# Patient Record
Sex: FEMALE | Employment: OTHER | URBAN - METROPOLITAN AREA
[De-identification: names, ages, dates, MRNs, and addresses within clinical notes are randomized per-mention and may not be internally consistent; named-entity substitution may affect disease eponyms.]

---

## 2019-01-14 ENCOUNTER — ANESTHESIA EVENT (OUTPATIENT)
Dept: OPERATING ROOM | Age: 44
DRG: 304 | End: 2019-01-14
Payer: MEDICARE

## 2019-01-14 ENCOUNTER — HOSPITAL ENCOUNTER (OUTPATIENT)
Dept: PREADMISSION TESTING | Age: 44
Discharge: HOME | DRG: 304 | End: 2019-01-18
Payer: MEDICARE

## 2019-01-14 ENCOUNTER — HOSPITAL ENCOUNTER (OUTPATIENT)
Dept: GENERAL RADIOLOGY | Age: 44
Discharge: HOME | DRG: 304 | End: 2019-01-16
Attending: NEUROLOGICAL SURGERY
Payer: MEDICARE

## 2019-01-14 VITALS
RESPIRATION RATE: 16 BRPM | BODY MASS INDEX: 31.66 KG/M2 | DIASTOLIC BLOOD PRESSURE: 83 MMHG | WEIGHT: 197 LBS | HEART RATE: 65 BPM | OXYGEN SATURATION: 98 % | HEIGHT: 66 IN | SYSTOLIC BLOOD PRESSURE: 143 MMHG | TEMPERATURE: 97.4 F

## 2019-01-14 DIAGNOSIS — M51.27 HERNIATION OF INTERVERTEBRAL DISC BETWEEN L5 AND S1: ICD-10-CM

## 2019-01-14 LAB
ABO/RH: NORMAL
ANION GAP SERPL CALCULATED.3IONS-SCNC: 13 MEQ/L (ref 7–13)
ANTIBODY SCREEN: NORMAL
APTT: 28.4 SEC (ref 21.6–35.4)
BACTERIA: ABNORMAL /HPF
BILIRUBIN URINE: NEGATIVE
BLOOD, URINE: NEGATIVE
BUN BLDV-MCNC: 15 MG/DL (ref 6–20)
CALCIUM SERPL-MCNC: 9.5 MG/DL (ref 8.6–10.2)
CHLORIDE BLD-SCNC: 103 MEQ/L (ref 98–107)
CLARITY: ABNORMAL
CO2: 24 MEQ/L (ref 22–29)
COLOR: ABNORMAL
CREAT SERPL-MCNC: 0.81 MG/DL (ref 0.5–0.9)
EKG ATRIAL RATE: 73 BPM
EKG P AXIS: 68 DEGREES
EKG P-R INTERVAL: 190 MS
EKG Q-T INTERVAL: 388 MS
EKG QRS DURATION: 98 MS
EKG QTC CALCULATION (BAZETT): 427 MS
EKG R AXIS: 80 DEGREES
EKG T AXIS: 62 DEGREES
EKG VENTRICULAR RATE: 73 BPM
EPITHELIAL CELLS, UA: ABNORMAL /HPF (ref 0–5)
GFR AFRICAN AMERICAN: >60
GFR NON-AFRICAN AMERICAN: >60
GLUCOSE BLD-MCNC: 79 MG/DL (ref 74–109)
GLUCOSE URINE: NEGATIVE MG/DL
HCT VFR BLD CALC: 48.8 % (ref 37–47)
HEMOGLOBIN: 17.1 G/DL (ref 12–16)
HYALINE CASTS: ABNORMAL /HPF (ref 0–5)
INR BLD: 1
KETONES, URINE: 15 MG/DL
LEUKOCYTE ESTERASE, URINE: ABNORMAL
MCH RBC QN AUTO: 34 PG (ref 27–31.3)
MCHC RBC AUTO-ENTMCNC: 35.1 % (ref 33–37)
MCV RBC AUTO: 96.9 FL (ref 82–100)
NITRITE, URINE: NEGATIVE
PDW BLD-RTO: 13.3 % (ref 11.5–14.5)
PH UA: 5 (ref 5–9)
PLATELET # BLD: 215 K/UL (ref 130–400)
POTASSIUM SERPL-SCNC: 4 MEQ/L (ref 3.5–5.1)
PROTEIN UA: NEGATIVE MG/DL
PROTHROMBIN TIME: 10.6 SEC (ref 9–11.5)
RBC # BLD: 5.03 M/UL (ref 4.2–5.4)
RBC UA: ABNORMAL /HPF (ref 0–2)
SODIUM BLD-SCNC: 140 MEQ/L (ref 132–144)
SPECIFIC GRAVITY UA: 1.03 (ref 1–1.03)
URINE REFLEX TO CULTURE: YES
UROBILINOGEN, URINE: 0.2 E.U./DL
WBC # BLD: 8.2 K/UL (ref 4.8–10.8)
WBC UA: ABNORMAL /HPF (ref 0–5)

## 2019-01-14 PROCEDURE — 81001 URINALYSIS AUTO W/SCOPE: CPT

## 2019-01-14 PROCEDURE — 87077 CULTURE AEROBIC IDENTIFY: CPT

## 2019-01-14 PROCEDURE — 86850 RBC ANTIBODY SCREEN: CPT

## 2019-01-14 PROCEDURE — 87086 URINE CULTURE/COLONY COUNT: CPT

## 2019-01-14 PROCEDURE — 80048 BASIC METABOLIC PNL TOTAL CA: CPT

## 2019-01-14 PROCEDURE — 72082 X-RAY EXAM ENTIRE SPI 2/3 VW: CPT

## 2019-01-14 PROCEDURE — 86900 BLOOD TYPING SEROLOGIC ABO: CPT

## 2019-01-14 PROCEDURE — 93005 ELECTROCARDIOGRAM TRACING: CPT

## 2019-01-14 PROCEDURE — 86901 BLOOD TYPING SEROLOGIC RH(D): CPT

## 2019-01-14 PROCEDURE — 87186 SC STD MICRODIL/AGAR DIL: CPT

## 2019-01-14 PROCEDURE — 85730 THROMBOPLASTIN TIME PARTIAL: CPT

## 2019-01-14 PROCEDURE — 85027 COMPLETE CBC AUTOMATED: CPT

## 2019-01-14 PROCEDURE — 87081 CULTURE SCREEN ONLY: CPT

## 2019-01-14 PROCEDURE — 85610 PROTHROMBIN TIME: CPT

## 2019-01-14 RX ORDER — AMLODIPINE BESYLATE 5 MG/1
5 TABLET ORAL DAILY
COMMUNITY
Start: 2018-12-22

## 2019-01-14 RX ORDER — SODIUM CHLORIDE, SODIUM LACTATE, POTASSIUM CHLORIDE, CALCIUM CHLORIDE 600; 310; 30; 20 MG/100ML; MG/100ML; MG/100ML; MG/100ML
INJECTION, SOLUTION INTRAVENOUS CONTINUOUS
Status: CANCELLED | OUTPATIENT
Start: 2019-01-15

## 2019-01-14 RX ORDER — LIDOCAINE HYDROCHLORIDE 10 MG/ML
1 INJECTION, SOLUTION EPIDURAL; INFILTRATION; INTRACAUDAL; PERINEURAL
Status: CANCELLED | OUTPATIENT
Start: 2019-01-15 | End: 2019-01-15

## 2019-01-14 RX ORDER — GENTAMICIN SULFATE 80 MG/100ML
80 INJECTION, SOLUTION INTRAVENOUS ONCE
Status: CANCELLED | OUTPATIENT
Start: 2019-01-15

## 2019-01-14 RX ORDER — OXYCODONE HYDROCHLORIDE AND ACETAMINOPHEN 5; 325 MG/1; MG/1
1 TABLET ORAL DAILY
Status: ON HOLD | COMMUNITY
End: 2019-01-17 | Stop reason: HOSPADM

## 2019-01-14 RX ORDER — SODIUM CHLORIDE 0.9 % (FLUSH) 0.9 %
10 SYRINGE (ML) INJECTION EVERY 12 HOURS SCHEDULED
Status: CANCELLED | OUTPATIENT
Start: 2019-01-15

## 2019-01-14 RX ORDER — SODIUM CHLORIDE 0.9 % (FLUSH) 0.9 %
10 SYRINGE (ML) INJECTION PRN
Status: CANCELLED | OUTPATIENT
Start: 2019-01-15

## 2019-01-14 RX ORDER — CEFAZOLIN SODIUM 2 G/50ML
2 SOLUTION INTRAVENOUS ONCE
Status: CANCELLED | OUTPATIENT
Start: 2019-01-15

## 2019-01-14 ASSESSMENT — ENCOUNTER SYMPTOMS
BACK PAIN: 1
SHORTNESS OF BREATH: 1
EYE ITCHING: 1
CONSTIPATION: 0
VOMITING: 1
WHEEZING: 1
STRIDOR: 0
NAUSEA: 1
COUGH: 1
DIARRHEA: 0
SORE THROAT: 0

## 2019-01-14 ASSESSMENT — LIFESTYLE VARIABLES: SMOKING_STATUS: 1

## 2019-01-15 ENCOUNTER — APPOINTMENT (OUTPATIENT)
Dept: GENERAL RADIOLOGY | Age: 44
DRG: 304 | End: 2019-01-15
Attending: NEUROLOGICAL SURGERY
Payer: MEDICARE

## 2019-01-15 ENCOUNTER — HOSPITAL ENCOUNTER (INPATIENT)
Age: 44
LOS: 2 days | Discharge: HOME HEALTH | DRG: 304 | End: 2019-01-17
Attending: NEUROLOGICAL SURGERY | Admitting: NEUROLOGICAL SURGERY
Payer: MEDICARE

## 2019-01-15 ENCOUNTER — ANESTHESIA (OUTPATIENT)
Dept: OPERATING ROOM | Age: 44
DRG: 304 | End: 2019-01-15
Payer: MEDICARE

## 2019-01-15 VITALS — SYSTOLIC BLOOD PRESSURE: 114 MMHG | DIASTOLIC BLOOD PRESSURE: 67 MMHG | TEMPERATURE: 97.5 F | OXYGEN SATURATION: 100 %

## 2019-01-15 DIAGNOSIS — M51.36 LUMBAR DEGENERATIVE DISC DISEASE: ICD-10-CM

## 2019-01-15 DIAGNOSIS — Z98.1 STATUS POST LUMBAR SPINAL FUSION: Primary | ICD-10-CM

## 2019-01-15 PROBLEM — M51.369 LUMBAR DEGENERATIVE DISC DISEASE: Status: ACTIVE | Noted: 2019-01-15

## 2019-01-15 LAB
HCG, URINE, POC: NORMAL
Lab: NORMAL
MRSA CULTURE ONLY: NORMAL
NEGATIVE QC PASS/FAIL: NORMAL
POSITIVE QC PASS/FAIL: NORMAL

## 2019-01-15 PROCEDURE — 88304 TISSUE EXAM BY PATHOLOGIST: CPT

## 2019-01-15 PROCEDURE — 6360000002 HC RX W HCPCS: Performed by: NURSE PRACTITIONER

## 2019-01-15 PROCEDURE — 6370000000 HC RX 637 (ALT 250 FOR IP): Performed by: NEUROLOGICAL SURGERY

## 2019-01-15 PROCEDURE — 2500000003 HC RX 250 WO HCPCS: Performed by: NEUROLOGICAL SURGERY

## 2019-01-15 PROCEDURE — 2580000003 HC RX 258: Performed by: NURSE PRACTITIONER

## 2019-01-15 PROCEDURE — 6360000002 HC RX W HCPCS: Performed by: NURSE ANESTHETIST, CERTIFIED REGISTERED

## 2019-01-15 PROCEDURE — 3209999900 FLUORO FOR SURGICAL PROCEDURES

## 2019-01-15 PROCEDURE — 1210000000 HC MED SURG R&B

## 2019-01-15 PROCEDURE — 0SB40ZZ EXCISION OF LUMBOSACRAL DISC, OPEN APPROACH: ICD-10-PCS | Performed by: NEUROLOGICAL SURGERY

## 2019-01-15 PROCEDURE — 0SG30K1 FUSION OF LUMBOSACRAL JOINT WITH NONAUTOLOGOUS TISSUE SUBSTITUTE, POSTERIOR APPROACH, POSTERIOR COLUMN, OPEN APPROACH: ICD-10-PCS | Performed by: NEUROLOGICAL SURGERY

## 2019-01-15 PROCEDURE — 01NB0ZZ RELEASE LUMBAR NERVE, OPEN APPROACH: ICD-10-PCS | Performed by: NEUROLOGICAL SURGERY

## 2019-01-15 PROCEDURE — 6360000002 HC RX W HCPCS: Performed by: NEUROLOGICAL SURGERY

## 2019-01-15 PROCEDURE — 94150 VITAL CAPACITY TEST: CPT

## 2019-01-15 PROCEDURE — 2500000003 HC RX 250 WO HCPCS: Performed by: NURSE ANESTHETIST, CERTIFIED REGISTERED

## 2019-01-15 PROCEDURE — 7100000000 HC PACU RECOVERY - FIRST 15 MIN: Performed by: NEUROLOGICAL SURGERY

## 2019-01-15 PROCEDURE — 2780000010 HC IMPLANT OTHER: Performed by: NEUROLOGICAL SURGERY

## 2019-01-15 PROCEDURE — 2580000003 HC RX 258: Performed by: NEUROLOGICAL SURGERY

## 2019-01-15 PROCEDURE — 3600000004 HC SURGERY LEVEL 4 BASE: Performed by: NEUROLOGICAL SURGERY

## 2019-01-15 PROCEDURE — 6360000002 HC RX W HCPCS: Performed by: STUDENT IN AN ORGANIZED HEALTH CARE EDUCATION/TRAINING PROGRAM

## 2019-01-15 PROCEDURE — 2720000010 HC SURG SUPPLY STERILE: Performed by: NEUROLOGICAL SURGERY

## 2019-01-15 PROCEDURE — 3700000001 HC ADD 15 MINUTES (ANESTHESIA): Performed by: NEUROLOGICAL SURGERY

## 2019-01-15 PROCEDURE — 0SG30AJ FUSION OF LUMBOSACRAL JOINT WITH INTERBODY FUSION DEVICE, POSTERIOR APPROACH, ANTERIOR COLUMN, OPEN APPROACH: ICD-10-PCS | Performed by: NEUROLOGICAL SURGERY

## 2019-01-15 PROCEDURE — C1713 ANCHOR/SCREW BN/BN,TIS/BN: HCPCS | Performed by: NEUROLOGICAL SURGERY

## 2019-01-15 PROCEDURE — 3700000000 HC ANESTHESIA ATTENDED CARE: Performed by: NEUROLOGICAL SURGERY

## 2019-01-15 PROCEDURE — 94664 DEMO&/EVAL PT USE INHALER: CPT

## 2019-01-15 PROCEDURE — 7100000001 HC PACU RECOVERY - ADDTL 15 MIN: Performed by: NEUROLOGICAL SURGERY

## 2019-01-15 PROCEDURE — S0028 INJECTION, FAMOTIDINE, 20 MG: HCPCS | Performed by: NURSE ANESTHETIST, CERTIFIED REGISTERED

## 2019-01-15 PROCEDURE — 94640 AIRWAY INHALATION TREATMENT: CPT

## 2019-01-15 PROCEDURE — 3600000014 HC SURGERY LEVEL 4 ADDTL 15MIN: Performed by: NEUROLOGICAL SURGERY

## 2019-01-15 PROCEDURE — 94762 N-INVAS EAR/PLS OXIMTRY CONT: CPT

## 2019-01-15 PROCEDURE — 2709999900 HC NON-CHARGEABLE SUPPLY: Performed by: NEUROLOGICAL SURGERY

## 2019-01-15 DEVICE — ROD SPNL LORDTC 5.5X45 MM TI RELINE-O: Type: IMPLANTABLE DEVICE | Site: SPINE LUMBAR | Status: FUNCTIONAL

## 2019-01-15 DEVICE — SCREW SPNL DIA5.5MM OPN TULIP LOK RELINE: Type: IMPLANTABLE DEVICE | Site: SPINE LUMBAR | Status: FUNCTIONAL

## 2019-01-15 DEVICE — SCREW SPNL L45MM DIA6.5MM POST THORACOLUMBOSACRAL POLYAX 2S: Type: IMPLANTABLE DEVICE | Site: SPINE LUMBAR | Status: FUNCTIONAL

## 2019-01-15 DEVICE — SCREW SPNL L55MM DIA6.5MM POST THORACOLUMBOSACRAL POLYAX 2S: Type: IMPLANTABLE DEVICE | Site: SPINE LUMBAR | Status: FUNCTIONAL

## 2019-01-15 DEVICE — GRAFT CHIP CANC OSTEOCEL 10CC: Type: IMPLANTABLE DEVICE | Site: SPINE LUMBAR | Status: FUNCTIONAL

## 2019-01-15 DEVICE — GRAFT BNE SUB 5ML MTRX CELLULAR OSTEOCEL +: Type: IMPLANTABLE DEVICE | Site: SPINE LUMBAR | Status: FUNCTIONAL

## 2019-01-15 DEVICE — GRAFT BNE SUB 30CC 1.7-10MM CANC CHIP MORSELIZED FRZ DRY: Type: IMPLANTABLE DEVICE | Site: SPINE LUMBAR | Status: FUNCTIONAL

## 2019-01-15 DEVICE — AGENT HEMOSTATIC SURGIFLOW MATRIX KIT W/THROMBIN: Type: IMPLANTABLE DEVICE | Site: SPINE LUMBAR | Status: FUNCTIONAL

## 2019-01-15 DEVICE — CONNECTOR SPNL L40-50MM CRSS LO PROF ADJ FOR 5.5MM ROD: Type: IMPLANTABLE DEVICE | Site: SPINE LUMBAR | Status: FUNCTIONAL

## 2019-01-15 RX ORDER — TIZANIDINE 4 MG/1
4 TABLET ORAL NIGHTLY
Status: ON HOLD | COMMUNITY
End: 2019-01-17 | Stop reason: HOSPADM

## 2019-01-15 RX ORDER — GLYCOPYRROLATE 1 MG/5 ML
SYRINGE (ML) INTRAVENOUS PRN
Status: DISCONTINUED | OUTPATIENT
Start: 2019-01-15 | End: 2019-01-15 | Stop reason: SDUPTHER

## 2019-01-15 RX ORDER — DEXAMETHASONE SODIUM PHOSPHATE 4 MG/ML
4 INJECTION, SOLUTION INTRA-ARTICULAR; INTRALESIONAL; INTRAMUSCULAR; INTRAVENOUS; SOFT TISSUE EVERY 6 HOURS
Status: DISCONTINUED | OUTPATIENT
Start: 2019-01-15 | End: 2019-01-16

## 2019-01-15 RX ORDER — MEPERIDINE HYDROCHLORIDE 25 MG/ML
12.5 INJECTION INTRAMUSCULAR; INTRAVENOUS; SUBCUTANEOUS EVERY 5 MIN PRN
Status: DISCONTINUED | OUTPATIENT
Start: 2019-01-15 | End: 2019-01-15 | Stop reason: HOSPADM

## 2019-01-15 RX ORDER — DIPHENHYDRAMINE HYDROCHLORIDE 50 MG/ML
12.5 INJECTION INTRAMUSCULAR; INTRAVENOUS
Status: DISCONTINUED | OUTPATIENT
Start: 2019-01-15 | End: 2019-01-15 | Stop reason: HOSPADM

## 2019-01-15 RX ORDER — ACETAMINOPHEN 325 MG/1
650 TABLET ORAL EVERY 4 HOURS PRN
Status: DISCONTINUED | OUTPATIENT
Start: 2019-01-15 | End: 2019-01-17 | Stop reason: HOSPADM

## 2019-01-15 RX ORDER — ALBUTEROL SULFATE 90 UG/1
2 AEROSOL, METERED RESPIRATORY (INHALATION)
Status: DISCONTINUED | OUTPATIENT
Start: 2019-01-15 | End: 2019-01-17 | Stop reason: HOSPADM

## 2019-01-15 RX ORDER — FENTANYL CITRATE 50 UG/ML
50 INJECTION, SOLUTION INTRAMUSCULAR; INTRAVENOUS EVERY 10 MIN PRN
Status: DISCONTINUED | OUTPATIENT
Start: 2019-01-15 | End: 2019-01-15 | Stop reason: HOSPADM

## 2019-01-15 RX ORDER — ROCURONIUM BROMIDE 10 MG/ML
INJECTION, SOLUTION INTRAVENOUS PRN
Status: DISCONTINUED | OUTPATIENT
Start: 2019-01-15 | End: 2019-01-15 | Stop reason: SDUPTHER

## 2019-01-15 RX ORDER — PROPOFOL 10 MG/ML
INJECTION, EMULSION INTRAVENOUS PRN
Status: DISCONTINUED | OUTPATIENT
Start: 2019-01-15 | End: 2019-01-15 | Stop reason: SDUPTHER

## 2019-01-15 RX ORDER — ASCORBIC ACID 500 MG
500 TABLET ORAL DAILY
Status: DISCONTINUED | OUTPATIENT
Start: 2019-01-15 | End: 2019-01-17 | Stop reason: HOSPADM

## 2019-01-15 RX ORDER — CEFAZOLIN SODIUM 2 G/50ML
2 SOLUTION INTRAVENOUS ONCE
Status: COMPLETED | OUTPATIENT
Start: 2019-01-15 | End: 2019-01-15

## 2019-01-15 RX ORDER — MAGNESIUM HYDROXIDE 1200 MG/15ML
LIQUID ORAL CONTINUOUS PRN
Status: COMPLETED | OUTPATIENT
Start: 2019-01-15 | End: 2019-01-15

## 2019-01-15 RX ORDER — ONDANSETRON 2 MG/ML
4 INJECTION INTRAMUSCULAR; INTRAVENOUS EVERY 6 HOURS PRN
Status: DISCONTINUED | OUTPATIENT
Start: 2019-01-15 | End: 2019-01-17 | Stop reason: HOSPADM

## 2019-01-15 RX ORDER — ALBUTEROL SULFATE 90 UG/1
2 AEROSOL, METERED RESPIRATORY (INHALATION) EVERY 6 HOURS PRN
Status: DISCONTINUED | OUTPATIENT
Start: 2019-01-15 | End: 2019-01-15

## 2019-01-15 RX ORDER — GINSENG 100 MG
CAPSULE ORAL PRN
Status: DISCONTINUED | OUTPATIENT
Start: 2019-01-15 | End: 2019-01-15 | Stop reason: HOSPADM

## 2019-01-15 RX ORDER — FENTANYL 25 UG/H
1 PATCH TRANSDERMAL
Status: DISCONTINUED | OUTPATIENT
Start: 2019-01-15 | End: 2019-01-16

## 2019-01-15 RX ORDER — LIDOCAINE HYDROCHLORIDE 10 MG/ML
1 INJECTION, SOLUTION EPIDURAL; INFILTRATION; INTRACAUDAL; PERINEURAL
Status: DISCONTINUED | OUTPATIENT
Start: 2019-01-15 | End: 2019-01-15 | Stop reason: HOSPADM

## 2019-01-15 RX ORDER — MORPHINE SULFATE 4 MG/ML
4 INJECTION, SOLUTION INTRAMUSCULAR; INTRAVENOUS
Status: DISCONTINUED | OUTPATIENT
Start: 2019-01-15 | End: 2019-01-16

## 2019-01-15 RX ORDER — SODIUM CHLORIDE 0.9 % (FLUSH) 0.9 %
10 SYRINGE (ML) INJECTION EVERY 12 HOURS SCHEDULED
Status: DISCONTINUED | OUTPATIENT
Start: 2019-01-15 | End: 2019-01-17 | Stop reason: HOSPADM

## 2019-01-15 RX ORDER — OXYCODONE HYDROCHLORIDE AND ACETAMINOPHEN 5; 325 MG/1; MG/1
1 TABLET ORAL DAILY
Status: DISCONTINUED | OUTPATIENT
Start: 2019-01-15 | End: 2019-01-16

## 2019-01-15 RX ORDER — AMLODIPINE BESYLATE 5 MG/1
5 TABLET ORAL DAILY
Status: DISCONTINUED | OUTPATIENT
Start: 2019-01-16 | End: 2019-01-17 | Stop reason: HOSPADM

## 2019-01-15 RX ORDER — ONDANSETRON 4 MG/1
4 TABLET, FILM COATED ORAL EVERY 8 HOURS PRN
COMMUNITY

## 2019-01-15 RX ORDER — SODIUM CHLORIDE, SODIUM LACTATE, POTASSIUM CHLORIDE, CALCIUM CHLORIDE 600; 310; 30; 20 MG/100ML; MG/100ML; MG/100ML; MG/100ML
INJECTION, SOLUTION INTRAVENOUS CONTINUOUS
Status: DISCONTINUED | OUTPATIENT
Start: 2019-01-15 | End: 2019-01-15

## 2019-01-15 RX ORDER — CEPHALEXIN 500 MG/1
500 CAPSULE ORAL EVERY 8 HOURS SCHEDULED
Status: DISCONTINUED | OUTPATIENT
Start: 2019-01-16 | End: 2019-01-17 | Stop reason: HOSPADM

## 2019-01-15 RX ORDER — FAMOTIDINE 20 MG/1
20 TABLET, FILM COATED ORAL 2 TIMES DAILY
Status: DISCONTINUED | OUTPATIENT
Start: 2019-01-15 | End: 2019-01-17 | Stop reason: HOSPADM

## 2019-01-15 RX ORDER — DEXTROSE, SODIUM CHLORIDE, AND POTASSIUM CHLORIDE 5; .45; .15 G/100ML; G/100ML; G/100ML
INJECTION INTRAVENOUS CONTINUOUS
Status: DISCONTINUED | OUTPATIENT
Start: 2019-01-15 | End: 2019-01-16

## 2019-01-15 RX ORDER — PANTOPRAZOLE SODIUM 40 MG/1
40 TABLET, DELAYED RELEASE ORAL
Status: DISCONTINUED | OUTPATIENT
Start: 2019-01-16 | End: 2019-01-17 | Stop reason: HOSPADM

## 2019-01-15 RX ORDER — MIDAZOLAM HYDROCHLORIDE 1 MG/ML
INJECTION INTRAMUSCULAR; INTRAVENOUS PRN
Status: DISCONTINUED | OUTPATIENT
Start: 2019-01-15 | End: 2019-01-15 | Stop reason: SDUPTHER

## 2019-01-15 RX ORDER — CYCLOBENZAPRINE HCL 10 MG
10 TABLET ORAL 3 TIMES DAILY PRN
Status: DISCONTINUED | OUTPATIENT
Start: 2019-01-15 | End: 2019-01-16

## 2019-01-15 RX ORDER — ONDANSETRON 2 MG/ML
INJECTION INTRAMUSCULAR; INTRAVENOUS PRN
Status: DISCONTINUED | OUTPATIENT
Start: 2019-01-15 | End: 2019-01-15 | Stop reason: SDUPTHER

## 2019-01-15 RX ORDER — DEXAMETHASONE SODIUM PHOSPHATE 10 MG/ML
INJECTION INTRAMUSCULAR; INTRAVENOUS PRN
Status: DISCONTINUED | OUTPATIENT
Start: 2019-01-15 | End: 2019-01-15 | Stop reason: SDUPTHER

## 2019-01-15 RX ORDER — ONDANSETRON 2 MG/ML
4 INJECTION INTRAMUSCULAR; INTRAVENOUS
Status: DISCONTINUED | OUTPATIENT
Start: 2019-01-15 | End: 2019-01-15 | Stop reason: HOSPADM

## 2019-01-15 RX ORDER — SODIUM CHLORIDE 0.9 % (FLUSH) 0.9 %
10 SYRINGE (ML) INJECTION PRN
Status: DISCONTINUED | OUTPATIENT
Start: 2019-01-15 | End: 2019-01-15 | Stop reason: HOSPADM

## 2019-01-15 RX ORDER — SENNA AND DOCUSATE SODIUM 50; 8.6 MG/1; MG/1
2 TABLET, FILM COATED ORAL 2 TIMES DAILY
Status: DISCONTINUED | OUTPATIENT
Start: 2019-01-15 | End: 2019-01-17 | Stop reason: HOSPADM

## 2019-01-15 RX ORDER — FENTANYL CITRATE 50 UG/ML
INJECTION, SOLUTION INTRAMUSCULAR; INTRAVENOUS PRN
Status: DISCONTINUED | OUTPATIENT
Start: 2019-01-15 | End: 2019-01-15 | Stop reason: SDUPTHER

## 2019-01-15 RX ORDER — SUCCINYLCHOLINE/SOD CL,ISO/PF 100 MG/5ML
SYRINGE (ML) INTRAVENOUS PRN
Status: DISCONTINUED | OUTPATIENT
Start: 2019-01-15 | End: 2019-01-15 | Stop reason: SDUPTHER

## 2019-01-15 RX ORDER — GABAPENTIN 400 MG/1
400 CAPSULE ORAL 4 TIMES DAILY
Status: DISCONTINUED | OUTPATIENT
Start: 2019-01-15 | End: 2019-01-17 | Stop reason: HOSPADM

## 2019-01-15 RX ORDER — CEFAZOLIN SODIUM 2 G/50ML
2 SOLUTION INTRAVENOUS EVERY 8 HOURS
Status: COMPLETED | OUTPATIENT
Start: 2019-01-15 | End: 2019-01-16

## 2019-01-15 RX ORDER — MORPHINE SULFATE 2 MG/ML
2 INJECTION, SOLUTION INTRAMUSCULAR; INTRAVENOUS
Status: DISCONTINUED | OUTPATIENT
Start: 2019-01-15 | End: 2019-01-16

## 2019-01-15 RX ORDER — OXYCODONE HYDROCHLORIDE AND ACETAMINOPHEN 5; 325 MG/1; MG/1
1 TABLET ORAL EVERY 4 HOURS PRN
Status: DISCONTINUED | OUTPATIENT
Start: 2019-01-15 | End: 2019-01-16

## 2019-01-15 RX ORDER — OXYCODONE HYDROCHLORIDE AND ACETAMINOPHEN 5; 325 MG/1; MG/1
2 TABLET ORAL EVERY 4 HOURS PRN
Status: DISCONTINUED | OUTPATIENT
Start: 2019-01-15 | End: 2019-01-16

## 2019-01-15 RX ORDER — DIPHENHYDRAMINE HYDROCHLORIDE 50 MG/ML
INJECTION INTRAMUSCULAR; INTRAVENOUS PRN
Status: DISCONTINUED | OUTPATIENT
Start: 2019-01-15 | End: 2019-01-15 | Stop reason: SDUPTHER

## 2019-01-15 RX ORDER — TIZANIDINE 4 MG/1
4 TABLET ORAL NIGHTLY
Status: DISCONTINUED | OUTPATIENT
Start: 2019-01-15 | End: 2019-01-17 | Stop reason: HOSPADM

## 2019-01-15 RX ORDER — ALBUTEROL SULFATE 90 UG/1
2 AEROSOL, METERED RESPIRATORY (INHALATION) EVERY 4 HOURS PRN
Status: DISCONTINUED | OUTPATIENT
Start: 2019-01-15 | End: 2019-01-15

## 2019-01-15 RX ORDER — ONDANSETRON 4 MG/1
4 TABLET, FILM COATED ORAL EVERY 8 HOURS PRN
Status: DISCONTINUED | OUTPATIENT
Start: 2019-01-15 | End: 2019-01-17 | Stop reason: HOSPADM

## 2019-01-15 RX ORDER — ASCORBIC ACID 500 MG
500 TABLET ORAL DAILY
COMMUNITY

## 2019-01-15 RX ORDER — LIDOCAINE HYDROCHLORIDE 20 MG/ML
INJECTION, SOLUTION INFILTRATION; PERINEURAL PRN
Status: DISCONTINUED | OUTPATIENT
Start: 2019-01-15 | End: 2019-01-15 | Stop reason: SDUPTHER

## 2019-01-15 RX ORDER — FERROUS SULFATE 325(65) MG
65 TABLET ORAL
COMMUNITY

## 2019-01-15 RX ORDER — ALBUTEROL SULFATE 90 UG/1
2 AEROSOL, METERED RESPIRATORY (INHALATION) 4 TIMES DAILY
Status: DISCONTINUED | OUTPATIENT
Start: 2019-01-15 | End: 2019-01-17 | Stop reason: HOSPADM

## 2019-01-15 RX ORDER — SODIUM CHLORIDE 0.9 % (FLUSH) 0.9 %
10 SYRINGE (ML) INJECTION PRN
Status: DISCONTINUED | OUTPATIENT
Start: 2019-01-15 | End: 2019-01-17 | Stop reason: HOSPADM

## 2019-01-15 RX ORDER — SODIUM CHLORIDE 0.9 % (FLUSH) 0.9 %
10 SYRINGE (ML) INJECTION EVERY 12 HOURS SCHEDULED
Status: DISCONTINUED | OUTPATIENT
Start: 2019-01-15 | End: 2019-01-15 | Stop reason: HOSPADM

## 2019-01-15 RX ORDER — FERROUS SULFATE 325(65) MG
325 TABLET ORAL
Status: DISCONTINUED | OUTPATIENT
Start: 2019-01-16 | End: 2019-01-17 | Stop reason: HOSPADM

## 2019-01-15 RX ORDER — PROPOFOL 10 MG/ML
INJECTION, EMULSION INTRAVENOUS CONTINUOUS PRN
Status: DISCONTINUED | OUTPATIENT
Start: 2019-01-15 | End: 2019-01-15 | Stop reason: SDUPTHER

## 2019-01-15 RX ORDER — GENTAMICIN SULFATE 80 MG/100ML
80 INJECTION, SOLUTION INTRAVENOUS ONCE
Status: COMPLETED | OUTPATIENT
Start: 2019-01-15 | End: 2019-01-15

## 2019-01-15 RX ORDER — METOCLOPRAMIDE HYDROCHLORIDE 5 MG/ML
10 INJECTION INTRAMUSCULAR; INTRAVENOUS
Status: COMPLETED | OUTPATIENT
Start: 2019-01-15 | End: 2019-01-15

## 2019-01-15 RX ADMIN — FAMOTIDINE 20 MG: 10 INJECTION, SOLUTION INTRAVENOUS at 07:10

## 2019-01-15 RX ADMIN — FAMOTIDINE 20 MG: 20 TABLET, FILM COATED ORAL at 20:27

## 2019-01-15 RX ADMIN — SODIUM CHLORIDE, POTASSIUM CHLORIDE, SODIUM LACTATE AND CALCIUM CHLORIDE 125 ML/HR: 600; 310; 30; 20 INJECTION, SOLUTION INTRAVENOUS at 06:59

## 2019-01-15 RX ADMIN — PROPOFOL 200 MG: 10 INJECTION, EMULSION INTRAVENOUS at 07:22

## 2019-01-15 RX ADMIN — SODIUM CHLORIDE, POTASSIUM CHLORIDE, SODIUM LACTATE AND CALCIUM CHLORIDE: 600; 310; 30; 20 INJECTION, SOLUTION INTRAVENOUS at 08:58

## 2019-01-15 RX ADMIN — MORPHINE SULFATE 2 MG: 2 INJECTION, SOLUTION INTRAMUSCULAR; INTRAVENOUS at 16:50

## 2019-01-15 RX ADMIN — SUGAMMADEX 200 MG: 100 INJECTION, SOLUTION INTRAVENOUS at 10:29

## 2019-01-15 RX ADMIN — CYCLOBENZAPRINE HYDROCHLORIDE 10 MG: 10 TABLET, FILM COATED ORAL at 23:54

## 2019-01-15 RX ADMIN — MEPERIDINE HYDROCHLORIDE 12.5 MG: 25 INJECTION INTRAMUSCULAR; INTRAVENOUS; SUBCUTANEOUS at 11:52

## 2019-01-15 RX ADMIN — SODIUM CHLORIDE, POTASSIUM CHLORIDE, SODIUM LACTATE AND CALCIUM CHLORIDE: 600; 310; 30; 20 INJECTION, SOLUTION INTRAVENOUS at 10:50

## 2019-01-15 RX ADMIN — CEFAZOLIN SODIUM 2 G: 2 SOLUTION INTRAVENOUS at 15:41

## 2019-01-15 RX ADMIN — MEPERIDINE HYDROCHLORIDE 12.5 MG: 25 INJECTION INTRAMUSCULAR; INTRAVENOUS; SUBCUTANEOUS at 11:42

## 2019-01-15 RX ADMIN — FENTANYL CITRATE 100 MCG: 50 INJECTION, SOLUTION INTRAMUSCULAR; INTRAVENOUS at 07:18

## 2019-01-15 RX ADMIN — MIDAZOLAM HYDROCHLORIDE 2 MG: 1 INJECTION, SOLUTION INTRAMUSCULAR; INTRAVENOUS at 07:18

## 2019-01-15 RX ADMIN — GENTAMICIN SULFATE 80 MG: 80 INJECTION, SOLUTION INTRAVENOUS at 07:45

## 2019-01-15 RX ADMIN — POTASSIUM CHLORIDE, DEXTROSE MONOHYDRATE AND SODIUM CHLORIDE: 150; 5; 450 INJECTION, SOLUTION INTRAVENOUS at 23:56

## 2019-01-15 RX ADMIN — CEFAZOLIN SODIUM 2 G: 2 SOLUTION INTRAVENOUS at 07:40

## 2019-01-15 RX ADMIN — DEXAMETHASONE SODIUM PHOSPHATE 4 MG: 4 INJECTION, SOLUTION INTRAMUSCULAR; INTRAVENOUS at 13:26

## 2019-01-15 RX ADMIN — FENTANYL CITRATE 50 MCG: 50 INJECTION, SOLUTION INTRAMUSCULAR; INTRAVENOUS at 11:15

## 2019-01-15 RX ADMIN — DEXAMETHASONE SODIUM PHOSPHATE 10 MG: 10 INJECTION INTRAMUSCULAR; INTRAVENOUS at 07:40

## 2019-01-15 RX ADMIN — PROPOFOL 100 MCG/KG/MIN: 10 INJECTION, EMULSION INTRAVENOUS at 07:40

## 2019-01-15 RX ADMIN — OXYCODONE AND ACETAMINOPHEN 2 TABLET: 5; 325 TABLET ORAL at 15:51

## 2019-01-15 RX ADMIN — Medication 2 PUFF: at 21:28

## 2019-01-15 RX ADMIN — FENTANYL CITRATE 50 MCG: 50 INJECTION, SOLUTION INTRAMUSCULAR; INTRAVENOUS at 11:26

## 2019-01-15 RX ADMIN — HYDROMORPHONE HYDROCHLORIDE 1 MG: 1 INJECTION, SOLUTION INTRAMUSCULAR; INTRAVENOUS; SUBCUTANEOUS at 07:40

## 2019-01-15 RX ADMIN — CYCLOBENZAPRINE HYDROCHLORIDE 10 MG: 10 TABLET, FILM COATED ORAL at 13:21

## 2019-01-15 RX ADMIN — OXYCODONE AND ACETAMINOPHEN 2 TABLET: 5; 325 TABLET ORAL at 20:27

## 2019-01-15 RX ADMIN — LIDOCAINE HYDROCHLORIDE 80 MG: 20 INJECTION, SOLUTION INFILTRATION; PERINEURAL at 07:22

## 2019-01-15 RX ADMIN — Medication 100 MG: at 07:22

## 2019-01-15 RX ADMIN — CEFAZOLIN SODIUM 2 G: 2 SOLUTION INTRAVENOUS at 23:54

## 2019-01-15 RX ADMIN — MORPHINE SULFATE 4 MG: 4 INJECTION, SOLUTION INTRAMUSCULAR; INTRAVENOUS at 13:21

## 2019-01-15 RX ADMIN — OXYCODONE AND ACETAMINOPHEN 1 TABLET: 5; 325 TABLET ORAL at 11:50

## 2019-01-15 RX ADMIN — GABAPENTIN 400 MG: 400 CAPSULE ORAL at 16:50

## 2019-01-15 RX ADMIN — DIPHENHYDRAMINE HYDROCHLORIDE 12.5 MG: 50 INJECTION, SOLUTION INTRAMUSCULAR; INTRAVENOUS at 07:40

## 2019-01-15 RX ADMIN — POTASSIUM CHLORIDE, DEXTROSE MONOHYDRATE AND SODIUM CHLORIDE: 150; 5; 450 INJECTION, SOLUTION INTRAVENOUS at 13:21

## 2019-01-15 RX ADMIN — ROCURONIUM BROMIDE 50 MG: 10 INJECTION INTRAVENOUS at 07:26

## 2019-01-15 RX ADMIN — METOCLOPRAMIDE 10 MG: 5 INJECTION, SOLUTION INTRAMUSCULAR; INTRAVENOUS at 11:17

## 2019-01-15 RX ADMIN — ONDANSETRON 4 MG: 2 INJECTION INTRAMUSCULAR; INTRAVENOUS at 13:21

## 2019-01-15 RX ADMIN — FAMOTIDINE 20 MG: 20 TABLET, FILM COATED ORAL at 13:21

## 2019-01-15 RX ADMIN — SENNOSIDES AND DOCUSATE SODIUM 2 TABLET: 8.6; 5 TABLET ORAL at 20:27

## 2019-01-15 RX ADMIN — DEXAMETHASONE SODIUM PHOSPHATE 4 MG: 4 INJECTION, SOLUTION INTRAMUSCULAR; INTRAVENOUS at 20:27

## 2019-01-15 RX ADMIN — HYDROMORPHONE HYDROCHLORIDE 1 MG: 1 INJECTION, SOLUTION INTRAMUSCULAR; INTRAVENOUS; SUBCUTANEOUS at 10:24

## 2019-01-15 RX ADMIN — GABAPENTIN 400 MG: 400 CAPSULE ORAL at 20:27

## 2019-01-15 RX ADMIN — ONDANSETRON 4 MG: 2 INJECTION INTRAMUSCULAR; INTRAVENOUS at 10:09

## 2019-01-15 RX ADMIN — Medication 0.2 MG: at 10:09

## 2019-01-15 ASSESSMENT — PULMONARY FUNCTION TESTS
PIF_VALUE: 23
PIF_VALUE: 23
PIF_VALUE: 22
PIF_VALUE: 23
PIF_VALUE: 21
PIF_VALUE: 20
PIF_VALUE: 23
PIF_VALUE: 23
PIF_VALUE: 13
PIF_VALUE: 23
PIF_VALUE: 22
PIF_VALUE: 23
PIF_VALUE: 13
PIF_VALUE: 22
PIF_VALUE: 24
PIF_VALUE: 19
PIF_VALUE: 23
PIF_VALUE: 22
PIF_VALUE: 23
PIF_VALUE: 23
PIF_VALUE: 22
PIF_VALUE: 6
PIF_VALUE: 23
PIF_VALUE: 27
PIF_VALUE: 20
PIF_VALUE: 23
PIF_VALUE: 13
PIF_VALUE: 24
PIF_VALUE: 20
PIF_VALUE: 22
PIF_VALUE: 23
PIF_VALUE: 20
PIF_VALUE: 24
PIF_VALUE: 13
PIF_VALUE: 23
PIF_VALUE: 17
PIF_VALUE: 23
PIF_VALUE: 22
PIF_VALUE: 22
PIF_VALUE: 23
PIF_VALUE: 23
PIF_VALUE: 21
PIF_VALUE: 23
PIF_VALUE: 23
PIF_VALUE: 19
PIF_VALUE: 17
PIF_VALUE: 23
PIF_VALUE: 22
PIF_VALUE: 13
PIF_VALUE: 23
PIF_VALUE: 19
PIF_VALUE: 23
PIF_VALUE: 23
PIF_VALUE: 21
PIF_VALUE: 23
PIF_VALUE: 18
PIF_VALUE: 23
PIF_VALUE: 22
PIF_VALUE: 17
PIF_VALUE: 22
PIF_VALUE: 23
PIF_VALUE: 22
PIF_VALUE: 23
PIF_VALUE: 13
PIF_VALUE: 17
PIF_VALUE: 22
PIF_VALUE: 17
PIF_VALUE: 0
PIF_VALUE: 23
PIF_VALUE: 22
PIF_VALUE: 22
PIF_VALUE: 5
PIF_VALUE: 23
PIF_VALUE: 17
PIF_VALUE: 23
PIF_VALUE: 22
PIF_VALUE: 22
PIF_VALUE: 14
PIF_VALUE: 23
PIF_VALUE: 22
PIF_VALUE: 23
PIF_VALUE: 22
PIF_VALUE: 24
PIF_VALUE: 23
PIF_VALUE: 24
PIF_VALUE: 22
PIF_VALUE: 23
PIF_VALUE: 22
PIF_VALUE: 23
PIF_VALUE: 13
PIF_VALUE: 22
PIF_VALUE: 23
PIF_VALUE: 22
PIF_VALUE: 23
PIF_VALUE: 22
PIF_VALUE: 23
PIF_VALUE: 24
PIF_VALUE: 23
PIF_VALUE: 22
PIF_VALUE: 8
PIF_VALUE: 23
PIF_VALUE: 22
PIF_VALUE: 20
PIF_VALUE: 24
PIF_VALUE: 19
PIF_VALUE: 21
PIF_VALUE: 23
PIF_VALUE: 22
PIF_VALUE: 24
PIF_VALUE: 23
PIF_VALUE: 23
PIF_VALUE: 21
PIF_VALUE: 23
PIF_VALUE: 22
PIF_VALUE: 24
PIF_VALUE: 23
PIF_VALUE: 13
PIF_VALUE: 23
PIF_VALUE: 23
PIF_VALUE: 22
PIF_VALUE: 22
PIF_VALUE: 20
PIF_VALUE: 23
PIF_VALUE: 23
PIF_VALUE: 26
PIF_VALUE: 22
PIF_VALUE: 21
PIF_VALUE: 23
PIF_VALUE: 20
PIF_VALUE: 23
PIF_VALUE: 2
PIF_VALUE: 23
PIF_VALUE: 20
PIF_VALUE: 22
PIF_VALUE: 23
PIF_VALUE: 23
PIF_VALUE: 20
PIF_VALUE: 22
PIF_VALUE: 22
PIF_VALUE: 23
PIF_VALUE: 23
PIF_VALUE: 17
PIF_VALUE: 19
PIF_VALUE: 22
PIF_VALUE: 21
PIF_VALUE: 2
PIF_VALUE: 23
PIF_VALUE: 23
PIF_VALUE: 24
PIF_VALUE: 17
PIF_VALUE: 2
PIF_VALUE: 22
PIF_VALUE: 13
PIF_VALUE: 20
PIF_VALUE: 23
PIF_VALUE: 14
PIF_VALUE: 22
PIF_VALUE: 23
PIF_VALUE: 3
PIF_VALUE: 23
PIF_VALUE: 23
PIF_VALUE: 17
PIF_VALUE: 22
PIF_VALUE: 23
PIF_VALUE: 22
PIF_VALUE: 23
PIF_VALUE: 20
PIF_VALUE: 23
PIF_VALUE: 22
PIF_VALUE: 23
PIF_VALUE: 23
PIF_VALUE: 15
PIF_VALUE: 22
PIF_VALUE: 21

## 2019-01-15 ASSESSMENT — PAIN SCALES - GENERAL
PAINLEVEL_OUTOF10: 10
PAINLEVEL_OUTOF10: 9
PAINLEVEL_OUTOF10: 8
PAINLEVEL_OUTOF10: 6
PAINLEVEL_OUTOF10: 8
PAINLEVEL_OUTOF10: 5
PAINLEVEL_OUTOF10: 8
PAINLEVEL_OUTOF10: 8
PAINLEVEL_OUTOF10: 6
PAINLEVEL_OUTOF10: 8
PAINLEVEL_OUTOF10: 0
PAINLEVEL_OUTOF10: 8

## 2019-01-15 ASSESSMENT — PAIN DESCRIPTION - PAIN TYPE
TYPE: ACUTE PAIN
TYPE: CHRONIC PAIN

## 2019-01-15 ASSESSMENT — PAIN DESCRIPTION - LOCATION
LOCATION: BACK;NECK
LOCATION: BACK

## 2019-01-16 ENCOUNTER — APPOINTMENT (OUTPATIENT)
Dept: GENERAL RADIOLOGY | Age: 44
DRG: 304 | End: 2019-01-16
Attending: NEUROLOGICAL SURGERY
Payer: MEDICARE

## 2019-01-16 LAB
ANION GAP SERPL CALCULATED.3IONS-SCNC: 12 MEQ/L (ref 7–13)
BASOPHILS ABSOLUTE: 0 K/UL (ref 0–0.2)
BASOPHILS RELATIVE PERCENT: 0.1 %
BUN BLDV-MCNC: 8 MG/DL (ref 6–20)
CALCIUM SERPL-MCNC: 8.3 MG/DL (ref 8.6–10.2)
CHLORIDE BLD-SCNC: 105 MEQ/L (ref 98–107)
CO2: 22 MEQ/L (ref 22–29)
CREAT SERPL-MCNC: 0.86 MG/DL (ref 0.5–0.9)
EOSINOPHILS ABSOLUTE: 0 K/UL (ref 0–0.7)
EOSINOPHILS RELATIVE PERCENT: 0 %
GFR AFRICAN AMERICAN: >60
GFR NON-AFRICAN AMERICAN: >60
GLUCOSE BLD-MCNC: 154 MG/DL (ref 74–109)
HCT VFR BLD CALC: 38.1 % (ref 37–47)
HEMOGLOBIN: 13.4 G/DL (ref 12–16)
LYMPHOCYTES ABSOLUTE: 0.7 K/UL (ref 1–4.8)
LYMPHOCYTES RELATIVE PERCENT: 3.9 %
MCH RBC QN AUTO: 34 PG (ref 27–31.3)
MCHC RBC AUTO-ENTMCNC: 35.1 % (ref 33–37)
MCV RBC AUTO: 96.6 FL (ref 82–100)
MONOCYTES ABSOLUTE: 0.8 K/UL (ref 0.2–0.8)
MONOCYTES RELATIVE PERCENT: 4.5 %
NEUTROPHILS ABSOLUTE: 15.6 K/UL (ref 1.4–6.5)
NEUTROPHILS RELATIVE PERCENT: 91.5 %
ORGANISM: ABNORMAL
PDW BLD-RTO: 13 % (ref 11.5–14.5)
PLATELET # BLD: 168 K/UL (ref 130–400)
POTASSIUM REFLEX MAGNESIUM: 4.4 MEQ/L (ref 3.5–5.1)
RBC # BLD: 3.94 M/UL (ref 4.2–5.4)
SLIDE REVIEW: ABNORMAL
SODIUM BLD-SCNC: 139 MEQ/L (ref 132–144)
URINE CULTURE, ROUTINE: ABNORMAL
WBC # BLD: 17.1 K/UL (ref 4.8–10.8)

## 2019-01-16 PROCEDURE — 6360000002 HC RX W HCPCS: Performed by: NEUROLOGICAL SURGERY

## 2019-01-16 PROCEDURE — 93010 ELECTROCARDIOGRAM REPORT: CPT | Performed by: INTERNAL MEDICINE

## 2019-01-16 PROCEDURE — G8979 MOBILITY GOAL STATUS: HCPCS

## 2019-01-16 PROCEDURE — G8978 MOBILITY CURRENT STATUS: HCPCS

## 2019-01-16 PROCEDURE — 6370000000 HC RX 637 (ALT 250 FOR IP): Performed by: NURSE PRACTITIONER

## 2019-01-16 PROCEDURE — 1210000000 HC MED SURG R&B

## 2019-01-16 PROCEDURE — 97166 OT EVAL MOD COMPLEX 45 MIN: CPT

## 2019-01-16 PROCEDURE — 94640 AIRWAY INHALATION TREATMENT: CPT

## 2019-01-16 PROCEDURE — 97162 PT EVAL MOD COMPLEX 30 MIN: CPT

## 2019-01-16 PROCEDURE — 80048 BASIC METABOLIC PNL TOTAL CA: CPT

## 2019-01-16 PROCEDURE — G8988 SELF CARE GOAL STATUS: HCPCS

## 2019-01-16 PROCEDURE — 2500000003 HC RX 250 WO HCPCS: Performed by: NEUROLOGICAL SURGERY

## 2019-01-16 PROCEDURE — 72100 X-RAY EXAM L-S SPINE 2/3 VWS: CPT

## 2019-01-16 PROCEDURE — 97535 SELF CARE MNGMENT TRAINING: CPT

## 2019-01-16 PROCEDURE — 85025 COMPLETE CBC W/AUTO DIFF WBC: CPT

## 2019-01-16 PROCEDURE — 6370000000 HC RX 637 (ALT 250 FOR IP): Performed by: NEUROLOGICAL SURGERY

## 2019-01-16 PROCEDURE — 2580000003 HC RX 258: Performed by: NEUROLOGICAL SURGERY

## 2019-01-16 PROCEDURE — G8987 SELF CARE CURRENT STATUS: HCPCS

## 2019-01-16 PROCEDURE — 36415 COLL VENOUS BLD VENIPUNCTURE: CPT

## 2019-01-16 RX ORDER — OXYCODONE HCL 10 MG/1
20 TABLET, FILM COATED, EXTENDED RELEASE ORAL EVERY 12 HOURS SCHEDULED
Status: DISCONTINUED | OUTPATIENT
Start: 2019-01-16 | End: 2019-01-17 | Stop reason: HOSPADM

## 2019-01-16 RX ORDER — OXYCODONE HYDROCHLORIDE 5 MG/1
10 TABLET ORAL EVERY 4 HOURS PRN
Status: DISCONTINUED | OUTPATIENT
Start: 2019-01-16 | End: 2019-01-17 | Stop reason: HOSPADM

## 2019-01-16 RX ORDER — DEXAMETHASONE SODIUM PHOSPHATE 4 MG/ML
2 INJECTION, SOLUTION INTRA-ARTICULAR; INTRALESIONAL; INTRAMUSCULAR; INTRAVENOUS; SOFT TISSUE EVERY 6 HOURS
Status: DISCONTINUED | OUTPATIENT
Start: 2019-01-16 | End: 2019-01-17 | Stop reason: HOSPADM

## 2019-01-16 RX ORDER — OXYCODONE HYDROCHLORIDE 5 MG/1
5 TABLET ORAL EVERY 4 HOURS PRN
Status: DISCONTINUED | OUTPATIENT
Start: 2019-01-16 | End: 2019-01-17 | Stop reason: HOSPADM

## 2019-01-16 RX ORDER — OXYCODONE HCL 10 MG/1
20 TABLET, FILM COATED, EXTENDED RELEASE ORAL ONCE
Status: DISCONTINUED | OUTPATIENT
Start: 2019-01-16 | End: 2019-01-16

## 2019-01-16 RX ADMIN — PANTOPRAZOLE SODIUM 40 MG: 40 TABLET, DELAYED RELEASE ORAL at 06:38

## 2019-01-16 RX ADMIN — GABAPENTIN 400 MG: 400 CAPSULE ORAL at 13:48

## 2019-01-16 RX ADMIN — DEXAMETHASONE SODIUM PHOSPHATE 2 MG: 4 INJECTION, SOLUTION INTRAMUSCULAR; INTRAVENOUS at 20:27

## 2019-01-16 RX ADMIN — DEXAMETHASONE SODIUM PHOSPHATE 4 MG: 4 INJECTION, SOLUTION INTRAMUSCULAR; INTRAVENOUS at 08:12

## 2019-01-16 RX ADMIN — CEFAZOLIN SODIUM 2 G: 2 SOLUTION INTRAVENOUS at 08:04

## 2019-01-16 RX ADMIN — Medication 2 PUFF: at 07:32

## 2019-01-16 RX ADMIN — DEXAMETHASONE SODIUM PHOSPHATE 4 MG: 4 INJECTION, SOLUTION INTRAMUSCULAR; INTRAVENOUS at 03:06

## 2019-01-16 RX ADMIN — Medication 10 ML: at 20:28

## 2019-01-16 RX ADMIN — Medication 2 PUFF: at 16:39

## 2019-01-16 RX ADMIN — CYCLOBENZAPRINE HYDROCHLORIDE 10 MG: 10 TABLET, FILM COATED ORAL at 15:12

## 2019-01-16 RX ADMIN — FERROUS SULFATE TAB 325 MG (65 MG ELEMENTAL FE) 325 MG: 325 (65 FE) TAB at 08:04

## 2019-01-16 RX ADMIN — SENNOSIDES AND DOCUSATE SODIUM 2 TABLET: 8.6; 5 TABLET ORAL at 20:26

## 2019-01-16 RX ADMIN — POTASSIUM CHLORIDE, DEXTROSE MONOHYDRATE AND SODIUM CHLORIDE 100 ML/HR: 150; 5; 450 INJECTION, SOLUTION INTRAVENOUS at 11:23

## 2019-01-16 RX ADMIN — CEPHALEXIN 500 MG: 500 CAPSULE ORAL at 20:27

## 2019-01-16 RX ADMIN — Medication 2 PUFF: at 21:43

## 2019-01-16 RX ADMIN — GABAPENTIN 400 MG: 400 CAPSULE ORAL at 20:26

## 2019-01-16 RX ADMIN — MORPHINE SULFATE 2 MG: 2 INJECTION, SOLUTION INTRAMUSCULAR; INTRAVENOUS at 06:38

## 2019-01-16 RX ADMIN — FAMOTIDINE 20 MG: 20 TABLET, FILM COATED ORAL at 20:27

## 2019-01-16 RX ADMIN — OXYCODONE HYDROCHLORIDE AND ACETAMINOPHEN 500 MG: 500 TABLET ORAL at 08:05

## 2019-01-16 RX ADMIN — MORPHINE SULFATE 4 MG: 4 INJECTION, SOLUTION INTRAMUSCULAR; INTRAVENOUS at 15:11

## 2019-01-16 RX ADMIN — OXYCODONE HYDROCHLORIDE 10 MG: 5 TABLET ORAL at 16:22

## 2019-01-16 RX ADMIN — OXYCODONE HYDROCHLORIDE 10 MG: 5 TABLET ORAL at 08:05

## 2019-01-16 RX ADMIN — MORPHINE SULFATE 4 MG: 4 INJECTION, SOLUTION INTRAMUSCULAR; INTRAVENOUS at 11:23

## 2019-01-16 RX ADMIN — GABAPENTIN 400 MG: 400 CAPSULE ORAL at 08:12

## 2019-01-16 RX ADMIN — SENNOSIDES AND DOCUSATE SODIUM 2 TABLET: 8.6; 5 TABLET ORAL at 08:05

## 2019-01-16 RX ADMIN — OXYCODONE AND ACETAMINOPHEN 2 TABLET: 5; 325 TABLET ORAL at 03:06

## 2019-01-16 RX ADMIN — OXYCODONE HYDROCHLORIDE 10 MG: 5 TABLET ORAL at 20:26

## 2019-01-16 RX ADMIN — GABAPENTIN 400 MG: 400 CAPSULE ORAL at 16:22

## 2019-01-16 RX ADMIN — DEXAMETHASONE SODIUM PHOSPHATE 4 MG: 4 INJECTION, SOLUTION INTRAMUSCULAR; INTRAVENOUS at 13:47

## 2019-01-16 RX ADMIN — CEPHALEXIN 500 MG: 500 CAPSULE ORAL at 06:37

## 2019-01-16 RX ADMIN — CEPHALEXIN 500 MG: 500 CAPSULE ORAL at 13:48

## 2019-01-16 RX ADMIN — OXYCODONE HYDROCHLORIDE 20 MG: 10 TABLET, FILM COATED, EXTENDED RELEASE ORAL at 13:47

## 2019-01-16 ASSESSMENT — PAIN DESCRIPTION - PAIN TYPE
TYPE: ACUTE PAIN
TYPE: ACUTE PAIN;SURGICAL PAIN

## 2019-01-16 ASSESSMENT — PAIN SCALES - GENERAL
PAINLEVEL_OUTOF10: 8
PAINLEVEL_OUTOF10: 8
PAINLEVEL_OUTOF10: 9
PAINLEVEL_OUTOF10: 9
PAINLEVEL_OUTOF10: 8
PAINLEVEL_OUTOF10: 9
PAINLEVEL_OUTOF10: 8
PAINLEVEL_OUTOF10: 8
PAINLEVEL_OUTOF10: 9

## 2019-01-16 ASSESSMENT — PAIN DESCRIPTION - LOCATION
LOCATION: BACK
LOCATION: BACK;HEAD

## 2019-01-16 ASSESSMENT — PAIN DESCRIPTION - ORIENTATION: ORIENTATION: LOWER;MID

## 2019-01-17 VITALS
RESPIRATION RATE: 16 BRPM | TEMPERATURE: 97.9 F | HEIGHT: 66 IN | OXYGEN SATURATION: 100 % | BODY MASS INDEX: 31.66 KG/M2 | WEIGHT: 197 LBS | SYSTOLIC BLOOD PRESSURE: 135 MMHG | DIASTOLIC BLOOD PRESSURE: 71 MMHG | HEART RATE: 63 BPM

## 2019-01-17 PROCEDURE — 97116 GAIT TRAINING THERAPY: CPT

## 2019-01-17 PROCEDURE — 97535 SELF CARE MNGMENT TRAINING: CPT

## 2019-01-17 PROCEDURE — 6360000002 HC RX W HCPCS: Performed by: NEUROLOGICAL SURGERY

## 2019-01-17 PROCEDURE — 2580000003 HC RX 258: Performed by: NEUROLOGICAL SURGERY

## 2019-01-17 PROCEDURE — 6370000000 HC RX 637 (ALT 250 FOR IP): Performed by: NURSE PRACTITIONER

## 2019-01-17 PROCEDURE — 6370000000 HC RX 637 (ALT 250 FOR IP): Performed by: NEUROLOGICAL SURGERY

## 2019-01-17 RX ORDER — OXYCODONE HYDROCHLORIDE 5 MG/1
7.5 TABLET ORAL EVERY 6 HOURS PRN
Qty: 40 TABLET | Refills: 0 | Status: SHIPPED | OUTPATIENT
Start: 2019-01-17 | End: 2019-01-24

## 2019-01-17 RX ORDER — CEPHALEXIN 500 MG/1
500 CAPSULE ORAL EVERY 8 HOURS SCHEDULED
Qty: 21 CAPSULE | Refills: 0 | Status: SHIPPED | OUTPATIENT
Start: 2019-01-17 | End: 2019-01-24

## 2019-01-17 RX ORDER — SENNA AND DOCUSATE SODIUM 50; 8.6 MG/1; MG/1
2 TABLET, FILM COATED ORAL 2 TIMES DAILY
Qty: 120 TABLET | Refills: 0 | Status: SHIPPED | OUTPATIENT
Start: 2019-01-17 | End: 2019-02-16

## 2019-01-17 RX ORDER — OXYCODONE HCL 20 MG/1
20 TABLET, FILM COATED, EXTENDED RELEASE ORAL EVERY 12 HOURS SCHEDULED
Qty: 10 TABLET | Refills: 0 | Status: SHIPPED | OUTPATIENT
Start: 2019-01-17 | End: 2019-01-30 | Stop reason: SDUPTHER

## 2019-01-17 RX ORDER — OXYCODONE HYDROCHLORIDE 5 MG/1
5 TABLET ORAL EVERY 4 HOURS PRN
Qty: 40 TABLET | Refills: 0 | Status: SHIPPED | OUTPATIENT
Start: 2019-01-17 | End: 2019-01-17 | Stop reason: HOSPADM

## 2019-01-17 RX ADMIN — FERROUS SULFATE TAB 325 MG (65 MG ELEMENTAL FE) 325 MG: 325 (65 FE) TAB at 08:32

## 2019-01-17 RX ADMIN — DEXAMETHASONE SODIUM PHOSPHATE 2 MG: 4 INJECTION, SOLUTION INTRAMUSCULAR; INTRAVENOUS at 02:38

## 2019-01-17 RX ADMIN — CEPHALEXIN 500 MG: 500 CAPSULE ORAL at 13:23

## 2019-01-17 RX ADMIN — OXYCODONE HYDROCHLORIDE 20 MG: 10 TABLET, FILM COATED, EXTENDED RELEASE ORAL at 14:36

## 2019-01-17 RX ADMIN — AMLODIPINE BESYLATE 5 MG: 5 TABLET ORAL at 08:32

## 2019-01-17 RX ADMIN — SENNOSIDES AND DOCUSATE SODIUM 2 TABLET: 8.6; 5 TABLET ORAL at 08:32

## 2019-01-17 RX ADMIN — PANTOPRAZOLE SODIUM 40 MG: 40 TABLET, DELAYED RELEASE ORAL at 05:27

## 2019-01-17 RX ADMIN — DEXAMETHASONE SODIUM PHOSPHATE 2 MG: 4 INJECTION, SOLUTION INTRAMUSCULAR; INTRAVENOUS at 08:32

## 2019-01-17 RX ADMIN — MAGESIUM CITRATE 296 ML: 1.75 LIQUID ORAL at 13:24

## 2019-01-17 RX ADMIN — CEPHALEXIN 500 MG: 500 CAPSULE ORAL at 05:27

## 2019-01-17 RX ADMIN — OXYCODONE HYDROCHLORIDE 10 MG: 5 TABLET ORAL at 05:10

## 2019-01-17 RX ADMIN — OXYCODONE HYDROCHLORIDE 20 MG: 10 TABLET, FILM COATED, EXTENDED RELEASE ORAL at 02:38

## 2019-01-17 RX ADMIN — FAMOTIDINE 20 MG: 20 TABLET, FILM COATED ORAL at 08:32

## 2019-01-17 RX ADMIN — OXYCODONE HYDROCHLORIDE AND ACETAMINOPHEN 500 MG: 500 TABLET ORAL at 08:32

## 2019-01-17 RX ADMIN — Medication 10 ML: at 08:32

## 2019-01-17 RX ADMIN — GABAPENTIN 400 MG: 400 CAPSULE ORAL at 08:32

## 2019-01-17 RX ADMIN — GABAPENTIN 400 MG: 400 CAPSULE ORAL at 13:23

## 2019-01-17 RX ADMIN — OXYCODONE HYDROCHLORIDE 10 MG: 5 TABLET ORAL at 09:17

## 2019-01-17 ASSESSMENT — PAIN SCALES - GENERAL
PAINLEVEL_OUTOF10: 6
PAINLEVEL_OUTOF10: 8
PAINLEVEL_OUTOF10: 0
PAINLEVEL_OUTOF10: 7
PAINLEVEL_OUTOF10: 9
PAINLEVEL_OUTOF10: 7
PAINLEVEL_OUTOF10: 9

## 2019-01-17 ASSESSMENT — PAIN DESCRIPTION - LOCATION
LOCATION: BACK;HEAD;NECK
LOCATION: BACK;HEAD

## 2019-01-17 ASSESSMENT — PAIN DESCRIPTION - PAIN TYPE: TYPE: ACUTE PAIN;SURGICAL PAIN

## 2020-07-22 ENCOUNTER — HOSPITAL ENCOUNTER (OUTPATIENT)
Dept: NEUROLOGY | Age: 45
Discharge: HOME | End: 2020-07-22
Payer: MEDICARE

## 2020-07-22 PROCEDURE — 95886 MUSC TEST DONE W/N TEST COMP: CPT

## 2020-07-22 PROCEDURE — 95910 NRV CNDJ TEST 7-8 STUDIES: CPT

## 2020-07-22 NOTE — PROCEDURES
evaluation.         Michael Rivera MD    D: 07/22/2020 14:16:21       T: 07/22/2020 14:23:21     [email or web address]  Job#: [id number]     Doc#: [id number]    CC:

## 2020-11-03 PROBLEM — I10 HYPERTENSION: Status: RESOLVED | Noted: 2020-11-03 | Resolved: 2020-11-03

## 2021-03-04 ENCOUNTER — HOSPITAL ENCOUNTER (OUTPATIENT)
Dept: GENERAL RADIOLOGY | Age: 46
Discharge: HOME | End: 2021-03-06
Payer: MEDICARE

## 2021-03-04 ENCOUNTER — HOSPITAL ENCOUNTER (OUTPATIENT)
Dept: PREADMISSION TESTING | Age: 46
Discharge: HOME | End: 2021-03-08
Payer: MEDICARE

## 2021-03-04 VITALS
BODY MASS INDEX: 34.91 KG/M2 | OXYGEN SATURATION: 97 % | WEIGHT: 217.2 LBS | TEMPERATURE: 97.5 F | DIASTOLIC BLOOD PRESSURE: 94 MMHG | SYSTOLIC BLOOD PRESSURE: 151 MMHG | HEART RATE: 73 BPM | RESPIRATION RATE: 16 BRPM | HEIGHT: 66 IN

## 2021-03-04 DIAGNOSIS — M48.061 SPINAL STENOSIS OF LUMBAR REGION, UNSPECIFIED WHETHER NEUROGENIC CLAUDICATION PRESENT: Primary | ICD-10-CM

## 2021-03-04 DIAGNOSIS — G40.909 SEIZURE DISORDER (HCC): ICD-10-CM

## 2021-03-04 DIAGNOSIS — M48.061 SPINAL STENOSIS OF LUMBAR REGION, UNSPECIFIED WHETHER NEUROGENIC CLAUDICATION PRESENT: ICD-10-CM

## 2021-03-04 LAB
ABO/RH: NORMAL
ANION GAP SERPL CALCULATED.3IONS-SCNC: 9 MEQ/L (ref 9–15)
ANTIBODY SCREEN: NORMAL
BUN BLDV-MCNC: 15 MG/DL (ref 6–20)
CALCIUM SERPL-MCNC: 9.4 MG/DL (ref 8.5–9.9)
CHLORIDE BLD-SCNC: 104 MEQ/L (ref 95–107)
CO2: 28 MEQ/L (ref 20–31)
CREAT SERPL-MCNC: 0.88 MG/DL (ref 0.5–0.9)
EKG ATRIAL RATE: 73 BPM
EKG P AXIS: 51 DEGREES
EKG P-R INTERVAL: 204 MS
EKG Q-T INTERVAL: 396 MS
EKG QRS DURATION: 98 MS
EKG QTC CALCULATION (BAZETT): 436 MS
EKG R AXIS: 61 DEGREES
EKG T AXIS: 61 DEGREES
EKG VENTRICULAR RATE: 73 BPM
GFR AFRICAN AMERICAN: >60
GFR NON-AFRICAN AMERICAN: >60
GLUCOSE BLD-MCNC: 111 MG/DL (ref 70–99)
HCT VFR BLD CALC: 44.5 % (ref 37–47)
HEMOGLOBIN: 15.1 G/DL (ref 12–16)
INR BLD: 0.9
MCH RBC QN AUTO: 32.5 PG (ref 27–31.3)
MCHC RBC AUTO-ENTMCNC: 33.9 % (ref 33–37)
MCV RBC AUTO: 95.9 FL (ref 82–100)
PDW BLD-RTO: 13.5 % (ref 11.5–14.5)
PLATELET # BLD: 218 K/UL (ref 130–400)
POTASSIUM SERPL-SCNC: 3.9 MEQ/L (ref 3.4–4.9)
PROTHROMBIN TIME: 12.5 SEC (ref 12.3–14.9)
RBC # BLD: 4.64 M/UL (ref 4.2–5.4)
SODIUM BLD-SCNC: 141 MEQ/L (ref 135–144)
WBC # BLD: 6.7 K/UL (ref 4.8–10.8)

## 2021-03-04 PROCEDURE — 86850 RBC ANTIBODY SCREEN: CPT

## 2021-03-04 PROCEDURE — 85610 PROTHROMBIN TIME: CPT

## 2021-03-04 PROCEDURE — 86901 BLOOD TYPING SEROLOGIC RH(D): CPT

## 2021-03-04 PROCEDURE — 93010 ELECTROCARDIOGRAM REPORT: CPT | Performed by: INTERNAL MEDICINE

## 2021-03-04 PROCEDURE — 80048 BASIC METABOLIC PNL TOTAL CA: CPT

## 2021-03-04 PROCEDURE — 72082 X-RAY EXAM ENTIRE SPI 2/3 VW: CPT

## 2021-03-04 PROCEDURE — 85027 COMPLETE CBC AUTOMATED: CPT

## 2021-03-04 PROCEDURE — 93005 ELECTROCARDIOGRAM TRACING: CPT | Performed by: NURSE PRACTITIONER

## 2021-03-04 PROCEDURE — 86900 BLOOD TYPING SEROLOGIC ABO: CPT

## 2021-03-04 RX ORDER — CEFAZOLIN SODIUM 2 G/50ML
2000 SOLUTION INTRAVENOUS ONCE
Status: CANCELLED | OUTPATIENT
Start: 2021-03-11

## 2021-03-04 RX ORDER — UBIDECARENONE 75 MG
50 CAPSULE ORAL DAILY
COMMUNITY

## 2021-03-04 RX ORDER — LIDOCAINE HYDROCHLORIDE 10 MG/ML
1 INJECTION, SOLUTION EPIDURAL; INFILTRATION; INTRACAUDAL; PERINEURAL
Status: CANCELLED | OUTPATIENT
Start: 2021-03-11 | End: 2021-03-11

## 2021-03-04 RX ORDER — TIZANIDINE 4 MG/1
4 TABLET ORAL EVERY 8 HOURS PRN
Status: ON HOLD | COMMUNITY
End: 2021-03-12 | Stop reason: HOSPADM

## 2021-03-04 RX ORDER — SODIUM CHLORIDE, SODIUM LACTATE, POTASSIUM CHLORIDE, CALCIUM CHLORIDE 600; 310; 30; 20 MG/100ML; MG/100ML; MG/100ML; MG/100ML
INJECTION, SOLUTION INTRAVENOUS CONTINUOUS
Status: CANCELLED | OUTPATIENT
Start: 2021-03-11

## 2021-03-04 RX ORDER — LEVOTHYROXINE SODIUM 0.05 MG/1
TABLET ORAL DAILY
COMMUNITY
Start: 2021-01-26 | End: 2022-07-11

## 2021-03-04 RX ORDER — GENTAMICIN SULFATE 80 MG/100ML
80 INJECTION, SOLUTION INTRAVENOUS ONCE
Status: CANCELLED | OUTPATIENT
Start: 2021-03-11

## 2021-03-04 RX ORDER — SODIUM CHLORIDE 0.9 % (FLUSH) 0.9 %
10 SYRINGE (ML) INJECTION EVERY 12 HOURS SCHEDULED
Status: CANCELLED | OUTPATIENT
Start: 2021-03-11

## 2021-03-04 RX ORDER — SODIUM CHLORIDE 0.9 % (FLUSH) 0.9 %
10 SYRINGE (ML) INJECTION PRN
Status: CANCELLED | OUTPATIENT
Start: 2021-03-11

## 2021-03-04 RX ORDER — BIOTIN 1 MG
TABLET ORAL 2 TIMES DAILY
COMMUNITY

## 2021-03-04 ASSESSMENT — ENCOUNTER SYMPTOMS
SHORTNESS OF BREATH: 0
TROUBLE SWALLOWING: 0
DIARRHEA: 0
STRIDOR: 0
ALLERGIC/IMMUNOLOGIC NEGATIVE: 1
COUGH: 1
EYES NEGATIVE: 1
NAUSEA: 0
VOMITING: 0
SORE THROAT: 0
ABDOMINAL PAIN: 0
CONSTIPATION: 0
WHEEZING: 0
BACK PAIN: 1
CHEST TIGHTNESS: 0

## 2021-03-04 NOTE — H&P
Nurse Practitioner History and Physical      CHIEF COMPLAINT:  Back pain    HISTORY OF PRESENT ILLNESS:      The patient is a 55 y.o. female with significant past medical history of lumbar stenosis  who presents for PLIF lumbar 4 - 5. Low back pain radiates to left leg to include left foot -- foot feels numb. -- leg feels weak & limps with ambulation. Similar sx of right leg but not as severe. Has been treated by pain management. S/p lumbar disc OR in 2017 & 2018. Has had ED visit at [facility] due to pain. MRI done. Scheduled for OR. Past Medical History:        Diagnosis Date    Arthritis     Asthma     Hypertension     Lumbar stenosis     Migraines     starts with neck pain    Seizures (HCC)     Thyroid disease     Uterine anomaly      Past Surgical History:    Past Surgical History:   Procedure Laterality Date    ANTERIOR CRUCIATE LIGAMENT REPAIR Left     APPENDECTOMY      CHOLECYSTECTOMY      LUMBAR FUSION N/A 1/15/2019    L4- L5 DECOMPRESSION, L5-S1 POSTERIOR LUMBAR INTERBODY FUSION performed by Randall Guidry MD at . [address]  5/9/13    duramorph 1.5 mg epideral  6mg celestone    TUBAL LIGATION Bilateral          Medications Prior to Admission:    Current Outpatient Medications   Medication Sig Dispense Refill    oxyCODONE-acetaminophen (PERCOCET) 5-325 MG per tablet Take 1 tablet by mouth 2 times daily for 20 days. 40 tablet 0    gabapentin (NEURONTIN) 600 MG tablet Take 1 tablet by mouth 3 times daily for 30 days. 90 tablet 0    gabapentin (NEURONTIN) 300 MG capsule Take 1 capsule by mouth 3 times daily for 30 days. 90 capsule 3    gabapentin (NEURONTIN) 300 MG capsule Take 1 capsule by mouth 4 times daily for 30 days. . 120 capsule 0    Misc.  Devices (RAISED TOILET SEAT/LOCK & ARMS) MISC 1 Piece by Does not apply route as needed (PRN) 2 each 0    ferrous sulfate 325 (65 Fe) MG tablet Take 65 mg by mouth daily (with breakfast)      vitamin C (ASCORBIC ACID) 500 MG tablet Take 500 mg by mouth daily      ondansetron (ZOFRAN) 4 MG tablet Take 4 mg by mouth every 8 hours as needed for Nausea or Vomiting      VENTOLIN  (90 Base) MCG/ACT inhaler 2 puffs 4 times daily       amLODIPine (NORVASC) 5 MG tablet       gabapentin (NEURONTIN) 400 MG capsule Take 2 capsules by mouth 4 times daily (Patient taking differently: Take 300 mg by mouth three times daily. Ewa Lares ) 120 capsule 0    omeprazole (PRILOSEC) 20 MG capsule Take 20 mg by mouth daily        No current facility-administered medications for this encounter.         Allergies:  Tylenol [acetaminophen], Aspirin, Codeine, Cortizone, Fentanyl, and Prochlorperazine edisylate    Social History:   Social History     Socioeconomic History    Marital status:      Spouse name: Not on file    Number of children: Not on file    Years of education: Not on file    Highest education level: Not on file   Occupational History    Not on file   Social Needs    Financial resource strain: Not on file    Food insecurity     Worry: Not on file     Inability: Not on file    Transportation needs     Medical: Not on file     Non-medical: Not on file   Tobacco Use    Smoking status: Current Every Day Smoker     Packs/day: 1.00     Years: 28.00     Pack years: 28.00     Types: Cigarettes    Smokeless tobacco: Never Used   Substance and Sexual Activity    Alcohol use: No     Alcohol/week: 0.0 standard drinks    Drug use: No    Sexual activity: Not on file   Lifestyle    Physical activity     Days per week: Not on file     Minutes per session: Not on file    Stress: Not on file   Relationships    Social connections     Talks on phone: Not on file     Gets together: Not on file     Attends Buddhism service: Not on file     Active member of club or organization: Not on file     Attends meetings of clubs or organizations: Not on file     Relationship status: Not on file    Intimate partner violence     Fear of current or ex partner: Not on file     Emotionally abused: Not on file     Physically abused: Not on file     Forced sexual activity: Not on file   Other Topics Concern    Not on file   Social History Narrative    Not on file       Family History:       Problem Relation Age of Onset    Cancer Mother         NHL    Heart Failure Maternal Grandmother        Review of Systems   Constitutional: Negative. Negative for chills and fever. HENT: Positive for congestion, postnasal drip and tinnitus. Negative for sore throat and trouble swallowing. Harshness of voice. Full upper & lower dentures. Eyes: Negative. Negative for visual disturbance. Respiratory: Positive for cough (smokers cough). Negative for chest tightness, shortness of breath, wheezing and stridor. Cardiovascular: Negative for chest pain and palpitations. Gastrointestinal: Negative for abdominal pain, constipation, diarrhea, nausea and vomiting. Endocrine:        Hypothyroid. Genitourinary: Negative. Negative for dysuria and frequency. Musculoskeletal: Positive for back pain (radiates to both legs.) and neck pain. Negative for myalgias. Skin: Negative. Allergic/Immunologic: Negative. Neurological: Positive for seizures (last seizure 2/2021) and headaches. Hematological: Negative. Psychiatric/Behavioral: Negative. Vitals: There were no vitals taken for this visit. Physical Exam  Constitutional:       Appearance: She is well-developed. HENT:      Head: Normocephalic and atraumatic. Right Ear: External ear normal. There is impacted cerumen. Left Ear: External ear normal. There is impacted cerumen. Nose: No congestion. Mouth/Throat:      Mouth: Mucous membranes are moist.      Comments: Edentulous upper & lower. Hoarseness of voice. Eyes:      General: No scleral icterus. Extraocular Movements: Extraocular movements intact.       Conjunctiva/sclera: Conjunctivae normal.      Pupils: Pupils are equal, round, and

## 2021-03-04 NOTE — PROGRESS NOTES
covid test to be done 3/4/2021 & will self quarantine until OR 3/11/2021. Deena-Hex wash instructions given -- to be done Tuesday 3/9/2021 & Wednesday 3/10/2021.

## 2021-03-05 LAB
SARS-COV-2: NOT DETECTED
SOURCE: NORMAL

## 2021-03-10 NOTE — H&P
Henny Navaaronfox  (1975)     2-       Subjective:      Chidi Lerner is 55 y.o. female who complains today of:         Chief Complaint   Patient presents with    Back Pain      She is here for follow up after MRI and x-rays. Feels the same compared to last visit. Previously been to [facility]. Denies injury or trauma. She complains of lower back pain with radiation left leg. Subjective left leg weakness is reported. History of PLIF 1-15-19 and lumbar diskectomy 11-27-17. Previous physical therapy. Incision site is clean and dry. She complains of lower back pain and bilateral lateral and anterior leg to foot pain, worse on the right. She sees pain management, Dr. Lewis Burnett in [facility]. Failed physical therapy and pain management injections. Chronic incontinence is reported since two years ago.       Current smoker.     Narcotics: Percocet 5-325 mg bid, Zanaflex, Neurontin 600 mg tid       Work status: Disability since 2004      Back Pain  Associated symptoms include headaches.  Pertinent negatives include no fever.         Allergies:  Tylenol [acetaminophen], Aspirin, Codeine, Cortizone, Fentanyl, and Prochlorperazine edisylate     Past Medical History        Past Medical History:   Diagnosis Date    Arthritis      Asthma      Hypertension      Lumbar stenosis      Migraines       starts with neck pain    Seizures (HCC)      Thyroid disease      Uterine anomaly           Past Surgical History         Past Surgical History:   Procedure Laterality Date    ANTERIOR CRUCIATE LIGAMENT REPAIR Left      APPENDECTOMY        CHOLECYSTECTOMY        LUMBAR FUSION N/A 1/15/2019     L4- L5 DECOMPRESSION, L5-S1 POSTERIOR LUMBAR INTERBODY FUSION performed by Hermes Dial MD at . [address]   5/9/13     duramorph 1.5 mg epideral  6mg celestone    TUBAL LIGATION Bilateral           Family History         Family History   Problem Relation Age of Onset    Cancer Mother       mouth 4 times daily for 30 days. . 120 capsule 0    Misc. Devices (RAISED TOILET SEAT/LOCK & ARMS) MISC 1 Piece by Does not apply route as needed (PRN) 2 each 0    ferrous sulfate 325 (65 Fe) MG tablet Take 65 mg by mouth daily (with breakfast)        vitamin C (ASCORBIC ACID) 500 MG tablet Take 500 mg by mouth daily        ondansetron (ZOFRAN) 4 MG tablet Take 4 mg by mouth every 8 hours as needed for Nausea or Vomiting        VENTOLIN  (90 Base) MCG/ACT inhaler 2 puffs 4 times daily         amLODIPine (NORVASC) 5 MG tablet          gabapentin (NEURONTIN) 400 MG capsule Take 2 capsules by mouth 4 times daily (Patient taking differently: Take 300 mg by mouth three times daily. Lawrance Potter ) 120 capsule 0    omeprazole (PRILOSEC) 20 MG capsule Take 20 mg by mouth daily           No current facility-administered medications on file prior to visit.                   Review of Systems   Constitutional: Negative for fever. HENT: Positive for hearing loss. Respiratory: Negative for shortness of breath. Gastrointestinal: Positive for constipation and nausea. Negative for diarrhea. Genitourinary: Negative for difficulty urinating. Musculoskeletal: Positive for back pain and neck pain. Skin: Negative for rash. Neurological: Positive for headaches. Hematological: Bruises/bleeds easily. Psychiatric/Behavioral: Positive for sleep disturbance.            Objective:      Vitals:  Temp 97.1 °F (36.2 °C)   Ht 5' 6\" (1.676 m)   Wt 200 lb (90.7 kg)   BMI 32.28 kg/m²          Physical Exam  Musculoskeletal:      Right shoulder: She exhibits decreased range of motion and tenderness (Back spasms noted. ). Lumbar back: She exhibits decreased range of motion and tenderness. Neurological:      Mental Status: She is alert and oriented to person, place, and time. Sensory: Sensory deficit (Hypalgesia of left thigh and lower leg.) present. Gait: Gait abnormal (Gait favors the back with back flexion. ).

## 2021-03-11 ENCOUNTER — ANESTHESIA (OUTPATIENT)
Dept: OPERATING ROOM | Age: 46
DRG: 304 | End: 2021-03-11
Payer: MEDICARE

## 2021-03-11 ENCOUNTER — HOSPITAL ENCOUNTER (INPATIENT)
Age: 46
LOS: 2 days | Discharge: HOME | DRG: 304 | End: 2021-03-13
Attending: NEUROLOGICAL SURGERY | Admitting: NEUROLOGICAL SURGERY
Payer: MEDICARE

## 2021-03-11 ENCOUNTER — HOSPITAL ENCOUNTER (OUTPATIENT)
Dept: GENERAL RADIOLOGY | Age: 46
Setting detail: OUTPATIENT SURGERY
Discharge: HOME | DRG: 304 | End: 2021-03-13
Attending: NEUROLOGICAL SURGERY
Payer: MEDICARE

## 2021-03-11 ENCOUNTER — ANESTHESIA EVENT (OUTPATIENT)
Dept: OPERATING ROOM | Age: 46
DRG: 304 | End: 2021-03-11
Payer: MEDICARE

## 2021-03-11 VITALS — TEMPERATURE: 96.6 F | SYSTOLIC BLOOD PRESSURE: 131 MMHG | OXYGEN SATURATION: 100 % | DIASTOLIC BLOOD PRESSURE: 86 MMHG

## 2021-03-11 DIAGNOSIS — R52 PAIN: ICD-10-CM

## 2021-03-11 DIAGNOSIS — G89.18 POST-OP PAIN: Primary | ICD-10-CM

## 2021-03-11 PROBLEM — M48.062 LUMBAR STENOSIS WITH NEUROGENIC CLAUDICATION: Status: ACTIVE | Noted: 2021-03-11

## 2021-03-11 LAB
ANION GAP SERPL CALCULATED.3IONS-SCNC: 8 MEQ/L (ref 9–15)
BUN BLDV-MCNC: 8 MG/DL (ref 6–20)
CALCIUM SERPL-MCNC: 9 MG/DL (ref 8.5–9.9)
CHLORIDE BLD-SCNC: 104 MEQ/L (ref 95–107)
CO2: 27 MEQ/L (ref 20–31)
CREAT SERPL-MCNC: 0.8 MG/DL (ref 0.5–0.9)
GFR AFRICAN AMERICAN: >60
GFR NON-AFRICAN AMERICAN: >60
GLUCOSE BLD-MCNC: 163 MG/DL (ref 70–99)
HCG, URINE, POC: NEGATIVE
HCT VFR BLD CALC: 46.3 % (ref 37–47)
HEMOGLOBIN: 15.6 G/DL (ref 12–16)
Lab: NORMAL
MCH RBC QN AUTO: 32.2 PG (ref 27–31.3)
MCHC RBC AUTO-ENTMCNC: 33.8 % (ref 33–37)
MCV RBC AUTO: 95.5 FL (ref 82–100)
NEGATIVE QC PASS/FAIL: NORMAL
PDW BLD-RTO: 13.2 % (ref 11.5–14.5)
PLATELET # BLD: 247 K/UL (ref 130–400)
POSITIVE QC PASS/FAIL: NORMAL
POTASSIUM REFLEX MAGNESIUM: 4.5 MEQ/L (ref 3.4–4.9)
RBC # BLD: 4.85 M/UL (ref 4.2–5.4)
SODIUM BLD-SCNC: 139 MEQ/L (ref 135–144)
WBC # BLD: 9.7 K/UL (ref 4.8–10.8)

## 2021-03-11 PROCEDURE — 0SP00JZ REMOVAL OF SYNTHETIC SUBSTITUTE FROM LUMBAR VERTEBRAL JOINT, OPEN APPROACH: ICD-10-PCS | Performed by: NEUROLOGICAL SURGERY

## 2021-03-11 PROCEDURE — 2580000003 HC RX 258: Performed by: NURSE ANESTHETIST, CERTIFIED REGISTERED

## 2021-03-11 PROCEDURE — 0SG00AJ FUSION OF LUMBAR VERTEBRAL JOINT WITH INTERBODY FUSION DEVICE, POSTERIOR APPROACH, ANTERIOR COLUMN, OPEN APPROACH: ICD-10-PCS | Performed by: NEUROLOGICAL SURGERY

## 2021-03-11 PROCEDURE — 6360000002 HC RX W HCPCS: Performed by: NURSE PRACTITIONER

## 2021-03-11 PROCEDURE — 6370000000 HC RX 637 (ALT 250 FOR IP): Performed by: NEUROLOGICAL SURGERY

## 2021-03-11 PROCEDURE — 88311 DECALCIFY TISSUE: CPT

## 2021-03-11 PROCEDURE — 94761 N-INVAS EAR/PLS OXIMETRY MLT: CPT

## 2021-03-11 PROCEDURE — 6360000002 HC RX W HCPCS: Performed by: NURSE ANESTHETIST, CERTIFIED REGISTERED

## 2021-03-11 PROCEDURE — 7100000000 HC PACU RECOVERY - FIRST 15 MIN: Performed by: NEUROLOGICAL SURGERY

## 2021-03-11 PROCEDURE — 6360000002 HC RX W HCPCS: Performed by: ANESTHESIOLOGY

## 2021-03-11 PROCEDURE — 0SG3071 FUSION OF LUMBOSACRAL JOINT WITH AUTOLOGOUS TISSUE SUBSTITUTE, POSTERIOR APPROACH, POSTERIOR COLUMN, OPEN APPROACH: ICD-10-PCS | Performed by: NEUROLOGICAL SURGERY

## 2021-03-11 PROCEDURE — 85027 COMPLETE CBC AUTOMATED: CPT

## 2021-03-11 PROCEDURE — 2500000003 HC RX 250 WO HCPCS: Performed by: NEUROLOGICAL SURGERY

## 2021-03-11 PROCEDURE — 3700000001 HC ADD 15 MINUTES (ANESTHESIA): Performed by: NEUROLOGICAL SURGERY

## 2021-03-11 PROCEDURE — 3600000004 HC SURGERY LEVEL 4 BASE: Performed by: NEUROLOGICAL SURGERY

## 2021-03-11 PROCEDURE — 2500000003 HC RX 250 WO HCPCS: Performed by: NURSE ANESTHETIST, CERTIFIED REGISTERED

## 2021-03-11 PROCEDURE — 88304 TISSUE EXAM BY PATHOLOGIST: CPT

## 2021-03-11 PROCEDURE — 94664 DEMO&/EVAL PT USE INHALER: CPT

## 2021-03-11 PROCEDURE — 2709999900 HC NON-CHARGEABLE SUPPLY: Performed by: NEUROLOGICAL SURGERY

## 2021-03-11 PROCEDURE — 94640 AIRWAY INHALATION TREATMENT: CPT

## 2021-03-11 PROCEDURE — 3209999900 FLUORO FOR SURGICAL PROCEDURES

## 2021-03-11 PROCEDURE — 0SB20ZZ EXCISION OF LUMBAR VERTEBRAL DISC, OPEN APPROACH: ICD-10-PCS | Performed by: NEUROLOGICAL SURGERY

## 2021-03-11 PROCEDURE — 2720000010 HC SURG SUPPLY STERILE: Performed by: NEUROLOGICAL SURGERY

## 2021-03-11 PROCEDURE — 4A11X4G MONITORING OF PERIPHERAL NERVOUS ELECTRICAL ACTIVITY, INTRAOPERATIVE, EXTERNAL APPROACH: ICD-10-PCS | Performed by: NEUROLOGICAL SURGERY

## 2021-03-11 PROCEDURE — 6360000002 HC RX W HCPCS: Performed by: NEUROLOGICAL SURGERY

## 2021-03-11 PROCEDURE — 7100000001 HC PACU RECOVERY - ADDTL 15 MIN: Performed by: NEUROLOGICAL SURGERY

## 2021-03-11 PROCEDURE — 2580000003 HC RX 258: Performed by: NEUROLOGICAL SURGERY

## 2021-03-11 PROCEDURE — 2580000003 HC RX 258: Performed by: NURSE PRACTITIONER

## 2021-03-11 PROCEDURE — 94150 VITAL CAPACITY TEST: CPT

## 2021-03-11 PROCEDURE — 3700000000 HC ANESTHESIA ATTENDED CARE: Performed by: NEUROLOGICAL SURGERY

## 2021-03-11 PROCEDURE — C1713 ANCHOR/SCREW BN/BN,TIS/BN: HCPCS | Performed by: NEUROLOGICAL SURGERY

## 2021-03-11 PROCEDURE — 80048 BASIC METABOLIC PNL TOTAL CA: CPT

## 2021-03-11 PROCEDURE — 2780000010 HC IMPLANT OTHER: Performed by: NEUROLOGICAL SURGERY

## 2021-03-11 PROCEDURE — 6370000000 HC RX 637 (ALT 250 FOR IP): Performed by: ANESTHESIOLOGY

## 2021-03-11 PROCEDURE — 3600000014 HC SURGERY LEVEL 4 ADDTL 15MIN: Performed by: NEUROLOGICAL SURGERY

## 2021-03-11 DEVICE — ROD SPNL L60MM DIA5.5MM POST THORACOLUMBOSACRAL TI LORDTC: Type: IMPLANTABLE DEVICE | Site: SPINE LUMBAR | Status: FUNCTIONAL

## 2021-03-11 DEVICE — IMPLANTABLE DEVICE: Type: IMPLANTABLE DEVICE | Site: SPINE LUMBAR | Status: FUNCTIONAL

## 2021-03-11 DEVICE — SCREW SPNL L55MM DIA6.5MM POST THORACOLUMBOSACRAL POLYAX 2S: Type: IMPLANTABLE DEVICE | Site: SPINE LUMBAR | Status: FUNCTIONAL

## 2021-03-11 DEVICE — GRAFT BNE SUB 30CC 1.7-10MM CANC CHIP MORSELIZED FRZ DRY: Type: IMPLANTABLE DEVICE | Site: SPINE LUMBAR | Status: FUNCTIONAL

## 2021-03-11 DEVICE — CONNECTOR SPNL CRSS LO PROF ADJ RELINE-O 5.5 X 45-65 MM: Type: IMPLANTABLE DEVICE | Site: SPINE LUMBAR | Status: FUNCTIONAL

## 2021-03-11 DEVICE — SCREW SPNL DIA5.5MM OPN TULIP LOK RELINE: Type: IMPLANTABLE DEVICE | Site: SPINE LUMBAR | Status: FUNCTIONAL

## 2021-03-11 DEVICE — GRAFT BNE SUB 5ML MTRX CELLULAR OSTEOCEL +: Type: IMPLANTABLE DEVICE | Site: SPINE LUMBAR | Status: FUNCTIONAL

## 2021-03-11 RX ORDER — WOUND DRESSING ADHESIVE - LIQUID
LIQUID MISCELLANEOUS PRN
Status: DISCONTINUED | OUTPATIENT
Start: 2021-03-11 | End: 2021-03-11 | Stop reason: ALTCHOICE

## 2021-03-11 RX ORDER — METOCLOPRAMIDE HYDROCHLORIDE 5 MG/ML
10 INJECTION INTRAMUSCULAR; INTRAVENOUS
Status: DISCONTINUED | OUTPATIENT
Start: 2021-03-11 | End: 2021-03-11 | Stop reason: HOSPADM

## 2021-03-11 RX ORDER — LEVOTHYROXINE SODIUM 0.05 MG/1
50 TABLET ORAL DAILY
Status: DISCONTINUED | OUTPATIENT
Start: 2021-03-12 | End: 2021-03-13 | Stop reason: HOSPADM

## 2021-03-11 RX ORDER — DIPHENHYDRAMINE HYDROCHLORIDE 50 MG/ML
12.5 INJECTION INTRAMUSCULAR; INTRAVENOUS
Status: DISCONTINUED | OUTPATIENT
Start: 2021-03-11 | End: 2021-03-11 | Stop reason: HOSPADM

## 2021-03-11 RX ORDER — OXYCODONE HYDROCHLORIDE AND ACETAMINOPHEN 5; 325 MG/1; MG/1
1 TABLET ORAL EVERY 4 HOURS PRN
Status: DISCONTINUED | OUTPATIENT
Start: 2021-03-11 | End: 2021-03-12

## 2021-03-11 RX ORDER — ONDANSETRON 2 MG/ML
4 INJECTION INTRAMUSCULAR; INTRAVENOUS
Status: DISCONTINUED | OUTPATIENT
Start: 2021-03-11 | End: 2021-03-11 | Stop reason: HOSPADM

## 2021-03-11 RX ORDER — SODIUM CHLORIDE, SODIUM LACTATE, POTASSIUM CHLORIDE, CALCIUM CHLORIDE 600; 310; 30; 20 MG/100ML; MG/100ML; MG/100ML; MG/100ML
INJECTION, SOLUTION INTRAVENOUS CONTINUOUS PRN
Status: DISCONTINUED | OUTPATIENT
Start: 2021-03-11 | End: 2021-03-11 | Stop reason: SDUPTHER

## 2021-03-11 RX ORDER — MIDAZOLAM HYDROCHLORIDE 1 MG/ML
INJECTION INTRAMUSCULAR; INTRAVENOUS PRN
Status: DISCONTINUED | OUTPATIENT
Start: 2021-03-11 | End: 2021-03-11 | Stop reason: SDUPTHER

## 2021-03-11 RX ORDER — OXYCODONE HYDROCHLORIDE AND ACETAMINOPHEN 5; 325 MG/1; MG/1
1 TABLET ORAL
Status: DISCONTINUED | OUTPATIENT
Start: 2021-03-11 | End: 2021-03-11

## 2021-03-11 RX ORDER — ALBUTEROL SULFATE 2.5 MG/3ML
2.5 SOLUTION RESPIRATORY (INHALATION) EVERY 6 HOURS PRN
Status: DISCONTINUED | OUTPATIENT
Start: 2021-03-11 | End: 2021-03-11

## 2021-03-11 RX ORDER — SODIUM CHLORIDE 0.9 % (FLUSH) 0.9 %
10 SYRINGE (ML) INJECTION PRN
Status: DISCONTINUED | OUTPATIENT
Start: 2021-03-11 | End: 2021-03-11 | Stop reason: HOSPADM

## 2021-03-11 RX ORDER — PANTOPRAZOLE SODIUM 40 MG/1
40 TABLET, DELAYED RELEASE ORAL
Status: DISCONTINUED | OUTPATIENT
Start: 2021-03-12 | End: 2021-03-12

## 2021-03-11 RX ORDER — CEPHALEXIN 500 MG/1
500 CAPSULE ORAL EVERY 8 HOURS SCHEDULED
Status: DISCONTINUED | OUTPATIENT
Start: 2021-03-12 | End: 2021-03-13 | Stop reason: HOSPADM

## 2021-03-11 RX ORDER — GENTAMICIN SULFATE 80 MG/100ML
80 INJECTION, SOLUTION INTRAVENOUS ONCE
Status: COMPLETED | OUTPATIENT
Start: 2021-03-11 | End: 2021-03-11

## 2021-03-11 RX ORDER — ALBUTEROL SULFATE 90 UG/1
2 AEROSOL, METERED RESPIRATORY (INHALATION)
Status: DISCONTINUED | OUTPATIENT
Start: 2021-03-11 | End: 2021-03-13 | Stop reason: HOSPADM

## 2021-03-11 RX ORDER — SODIUM CHLORIDE, SODIUM LACTATE, POTASSIUM CHLORIDE, CALCIUM CHLORIDE 600; 310; 30; 20 MG/100ML; MG/100ML; MG/100ML; MG/100ML
INJECTION, SOLUTION INTRAVENOUS CONTINUOUS
Status: DISCONTINUED | OUTPATIENT
Start: 2021-03-11 | End: 2021-03-11

## 2021-03-11 RX ORDER — UBIDECARENONE 75 MG
50 CAPSULE ORAL DAILY
Status: DISCONTINUED | OUTPATIENT
Start: 2021-03-11 | End: 2021-03-13 | Stop reason: HOSPADM

## 2021-03-11 RX ORDER — MAGNESIUM HYDROXIDE 1200 MG/15ML
LIQUID ORAL CONTINUOUS PRN
Status: COMPLETED | OUTPATIENT
Start: 2021-03-11 | End: 2021-03-11

## 2021-03-11 RX ORDER — PROPOFOL 10 MG/ML
INJECTION, EMULSION INTRAVENOUS CONTINUOUS PRN
Status: DISCONTINUED | OUTPATIENT
Start: 2021-03-11 | End: 2021-03-11 | Stop reason: SDUPTHER

## 2021-03-11 RX ORDER — LIDOCAINE HYDROCHLORIDE 20 MG/ML
INJECTION, SOLUTION INTRAVENOUS PRN
Status: DISCONTINUED | OUTPATIENT
Start: 2021-03-11 | End: 2021-03-11 | Stop reason: SDUPTHER

## 2021-03-11 RX ORDER — HYDROCODONE BITARTRATE AND ACETAMINOPHEN 5; 325 MG/1; MG/1
1 TABLET ORAL PRN
Status: DISCONTINUED | OUTPATIENT
Start: 2021-03-11 | End: 2021-03-11 | Stop reason: HOSPADM

## 2021-03-11 RX ORDER — DEXTROSE, SODIUM CHLORIDE, AND POTASSIUM CHLORIDE 5; .45; .15 G/100ML; G/100ML; G/100ML
INJECTION INTRAVENOUS CONTINUOUS
Status: DISCONTINUED | OUTPATIENT
Start: 2021-03-11 | End: 2021-03-12

## 2021-03-11 RX ORDER — ALBUTEROL SULFATE 90 UG/1
2 AEROSOL, METERED RESPIRATORY (INHALATION) 4 TIMES DAILY
Status: DISCONTINUED | OUTPATIENT
Start: 2021-03-11 | End: 2021-03-13 | Stop reason: HOSPADM

## 2021-03-11 RX ORDER — PROMETHAZINE HYDROCHLORIDE 12.5 MG/1
12.5 TABLET ORAL EVERY 6 HOURS PRN
Status: DISCONTINUED | OUTPATIENT
Start: 2021-03-11 | End: 2021-03-13 | Stop reason: HOSPADM

## 2021-03-11 RX ORDER — GINSENG 100 MG
CAPSULE ORAL PRN
Status: DISCONTINUED | OUTPATIENT
Start: 2021-03-11 | End: 2021-03-11 | Stop reason: ALTCHOICE

## 2021-03-11 RX ORDER — ASCORBIC ACID 500 MG
500 TABLET ORAL DAILY
Status: DISCONTINUED | OUTPATIENT
Start: 2021-03-11 | End: 2021-03-13 | Stop reason: HOSPADM

## 2021-03-11 RX ORDER — SODIUM CHLORIDE 0.9 % (FLUSH) 0.9 %
10 SYRINGE (ML) INJECTION EVERY 12 HOURS SCHEDULED
Status: DISCONTINUED | OUTPATIENT
Start: 2021-03-11 | End: 2021-03-11 | Stop reason: HOSPADM

## 2021-03-11 RX ORDER — DEXAMETHASONE SODIUM PHOSPHATE 10 MG/ML
INJECTION INTRAMUSCULAR; INTRAVENOUS PRN
Status: DISCONTINUED | OUTPATIENT
Start: 2021-03-11 | End: 2021-03-11 | Stop reason: SDUPTHER

## 2021-03-11 RX ORDER — LIDOCAINE HYDROCHLORIDE 10 MG/ML
1 INJECTION, SOLUTION EPIDURAL; INFILTRATION; INTRACAUDAL; PERINEURAL
Status: DISCONTINUED | OUTPATIENT
Start: 2021-03-11 | End: 2021-03-11 | Stop reason: HOSPADM

## 2021-03-11 RX ORDER — PROPOFOL 10 MG/ML
INJECTION, EMULSION INTRAVENOUS PRN
Status: DISCONTINUED | OUTPATIENT
Start: 2021-03-11 | End: 2021-03-11 | Stop reason: SDUPTHER

## 2021-03-11 RX ORDER — ONDANSETRON 2 MG/ML
INJECTION INTRAMUSCULAR; INTRAVENOUS PRN
Status: DISCONTINUED | OUTPATIENT
Start: 2021-03-11 | End: 2021-03-11 | Stop reason: SDUPTHER

## 2021-03-11 RX ORDER — CEFAZOLIN SODIUM 2 G/50ML
2000 SOLUTION INTRAVENOUS EVERY 8 HOURS
Status: COMPLETED | OUTPATIENT
Start: 2021-03-11 | End: 2021-03-12

## 2021-03-11 RX ORDER — CEFAZOLIN SODIUM 2 G/50ML
2000 SOLUTION INTRAVENOUS ONCE
Status: COMPLETED | OUTPATIENT
Start: 2021-03-11 | End: 2021-03-11

## 2021-03-11 RX ORDER — TIZANIDINE 4 MG/1
4 TABLET ORAL EVERY 8 HOURS PRN
Status: DISCONTINUED | OUTPATIENT
Start: 2021-03-11 | End: 2021-03-12

## 2021-03-11 RX ORDER — ROCURONIUM BROMIDE 10 MG/ML
INJECTION, SOLUTION INTRAVENOUS PRN
Status: DISCONTINUED | OUTPATIENT
Start: 2021-03-11 | End: 2021-03-11 | Stop reason: SDUPTHER

## 2021-03-11 RX ORDER — ALBUTEROL SULFATE 0.63 MG/3ML
0.63 SOLUTION RESPIRATORY (INHALATION) EVERY 6 HOURS PRN
Status: DISCONTINUED | OUTPATIENT
Start: 2021-03-11 | End: 2021-03-11

## 2021-03-11 RX ORDER — SODIUM CHLORIDE 0.9 % (FLUSH) 0.9 %
10 SYRINGE (ML) INJECTION PRN
Status: DISCONTINUED | OUTPATIENT
Start: 2021-03-11 | End: 2021-03-13 | Stop reason: HOSPADM

## 2021-03-11 RX ORDER — AMLODIPINE BESYLATE 5 MG/1
5 TABLET ORAL DAILY
Status: DISCONTINUED | OUTPATIENT
Start: 2021-03-11 | End: 2021-03-13 | Stop reason: HOSPADM

## 2021-03-11 RX ORDER — ONDANSETRON 4 MG/1
4 TABLET, FILM COATED ORAL EVERY 8 HOURS PRN
Status: DISCONTINUED | OUTPATIENT
Start: 2021-03-11 | End: 2021-03-13 | Stop reason: HOSPADM

## 2021-03-11 RX ORDER — GLYCOPYRROLATE 1 MG/5 ML
SYRINGE (ML) INTRAVENOUS PRN
Status: DISCONTINUED | OUTPATIENT
Start: 2021-03-11 | End: 2021-03-11 | Stop reason: SDUPTHER

## 2021-03-11 RX ORDER — HYDROCODONE BITARTRATE AND ACETAMINOPHEN 5; 325 MG/1; MG/1
2 TABLET ORAL PRN
Status: DISCONTINUED | OUTPATIENT
Start: 2021-03-11 | End: 2021-03-11 | Stop reason: HOSPADM

## 2021-03-11 RX ORDER — SENNA AND DOCUSATE SODIUM 50; 8.6 MG/1; MG/1
1 TABLET, FILM COATED ORAL 2 TIMES DAILY
Status: DISCONTINUED | OUTPATIENT
Start: 2021-03-11 | End: 2021-03-13 | Stop reason: HOSPADM

## 2021-03-11 RX ORDER — ONDANSETRON 2 MG/ML
4 INJECTION INTRAMUSCULAR; INTRAVENOUS EVERY 6 HOURS PRN
Status: DISCONTINUED | OUTPATIENT
Start: 2021-03-11 | End: 2021-03-13 | Stop reason: HOSPADM

## 2021-03-11 RX ORDER — OMEPRAZOLE 20 MG/1
20 CAPSULE, DELAYED RELEASE ORAL DAILY
Status: DISCONTINUED | OUTPATIENT
Start: 2021-03-11 | End: 2021-03-11 | Stop reason: CLARIF

## 2021-03-11 RX ORDER — GABAPENTIN 300 MG/1
600 CAPSULE ORAL 3 TIMES DAILY
Status: DISCONTINUED | OUTPATIENT
Start: 2021-03-11 | End: 2021-03-13 | Stop reason: HOSPADM

## 2021-03-11 RX ORDER — DIPHENHYDRAMINE HYDROCHLORIDE 50 MG/ML
INJECTION INTRAMUSCULAR; INTRAVENOUS PRN
Status: DISCONTINUED | OUTPATIENT
Start: 2021-03-11 | End: 2021-03-11 | Stop reason: SDUPTHER

## 2021-03-11 RX ORDER — SODIUM CHLORIDE 0.9 % (FLUSH) 0.9 %
10 SYRINGE (ML) INJECTION EVERY 12 HOURS SCHEDULED
Status: DISCONTINUED | OUTPATIENT
Start: 2021-03-11 | End: 2021-03-13 | Stop reason: HOSPADM

## 2021-03-11 RX ADMIN — MIDAZOLAM HYDROCHLORIDE 2 MG: 2 INJECTION, SOLUTION INTRAMUSCULAR; INTRAVENOUS at 10:00

## 2021-03-11 RX ADMIN — LIDOCAINE HYDROCHLORIDE 100 MG: 20 INJECTION, SOLUTION INTRAVENOUS at 10:03

## 2021-03-11 RX ADMIN — HYDROMORPHONE HYDROCHLORIDE 0.5 MG: 1 INJECTION, SOLUTION INTRAMUSCULAR; INTRAVENOUS; SUBCUTANEOUS at 14:37

## 2021-03-11 RX ADMIN — DIPHENHYDRAMINE HYDROCHLORIDE 12.5 MG: 50 INJECTION INTRAMUSCULAR; INTRAVENOUS at 10:00

## 2021-03-11 RX ADMIN — DEXAMETHASONE SODIUM PHOSPHATE 10 MG: 10 INJECTION INTRAMUSCULAR; INTRAVENOUS at 10:21

## 2021-03-11 RX ADMIN — GENTAMICIN SULFATE 80 MG: 80 INJECTION, SOLUTION INTRAVENOUS at 10:15

## 2021-03-11 RX ADMIN — Medication 0.2 MG: at 11:02

## 2021-03-11 RX ADMIN — SODIUM CHLORIDE, POTASSIUM CHLORIDE, SODIUM LACTATE AND CALCIUM CHLORIDE: 600; 310; 30; 20 INJECTION, SOLUTION INTRAVENOUS at 10:00

## 2021-03-11 RX ADMIN — CEFAZOLIN SODIUM 2 G: 2 SOLUTION INTRAVENOUS at 10:10

## 2021-03-11 RX ADMIN — SODIUM CHLORIDE, PRESERVATIVE FREE 10 ML: 5 INJECTION INTRAVENOUS at 19:51

## 2021-03-11 RX ADMIN — TIZANIDINE 4 MG: 4 TABLET ORAL at 17:32

## 2021-03-11 RX ADMIN — DOCUSATE SODIUM 50 MG AND SENNOSIDES 8.6 MG 1 TABLET: 8.6; 5 TABLET, FILM COATED ORAL at 19:49

## 2021-03-11 RX ADMIN — ALBUTEROL SULFATE 2.5 MG: 2.5 SOLUTION RESPIRATORY (INHALATION) at 14:50

## 2021-03-11 RX ADMIN — OXYCODONE HYDROCHLORIDE AND ACETAMINOPHEN 1 TABLET: 5; 325 TABLET ORAL at 15:16

## 2021-03-11 RX ADMIN — HYDROMORPHONE HYDROCHLORIDE 0.5 MG: 1 INJECTION, SOLUTION INTRAMUSCULAR; INTRAVENOUS; SUBCUTANEOUS at 14:24

## 2021-03-11 RX ADMIN — ONDANSETRON 4 MG: 2 INJECTION INTRAMUSCULAR; INTRAVENOUS at 12:15

## 2021-03-11 RX ADMIN — PHENYLEPHRINE HYDROCHLORIDE 50 MCG: 10 INJECTION INTRAVENOUS at 11:03

## 2021-03-11 RX ADMIN — REMIFENTANIL HYDROCHLORIDE 0.08 MCG/KG/MIN: 1 INJECTION, POWDER, LYOPHILIZED, FOR SOLUTION INTRAVENOUS at 10:05

## 2021-03-11 RX ADMIN — ROCURONIUM BROMIDE 10 MG: 10 INJECTION INTRAVENOUS at 10:30

## 2021-03-11 RX ADMIN — OXYCODONE HYDROCHLORIDE AND ACETAMINOPHEN 1 TABLET: 5; 325 TABLET ORAL at 19:49

## 2021-03-11 RX ADMIN — HYDROMORPHONE HYDROCHLORIDE 0.5 MG: 1 INJECTION, SOLUTION INTRAMUSCULAR; INTRAVENOUS; SUBCUTANEOUS at 13:48

## 2021-03-11 RX ADMIN — HYDROMORPHONE HYDROCHLORIDE 0.5 MG: 1 INJECTION, SOLUTION INTRAMUSCULAR; INTRAVENOUS; SUBCUTANEOUS at 13:13

## 2021-03-11 RX ADMIN — FAMOTIDINE 20 MG: 10 INJECTION, SOLUTION INTRAVENOUS at 10:00

## 2021-03-11 RX ADMIN — HYDROMORPHONE HYDROCHLORIDE 0.3 MG: 1 INJECTION, SOLUTION INTRAMUSCULAR; INTRAVENOUS; SUBCUTANEOUS at 12:51

## 2021-03-11 RX ADMIN — Medication 50 MCG: at 19:49

## 2021-03-11 RX ADMIN — HYDROMORPHONE HYDROCHLORIDE 0.5 MG: 1 INJECTION, SOLUTION INTRAMUSCULAR; INTRAVENOUS; SUBCUTANEOUS at 14:02

## 2021-03-11 RX ADMIN — HYDROMORPHONE HYDROCHLORIDE 0.3 MG: 1 INJECTION, SOLUTION INTRAMUSCULAR; INTRAVENOUS; SUBCUTANEOUS at 11:08

## 2021-03-11 RX ADMIN — GABAPENTIN 600 MG: 300 CAPSULE ORAL at 19:49

## 2021-03-11 RX ADMIN — PHENYLEPHRINE HYDROCHLORIDE 50 MCG: 10 INJECTION INTRAVENOUS at 10:39

## 2021-03-11 RX ADMIN — REMIFENTANIL HYDROCHLORIDE 0.08 MCG/KG/MIN: 1 INJECTION, POWDER, LYOPHILIZED, FOR SOLUTION INTRAVENOUS at 10:35

## 2021-03-11 RX ADMIN — PROPOFOL 50 MCG/KG/MIN: 10 INJECTION, EMULSION INTRAVENOUS at 10:20

## 2021-03-11 RX ADMIN — REMIFENTANIL HYDROCHLORIDE 0.08 MCG/KG/MIN: 1 INJECTION, POWDER, LYOPHILIZED, FOR SOLUTION INTRAVENOUS at 10:20

## 2021-03-11 RX ADMIN — POTASSIUM CHLORIDE, DEXTROSE MONOHYDRATE AND SODIUM CHLORIDE: 150; 5; 450 INJECTION, SOLUTION INTRAVENOUS at 17:33

## 2021-03-11 RX ADMIN — OXYCODONE HYDROCHLORIDE AND ACETAMINOPHEN 500 MG: 500 TABLET ORAL at 17:32

## 2021-03-11 RX ADMIN — HYDROMORPHONE HYDROCHLORIDE 0.3 MG: 1 INJECTION, SOLUTION INTRAMUSCULAR; INTRAVENOUS; SUBCUTANEOUS at 10:45

## 2021-03-11 RX ADMIN — SODIUM CHLORIDE, POTASSIUM CHLORIDE, SODIUM LACTATE AND CALCIUM CHLORIDE 50 ML/HR: 600; 310; 30; 20 INJECTION, SOLUTION INTRAVENOUS at 09:37

## 2021-03-11 RX ADMIN — HYDROMORPHONE HYDROCHLORIDE 0.4 MG: 1 INJECTION, SOLUTION INTRAMUSCULAR; INTRAVENOUS; SUBCUTANEOUS at 11:24

## 2021-03-11 RX ADMIN — CEFAZOLIN SODIUM 2000 MG: 2 SOLUTION INTRAVENOUS at 17:33

## 2021-03-11 RX ADMIN — HYDROMORPHONE HYDROCHLORIDE 0.5 MG: 1 INJECTION, SOLUTION INTRAMUSCULAR; INTRAVENOUS; SUBCUTANEOUS at 20:53

## 2021-03-11 RX ADMIN — ONDANSETRON 4 MG: 2 INJECTION INTRAMUSCULAR; INTRAVENOUS at 17:38

## 2021-03-11 RX ADMIN — PROPOFOL 200 MG: 10 INJECTION, EMULSION INTRAVENOUS at 10:05

## 2021-03-11 RX ADMIN — HYDROMORPHONE HYDROCHLORIDE 0.5 MG: 1 INJECTION, SOLUTION INTRAMUSCULAR; INTRAVENOUS; SUBCUTANEOUS at 17:38

## 2021-03-11 RX ADMIN — HYDROMORPHONE HYDROCHLORIDE 0.2 MG: 1 INJECTION, SOLUTION INTRAMUSCULAR; INTRAVENOUS; SUBCUTANEOUS at 12:15

## 2021-03-11 RX ADMIN — SODIUM CHLORIDE, POTASSIUM CHLORIDE, SODIUM LACTATE AND CALCIUM CHLORIDE 125 ML/HR: 600; 310; 30; 20 INJECTION, SOLUTION INTRAVENOUS at 09:37

## 2021-03-11 RX ADMIN — ROCURONIUM BROMIDE 50 MG: 10 INJECTION INTRAVENOUS at 10:05

## 2021-03-11 ASSESSMENT — PAIN SCALES - GENERAL
PAINLEVEL_OUTOF10: 3
PAINLEVEL_OUTOF10: 10
PAINLEVEL_OUTOF10: 10
PAINLEVEL_OUTOF10: 2
PAINLEVEL_OUTOF10: 3
PAINLEVEL_OUTOF10: 10
PAINLEVEL_OUTOF10: 10
PAINLEVEL_OUTOF10: 6
PAINLEVEL_OUTOF10: 9

## 2021-03-11 ASSESSMENT — PULMONARY FUNCTION TESTS
PIF_VALUE: 10
PIF_VALUE: 18
PIF_VALUE: 42
PIF_VALUE: 18
PIF_VALUE: 17
PIF_VALUE: 18
PIF_VALUE: 19
PIF_VALUE: 10
PIF_VALUE: 22
PIF_VALUE: 23
PIF_VALUE: 10
PIF_VALUE: 18
PIF_VALUE: 18
PIF_VALUE: 14
PIF_VALUE: 20
PIF_VALUE: 18
PIF_VALUE: 18
PIF_VALUE: 17
PIF_VALUE: 17
PIF_VALUE: 18
PIF_VALUE: 10
PIF_VALUE: 1
PIF_VALUE: 18
PIF_VALUE: 23
PIF_VALUE: 10
PIF_VALUE: 19
PIF_VALUE: 18
PIF_VALUE: 17
PIF_VALUE: 21
PIF_VALUE: 19
PIF_VALUE: 19
PIF_VALUE: 17
PIF_VALUE: 18
PIF_VALUE: 20
PIF_VALUE: 10
PIF_VALUE: 18
PIF_VALUE: 10
PIF_VALUE: 3
PIF_VALUE: 17
PIF_VALUE: 18
PIF_VALUE: 20
PIF_VALUE: 16
PIF_VALUE: 1
PIF_VALUE: 8
PIF_VALUE: 17
PIF_VALUE: 10
PIF_VALUE: 18
PIF_VALUE: 17
PIF_VALUE: 22
PIF_VALUE: 16
PIF_VALUE: 18
PIF_VALUE: 17
PIF_VALUE: 23
PIF_VALUE: 18
PIF_VALUE: 17
PIF_VALUE: 18
PIF_VALUE: 17
PIF_VALUE: 2
PIF_VALUE: 17
PIF_VALUE: 20
PIF_VALUE: 23
PIF_VALUE: 18
PIF_VALUE: 18
PIF_VALUE: 19
PIF_VALUE: 19
PIF_VALUE: 22
PIF_VALUE: 16
PIF_VALUE: 18
PIF_VALUE: 19
PIF_VALUE: 17
PIF_VALUE: 23
PIF_VALUE: 19
PIF_VALUE: 18
PIF_VALUE: 19
PIF_VALUE: 18
PIF_VALUE: 18
PIF_VALUE: 10
PIF_VALUE: 23
PIF_VALUE: 19
PIF_VALUE: 17
PIF_VALUE: 25
PIF_VALUE: 17
PIF_VALUE: 13
PIF_VALUE: 19
PIF_VALUE: 18
PIF_VALUE: 18
PIF_VALUE: 17
PIF_VALUE: 23
PIF_VALUE: 19
PIF_VALUE: 26
PIF_VALUE: 17
PIF_VALUE: 16
PIF_VALUE: 17
PIF_VALUE: 20
PIF_VALUE: 18
PIF_VALUE: 1
PIF_VALUE: 18

## 2021-03-11 ASSESSMENT — LIFESTYLE VARIABLES: SMOKING_STATUS: 1

## 2021-03-11 ASSESSMENT — PAIN DESCRIPTION - DESCRIPTORS: DESCRIPTORS: CONSTANT;SHARP;STABBING

## 2021-03-11 NOTE — PROGRESS NOTES
Pt stating she's having a hard time catching her breath or taking a deep breath. She is satting 99% on 2 liters. Pt repositioned and incentive spirometer at bedside        Called Dr. Janet Alvarez. ..... allbuteral nebulizer treatment ordered

## 2021-03-11 NOTE — BRIEF OP NOTE
Brief Postoperative Note      Patient: Rick Santiago  YOB: 1975  MRN: [id number]    Date of Procedure: 3/11/2021    Pre-Op Diagnosis: STENOSIS, SPONDYLOLISTHESIS, RADICULOPATHY    Post-Op Diagnosis: Same       Procedure(s):  L4-5 TLIF INSTRUMENTATION AT L5-S1 DECOMPRESSION AT L4, L5 REMOVAL AND REINSERTION OF HARDWARE AT L5-S1    Surgeon(s):  Janet Ambriz MD    Assistant:  First Assistant: Nico Westbrook    Anesthesia: General    Estimated Blood Loss (mL): less than 50     Complications: None    Specimens:   ID Type Source Tests Collected by Time Destination   A : disc Tissue Spine SURGICAL PATHOLOGY Janet Ambriz MD 3/11/2021 1134        Implants:  Implant Name Type Inv. Item Serial No.  Lot No. LRB No. Used Action   GRAFT BNE SUB 30CC 1.7-10MM CANC CHIP MORSELIZED FRZ DRY - [id number]  GRAFT BNE SUB 30CC 1.7-10MM CANC CHIP MORSELIZED FRZ DRY [id number] [facility] [facility]  N/A 1 Implanted   GRAFT BNE SUB 30CC 1.7-10MM CANC CHIP MORSELIZED FRZ DRY - [id number]  GRAFT BNE SUB 30CC 1.7-10MM CANC CHIP MORSELIZED FRZ DRY [id number] [facility] [employer] [facility]  N/A 1 Implanted   GRAFT BNE SUB 5ML MTRX CELLULAR OSTEOCEL + - [id number]  GRAFT BNE SUB 5ML MTRX CELLULAR OSTEOCEL + [id number] [facility]  N/A 1 Implanted   CAGE SPNL 10 MM COALESCE  CAGE SPNL 10 MM COALESCE  [employer]- [id number] N/A 1 Implanted   SCREW SPNL L55MM DIA6. 5MM POST THORACOLUMBOSACRAL POLYAX 2S  SCREW SPNL L55MM DIA6. 5MM POST THORACOLUMBOSACRAL POLYAX 2S  [employer]-  N/A 2 Implanted   SCREW SPNL L50MM OD7MM 2S POLYAX RELINE-O  SCREW SPNL L50MM OD7MM 2S POLYAX RELINE-O  [employer]-  N/A 1 Implanted   SCREW SPNL L55MM OD7MM 2S POLYAX RELINE-O  SCREW SPNL L55MM OD7MM 2S POLYAX RELINE-O  [employer]-WD  N/A 3 Implanted   SCREW SPNL DIA5. 5MM OPN TULIP SORIN RELINE  SCREW SPNL DIA5. 5MM OPN TULIP SORIN RELINE  [employer]-WD  N/A 1 Implanted   CONNECTOR SPNL CRSS LO PROF ADJ RELINE-O 5.5 X 45-65 MM  CONNECTOR SPNL CRSS LO PROF ADJ RELINE-O 5.5 X 45-65 MM  [employer]-WD  N/A 1 Implanted   AC SPNL L60MM DIA5. 5MM POST THORACOLUMBOSACRAL TI LORDTC  AC SPNL L60MM DIA5. 5MM POST THORACOLUMBOSACRAL TI LORDTC  [employer]-WD  N/A 2 Implanted         Drains:   Urethral Catheter Temperature probe 16 fr (Active)           Electronically signed by Esthela Donovan MD on 3/11/2021 at 1:21 PM

## 2021-03-11 NOTE — PROGRESS NOTES
Pt still experiencing 10/10 pain unrelieved after 2mg percocet. PO Norco ordered by patient does not like Norco and would rather have Percocet. Call place to Dr. Sherif Aponte.   He will order Percocet

## 2021-03-11 NOTE — ANESTHESIA PRE PROCEDURE
Department of Anesthesiology  Preprocedure Note       Name:  Ajay Toth   Age:  55 y.o.  :  1975                                          MRN:  [id number]         Date:  3/11/2021      Surgeon: Kimberly Patton):  Scott Al MD    Procedure: Procedure(s):  PLIF (POSTERIOR LATERAL INTERBODY FUSION) L 4-5     2 HOURS/ 1 C-ARM/ DAVE TABLE/ SSEP/ CELL SAVERS/ NUVASIVE  2ND CASE  ([facility])    Medications prior to admission:   Prior to Admission medications    Medication Sig Start Date End Date Taking? Authorizing Provider   Calcium Carb-Cholecalciferol (CALCIUM 1000 + D PO) Take 600 mg by mouth daily 10/16/19   Historical Provider, MD   Potassium 99 MG TABS Take 99 mg by mouth daily 10/5/17   Historical Provider, MD   levothyroxine (SYNTHROID) 50 MCG tablet daily 21   Historical Provider, MD   tiZANidine (ZANAFLEX) 4 MG tablet Take 4 mg by mouth every 8 hours as needed    Historical Provider, MD   vitamin B-12 (CYANOCOBALAMIN) 100 MCG tablet Take 50 mcg by mouth daily    Historical Provider, MD   Biotin 1000 MCG TABS Take by mouth 2 times daily    Historical Provider, MD   oxyCODONE-acetaminophen (PERCOCET) 5-325 MG per tablet Take 1 tablet by mouth 2 times daily for 20 days. 3/4/21 3/24/21  Scott Al MD   gabapentin (NEURONTIN) 600 MG tablet Take 1 tablet by mouth 3 times daily for 30 days. 11/20/19 3/4/21  Scott Al MD   Misc.  Devices (RAISED TOILET SEAT/LOCK & ARMS) MISC 1 Piece by Does not apply route as needed (PRN) 19   Scott Al MD   ferrous sulfate 325 (65 Fe) MG tablet Take 65 mg by mouth daily (with breakfast)    Historical Provider, MD   vitamin C (ASCORBIC ACID) 500 MG tablet Take 500 mg by mouth daily    Historical Provider, MD   ondansetron (ZOFRAN) 4 MG tablet Take 4 mg by mouth every 8 hours as needed for Nausea or Vomiting    Historical Provider, MD   VENTOLIN  (90 Base) MCG/ACT inhaler 2 puffs 4 times daily  18   Historical Provider, MD   amLODIPine (Nery Cochran) 5 MG tablet Take 5 mg by mouth daily  12/22/18   Historical Provider, MD   omeprazole (PRILOSEC) 20 MG capsule Take 20 mg by mouth daily     Historical Provider, MD       Current medications:    No current facility-administered medications for this encounter. 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     Lumbar stenosis     Migraines     starts with neck pain    Seizures (HCC)     Thyroid disease     meds > 4 yrs -- intermittent    Uterine anomaly        Past Surgical History:        Procedure Laterality Date    ANTERIOR CRUCIATE LIGAMENT REPAIR Left     APPENDECTOMY      at age 21    [address]  2017    lumbar disc     BACK SURGERY  2018    lumbar fusion    CHOLECYSTECTOMY  2015    COLONOSCOPY      ENDOMETRIAL ABLATION  2006    post op sepsis    ENDOSCOPY, COLON, DIAGNOSTIC      KNEE ARTHROSCOPY Left 2004    ACL repair    LUMBAR FUSION N/A 1/15/2019    L4- L5 DECOMPRESSION, L5-S1 POSTERIOR LUMBAR INTERBODY FUSION performed by Gary Pimentel MD at . [address]  5/9/13    duramorph 1.5 mg epideral  6mg celestone    TONSILLECTOMY      as child    TUBAL LIGATION Bilateral 2002       Social History:    Social History     Tobacco Use    Smoking status: Current Every Day Smoker     Packs/day: 1.50     Years: 30.00     Pack years: 45.00     Types: Cigarettes    Smokeless tobacco: Never Used   Substance Use Topics    Alcohol use: No     Alcohol/week: 0.0 standard drinks     Frequency: Never                                Ready to quit: Not Answered  Counseling given: Not Answered      Vital Signs (Current): There were no vitals filed for this visit. BP Readings from Last 3 Encounters:   03/04/21 (!) 151/94   01/17/19 135/71   01/14/19 (!) 143/83       NPO Status:                                                                                 BMI:   Wt Readings from Last 3 Encounters:   03/04/21 217 lb 3.2 oz (98.5 kg)   02/12/21 200 lb (90.7 kg)   01/04/21 215 lb (97.5 kg)     There is no height or weight on file to calculate BMI.    CBC:   Lab Results   Component Value Date    WBC 6.7 03/04/2021    RBC 4.64 03/04/2021    HGB 15.1 03/04/2021    HCT 44.5 03/04/2021    MCV 95.9 03/04/2021    RDW 13.5 03/04/2021     03/04/2021       CMP:   Lab Results   Component Value Date     03/04/2021    K 3.9 03/04/2021    K 4.4 01/16/2019     03/04/2021    CO2 28 03/04/2021    BUN 15 03/04/2021    CREATININE 0.88 03/04/2021    GFRAA >60.0 03/04/2021    LABGLOM >60.0 03/04/2021    GLUCOSE 111 03/04/2021    CALCIUM 9.4 03/04/2021       POC Tests: No results for input(s): POCGLU, POCNA, POCK, POCCL, POCBUN, POCHEMO, POCHCT in the last 72 hours.     Coags:   Lab Results   Component Value Date    PROTIME 12.5 03/04/2021    INR 0.9 03/04/2021    APTT 28.4 01/14/2019       HCG (If Applicable): No results found for: PREGTESTUR, PREGSERUM, HCG, HCGQUANT     ABGs: No results found for: PHART, PO2ART, KYU7WFH, TMJ5WUM, BEART, C8JUKLQO     Type & Screen (If Applicable):  No results found for: LABABO, LABRH    Drug/Infectious Status (If Applicable):  No results found for: HIV, HEPCAB    COVID-19 Screening (If Applicable):   Lab Results   Component Value Date    COVID19 Not Detected 03/04/2021         Anesthesia Evaluation  Patient summary reviewed and Nursing notes reviewed no history of anesthetic complications:   Airway: Mallampati: II  TM distance: >3 FB   Neck ROM: full  Mouth opening: > = 3 FB Dental: normal exam         Pulmonary:   (+) asthma: current smoker                           Cardiovascular:    (+) hypertension:,                   Neuro/Psych:   (+) seizures:, CVA:, headaches:, psychiatric history:            GI/Hepatic/Renal:        Morbid obesity: Class II obesity. Endo/Other:    (+) hypothyroidism::., .                 Abdominal:           Vascular: negative vascular ROS. Anesthesia Plan      general     ASA 3       Induction: intravenous. MIPS: Postoperative opioids intended and Prophylactic antiemetics administered. Anesthetic plan and risks discussed with patient. Plan discussed with CRNA.     Attending anesthesiologist reviewed and agrees with Megan Schafer MD   3/11/2021

## 2021-03-11 NOTE — OP NOTE
Operative Note  ______________________________________________________________    Patient: Naveen Fitzgerald  YOB: 1975  MRN: [id number]  Date of Procedure: 3/11/2021    Pre-Op Diagnosis: STENOSIS, SPONDYLOLISTHESIS, RADICULOPATHY with disc herniation at L4-5 left, previous lumbar fusion instrumentation at L5-S1    Post-Op Diagnosis: Same       Procedure(s):  L4-5 TLIF INSTRUMENTATION AT L5-S1 DECOMPRESSION AT L4, L5 REMOVAL AND REINSERTION OF HARDWARE AT L5-S1    Removal and reinsertion of L5-S1 pedicle screws, L4 decompressive laminotomy and foraminotomy, L4-5 left discectomy, L4-5 bilateral pedicle screw instrumentation, L4-5 left transforaminal lumbar interbody fusion, L4-5 arthrodesis with with posterior lateral fusion, L5-S1 posterior lateral fusion, use of SSEP and fluoroscopy, use of autograft allograft and osteocell,    Anesthesia: General    Surgeon(s):  Estrella Resendiz MD    Staff:    First Assistant: Kathaleen Mcburney Person First: Aisha Aponte Person Second: Elicia Knight    Estimated Blood Loss: 50 mL    Specimens:   ID Type Source Tests Collected by Time Destination   A : disc Tissue Spine SURGICAL PATHOLOGY Estrella Resendiz MD 3/11/2021 1134        Implants:  Implant Name Type Inv.  Item Serial No.  Lot No. LRB No. Used Action   GRAFT BNE SUB 30CC 1.7-10MM CANC CHIP MORSELIZED FRZ DRY - [id number]  GRAFT BNE SUB 30CC 1.7-10MM CANC CHIP MORSELIZED FRZ DRY [id number] [employer]-  N/A 1 Implanted   GRAFT BNE SUB 30CC 1.7-10MM CANC CHIP MORSELIZED FRZ DRY - [id number]  GRAFT BNE SUB 30CC 1.7-10MM CANC CHIP MORSELIZED FRZ DRY [id number] [facility]  N/A 1 Implanted   GRAFT BNE SUB 5ML MTRX CELLULAR OSTEOCEL + - [id number]  GRAFT BNE SUB 5ML MTRX CELLULAR OSTEOCEL + [id number] [employer]  N/A 1 Implanted   CAGE SPNL 10 MM COALESCE  CAGE SPNL 10 MM COALESCE  [employer] [id number] N/A 1 Implanted   SCREW SPNL L55MM DIA6. 5MM POST THORACOLUMBOSACRAL POLYAX 2S  SCREW SPNL L55MM DIA6. 5MM POST THORACOLUMBOSACRAL POLYAX 2S  [employer]  N/A 2 Implanted   SCREW SPNL L50MM OD7MM 2S POLYAX RELINE-O  SCREW SPNL L50MM OD7MM 2S POLYAX RELINE-O  [employer]  N/A 1 Implanted   SCREW SPNL L55MM OD7MM 2S POLYAX RELINE-O  SCREW SPNL L55MM OD7MM 2S POLYAX RELINE-O  [employer]  N/A 3 Implanted   SCREW SPNL DIA5. 5MM OPN TULIP SORIN RELINE  SCREW SPNL DIA5. 5MM OPN TULIP SROIN RELINE  [employer]  N/A 1 Implanted   CONNECTOR SPNL CRSS LO PROF ADJ RELINE-O 5.5 X 45-65 MM  CONNECTOR SPNL CRSS LO PROF ADJ RELINE-O 5.5 X 45-65 MM  [employer]  N/A 1 Implanted   AC SPNL L60MM DIA5. 5MM POST THORACOLUMBOSACRAL TI LORDTC  AC SPNL L60MM DIA5. 5MM POST THORACOLUMBOSACRAL TI LORDTC  [employer]  N/A 2 Implanted         Drains:   Urethral Catheter Temperature probe 16 fr (Active)       Procedure:  Patient is a 35-year-old female with history of lumbar fusion rotation L5-S1 with addressable pathology at L4-5 with disc herniation stenosis with radiculopathy and intractable pain for which patient is admitted for surgery. She was given preoperative antibiotics and Decadron Intra-Op. Teds and compression boots were applied to prevent Intra-Op and postop DVTs. Howell catheter was applied as well. After the intubation, she was carefully positioned prone over or table. In neck and pressure. Carefully inspected and protected. Under fluoroscopy, back was then cleaned with ChloraPrep and draped sterilely. Midline incision was made using her previous scar. Careful dissection was made to expose the lamina of L4 as well as her previous surgical site at L5-S1. Initial attention was made at L5S1 space. Pedicle screws are carefully identified and removed in routine fashion. There appears to be a solid screw screw at L5 but not as tight fit S1.     Further dissection was made at the interlaminar space at L5-S1 space with the inspection of the fusion site at L5-S1. With fusion state unclear as with her given her significant smoking history, decision was made to reinsert screws at this level. Performed post lateral fusion. Subsequently attention was made at L4-5 space. An L4 decompressive laminotomy was performed with a resection of scars intervening levels of L4 and L5. Careful foraminotomy and facetectomy was performed the left where there is significant disc herniation was noted with a scar causing severe tension at the L5 nerve root. After scar resection was performed resection adherent and thickened ligamentum flavum's L4-5 level, lateral edge of the thecal sac and L5 nerve was clearly identified. And while these structures were being protected with nerve retractor, incision was made at the annulus with #15 blade. Multiple subligamentous disc fragments were then removed at the level disc as well as a caudal to the level of disc space at L4-5 as seen on the diagnostic studies. Relaxation of the nerve root was and the thecal sac was appreciated afterwards. At this point under direct visualization, pedicle screws in the placement the pedicles of L4 and L5 in routine fashion with screw placement confirmed with a spinal screw stimulation along with the fluoroscopy. Discectomy was then further performed in preparation for lumbar interbody fusion into the disc space using curettes and pituitary forcep. Peek cages filled with pictures were placed through transforaminal approach with satisfactory placement confirmed with the fluoroscopy. Remainder disc space was then filled with graft for arthrodesis at L4-5 disc space along with a posterior lateral fusion spanning from L4-5 to S1. Hemostasis satisfactory. Frequent antibiotic irrigations applied. Gelfoam was applied over the operative field. Paraspinal muscle and fascia were closed with 0 Vicryl's.   In 203 0 Vicryl was used for subcutaneous layers and stay for skin. Patient tolerated procedure well. Less than 50 cc of EBL was noted.     Pao Gross MD   on 3/11/21 at 1:21 PM EST

## 2021-03-11 NOTE — PROGRESS NOTES
[facility] EMERGENCY [facility] AT [place] Respiratory Therapy Evaluation   Current Order:  ALBUTEROL MDI QID/ALBUTEROL NEB Q6PRN      Home Regimen: QID      Ordering Physician: Milana Novoa  Re-evaluation Date:  3/14     Diagnosis: SPONDYLOLISTHESIS      Patient Status: Stable / Unstable + Physician notified    The following MDI Criteria must be met in order to convert aerosol to MDI with spacer.  If unable to meet, MDI will be converted to aerosol:  []  Patient able to demonstrate the ability to use MDI effectively  []  Patient alert and cooperative  []  Patient able to take deep breath with 5-10 second hold  []  Medication(s) available in this delivery method   []  Peak flow greater than or equal to 200 ml/min            Current Order Substituted To  (same drug, same frequency)   Aerosol to MDI [] Albuterol Sulfate 0.083% unit dose by aerosol Albuterol Sulfate MDI 2 puffs by inhalation with spacer    [] Levalbuterol 1.25 mg unit dose by aerosol Levalbuterol MDI 2 puffs by inhalation with spacer    [] Levalbuterol 0.63 mg unit dose by aerosol Levalbuterol MDI 2 puffs by inhalation with spacer    [] Ipratropium Bromide 0.02% unit dose by aerosol Ipratropium Bromide MDI 2 puffs by inhalation with spacer    [] Duoneb (Ipratropium + Albuterol) unit dose by aerosol Ipratropium MDI + Albuterol MDI 2 puffs by inhalation w/spacer   MDI to Aerosol [] Albuterol Sulfate MDI Albuterol Sulfate 0.083% unit dose by aerosol    [] Levalbuterol MDI 2 puffs by inhalation Levalbuterol 1.25 mg unit dose by aerosol    [] Ipratropium Bromide MDI by inhalation Ipratropium Bromide 0.02% unit dose by aerosol    [] Combivent (Ipratropium + Albuterol) MDI by inhalation Duoneb (Ipratropium + Albuterol) unit dose by aerosol       Treatment Assessment [Frequency/Schedule]:  Change frequency to: _________________ALBUTEROL MDI 2 PUFFS QID AND Q2PRN________________________________per Protocol, P&T, [facility]      Points 0 1 2 3 4   Pulmonary Status  Non-Smoker  []   Smoking history   < 20 pack years  []   Smoking history  ?  20 pack years  [x]   Pulmonary Disorder  (acute or chronic)  []   Severe or Chronic w/ Exacerbation  []     Surgical Status No []   Surgeries     General [x]   Surgery Lower []   Abdominal Thoracic or []   Upper Abdominal Thoracic with  PulmonaryDisorder  []     Chest X-ray Clear/Not  Ordered     [x]  Chronic Changes  Results Pending  []  Infiltrates, atelectasis, pleural effusion, or edema  []  Infiltrates in more than one lobe []  Infiltrate + Atelectasis, &/or pleural effusion  []    Respiratory Pattern Regular,  RR = 12-20 [x]  Increased,  RR = 21-25 []  TRUONG, irregular,  or RR = 26-30 []  Decreased FEV1  or RR = 31-35 []  Severe SOB, use  of accessory muscles, or RR ? 35  []    Mental Status Alert, oriented,  Cooperative [x]  Confused but Follows commands []  Lethargic or unable to follow commands []  Obtunded  []  Comatose  []    Breath Sounds Clear to  auscultation  [x]  Decreased unilaterally or  in bases only []  Decreased  bilaterally  []  Crackles or intermittent wheezes []  Wheezes []    Cough Strong, Spontan., & nonproductive [x]  Strong,  spontaneous, &  productive []  Weak,  Nonproductive []  Weak, productive or  with wheezes []  No spontaneous  cough or may require suctioning []    Level of Activity Ambulatory []  Ambulatory w/ Assist  [x]  Non-ambulatory []  Paraplegic []  Quadriplegic []    Total    Score:_____4__     Triage Score:___5____      Tri       Triage:     1. (>20) Freq: Q3    2. (16-20) Freq: Q4   3. (11-15) Freq: QID & Albuterol Q2 PRN    4. (6-10) Freq: TID & Albuterol Q2 PRN    5. (0-5) Freq Q4prn

## 2021-03-12 ENCOUNTER — APPOINTMENT (OUTPATIENT)
Dept: GENERAL RADIOLOGY | Age: 46
DRG: 304 | End: 2021-03-12
Attending: NEUROLOGICAL SURGERY
Payer: MEDICARE

## 2021-03-12 LAB
ANION GAP SERPL CALCULATED.3IONS-SCNC: 10 MEQ/L (ref 9–15)
BASOPHILS ABSOLUTE: 0 K/UL (ref 0–0.2)
BASOPHILS RELATIVE PERCENT: 0.2 %
BUN BLDV-MCNC: 9 MG/DL (ref 6–20)
CALCIUM SERPL-MCNC: 8.9 MG/DL (ref 8.5–9.9)
CHLORIDE BLD-SCNC: 103 MEQ/L (ref 95–107)
CO2: 23 MEQ/L (ref 20–31)
CREAT SERPL-MCNC: 0.64 MG/DL (ref 0.5–0.9)
EOSINOPHILS ABSOLUTE: 0 K/UL (ref 0–0.7)
EOSINOPHILS RELATIVE PERCENT: 0.1 %
GFR AFRICAN AMERICAN: >60
GFR NON-AFRICAN AMERICAN: >60
GLUCOSE BLD-MCNC: 128 MG/DL (ref 70–99)
HCT VFR BLD CALC: 40.5 % (ref 37–47)
HEMOGLOBIN: 13.7 G/DL (ref 12–16)
LYMPHOCYTES ABSOLUTE: 1.3 K/UL (ref 1–4.8)
LYMPHOCYTES RELATIVE PERCENT: 8.4 %
MCH RBC QN AUTO: 32 PG (ref 27–31.3)
MCHC RBC AUTO-ENTMCNC: 33.8 % (ref 33–37)
MCV RBC AUTO: 94.7 FL (ref 82–100)
MONOCYTES ABSOLUTE: 0.9 K/UL (ref 0.2–0.8)
MONOCYTES RELATIVE PERCENT: 5.8 %
NEUTROPHILS ABSOLUTE: 13.3 K/UL (ref 1.4–6.5)
NEUTROPHILS RELATIVE PERCENT: 85.5 %
PDW BLD-RTO: 13.2 % (ref 11.5–14.5)
PLATELET # BLD: 236 K/UL (ref 130–400)
POTASSIUM REFLEX MAGNESIUM: 4.3 MEQ/L (ref 3.4–4.9)
RBC # BLD: 4.28 M/UL (ref 4.2–5.4)
SODIUM BLD-SCNC: 136 MEQ/L (ref 135–144)
WBC # BLD: 15.5 K/UL (ref 4.8–10.8)

## 2021-03-12 PROCEDURE — 6370000000 HC RX 637 (ALT 250 FOR IP): Performed by: NURSE PRACTITIONER

## 2021-03-12 PROCEDURE — 97165 OT EVAL LOW COMPLEX 30 MIN: CPT

## 2021-03-12 PROCEDURE — 2580000003 HC RX 258: Performed by: NEUROLOGICAL SURGERY

## 2021-03-12 PROCEDURE — 94761 N-INVAS EAR/PLS OXIMETRY MLT: CPT

## 2021-03-12 PROCEDURE — 6360000002 HC RX W HCPCS: Performed by: NEUROLOGICAL SURGERY

## 2021-03-12 PROCEDURE — 85025 COMPLETE CBC W/AUTO DIFF WBC: CPT

## 2021-03-12 PROCEDURE — 72100 X-RAY EXAM L-S SPINE 2/3 VWS: CPT

## 2021-03-12 PROCEDURE — 36415 COLL VENOUS BLD VENIPUNCTURE: CPT

## 2021-03-12 PROCEDURE — 80048 BASIC METABOLIC PNL TOTAL CA: CPT

## 2021-03-12 PROCEDURE — 6370000000 HC RX 637 (ALT 250 FOR IP): Performed by: NEUROLOGICAL SURGERY

## 2021-03-12 PROCEDURE — 1210000000 HC MED SURG R&B

## 2021-03-12 PROCEDURE — 94640 AIRWAY INHALATION TREATMENT: CPT

## 2021-03-12 PROCEDURE — 73030 X-RAY EXAM OF SHOULDER: CPT

## 2021-03-12 RX ORDER — DIAZEPAM 5 MG/1
5 TABLET ORAL EVERY 6 HOURS PRN
Status: DISCONTINUED | OUTPATIENT
Start: 2021-03-12 | End: 2021-03-13 | Stop reason: HOSPADM

## 2021-03-12 RX ORDER — CEPHALEXIN 500 MG/1
500 CAPSULE ORAL 3 TIMES DAILY
Qty: 21 CAPSULE | Refills: 0 | Status: SHIPPED | OUTPATIENT
Start: 2021-03-12 | End: 2021-03-19

## 2021-03-12 RX ORDER — PANTOPRAZOLE SODIUM 40 MG/1
40 TABLET, DELAYED RELEASE ORAL
Status: DISCONTINUED | OUTPATIENT
Start: 2021-03-12 | End: 2021-03-13 | Stop reason: HOSPADM

## 2021-03-12 RX ORDER — OXYCODONE AND ACETAMINOPHEN 7.5; 325 MG/1; MG/1
1 TABLET ORAL EVERY 8 HOURS PRN
Qty: 40 TABLET | Refills: 0 | Status: SHIPPED | OUTPATIENT
Start: 2021-03-12 | End: 2021-03-26

## 2021-03-12 RX ORDER — DIAZEPAM 5 MG/1
5 TABLET ORAL EVERY 8 HOURS PRN
Qty: 40 TABLET | Refills: 0 | Status: SHIPPED | OUTPATIENT
Start: 2021-03-12 | End: 2021-03-22

## 2021-03-12 RX ORDER — OXYCODONE AND ACETAMINOPHEN 7.5; 325 MG/1; MG/1
1 TABLET ORAL EVERY 4 HOURS PRN
Status: DISCONTINUED | OUTPATIENT
Start: 2021-03-12 | End: 2021-03-13 | Stop reason: HOSPADM

## 2021-03-12 RX ADMIN — CEFAZOLIN SODIUM 2000 MG: 2 SOLUTION INTRAVENOUS at 01:41

## 2021-03-12 RX ADMIN — ALBUTEROL SULFATE 2 PUFF: 90 AEROSOL, METERED RESPIRATORY (INHALATION) at 11:29

## 2021-03-12 RX ADMIN — TIZANIDINE 4 MG: 4 TABLET ORAL at 01:41

## 2021-03-12 RX ADMIN — HYDROMORPHONE HYDROCHLORIDE 0.5 MG: 1 INJECTION, SOLUTION INTRAMUSCULAR; INTRAVENOUS; SUBCUTANEOUS at 12:27

## 2021-03-12 RX ADMIN — CEPHALEXIN 500 MG: 500 CAPSULE ORAL at 20:29

## 2021-03-12 RX ADMIN — DOCUSATE SODIUM 50 MG AND SENNOSIDES 8.6 MG 1 TABLET: 8.6; 5 TABLET, FILM COATED ORAL at 20:29

## 2021-03-12 RX ADMIN — DIAZEPAM 5 MG: 5 TABLET ORAL at 16:54

## 2021-03-12 RX ADMIN — ALBUTEROL SULFATE 2 PUFF: 90 AEROSOL, METERED RESPIRATORY (INHALATION) at 20:16

## 2021-03-12 RX ADMIN — HYDROMORPHONE HYDROCHLORIDE 0.5 MG: 1 INJECTION, SOLUTION INTRAMUSCULAR; INTRAVENOUS; SUBCUTANEOUS at 21:23

## 2021-03-12 RX ADMIN — ALBUTEROL SULFATE 2 PUFF: 90 AEROSOL, METERED RESPIRATORY (INHALATION) at 05:33

## 2021-03-12 RX ADMIN — DIAZEPAM 5 MG: 5 TABLET ORAL at 23:03

## 2021-03-12 RX ADMIN — OXYCODONE HYDROCHLORIDE AND ACETAMINOPHEN 1 TABLET: 7.5; 325 TABLET ORAL at 08:03

## 2021-03-12 RX ADMIN — HYDROMORPHONE HYDROCHLORIDE 0.5 MG: 1 INJECTION, SOLUTION INTRAMUSCULAR; INTRAVENOUS; SUBCUTANEOUS at 16:54

## 2021-03-12 RX ADMIN — OXYCODONE HYDROCHLORIDE AND ACETAMINOPHEN 1 TABLET: 7.5; 325 TABLET ORAL at 22:14

## 2021-03-12 RX ADMIN — OXYCODONE HYDROCHLORIDE AND ACETAMINOPHEN 500 MG: 500 TABLET ORAL at 08:00

## 2021-03-12 RX ADMIN — DOCUSATE SODIUM 50 MG AND SENNOSIDES 8.6 MG 1 TABLET: 8.6; 5 TABLET, FILM COATED ORAL at 07:59

## 2021-03-12 RX ADMIN — OXYCODONE HYDROCHLORIDE AND ACETAMINOPHEN 1 TABLET: 7.5; 325 TABLET ORAL at 13:45

## 2021-03-12 RX ADMIN — GABAPENTIN 600 MG: 300 CAPSULE ORAL at 20:28

## 2021-03-12 RX ADMIN — CEFAZOLIN SODIUM 2000 MG: 2 SOLUTION INTRAVENOUS at 08:01

## 2021-03-12 RX ADMIN — ALBUTEROL SULFATE 2 PUFF: 90 AEROSOL, METERED RESPIRATORY (INHALATION) at 16:31

## 2021-03-12 RX ADMIN — PANTOPRAZOLE SODIUM 40 MG: 40 TABLET, DELAYED RELEASE ORAL at 13:46

## 2021-03-12 RX ADMIN — HYDROMORPHONE HYDROCHLORIDE 0.5 MG: 1 INJECTION, SOLUTION INTRAMUSCULAR; INTRAVENOUS; SUBCUTANEOUS at 01:41

## 2021-03-12 RX ADMIN — PANTOPRAZOLE SODIUM 40 MG: 40 TABLET, DELAYED RELEASE ORAL at 06:33

## 2021-03-12 RX ADMIN — DIAZEPAM 5 MG: 5 TABLET ORAL at 08:00

## 2021-03-12 RX ADMIN — PROMETHAZINE HYDROCHLORIDE 12.5 MG: 12.5 TABLET ORAL at 01:41

## 2021-03-12 RX ADMIN — HYDROMORPHONE HYDROCHLORIDE 0.5 MG: 1 INJECTION, SOLUTION INTRAMUSCULAR; INTRAVENOUS; SUBCUTANEOUS at 05:46

## 2021-03-12 RX ADMIN — Medication 50 MCG: at 08:00

## 2021-03-12 RX ADMIN — OXYCODONE HYDROCHLORIDE AND ACETAMINOPHEN 1 TABLET: 7.5; 325 TABLET ORAL at 17:56

## 2021-03-12 RX ADMIN — GABAPENTIN 600 MG: 300 CAPSULE ORAL at 07:59

## 2021-03-12 RX ADMIN — SODIUM CHLORIDE, PRESERVATIVE FREE 10 ML: 5 INJECTION INTRAVENOUS at 20:54

## 2021-03-12 RX ADMIN — OXYCODONE HYDROCHLORIDE AND ACETAMINOPHEN 1 TABLET: 5; 325 TABLET ORAL at 00:32

## 2021-03-12 RX ADMIN — GABAPENTIN 600 MG: 300 CAPSULE ORAL at 13:45

## 2021-03-12 RX ADMIN — ONDANSETRON 4 MG: 2 INJECTION INTRAMUSCULAR; INTRAVENOUS at 12:27

## 2021-03-12 RX ADMIN — CEPHALEXIN 500 MG: 500 CAPSULE ORAL at 13:45

## 2021-03-12 RX ADMIN — OXYCODONE HYDROCHLORIDE AND ACETAMINOPHEN 1 TABLET: 5; 325 TABLET ORAL at 04:28

## 2021-03-12 RX ADMIN — PANTOPRAZOLE SODIUM 40 MG: 40 TABLET, DELAYED RELEASE ORAL at 16:54

## 2021-03-12 ASSESSMENT — PAIN SCALES - GENERAL
PAINLEVEL_OUTOF10: 9
PAINLEVEL_OUTOF10: 10
PAINLEVEL_OUTOF10: 9
PAINLEVEL_OUTOF10: 10
PAINLEVEL_OUTOF10: 9
PAINLEVEL_OUTOF10: 9

## 2021-03-12 ASSESSMENT — PAIN DESCRIPTION - LOCATION
LOCATION: BACK
LOCATION: BACK;NECK;LEG

## 2021-03-12 ASSESSMENT — PAIN DESCRIPTION - DESCRIPTORS: DESCRIPTORS: SHARP;STABBING

## 2021-03-12 ASSESSMENT — PAIN DESCRIPTION - PAIN TYPE: TYPE: SURGICAL PAIN

## 2021-03-12 NOTE — ADT AUTH CERT
(H) NONE    Utilization Reviews       PA RECOMMENDATIONS by Valarie Gavin RN       Review Status Review Entered   In Primary 3/12/2021 13:33      Criteria Review               2021 1324         Physician Justin   slr keep in We recommend that the following pt's current hospitalization under   DebCullenpolina Dalton       slr keep in     We recommend that the following pt's current hospitalization under INPATIENT status remain INPATIENT.        Name: Lucina Lopez   : 1975   CSN: [id number]   INSURANCE: [employer]  21        Clinical summary 54 y/o female with a PMH significant for Hypertension, Hypothyroidism, DDD and neuropathy underwent an L5-S1 decompression, removal and re-insertion of hardware L5-S1 for lumber DDD with stenosis and neurogenic claudication necessitating ongoing post-op management with neurovascular checks, incentive spirometry, optimization of pain control with IV analgesics, anti-spasmodics, input/output assessments, optimization of BP control and mobility assessments. Vitals /80 HR 74/min.   Labs and Imaging WBC: 15.5  MCG criteria applies Yes  Comments Patient with post-op findings and management meeting INPATIENT status criteria.        This chart was reviewed at 1:19 PM 3/12/2021     Srikanth Arthur MD FACP  Physician Advisor                Musculoskeletal Surgery or Procedure GRG - Care Day 2 (3/12/2021) by Valarie Gavin RN       Review Status Review Entered   Completed 3/12/2021 11:26      Criteria Review      Care Day: 2 Care Date: 3/12/2021 Level of Care: Inpatient Floor    Guideline Day 2    Level Of Care    (X) Floor    3/12/2021 11:25 AM EST by Faheem Jaems      ortho/neuro/tele unit    Clinical Status    (X) * No ICU or intermediate care needs    3/12/2021 11:25 AM EST by Faheem James      No ICU meds, interventions, or monitoring needed    Interventions    (X) Inpatient interventions continue    3/12/2021 11:25 AM EST by Faehem James      lab and vitals monitoring, pain management, PT/OT - increase mobilization    * Milestone

## 2021-03-12 NOTE — PROGRESS NOTES
Department of Neurosurgery  Nurse Practitioner Progress Note  POD #1 L4-5 TLIF INSTRUMENTATION AT L5-S1 DECOMPRESSION AT L4, L5 REMOVAL AND REINSERTION OF HARDWARE AT L5-S1     Removal and reinsertion of L5-S1 pedicle screws, L4 decompressive laminotomy and foraminotomy, L4-5 left discectomy, L4-5 bilateral pedicle screw instrumentation, L4-5 left transforaminal lumbar interbody fusion, L4-5 arthrodesis with with posterior lateral fusion, L5-S1 posterior lateral fusion, use of SSEP and fluoroscopy, use of autograft allograft and osteocell,    SUBJECTIVE:  Patient complains of severe amount of lumbar incisional pain that she describes as a constant stretching and pulling that does not radiates. Pain is exacerbated by movement and she is not receiving much pain relief with the use of ice, rest and pain medication. She was requesting IV dilaudid throughout the night and requesting to go home with dilaudid. The patient notes that she continues to have numbness in the left lower extremity that was present prior to surgery however she is able to feel my touch her lower extremities. The patient denies any chest pain or shortness of breath. OBJECTIVE    Patient laying on side eating breakfast during rounds. She does not appear to be in any acute distress. She does not appear to be in a significant amount of pain at this time. She is hemodynamically stable. I reviewed her labs which shows leukocytosis with a WBC of 15.5. This is to be expected post-operatively and she does not have any signs of infection. Will continue to monitor.      Physical  VITALS:  /62   Pulse 68   Temp 97.5 °F (36.4 °C) (Oral)   Resp 18   Ht 5' 6\" (1.676 m)   Wt 217 lb (98.4 kg)   LMP  (LMP Unknown)   SpO2 98%   Breastfeeding No   BMI 35.02 kg/m²   CONSTITUTIONAL:  awake, alert, cooperative, no apparent distress, and appears stated age  LUNGS:  No increased work of breathing, good air exchange, clear to auscultation bilaterally, no crackles or wheezing  ABDOMEN:  normal bowel sounds, non-distended and non-tender  MUSCULOSKELETAL:  motor strength is 5 out of 5 all extremities bilaterally  NEUROLOGIC:  Sensory:  Sensory intact    Data  CBC:   Lab Results   Component Value Date    WBC 15.5 03/12/2021    RBC 4.28 03/12/2021    HGB 13.7 03/12/2021    HCT 40.5 03/12/2021    MCV 94.7 03/12/2021    MCH 32.0 03/12/2021    MCHC 33.8 03/12/2021    RDW 13.2 03/12/2021     03/12/2021    MPV 10.4 09/20/2015     CMP:    Lab Results   Component Value Date     03/12/2021    K 4.3 03/12/2021     03/12/2021    CO2 23 03/12/2021    BUN 9 03/12/2021    CREATININE 0.64 03/12/2021    GFRAA >60.0 03/12/2021    LABGLOM >60.0 03/12/2021    GLUCOSE 128 03/12/2021    CALCIUM 8.9 03/12/2021     BMP:    Lab Results   Component Value Date     03/12/2021    K 4.3 03/12/2021     03/12/2021    CO2 23 03/12/2021    BUN 9 03/12/2021    CREATININE 0.64 03/12/2021    CALCIUM 8.9 03/12/2021    GFRAA >60.0 03/12/2021    LABGLOM >60.0 03/12/2021    GLUCOSE 128 03/12/2021     Radiology Review:    FLUORO FOR SURGICAL PROCEDURES : 3/11/2021 10:06 AM       CLINICAL HISTORY: R52 Pain ICD10.       COMPARISON: None available.       Intraoperative fluoroscopy was provided for Dr. Paolo Cornelius procedure.       A total of 42.7 seconds of fluoroscopy was used, with 4 fluoroscopic stills saved. No diagnostic images were obtained.       Please see Dr. Paolo Cornelius surgical notes for completeness       Post op lumbar spine xray pending at this time.    Current Inpatient Medications  Current Facility-Administered Medications: diazePAM (VALIUM) tablet 5 mg, 5 mg, Oral, Q6H PRN  oxyCODONE-acetaminophen (PERCOCET) 7.5-325 MG per tablet 1 tablet, 1 tablet, Oral, Q4H PRN  albuterol sulfate  (90 Base) MCG/ACT inhaler 2 puff, 2 puff, Inhalation, 4x daily  amLODIPine (NORVASC) tablet 5 mg, 5 mg, Oral, Daily  ascorbic acid (VITAMIN C) tablet 500 mg, 500 mg, Oral, Daily  ondansetron (ZOFRAN) tablet 4 mg, 4 mg, Oral, Q8H PRN  gabapentin (NEURONTIN) capsule 600 mg, 600 mg, Oral, TID  Potassium TABS 99 mg, 99 mg, Oral, Daily  levothyroxine (SYNTHROID) tablet 50 mcg, 50 mcg, Oral, Daily  vitamin B-12 (CYANOCOBALAMIN) tablet 50 mcg, 50 mcg, Oral, Daily  dextrose 5 % and 0.45 % NaCl with KCl 20 mEq infusion, , Intravenous, Continuous  sodium chloride flush 0.9 % injection 10 mL, 10 mL, Intravenous, 2 times per day  sodium chloride flush 0.9 % injection 10 mL, 10 mL, Intravenous, PRN  ceFAZolin (ANCEF) 2000 mg in dextrose 3 % 50 mL IVPB (duplex), 2,000 mg, Intravenous, Q8H  cephALEXin (KEFLEX) capsule 500 mg, 500 mg, Oral, 3 times per day  HYDROmorphone (DILAUDID) injection 0.5 mg, 0.5 mg, Intravenous, Q3H PRN  sennosides-docusate sodium (SENOKOT-S) 8.6-50 MG tablet 1 tablet, 1 tablet, Oral, BID  promethazine (PHENERGAN) tablet 12.5 mg, 12.5 mg, Oral, Q6H PRN **OR** ondansetron (ZOFRAN) injection 4 mg, 4 mg, Intravenous, Q6H PRN  pantoprazole (PROTONIX) tablet 40 mg, 40 mg, Oral, QAM AC  albuterol sulfate  (90 Base) MCG/ACT inhaler 2 puff, 2 puff, Inhalation, Q2H PRN  ASSESSMENT AND PLAN    Patient complaints of severe amount of pain. I have made adjustments to her pain medication and have changed the percocet from 5 mg to 7.5 mg every 4 hours PRN, and discontinued to the zanaflex and added valioum. I had a discussion with the patient about the need to wean off of the IV dilaudid because she will not be going home with a prescription for dilaudid. Patient received multiple doses of IV dilaudid last night for her pain. Patient verbalized understanding and requested that the IV dilaudid not be discontinued until discharge from the hospital.     1. Continue PT/OT  2. Continue pain control  3. Ambulate patient with brace  4.  Discharge planning

## 2021-03-12 NOTE — CARE COORDINATION
[facility] EMERGENCY [facility] AT [place] Case Management Initial Discharge Assessment    Met with Patient to discuss discharge plan. PCP: Raúl Hussein, DO                                Date of Last Visit:     If no PCP, list provided? N/A    Discharge Planning    Living Arrangements: independently at home    Who do you live with? SONS    Who helps you with your care:  family    If lives at home:     Do you have any barriers navigating in your home? no    Patient can perform ADL? Yes    Current Services (outpatient and in home) :  None    Dialysis: No    Is transportation available to get to your appointments? Yes    DME Equipment:  yes - [facility]    Respiratory equipment: None    Respiratory provider:  no     Pharmacy:  yes - [facility] with Medication Assistance Program?  No      Patient agreeable to [facility] AT [facility]? Yes, [address]    Patient agreeable to SNF/Rehab? No    Other discharge needs identified? N/A    Freedom of choice list provided with basic dialogue that supports the patient's individualized plan of care/goals and shares the quality data associated with the providers. Yes    Does Patient Have a High-Risk for Readmission Diagnosis (CHF, PN, MI, COPD)? No    If Yes,     Consult with pulmonologist? N/A   Consult with cardiologist? N/A   Cardiac Rehab referral if EF <35%? N/A   Consult with Pharmacy for medication assessment prior to discharge? N/A   Consult with Behavioral health to aid in depression, anxiety, or coping issues? N/A   Palliative Care Consult? N/A   Pulmonary Rehab order for COPD, PN, and CHF (if EF > 35%)? N/A    Does patient have a reliable scale and know how to read it (for CHF)? N/A   Nutrition consult for CHF? N/A   Respiratory therapy consult that includes bedside instruction on administration of nebulizers and/or inhalers, and assessment of oxygen and equipment needs in the home?  N/A    The plan for Transition of Care is related to the following treatment goals: [place] [address]    Initial Discharge Plan? (Note: please see concurrent daily documentation for any updates after initial note). FROM HOME AND HER SONS LIVE WITH HER. SHE HAS HELP FROM FRIENDS AS WELL. sHE PLANS TO RETURN HOME AND WOULD LIKE [facility] FOR PT/OT AS WELL AS A SHOWER CHAIR AND A RAISED TOILET SEAT.       The Patient and/or patient representative: PT was provided with choice of any post-acute providers for care and equipment and agrees with discharge plan  Yes    Electronically signed by STORM Jarvis on 3/12/2021 at 12:46 PM

## 2021-03-12 NOTE — PROGRESS NOTES
Physical Therapy Med Surg Initial Assessment  Facility/Department: [facility] NEURO  Room: N222/N222-01       NAME: Lucina Lopez  : 1975 (55 y.o.)  MRN: [id number]  CODE STATUS: Full Code    Date of Service: 3/12/2021    Patient Diagnosis(es): Lumbar degenerative disc disease [M51.36]  Lumbar stenosis with neurogenic claudication [M48.062]   No chief complaint on file. Patient Active Problem List    Diagnosis Date Noted    Lumbar stenosis with neurogenic claudication 2021    Lumbar degenerative disc disease 01/15/2019    Herniation of intervertebral disc between L5 and S1 2019    Psychiatric pseudoseizure     Intractable headache     Essential hypertension     Convulsions ([address]     Status migrainosus 2015    Seizure disorder [facility] [address] 2015    Left-sided weakness 2015    Lumbar stenosis     Asthma     Back pain 2013        Past Medical History:   Diagnosis Date    Arthritis     spine    Asthma     since childhood -- smokes x 30 yrs    Cerebral artery occlusion with cerebral infarction ([address]     per CT scan -- patient without sx    Hypertension     meds > 3 yrs -- has not taken x 1 month due to mouth dryness.     Lumbar stenosis     Migraines     starts with neck pain    Seizures (HCC)     Thyroid disease     meds > 4 yrs -- intermittent    Uterine anomaly      Past Surgical History:   Procedure Laterality Date    ANTERIOR CRUCIATE LIGAMENT REPAIR Left     APPENDECTOMY      at age 21    [address]  2017    lumbar disc     BACK SURGERY  2018    lumbar fusion    CHOLECYSTECTOMY  2015    COLONOSCOPY      ENDOMETRIAL ABLATION  2006    post op sepsis    ENDOSCOPY, COLON, DIAGNOSTIC      KNEE ARTHROSCOPY Left     ACL repair    LUMBAR FUSION N/A 1/15/2019    L4- L5 DECOMPRESSION, L5-S1 POSTERIOR LUMBAR INTERBODY FUSION performed by Adams Giles MD at [facility] 3/11/2021    L4-5 TLIF INSTRUMENTATION AT L5-S1 DECOMPRESSION AT L4, L5 REMOVAL AND REINSERTION OF HARDWARE AT L5-S1 performed by Mahamed Alvarez MD at [address]  5/9/13    duramorph 1.5 mg epideral  6mg celestone    TONSILLECTOMY      as child    TUBAL LIGATION Bilateral 2002     Chart Reviewed: Yes  Patient assessed for rehabilitation services?: Yes  Family / Caregiver Present: No  General Comment  Comments: Pt laying in bed, agreeable to PT eval  Restrictions:  Restrictions/Precautions: Fall Risk  Required Braces or Orthoses  Spinal: Lumbar Corset  Spinal Other: Brace when OOB per order in medical record     SUBJECTIVE: Subjective: Pt reports pain 9/10  Response To Previous Treatment: Not applicable    Pain  Pre Treatment Pain Screening  Pain at present: 9  Scale Used: Numeric Score  Intervention List: Patient able to continue with treatment;Nurse/physician notified    Post Treatment Pain Screening:   Pain Screening  Patient Currently in Pain: Yes  Pain Assessment  Pain Assessment: 0-10  Pain Level: 9  Pain Type: Surgical pain;Chronic pain  Pain Location: Back  Pain Descriptors: Amina Needs; Stabbing  Pain Frequency: Continuous  Non-Pharmaceutical Pain Intervention(s): Repositioned; Ambulation/Increased Activity    Prior Level of Function:  Social/Functional History  Lives With: Family(sons and dtr)  Type of Home: Mobile home  Home Layout: One level  Home Access: Stairs to enter with rails  Entrance Stairs - Number of Steps: 4-5  Entrance Stairs - Rails: Both  Bathroom Shower/Tub: Tub/Shower unit  Bathroom Equipment: (none)  Home Equipment: Rolling walker  ADL Assistance: Independent  Homemaking Assistance: Independent  Ambulation Assistance: Independent  Transfer Assistance: Independent  Active : Yes    OBJECTIVE:   Vision: Within Functional Limits  Hearing: Within functional limits    Cognition:  Overall Orientation Status: Within Functional Limits  Follows Commands: Within Functional Limits     ROM:  RLE AROM: WFL  LLE AROM : WFL  Spine  Lumbar: impaired s/p lumbar insrumentation and decompression    Strength:  Strength RLE  Comment: functionallly >/=3+/5  Strength LLE  Comment: functionallly >/=3+/5    Neuro:  Balance  Posture: Fair  Sitting - Static: Good  Sitting - Dynamic: Good  Standing - Static: Fair;+(performing toileting)  Standing - Dynamic: Fair     Tone RLE  RLE Tone: Normotonic  Tone LLE  LLE Tone: Normotonic  Motor Control  Gross Motor?: WFL  Sensation  Overall Sensation Status: WFL    Bed mobility  Supine to Sit: Supervision  Sit to Supine: Unable to assess(pt up to bedside chair for lunch)    Transfers  Sit to Stand: Stand by assistance  Stand to sit: Stand by assistance  Bed to Chair: Stand by assistance    Ambulation  Ambulation?: Yes  Ambulation 1  Surface: level tile  Device: Rolling Walker  Assistance: Stand by assistance  Quality of Gait: flexed posture, heavy  UE use on 2ww  Distance: 30ft with turns  Comments: poor tolerance to activity d/t elevated pain levels    Activity Tolerance  Activity Tolerance: Patient limited by pain      PT Education  PT Education: Goals;PT Role;Plan of Care    ASSESSMENT:   Body structures, Functions, Activity limitations: Decreased functional mobility ; Decreased strength; Increased pain;Decreased posture;Decreased ROM; Decreased balance  Decision Making: Medium Complexity  History: med  Exam: high  Clinical Presentation: med    Prognosis: Good  Barriers to Learning: none    DISCHARGE RECOMMENDATIONS:  Discharge Recommendations: Continue to assess pending progress    Assessment: Pt demonstrates the above deficits and decline in functional mobility status placing them at increased risk for falls. Pt would benefit from physical therapy to address above deficits and allow for safe return home at highest level of function, decrease risk for falls, and improve QOL.     REQUIRES PT FOLLOW UP: Yes      PLAN OF CARE:  Plan  Times per week: 3-6  Current Treatment Recommendations: Strengthening, Functional Mobility Training, Neuromuscular

## 2021-03-12 NOTE — CARE COORDINATION
MYRONW faxed information to [facility]   Equipment ordered from [employer] in [place] per pt request.  [employer] # [phone]

## 2021-03-12 NOTE — PROGRESS NOTES
[facility] OCCUPATIONAL THERAPY EVALUATION - ACUTE     NAME: Betty Lay  : 1975 (55 y.o.)  MRN: [id number]  CODE STATUS: Full Code  Room: [place] [id number]    Date of Service: 3/12/2021    Patient Diagnosis(es): Lumbar degenerative disc disease [M51.36]  Lumbar stenosis with neurogenic claudication [M48.062]   No chief complaint on file. Patient Active Problem List    Diagnosis Date Noted    Lumbar stenosis with neurogenic claudication 2021    Lumbar degenerative disc disease 01/15/2019    Herniation of intervertebral disc between L5 and S1 2019    Psychiatric pseudoseizure     Intractable headache     Essential hypertension     [facility] [facility]     Status migrainosus 2015    Seizure disorder ([facility] 2015    Left-sided weakness 2015    Lumbar stenosis     Asthma     Back pain 2013        Past Medical History:   Diagnosis Date    Arthritis     spine    Asthma     since childhood -- smokes x 30 yrs    Cerebral artery occlusion with cerebral infarction ([facility] [address] [facility]     per CT scan -- patient without sx    Hypertension     meds > 3 yrs -- has not taken x 1 month due to mouth dryness.     Lumbar stenosis     Migraines     starts with neck pain    Seizures (HCC)     Thyroid disease     meds > 4 yrs -- intermittent    Uterine anomaly      Past Surgical History:   Procedure Laterality Date    ANTERIOR CRUCIATE LIGAMENT REPAIR Left     APPENDECTOMY      at age 21    [address]  2017    lumbar disc     BACK SURGERY  2018    lumbar fusion    CHOLECYSTECTOMY  2015    COLONOSCOPY      ENDOMETRIAL ABLATION  2006    post op sepsis    ENDOSCOPY, COLON, DIAGNOSTIC      KNEE ARTHROSCOPY Left     ACL repair    LUMBAR FUSION N/A 1/15/2019    L4- L5 DECOMPRESSION, L5-S1 POSTERIOR LUMBAR INTERBODY FUSION performed by Angelia Munoz MD at [facility] 3/11/2021    L4-5 TLIF INSTRUMENTATION AT L5-S1 DECOMPRESSION AT L4, L5 REMOVAL AND REINSERTION Cognition  Overall Cognitive Status: WFL    Perception Status:  Perception  Overall Perceptual Status: WFL    Sensation Status:  Sensation  Overall Sensation Status: WFL    Vision and Hearing Status:  Vision  Vision: Impaired  Vision Exceptions: Wears glasses for reading  Hearing  Hearing: Within functional limits     ROM:   LUE AROM (degrees)  LUE AROM : WFL  Left Hand AROM (degrees)  Left Hand AROM: WFL  RUE AROM (degrees)  RUE AROM : WFL  Right Hand AROM (degrees)  Right Hand AROM: WFL    Strength:  LUE Strength  LUE Strength Comment: DNT due to recent back surgery  RUE Strength  RUE Strength Comment: DNT due to recent back surgery    Coordination, Tone, Quality of Movement:    Tone RUE  RUE Tone: Normotonic  Tone LUE  LUE Tone: Normotonic  Coordination  Movements Are Fluid And Coordinated: Yes    Hand Dominance:  Hand Dominance  Hand Dominance: Right    ADL Status:  ADL  Feeding: Independent  Grooming: Modified independent   UE Dressing: Modified independent   LE Dressing: Modified independent (Patient was able to bring her feet up to place them on her opposite knee to adjust her socks)  Toileting: Modified independent   Toilet Transfers  Toilet - Technique: Ambulating  Equipment Used: Standard bedside commode  Toilet Transfer: Modified independent       Therapy key for assistance levels    Independent = Pt. is able to perform task with no assistance but may require a device   Stand by assistance = Pt. does not perform task at an independent level but does not need physical assistance, requires verbal cues  Minimal, Moderate, Maximal Assistance = Pt. requires physical assistance (25%, 50%, 75% assist from helper) for task but is able to actively participate in task   Dependent = Pt. requires total assistance with task and is not able to actively participate with task completion     Functional Mobility:  Functional Mobility  Functional - Mobility Device: Rolling Walker  Activity: (to/from the [facility] across the room) Bed Mobility   independent    Seated and Standing Balance:  Balance  Sitting Balance: Independent  Standing Balance: Modified independent     Functional Endurance:  Activity Tolerance  Activity Tolerance: Patient limited by pain    D/C Recommendations:  OT D/C RECOMMENDATIONS  REQUIRES OT FOLLOW UP: Yes    Equipment Recommendations:  OT Equipment Recommendations  Equipment Needed: YesPatient is to obtain a BSC and shower chair    OT Education:   OT Education  OT Education: OT Role, Plan of Care    OT Follow Up:  OT D/C RECOMMENDATIONS  REQUIRES OT FOLLOW UP: Yes       Assessment/Discharge Disposition:     Performance deficits / Impairments: Decreased high-level IADLs  Discharge Recommendations: Continue to assess pending progress  Decision Making: Low Complexity       Six Click Score   How much help for putting on and taking off regular lower body clothing?: None  How much help for Bathing?: None  How much help for Toileting?: None  How much help for putting on and taking off regular upper body clothing?: None  How much help for taking care of personal grooming?: None  How much help for eating meals?: None  AM-[facility] Inpatient Daily Activity Raw Score: 24  AM-PAC Inpatient ADL T-Scale Score : 57.54  ADL Inpatient CMS 0-100% Score: 0    Plan:  Plan  Times per week: 1 visit  Current Treatment Recommendations: Home Management Training    Goals:   Patient will:    - Other :  Patient to make her bed at walker level    Patient Goal: Patient goals : \"To get better.  My back and my leg\"     Discussed and agreed upon: Yes Comments:     Therapy Time:   OT Individual Minutes  Time In: 4921  Time Out: 1520  Minutes: 21    Eval: 21 minutes     Electronically signed by:    KIM Worthy  3/12/2021, 3:57 PM

## 2021-03-12 NOTE — FLOWSHEET NOTE
Pt in radiology dept for xray of lumbar spine post back surgery with maranda Emery. During imaging, xray tube came down onto pt's right shoulder/upper arm. maranda Emery had his hand between xray tube and pt right shoulder/upper arm when xray tube came down. Xray staff notified radiology RN to assess area. Pt's right shoulder/upper arm area with no visible abnormalities or range of motion abnormatilies assessed and pt denies pain and states \"It was heavy, but I'm fine\". Pt refused ice pack to right shoulder/upper arm. Right arm elevated on pillow. Dr. Theo Recio notified by maranda Zhao and orders received to xray right shoulder/upper arm. [facility] # [id number] filed by Tamra lal. Pt's nurse, Alva Og RN on 2N notified. Pt transferred via care back to room 222. Electronically signed by Elizabeth Evans RN on 3/12/2021 at 11:53 AM

## 2021-03-12 NOTE — DISCHARGE INSTR - COC
Continuity of Care Form    Patient Name: Marjan Pandey   :  1975  MRN:  [id number]    Admit date:  3/11/2021  Discharge date:  3/13/2021    Code Status Order: Full Code   Advance Directives:   [facility] Documentation     Date/Time Healthcare Directive Type of Healthcare Directive Copy in [address] Agent's Name Healthcare Agent's Phone Number    [phone]  No, patient does not have an advance directive for healthcare treatment -- -- -- -- --          Admitting Physician:  Anne Mtz MD  PCP: Donte Sanchez DO    Discharging Nurse:  Christina Unit/Room#: C131/S429-19  Discharging Unit Phone Number: [phone]    Emergency Contact:   Extended Emergency Contact Information  Primary Emergency Contact: Sven Monreal  [facility], [address] Phone: [phone]  Relation: Child  Secondary Emergency Contact: [address] Phone: [phone]  Relation: Child    Past Surgical History:  Past Surgical History:   Procedure Laterality Date    ANTERIOR CRUCIATE LIGAMENT REPAIR Left     APPENDECTOMY      at age 21    [address]  2017    lumbar disc     BACK SURGERY  2018    lumbar fusion    CHOLECYSTECTOMY  2015    COLONOSCOPY      ENDOMETRIAL ABLATION  2006    post op sepsis    ENDOSCOPY, COLON, DIAGNOSTIC      KNEE ARTHROSCOPY Left     ACL repair    LUMBAR FUSION N/A 1/15/2019    L4- L5 DECOMPRESSION, L5-S1 POSTERIOR LUMBAR INTERBODY FUSION performed by Anne Mtz MD at [facility] 3/11/2021    L4-5 TLIF INSTRUMENTATION AT L5-S1 DECOMPRESSION AT L4, L5 REMOVAL AND REINSERTION OF HARDWARE AT L5-S1 performed by Anne Mtz MD at [facility] [address]  13    duramorph 1.5 mg epideral  6mg celestone    TONSILLECTOMY      as child    TUBAL LIGATION Bilateral        Immunization History: There is no immunization history on file for this patient. Active Problems:  Patient Active Problem List   Diagnosis Code    Back pain M54.9    Status migrainosus G43.901    Seizure disorder ([address] G40.909    Left-sided weakness R53.1    Lumbar stenosis M48.061    Asthma J45.909    Essential hypertension I10    Convulsions ([facility] R56.9    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    Intractable headache R51.9    Herniation of intervertebral disc between L5 and S1 M51.27    Lumbar degenerative disc disease M51.36    Lumbar stenosis with neurogenic claudication M48.062       Isolation/Infection:   Isolation          No Isolation        Patient Infection Status     Infection Onset Added Last Indicated Last Indicated By Review Planned Expiration Resolved Resolved By    None active    Resolved    COVID-19 Rule Out 03/04/21 03/04/21 03/04/21 COVID-19 Ambulatory (Ordered)   03/05/21 Rule-Out Test Resulted          Nurse Assessment:  Last Vital Signs: /71   Pulse 73   Temp 98.2 °F (36.8 °C) (Oral)   Resp 16   Ht 5' 6\" (1.676 m)   Wt 217 lb (98.4 kg)   LMP  (LMP Unknown)   SpO2 99%   Breastfeeding No   BMI 35.02 kg/m²     Last documented pain score (0-10 scale): Pain Level: 9  Last Weight:   Wt Readings from Last 1 Encounters:   03/11/21 217 lb (98.4 kg)     Mental Status:  oriented, alert and thought processes intact    IV Access:  - None    Nursing Mobility/ADLs:  Walking   Assisted  Transfer  Assisted  Bathing  Assisted  Dressing  Assisted  Toileting  Independent  Feeding  Independent  Med Admin  Independent  Med Delivery   whole    Wound Care Documentation and Therapy:        Elimination:  Continence:   · Bowel:  Yes  · Bladder: Yes  Urinary Catheter: None   Colostomy/Ileostomy/Ileal Conduit: No       Date of Last BM: ***    Intake/Output Summary (Last 24 hours) at 3/12/2021 1658  Last data filed at 3/12/2021 0636  Gross per 24 hour   Intake 1305 ml   Output 2650 ml   Net -1345 ml     I/O last 3 completed shifts: In: 6695 [I.V.:1200; IV Piggyback:105]  Out: 2650 [Urine:2650]    Safety Concerns: At Risk for Falls    Impairments/Disabilities:      None    Nutrition Therapy:  Current Nutrition Therapy:   - Oral Diet:  General    Routes of Feeding: Oral  Liquids: Thin Liquids  Daily Fluid Restriction: no  Last Modified Barium Swallow with Video (Video Swallowing Test): not done    Treatments at the Time of Hospital Discharge:   Respiratory Treatments: ***  Oxygen Therapy:  is not on home oxygen therapy. Ventilator:    - No ventilator support    Rehab Therapies: Physical Therapy and Occupational Therapy  Weight Bearing Status/Restrictions: No weight bearing restirctions  Other Medical Equipment (for information only, NOT a DME order):  walker  Other Treatments: ***    Patient's personal belongings (please select all that are sent with patient):  Dentures lower    RN SIGNATURE:  Electronically signed by Dom Hawthorne RN on 3/13/21 at 9:53 AM EST    CASE MANAGEMENT/SOCIAL WORK SECTION    Inpatient Status Date: ***    Readmission Risk Assessment Score:  Readmission Risk              Risk of Unplanned Readmission:        7           Discharging to Facility/ Agency   · Name: [facility]  · Address:  · Phone:[phone]  · Fax:    Dialysis Facility (if applicable)   · Name:  · Address:  · Dialysis Schedule:  · Phone:  · Fax:    / signature: {Esignature:[id number]}    PHYSICIAN SECTION    Prognosis: {Prognosis:8760802203}    Condition at Discharge: [address] Patient Condition:[id number]}    Rehab Potential (if transferring to Rehab): {Prognosis:2752697993}    Recommended Labs or Other Treatments After Discharge: ***    Physician Certification: I certify the above information and transfer of Tabitha Brown  is necessary for the continuing treatment of the diagnosis listed and that she requires {Admit to Appropriate Level of Care:21658} for {GREATER/LESS:348855751} 30 days.      Update Admission H&P: {CHP DME Changes in HKRMB:317062146}    PHYSICIAN SIGNATURE:  {Esignature:[id number]}

## 2021-03-13 VITALS
WEIGHT: 217 LBS | RESPIRATION RATE: 16 BRPM | TEMPERATURE: 98.7 F | BODY MASS INDEX: 34.87 KG/M2 | OXYGEN SATURATION: 100 % | HEIGHT: 66 IN | SYSTOLIC BLOOD PRESSURE: 122 MMHG | DIASTOLIC BLOOD PRESSURE: 60 MMHG | HEART RATE: 85 BPM

## 2021-03-13 PROCEDURE — 6370000000 HC RX 637 (ALT 250 FOR IP): Performed by: NURSE PRACTITIONER

## 2021-03-13 PROCEDURE — 6370000000 HC RX 637 (ALT 250 FOR IP): Performed by: NEUROLOGICAL SURGERY

## 2021-03-13 PROCEDURE — 2580000003 HC RX 258: Performed by: NEUROLOGICAL SURGERY

## 2021-03-13 PROCEDURE — 94761 N-INVAS EAR/PLS OXIMETRY MLT: CPT

## 2021-03-13 PROCEDURE — 6360000002 HC RX W HCPCS: Performed by: NEUROLOGICAL SURGERY

## 2021-03-13 PROCEDURE — 94640 AIRWAY INHALATION TREATMENT: CPT

## 2021-03-13 RX ADMIN — SODIUM CHLORIDE, PRESERVATIVE FREE 10 ML: 5 INJECTION INTRAVENOUS at 07:36

## 2021-03-13 RX ADMIN — GABAPENTIN 600 MG: 300 CAPSULE ORAL at 07:35

## 2021-03-13 RX ADMIN — HYDROMORPHONE HYDROCHLORIDE 0.5 MG: 1 INJECTION, SOLUTION INTRAMUSCULAR; INTRAVENOUS; SUBCUTANEOUS at 09:32

## 2021-03-13 RX ADMIN — ALBUTEROL SULFATE 2 PUFF: 90 AEROSOL, METERED RESPIRATORY (INHALATION) at 06:55

## 2021-03-13 RX ADMIN — HYDROMORPHONE HYDROCHLORIDE 0.5 MG: 1 INJECTION, SOLUTION INTRAMUSCULAR; INTRAVENOUS; SUBCUTANEOUS at 06:28

## 2021-03-13 RX ADMIN — Medication 50 MCG: at 07:35

## 2021-03-13 RX ADMIN — OXYCODONE HYDROCHLORIDE AND ACETAMINOPHEN 1 TABLET: 7.5; 325 TABLET ORAL at 02:20

## 2021-03-13 RX ADMIN — SODIUM CHLORIDE, PRESERVATIVE FREE 10 ML: 5 INJECTION INTRAVENOUS at 00:25

## 2021-03-13 RX ADMIN — HYDROMORPHONE HYDROCHLORIDE 0.5 MG: 1 INJECTION, SOLUTION INTRAMUSCULAR; INTRAVENOUS; SUBCUTANEOUS at 03:27

## 2021-03-13 RX ADMIN — OXYCODONE HYDROCHLORIDE AND ACETAMINOPHEN 1 TABLET: 7.5; 325 TABLET ORAL at 07:35

## 2021-03-13 RX ADMIN — DIAZEPAM 5 MG: 5 TABLET ORAL at 07:35

## 2021-03-13 RX ADMIN — OXYCODONE HYDROCHLORIDE AND ACETAMINOPHEN 500 MG: 500 TABLET ORAL at 07:35

## 2021-03-13 RX ADMIN — HYDROMORPHONE HYDROCHLORIDE 0.5 MG: 1 INJECTION, SOLUTION INTRAMUSCULAR; INTRAVENOUS; SUBCUTANEOUS at 00:26

## 2021-03-13 RX ADMIN — CEPHALEXIN 500 MG: 500 CAPSULE ORAL at 06:28

## 2021-03-13 RX ADMIN — DOCUSATE SODIUM 50 MG AND SENNOSIDES 8.6 MG 1 TABLET: 8.6; 5 TABLET, FILM COATED ORAL at 07:36

## 2021-03-13 RX ADMIN — ONDANSETRON 4 MG: 2 INJECTION INTRAMUSCULAR; INTRAVENOUS at 00:25

## 2021-03-13 RX ADMIN — PANTOPRAZOLE SODIUM 40 MG: 40 TABLET, DELAYED RELEASE ORAL at 06:28

## 2021-03-13 ASSESSMENT — PAIN DESCRIPTION - ONSET
ONSET: ON-GOING

## 2021-03-13 ASSESSMENT — PAIN DESCRIPTION - PAIN TYPE
TYPE: SURGICAL PAIN

## 2021-03-13 ASSESSMENT — PAIN DESCRIPTION - FREQUENCY
FREQUENCY: CONTINUOUS

## 2021-03-13 ASSESSMENT — PAIN - FUNCTIONAL ASSESSMENT
PAIN_FUNCTIONAL_ASSESSMENT: ACTIVITIES ARE NOT PREVENTED
PAIN_FUNCTIONAL_ASSESSMENT: ACTIVITIES ARE NOT PREVENTED

## 2021-03-13 ASSESSMENT — PAIN DESCRIPTION - LOCATION
LOCATION: BACK

## 2021-03-13 ASSESSMENT — PAIN DESCRIPTION - DESCRIPTORS
DESCRIPTORS: CONSTANT;SHARP;THROBBING
DESCRIPTORS: ACHING;CONSTANT;SHARP;THROBBING
DESCRIPTORS: ACHING;SHARP
DESCRIPTORS: ACHING;THROBBING;SHARP
DESCRIPTORS: CONSTANT;SHARP

## 2021-03-13 ASSESSMENT — PAIN DESCRIPTION - ORIENTATION
ORIENTATION: LOWER;MID
ORIENTATION: LOWER;MID
ORIENTATION: MID;LOWER
ORIENTATION: LOWER;MID

## 2021-03-13 ASSESSMENT — PAIN SCALES - GENERAL
PAINLEVEL_OUTOF10: 10

## 2021-03-13 ASSESSMENT — PAIN DESCRIPTION - PROGRESSION: CLINICAL_PROGRESSION: NOT CHANGED

## 2021-03-13 NOTE — FLOWSHEET NOTE
0801: AM assessment completed. Patient sitting on side of bed. Neuro checks unremarkable from previous assessments. Patient expressing severe pain and an inability to sleep last night. Placed ice on patients dressing site. 9334: Dressing changed to patients lower back. Scant amount of serosanginous drainage noted. New island dressing applied. Patient and daughter given instructions on incision care and dressing changes. Patient given instructions on use of FREDDY hose also. 0932: Patient given dose of IV dilaudid for c/o 10/10 lower back pain. Patient describes as sharp and constant in nature. 1000: Patient given discharge instructions. Patient expresses understanding of RX, follow up appointments and incisional care. 1030: Patient leaving with PCA Marily and daughter via wheelchair. Daughter to take her home and to get RX filled.

## 2021-03-13 NOTE — DISCHARGE INSTR - DIET

## 2021-03-13 NOTE — PROGRESS NOTES
Dilaudid 0.5 wasted with Cassandra Estrella RN d/t loss of IV access. RN witnessed waste.   Electronically signed by Michael Walsh RN on 3/12/2021 at 11:40 PM

## 2021-03-13 NOTE — CARE COORDINATION
Spoke with pt and dtr at bedside. They have order to  equipment in [place] as ordered. Pt. States she is having son obtain. Declines any further needs. Does have walker at home. Most likely d/c today. Jerald's called [phone] and informed Mukul Rust of d/c today. She will contact pt and see Sun or Monday.   Electronically signed by Edie Forbes RN on 3/13/2021 at 9:18 AM

## 2021-03-13 NOTE — PLAN OF CARE
Problem: Pain:  Goal: Pain level will decrease  Outcome: Ongoing  Goal: Control of acute pain  Outcome: Ongoing  Goal: Control of chronic pain  Outcome: Ongoing     Problem: Falls - Risk of:  Goal: Will remain free from falls  Outcome: Ongoing  Goal: Absence of physical injury  Outcome: Ongoing

## 2021-03-14 NOTE — DISCHARGE SUMMARY
Discharge summary  This patient Kirill Smalls was admitted to the hospital on 3/11/2021  after undergoing Procedure(s) (LRB):  L4-5 TLIF INSTRUMENTATION AT L5-S1 DECOMPRESSION AT L4, L5 REMOVAL AND REINSERTION OF HARDWARE AT L5-S1 (N/A) without complications that morning. Hospital course  Patient tolerated surgical procedure well and there was no complications. Patient progressed adequtly through their recovery during hospital stay including PT and rehabilitation. Patient was then D/C on 3/13/2021 to Home  in stable condition. Patient was instructed on the use of pain medications, the signs and symptoms of infection, and was given our number to call should they have any questions or concerns following discharge. Narrative       EXAMINATION: XR LUMBAR SPINE (2-3 VIEWS)       CLINICAL HISTORY:  Postop        COMPARISONS: LUMBAR SPINE X-RAYS FROM MARCH 4, 2021        TECHNIQUE: AP, lateral, coned-down and coned-down lateral, 3 views       FINDINGS:    There are no lytic or sclerotic bone lesions. There is no fracture or subluxation, there is no loss of vertebral body height. Status post L4-5 and L5 discectomies with spacer placement as well as posterior fusion including bilateral acetabular screws at L4, L5, and S1 and vertical bars. There is a cross bar. There is opaque hardware is intact. The remaining intervertebral disks are within normal limits. The spine is in anatomic alignment. The SI joints are symmetric.  There are surgical clips in posterior midline and postoperative changes in the subcutaneous soft tissues.           Impression       Status post L4-5 discectomy which is new since the previous study with persistent L5-S1 discectomy and spacer placement as well as lower lumbar fusion from L4 to S1 on the current study which has been expanded since the previous study.

## 2021-03-16 NOTE — PROGRESS NOTES
Physical Therapy  Facility/Department: [facility] XWLF P499/S902-85  Physical Therapy Discharge      NAME: Suzanne Auguste    : 1975 (55 y.o.)  MRN: [id number]    Account: [de-identified]  Gender: female      Patient has been discharged from acute care hospital. DC patient from current PT program.      Electronically signed by Magda Sadler PT on 3/16/21 at 4:05 PM EDT

## 2021-06-24 DIAGNOSIS — M48.061 SPINAL STENOSIS OF LUMBAR REGION, UNSPECIFIED WHETHER NEUROGENIC CLAUDICATION PRESENT: ICD-10-CM

## 2021-06-24 DIAGNOSIS — M51.36 LUMBAR DEGENERATIVE DISC DISEASE: ICD-10-CM

## 2021-06-24 DIAGNOSIS — M51.26 LUMBAR DISC HERNIATION: ICD-10-CM

## 2021-06-24 DIAGNOSIS — M54.16 LUMBAR RADICULOPATHY: ICD-10-CM

## 2021-06-24 DIAGNOSIS — M47.816 LUMBAR SPONDYLOSIS: ICD-10-CM

## 2021-06-24 NOTE — TELEPHONE ENCOUNTER
Requested Prescriptions     Pending Prescriptions Disp Refills    oxyCODONE-acetaminophen (PERCOCET) 5-325 MG per tablet 30 tablet 0     Sig: Take 1 tablet by mouth daily for 30 days. Patient last seen on:  5/27  Date of last surgery:  n/a  Date of last refill:  5/27  Pain level:  n/a  Patient complaining of:  Pt states she has not been able to wean herself off. She says that her pain is getting worse. She says that she can barely walk today.    Future appts:

## 2021-06-25 RX ORDER — OXYCODONE HYDROCHLORIDE AND ACETAMINOPHEN 5; 325 MG/1; MG/1
1 TABLET ORAL DAILY
Qty: 30 TABLET | Refills: 0 | Status: SHIPPED | OUTPATIENT
Start: 2021-06-25 | End: 2021-07-25

## 2021-06-25 NOTE — TELEPHONE ENCOUNTER
RX SENT. PLEASE INFORM PT SHE WILL NEED TO OBTAIN ANY REFILLS THROUGH HER PCP OR A PAIN MGMT DR SINCE I AM LEAVING THE OFFICE.

## 2021-06-29 ENCOUNTER — TELEPHONE (OUTPATIENT)
Dept: NEUROSURGERY | Age: 46
End: 2021-06-29

## 2021-06-29 NOTE — TELEPHONE ENCOUNTER
PATIENT CALLED STATING SHE FELL DOWN 8 STAIRS ON Friday/Saturday 06/25/21 OR 06/26/2021 AND BROKE HER FOOT IN TWO SPOTS. PATIENT STATED SHE FELL ON HER LEFT SIDE. PATIENT STATED THE E.R. DIDN'T EVEN TAKE AN X-RAY OF HER BACK, PATIENT STATED THAT SHE FELT HER BACK \"POP\" WHEN SHE FELL. PATIENT STATED SHE IS EXPERIENCING INCREASED PAIN IN HER BACK. PATIENT IS CURRENTLY ON CRUTCHES WHICH IS CAUSING MORE PAIN IN HER BACK. PATIENT SAID THE PAIN MEDICATION IS NOT HELPING AND DOESN'T KNOW WHAT TO DO.   CALL BACK NUMBER [phone]

## 2021-06-30 DIAGNOSIS — Z98.890 HISTORY OF BACK SURGERY: ICD-10-CM

## 2021-06-30 DIAGNOSIS — M51.26 LUMBAR DISC HERNIATION: Primary | ICD-10-CM

## 2021-06-30 DIAGNOSIS — M51.36 LUMBAR DEGENERATIVE DISC DISEASE: ICD-10-CM

## 2021-06-30 DIAGNOSIS — M54.16 LUMBAR RADICULOPATHY: ICD-10-CM

## 2021-06-30 NOTE — TELEPHONE ENCOUNTER
PT WAS CALLED TO MAKE AWARE OF DR. Sherly Marin RESPONSE. X-RAY ORDER FAXED TO Jordy Adams AT [facility] #[phone]. PT WILL GET ON DISC TO DROP OFF TO OFFICE FOR DR. OROZCO TO REVIEW.

## 2021-06-30 NOTE — TELEPHONE ENCOUNTER
WOULD LIKE FOR PT TO GET LUMBAR X-RAYS AND DROP OFF DISC FOR ME TO REVIEW (UNLESS SHE OBTAINS AT [facility]). ORDER IN [facility].

## 2021-07-01 NOTE — TELEPHONE ENCOUNTER
PT WAS CALLED BACK TO MAKE AWARE THE X-RAY ORDER WAS FAXED TO THE X-RAY DEPT, NOT THE ER. PT STATES THEY NEVER REC'D IT. X-RAY ORDER WAS RE-FAXED AGAIN  [address] TO [facility] #[phone]. PT ALSO MENTIONED THE ER WOULD NOT GIVE HER ANYTHING FOR PAIN SINCE SHE JUST GOT THE PERCOCET 1 PO QD FROM DR. OROZCO RECENTLY AND THEY TOLD HER TO ADDRESS WITH HER FOOT SURGEON, WHOM SHE IS SEEING TODAY PER PT.

## 2021-07-01 NOTE — TELEPHONE ENCOUNTER
Pt states she went to the Er and they did not do the Xrays. She says that she feels awful and is in a lot of pain. She is wondering what she should do?

## 2021-07-08 ENCOUNTER — TELEPHONE (OUTPATIENT)
Dept: NEUROSURGERY | Age: 46
End: 2021-07-08

## 2021-07-08 DIAGNOSIS — M51.26 LUMBAR DISC HERNIATION: Primary | ICD-10-CM

## 2021-07-08 DIAGNOSIS — M48.061 SPINAL STENOSIS OF LUMBAR REGION, UNSPECIFIED WHETHER NEUROGENIC CLAUDICATION PRESENT: ICD-10-CM

## 2021-07-08 DIAGNOSIS — M54.16 LUMBAR RADICULOPATHY: ICD-10-CM

## 2021-07-08 DIAGNOSIS — M51.36 LUMBAR DEGENERATIVE DISC DISEASE: ICD-10-CM

## 2021-07-08 NOTE — TELEPHONE ENCOUNTER
PT WAS CALLED BACK TO FURTHER CLARIFY AND PT STATES SHE DID NOT HAVE HER X-RAY DONE. PT MADE AWARE SHE NEEDS TO GET THIS DONE AT DROP OFF ON DISC FOR DR. OROZCO TO REVIEW. X-RAY ORDER WAS PREVIOUSLY FAXED  [address] TWICE NOW. PT STATES SHE IS OUT OF HER PERCOCET 5-325 MG 1 PO QD (RAN OUT ON 7/6/21) GIVEN ON 6-25-21 BY DR. Fan Mcduffie B/C THE ER TOLD HER TO INCREASE IT TO TID. SHE HAS A RX FROM ER  FOR 10 PILLS, BUT PHARMACY WON'T FILL SINCE SHE ISN'T DUE.

## 2021-07-09 NOTE — TELEPHONE ENCOUNTER
CALLED PT, SPOKE WITH HER TO ADVISE PER DR. OROZCO CANNOT REFILL PERCOCET EARLY- PT AWARE TO TAKE AS DIRECTED.

## 2021-07-23 NOTE — TELEPHONE ENCOUNTER
WHERE WERE X-RAYS DONE? IF NOT DONE AT [facility], SHE WILL NEED TO DROP OFF DISC. THANK YOU.

## 2021-07-23 NOTE — TELEPHONE ENCOUNTER
PT WAS CALLED BACK AND STATES SHE HAS NOT HAD THE X-RAYS DONE YET, BUT WILL GET DONE AT [facility]. PT MADE AWARE TO GET X-RAYS PUT ON DISC AND DROP OFF TO DR. Wall Score OFFICE FOR HIM TO REVIEW. PT STATES SHE HAD CALLED ASKING FOR PERCOCET 1 PO QD REFILL SENT TO [facility]. LAST GIVEN ON 6-25-21 AND DUE ON 7-25-21. PT'S October APPT WAS CX'D DUE TO DR. OROZCO LEAVING. NO FUTURE APPTS AT THIS TIME. PT MADE AWARE DR. OROZCO IS OUT OF OFFICE AND WILL RETURN NEXT WEEK AND WILL REVIEW WITH HIM AT THAT TIME. PT IS S/P PLIF 3-. WILL DR. OROZCO REFILL?

## 2021-07-28 RX ORDER — OXYCODONE HYDROCHLORIDE AND ACETAMINOPHEN 5; 325 MG/1; MG/1
1 TABLET ORAL DAILY
Qty: 30 TABLET | Refills: 0 | Status: SHIPPED | OUTPATIENT
Start: 2021-07-28 | End: 2021-09-03 | Stop reason: SDUPTHER

## 2021-07-28 RX ORDER — OXYCODONE HYDROCHLORIDE AND ACETAMINOPHEN 5; 325 MG/1; MG/1
1 TABLET ORAL DAILY
Qty: 30 TABLET | Refills: 0 | Status: CANCELLED | OUTPATIENT
Start: 2021-07-28 | End: 2021-08-27

## 2021-07-28 NOTE — TELEPHONE ENCOUNTER
CALLED PT TO ADVISE PER DR. Juliann Tang SENT HER LAST RX TO PHARM. PT AWARE THIS WILL BE THE LAST ONE DUE TO HIM MOVING OUT OF TOWN.

## 2021-08-30 DIAGNOSIS — M51.26 LUMBAR DISC HERNIATION: ICD-10-CM

## 2021-08-30 DIAGNOSIS — M48.061 SPINAL STENOSIS OF LUMBAR REGION, UNSPECIFIED WHETHER NEUROGENIC CLAUDICATION PRESENT: ICD-10-CM

## 2021-08-30 DIAGNOSIS — M51.36 LUMBAR DEGENERATIVE DISC DISEASE: ICD-10-CM

## 2021-08-30 DIAGNOSIS — M54.16 LUMBAR RADICULOPATHY: ICD-10-CM

## 2021-08-31 NOTE — TELEPHONE ENCOUNTER
PT IS S/P PLIF 3-. PERCOCET 5-325 MG 1 PO QD LAST GIVEN ON 7-28-21. PT LAST SEEN ON 5-27-21. WILL DR. OROZCO REFILL?

## 2021-09-03 RX ORDER — OXYCODONE HYDROCHLORIDE AND ACETAMINOPHEN 5; 325 MG/1; MG/1
1 TABLET ORAL DAILY
Qty: 30 TABLET | Refills: 0 | Status: SHIPPED | OUTPATIENT
Start: 2021-09-03 | End: 2021-10-03

## 2022-04-20 ENCOUNTER — OFFICE VISIT (OUTPATIENT)
Dept: PAIN MANAGEMENT | Age: 47
End: 2022-04-20
Payer: MEDICARE

## 2022-04-20 VITALS
HEIGHT: 66 IN | DIASTOLIC BLOOD PRESSURE: 74 MMHG | SYSTOLIC BLOOD PRESSURE: 128 MMHG | TEMPERATURE: 96.5 F | BODY MASS INDEX: 32.95 KG/M2 | WEIGHT: 205 LBS

## 2022-04-20 DIAGNOSIS — M54.16 LUMBAR RADICULOPATHY: ICD-10-CM

## 2022-04-20 DIAGNOSIS — G89.4 CHRONIC PAIN SYNDROME: ICD-10-CM

## 2022-04-20 DIAGNOSIS — R32 URINARY INCONTINENCE, UNSPECIFIED TYPE: ICD-10-CM

## 2022-04-20 DIAGNOSIS — Z98.1 HISTORY OF LUMBAR SPINAL FUSION: Primary | ICD-10-CM

## 2022-04-20 DIAGNOSIS — G40.909 SEIZURE DISORDER (HCC): ICD-10-CM

## 2022-04-20 PROCEDURE — G8417 CALC BMI ABV UP PARAM F/U: HCPCS | Performed by: NURSE PRACTITIONER

## 2022-04-20 PROCEDURE — G8427 DOCREV CUR MEDS BY ELIG CLIN: HCPCS | Performed by: NURSE PRACTITIONER

## 2022-04-20 PROCEDURE — 4004F PT TOBACCO SCREEN RCVD TLK: CPT | Performed by: NURSE PRACTITIONER

## 2022-04-20 PROCEDURE — 99215 OFFICE O/P EST HI 40 MIN: CPT | Performed by: NURSE PRACTITIONER

## 2022-04-20 RX ORDER — GABAPENTIN 600 MG/1
600 TABLET ORAL 3 TIMES DAILY
Qty: 90 TABLET | Refills: 0 | Status: SHIPPED | OUTPATIENT
Start: 2022-04-20 | End: 2022-05-19 | Stop reason: SDUPTHER

## 2022-04-20 RX ORDER — OXYCODONE HYDROCHLORIDE AND ACETAMINOPHEN 5; 325 MG/1; MG/1
1 TABLET ORAL 2 TIMES DAILY PRN
Qty: 14 TABLET | Refills: 0 | Status: SHIPPED | OUTPATIENT
Start: 2022-04-20 | End: 2022-05-19 | Stop reason: SDUPTHER

## 2022-04-20 RX ORDER — NALOXONE HYDROCHLORIDE 4 MG/.1ML
1 SPRAY NASAL PRN
Qty: 1 EACH | Refills: 0 | Status: SHIPPED | OUTPATIENT
Start: 2022-04-20 | End: 2022-07-11

## 2022-04-20 ASSESSMENT — ENCOUNTER SYMPTOMS
BACK PAIN: 1
RESPIRATORY NEGATIVE: 1
CONSTIPATION: 0
DIARRHEA: 0

## 2022-04-20 NOTE — PROGRESS NOTES
Patient: Silviano Vizcarra  YOB: 1975  Date: 4/20/22        Subjective:     Silviano Vizcarra is a 52 y.o. female who complains today of:    Chief Complaint   Patient presents with    Back Pain     lower back         Allergies:  Fentanyl, Prochlorperazine edisylate, Tylenol [acetaminophen], Cortizone, Aspirin, and Codeine    Past Medical History:   Diagnosis Date    Arthritis     spine    Asthma     since childhood -- smokes x 30 yrs    Cerebral artery occlusion with cerebral infarction ([address]     per CT scan -- patient without sx    Hypertension     meds > 3 yrs -- has not taken x 1 month due to mouth dryness.     Lumbar stenosis     Migraines     starts with neck pain    Seizures (HCC)     Thyroid disease     meds > 4 yrs -- intermittent    Uterine anomaly      Past Surgical History:   Procedure Laterality Date    ANTERIOR CRUCIATE LIGAMENT REPAIR Left     APPENDECTOMY      at age 21    [address]  2017    lumbar disc     BACK SURGERY  2018    lumbar fusion    CHOLECYSTECTOMY  2015    COLONOSCOPY      ENDOMETRIAL ABLATION  2006    post op sepsis    ENDOSCOPY, COLON, DIAGNOSTIC      KNEE ARTHROSCOPY Left 2004    ACL repair    LUMBAR FUSION N/A 1/15/2019    L4- L5 DECOMPRESSION, L5-S1 POSTERIOR LUMBAR INTERBODY FUSION performed by Senia Carvajal MD at [address] [place] [address] 3/11/2021    L4-5 TLIF INSTRUMENTATION AT L5-S1 DECOMPRESSION AT L4, L5 REMOVAL AND REINSERTION OF HARDWARE AT L5-S1 performed by Senia Carvajal MD at [facility]  5/9/13    duramorph 1.5 mg epideral  6mg celestone    TONSILLECTOMY      as child    TUBAL LIGATION Bilateral 2002     Family History   Problem Relation Age of Onset    Cancer Mother         NHL    COPD Mother     Other Mother         ITP & pancreatitis    Heart Failure Maternal Grandmother     No Known Problems Sister     High Blood Pressure Brother     No Known Problems Son     No Known Problems Daughter      Social History     Socioeconomic History    Marital status:      Spouse name: Not on file    Number of children: Not on file    Years of education: Not on file    Highest education level: Not on file   Occupational History    Not on file   Tobacco Use    Smoking status: Current Every Day Smoker     Packs/day: 1.50     Years: 30.00     Pack years: 45.00     Types: Cigarettes    Smokeless tobacco: Never Used   Vaping Use    Vaping Use: Never used   Substance and Sexual Activity    Alcohol use: No     Alcohol/week: 0.0 standard drinks    Drug use: No    Sexual activity: Not on file   Other Topics Concern    Not on file   Social History Narrative    Not on file     Social Determinants of Health     Financial Resource Strain:     Difficulty of Paying Living Expenses: Not on file   Food Insecurity:     Worried About Running Out of Food in the Last Year: Not on file    Cammy of Food in the Last Year: Not on file   Transportation Needs:     Lack of Transportation (Medical): Not on file    Lack of Transportation (Non-Medical):  Not on file   Physical Activity:     Days of Exercise per Week: Not on file    Minutes of Exercise per Session: Not on file   Stress:     Feeling of Stress : Not on file   Social Connections:     Frequency of Communication with Friends and Family: Not on file    Frequency of Social Gatherings with Friends and Family: Not on file    Attends Hinduism Services: Not on file    Active Member of [address] [place] [address] or Organizations: Not on file    Attends Club or Organization Meetings: Not on file    Marital Status: Not on file   Intimate Partner Violence:     Fear of Current or Ex-Partner: Not on file    Emotionally Abused: Not on file    Physically Abused: Not on file    Sexually Abused: Not on file   Housing Stability:     Unable to Pay for Housing in the Last Year: Not on file    Number of [place] in the Last Year: Not on file    Unstable Housing in the Last Year: Not on file Current Outpatient Medications on File Prior to Visit   Medication Sig Dispense Refill    Calcium Carb-Cholecalciferol (CALCIUM 1000 + D PO) Take 600 mg by mouth daily      Potassium 99 MG TABS Take 99 mg by mouth daily      levothyroxine (SYNTHROID) 50 MCG tablet daily      vitamin B-12 (CYANOCOBALAMIN) 100 MCG tablet Take 50 mcg by mouth daily      Biotin 1000 MCG TABS Take by mouth 2 times daily      Misc. Devices (RAISED TOILET SEAT/LOCK & ARMS) MISC 1 Piece by Does not apply route as needed (PRN) 2 each 0    ferrous sulfate 325 (65 Fe) MG tablet Take 65 mg by mouth daily (with breakfast)      vitamin C (ASCORBIC ACID) 500 MG tablet Take 500 mg by mouth daily      ondansetron (ZOFRAN) 4 MG tablet Take 4 mg by mouth every 8 hours as needed for Nausea or Vomiting      VENTOLIN  (90 Base) MCG/ACT inhaler 2 puffs 4 times daily       amLODIPine (NORVASC) 5 MG tablet Take 5 mg by mouth daily       omeprazole (PRILOSEC) 20 MG capsule Take 40 mg by mouth three times daily        No current facility-administered medications on file prior to visit. 3/11/2021 L4-5 TLIF INSTRUMENTATION AT L5-S1 DECOMPRESSION AT L4, L5 REMOVAL AND REINSERTION OF HARDWARE AT L5-S1with Dr Melvin Khan    42-year-old female here today to follow-up chronic back pain. She had lumbar fusion 3/11/2021 with Dr. Melvin Khan with a previous history of fusion 1/15/2019 and lumbar discectomy 11/27/2017. Apparently she fell July 2021, had broken ankle not treated. Has been having pain left low back going into buttocks and down leg. Can't feel below her left knee, needs walker. She can't do anything because of the pain. She also states that she cannot hold urine or stool. She denies any illicits. Has received short supplies of narcotics from emergency room visits.   States that she sees Dr. Jake Lyon for seizures who was previously giving her gabapentin 800 mg 3 times daily but since she moved she has not been able to follow-up. Back Pain  Associated symptoms include weakness. Pertinent negatives include no headaches or numbness. Review of Systems   Constitutional: Negative. Negative for activity change and unexpected weight change. HENT: Negative. Negative for hearing loss. Respiratory: Negative. Cardiovascular: Negative for leg swelling. Gastrointestinal: Negative for constipation and diarrhea. Genitourinary: Negative. Musculoskeletal: Positive for back pain. Negative for gait problem, joint swelling and neck pain. Skin: Negative for rash. Neurological: Positive for weakness. Negative for dizziness, numbness and headaches. Psychiatric/Behavioral: Negative. Negative for sleep disturbance. Objective:     Vitals:  /74   Temp 96.5 °F (35.8 °C) (Infrared)   Ht 5' 6\" (1.676 m)   Wt 205 lb (93 kg)   BMI 33.09 kg/m² Pain Score:  10 - Worst pain ever    Physical Exam  Vitals reviewed. Constitutional:       Appearance: She is well-developed. HENT:      Head: Normocephalic. Nose: Nose normal.   Pulmonary:      Effort: Pulmonary effort is normal.   Musculoskeletal:      Cervical back: Normal range of motion and neck supple. Lumbar back: Tenderness present. Decreased range of motion. Positive right straight leg raise test and positive left straight leg raise test.      Comments: Appears uncomfortable. Sits with left leg outstretched. Decreased sensation to left lower leg. Unable to toe or heel raise on left foot. Difficulty getting up from chair. Antalgic gait with out assistive device   Lymphadenopathy:      Cervical: No cervical adenopathy. Skin:     General: Skin is warm and dry. Findings: No erythema or rash. Neurological:      Mental Status: She is alert and oriented to person, place, and time. Cranial Nerves: No cranial nerve deficit. Deep Tendon Reflexes: Reflexes are normal and symmetric.  Reflexes normal.   Psychiatric:         Mood and Affect: Mood normal.         Behavior: Behavior normal.         Thought Content: Thought content normal.         Judgment: Judgment normal.            Assessment:        Diagnosis Orders   1. History of lumbar spinal fusion  XR LUMBAR SPINE (MIN 4 VIEWS)    MRI LUMBAR SPINE W WO CONTRAST    Urine Drug Screen    oxyCODONE-acetaminophen (PERCOCET) 5-325 MG per tablet    Amb External Referral To Physical Therapy   2. Lumbar radiculopathy  XR LUMBAR SPINE (MIN 4 VIEWS)    MRI LUMBAR SPINE W WO CONTRAST    Urine Drug Screen    oxyCODONE-acetaminophen (PERCOCET) 5-325 MG per tablet    Amb External Referral To Physical Therapy   3. Urinary incontinence, unspecified type  XR LUMBAR SPINE (MIN 4 VIEWS)    MRI LUMBAR SPINE W WO CONTRAST    Urine Drug Screen    oxyCODONE-acetaminophen (PERCOCET) 5-325 MG per tablet    Amb External Referral To Physical Therapy   4. Seizure disorder ([facility]     5. Chronic pain syndrome         Plan:     Periodic Controlled Substance Monitoring: Possible medication side effects, risk of tolerance/dependence & alternative treatments discussed. ,Assessed functional status. Mario Huerta, JERROD - CNP)    Orders Placed This Encounter   Medications    gabapentin (NEURONTIN) 600 MG tablet     Sig: Take 1 tablet by mouth 3 times daily for 30 days. Dispense:  90 tablet     Refill:  0    oxyCODONE-acetaminophen (PERCOCET) 5-325 MG per tablet     Sig: Take 1 tablet by mouth 2 times daily as needed for Pain for up to 7 days. Intended supply: 5 days.  Take lowest dose possible to manage pain     Dispense:  14 tablet     Refill:  0     Reduce doses taken as pain becomes manageable    naloxone 4 MG/0.1ML LIQD nasal spray     Si spray by Nasal route as needed for Opioid Reversal     Dispense:  1 each     Refill:  0       Orders Placed This Encounter   Procedures    XR LUMBAR SPINE (MIN 4 VIEWS)     Standing Status:   Future     Standing Expiration Date:   2022     Scheduling Instructions:      XR LUMBAR AP LAT FLEX EXT     Order Specific Question:   Reason for exam:     Answer:   eval for instability, hx PLIF    MRI LUMBAR SPINE W WO CONTRAST     Standing Status:   Future     Standing Expiration Date:   7/19/2022     Order Specific Question:   STAT Creatinine as needed:     Answer:   Yes     Order Specific Question:   Reason for exam:     Answer:   eval for herniation    Urine Drug Screen     Chronic pain/illicits    Amb External Referral To Physical Therapy     Referral Priority:   Routine     Referral Type:   Consult for Advice and Opinion     Requested Specialty:   Physical Therapy     Number of Visits Requested:   1     Opioid Risk Tool: Negative if blank [], Positive if []   [x] All negative      Family Hx of Substance Abuse ?        [] ETOH                  (Men 3.0; Women 1.0)  [] Illegal Drugs       (Men 3.0; Women 2.0)  [] Rx Drug Abuse  (Men 4.0; Women 4.0)                        Personal History of Substance Abuse ?     [] ETOH                (Men 3.0; Women 3.0)  [] Illegal Drugs     (Men 4.0; Women 4.0)  [] Rx Drug Abuse (Men 5.0; Women 5.0)   Age between 17-45?      [] Yes  (Men/Women 1.0)     Pre-adolescent Sex Abuse?     [] Yes (Women 3.0)  Psychological Disease ?      [] ADHD, OCD, Bipolar Disorder or Schizophrenia?        (Men/Women 2.0)     [] Depression?  (Men/Women 1.0)   Point Total 0   ( Low Risk  0-3 ,   Moderate 4-7 ,   High Risk 8+)     -can't take steroids due to allergy  -UDS, states she has not taken any narcotics or illicits.  -We will give her gabapentin 600 mg 3 times daily #90.  -Percocet 5 mg twice daily as needed, #14, 7-day supply  (Apparently she was unable to urinate per medical assistant and an oral swab was obtained. She did tell the medical assistant she took gabapentin and Hermon Pelt yesterday)  She did not disclose this to me.   When I looked at [employer] I saw that the gabapentin and Percocet was given to her and she said it was a short supply and she has been out of it for a while. -MRI with and without contrast to evaluate for herniation, history of fusion x2  -X-ray lumbar spine with flexion and extension to evaluate for instability  -We will order Physical Therapy Eval and Treat for reducing spasm, soft tissue work, massage, stretching and strengthening. Discussed options with the patient today. Anatomic model pathology was shown and reviewed with pt. All questions were answered. Relevant imaging reviewed, NDP reviewed and pain generators reviewed. Pt verbalized understanding and agrees with above plan. Will continue medications as they do help pt function with ADL and improve quality of life. Pt understands potential side effects from opioids such as constipation, dry mouth, dizziness, opioid use disorder, and overdose. Pt has failed non opioid therapy and decision was made to prescribe opioids to help with daily function and improve quality of life. Discussed the principles of opioid tolerance as well as the concerns regarding opioid diversion, misuse, and abuse. Discussed the risks of respiratory depression and death while on pain medications, especially when combined with other sedative substances. Discussed the elevated risks of excessive sedation while on pain medications. Advised him against driving or operating heavy machinery or performing any activities where he may harm himself or others while on pain medications. Particular caution was emphasized especially during dose adjustments and medication changes. OARRS was reviewed. ORT score = 0  Daily MME 15  Narcan prescribed 4/20/22 and understands the proper use in the event of an overdose. This NP saw pt under direct supervision of Dr Les Sanchez. Controlled Substances Monitoring: Periodic Controlled Substance Monitoring: Possible medication side effects, risk of tolerance/dependence & alternative treatments discussed. ,Assessed functional status.  JERROD Romeo - CNP)      Follow up:  Return in about 4 weeks (around 5/18/2022) for review meds and reassess pain.     Andrew Torres, APRN - CNP

## 2022-04-21 ENCOUNTER — TELEPHONE (OUTPATIENT)
Dept: PAIN MANAGEMENT | Age: 47
End: 2022-04-21

## 2022-04-21 NOTE — TELEPHONE ENCOUNTER
MRI Lumbar w/&w/out contrast authorization request faxed to Erin ([phone]). MRI to be done at [facility].

## 2022-04-22 NOTE — TELEPHONE ENCOUNTER
MRI Lumbar w/&w/out contrast order along w/auth letter faxed to [facility] ([phone]). They will call patient to schedule.       Aaron Acosta #[id number]   4- to 6-

## 2022-04-26 DIAGNOSIS — R32 URINARY INCONTINENCE, UNSPECIFIED TYPE: ICD-10-CM

## 2022-04-26 DIAGNOSIS — M54.16 LUMBAR RADICULOPATHY: ICD-10-CM

## 2022-04-26 DIAGNOSIS — Z98.1 HISTORY OF LUMBAR SPINAL FUSION: ICD-10-CM

## 2022-04-26 RX ORDER — OXYCODONE HYDROCHLORIDE AND ACETAMINOPHEN 5; 325 MG/1; MG/1
1 TABLET ORAL 2 TIMES DAILY PRN
Qty: 44 TABLET | Refills: 0 | Status: CANCELLED | OUTPATIENT
Start: 2022-04-27 | End: 2022-05-19

## 2022-04-26 NOTE — TELEPHONE ENCOUNTER
Requested Prescriptions     Pending Prescriptions Disp Refills    oxyCODONE-acetaminophen (PERCOCET) 5-325 MG per tablet 40 tablet 0     Sig: Take 1 tablet by mouth 2 times daily as needed for Pain for up to 20 days. Take lowest dose possible to manage pain       Patient last seen on:  4/20/22  Date of last surgery:  n/a  Date of last refill:  4/20/22  Pain level:  n/a  Patient complaining of:  Patient states she was supposed to call in to receive the rest of her prescription. Last prescription was sent in for 7 days.    Future appts: 5/19/22

## 2022-05-02 DIAGNOSIS — M54.16 LUMBAR RADICULOPATHY: ICD-10-CM

## 2022-05-02 DIAGNOSIS — R32 URINARY INCONTINENCE, UNSPECIFIED TYPE: ICD-10-CM

## 2022-05-02 DIAGNOSIS — Z98.1 HISTORY OF LUMBAR SPINAL FUSION: ICD-10-CM

## 2022-05-04 ENCOUNTER — TELEPHONE (OUTPATIENT)
Dept: NEUROSURGERY | Age: 47
End: 2022-05-04

## 2022-05-04 NOTE — TELEPHONE ENCOUNTER
PT WAS CALLED & LMVM. WHEN PT CALLS BACK, PLEASE INFORM PT OF MRI RESULTS PER COREY DOYLE. Message from JERROD Ewing CNP sent at 5/4/2022  9:30 AM EDT -----  I have your MRI report. We will discuss it at your next office visit.   Possibly a very small herniation.

## 2022-05-04 NOTE — TELEPHONE ENCOUNTER
----- Message from JERROD Palma CNP sent at 5/4/2022  9:30 AM EDT -----  I have your MRI report. We will discuss it at your next office visit. Possibly a very small herniation.

## 2022-05-16 RX ORDER — GABAPENTIN 600 MG/1
TABLET ORAL
Qty: 90 TABLET | Refills: 0 | OUTPATIENT
Start: 2022-05-16

## 2022-05-19 ENCOUNTER — OFFICE VISIT (OUTPATIENT)
Dept: PAIN MANAGEMENT | Age: 47
End: 2022-05-19
Payer: MEDICARE

## 2022-05-19 VITALS
DIASTOLIC BLOOD PRESSURE: 68 MMHG | TEMPERATURE: 97.7 F | HEIGHT: 66 IN | BODY MASS INDEX: 32.95 KG/M2 | SYSTOLIC BLOOD PRESSURE: 118 MMHG | WEIGHT: 205 LBS

## 2022-05-19 DIAGNOSIS — M51.26 HERNIATED LUMBAR INTERVERTEBRAL DISC: ICD-10-CM

## 2022-05-19 DIAGNOSIS — K59.03 DRUG-INDUCED CONSTIPATION: ICD-10-CM

## 2022-05-19 DIAGNOSIS — R32 URINARY INCONTINENCE, UNSPECIFIED TYPE: ICD-10-CM

## 2022-05-19 DIAGNOSIS — Z98.1 HISTORY OF LUMBAR SPINAL FUSION: ICD-10-CM

## 2022-05-19 DIAGNOSIS — M54.16 LUMBAR RADICULOPATHY: ICD-10-CM

## 2022-05-19 DIAGNOSIS — M51.26 HERNIATION OF LEFT SIDE OF L4-L5 INTERVERTEBRAL DISC: Primary | ICD-10-CM

## 2022-05-19 PROCEDURE — 4004F PT TOBACCO SCREEN RCVD TLK: CPT | Performed by: NURSE PRACTITIONER

## 2022-05-19 PROCEDURE — G8417 CALC BMI ABV UP PARAM F/U: HCPCS | Performed by: NURSE PRACTITIONER

## 2022-05-19 PROCEDURE — 99215 OFFICE O/P EST HI 40 MIN: CPT | Performed by: NURSE PRACTITIONER

## 2022-05-19 PROCEDURE — G8427 DOCREV CUR MEDS BY ELIG CLIN: HCPCS | Performed by: NURSE PRACTITIONER

## 2022-05-19 RX ORDER — OXYCODONE HYDROCHLORIDE AND ACETAMINOPHEN 5; 325 MG/1; MG/1
1 TABLET ORAL 2 TIMES DAILY PRN
Qty: 45 TABLET | Refills: 0 | Status: SHIPPED | OUTPATIENT
Start: 2022-05-19 | End: 2022-06-06 | Stop reason: SDUPTHER

## 2022-05-19 RX ORDER — DOCUSATE SODIUM 100 MG/1
100 CAPSULE, LIQUID FILLED ORAL 2 TIMES DAILY PRN
Qty: 60 CAPSULE | Refills: 0 | Status: ON HOLD | OUTPATIENT
Start: 2022-05-19 | End: 2022-10-01

## 2022-05-19 RX ORDER — GABAPENTIN 600 MG/1
600 TABLET ORAL 3 TIMES DAILY
Qty: 90 TABLET | Refills: 0 | Status: ON HOLD | OUTPATIENT
Start: 2022-05-19 | End: 2022-06-17

## 2022-05-19 ASSESSMENT — ENCOUNTER SYMPTOMS
CONSTIPATION: 0
BACK PAIN: 1
RESPIRATORY NEGATIVE: 1
DIARRHEA: 0

## 2022-05-19 NOTE — PROGRESS NOTES
Patient: Chestine Merlin  YOB: 1975  Date: 5/19/22        Subjective:     Chestine Merlin is a 52 y.o. female who complains today of:    Chief Complaint   Patient presents with    Back Pain         Allergies:  Fentanyl, Prochlorperazine edisylate, Tylenol [acetaminophen], Cortizone, Aspirin, and Codeine    Past Medical History:   Diagnosis Date    Arthritis     spine    Asthma     since childhood -- smokes x 30 yrs    Cerebral artery occlusion with cerebral infarction ([address]     per CT scan -- patient without sx    Hypertension     meds > 3 yrs -- has not taken x 1 month due to mouth dryness.     Lumbar stenosis     Migraines     starts with neck pain    Seizures (HCC)     Thyroid disease     meds > 4 yrs -- intermittent    Uterine anomaly      Past Surgical History:   Procedure Laterality Date    ANTERIOR CRUCIATE LIGAMENT REPAIR Left     APPENDECTOMY      at age 21    [address]  2017    lumbar disc     BACK SURGERY  2018    lumbar fusion    CHOLECYSTECTOMY  2015    COLONOSCOPY      ENDOMETRIAL ABLATION  2006    post op sepsis    ENDOSCOPY, COLON, DIAGNOSTIC      KNEE ARTHROSCOPY Left 2004    ACL repair    LUMBAR FUSION N/A 1/15/2019    L4- L5 DECOMPRESSION, L5-S1 POSTERIOR LUMBAR INTERBODY FUSION performed by Dior Esteban MD at [address] [place] [address] 3/11/2021    L4-5 TLIF INSTRUMENTATION AT L5-S1 DECOMPRESSION AT L4, L5 REMOVAL AND REINSERTION OF HARDWARE AT L5-S1 performed by Dior Esteban MD at [place] [address]  5/9/13    duramorph 1.5 mg epideral  6mg celestone    TONSILLECTOMY      as child    TUBAL LIGATION Bilateral 2002     Family History   Problem Relation Age of Onset    Cancer Mother         NHL    COPD Mother     Other Mother         ITP & pancreatitis    Heart Failure Maternal Grandmother     No Known Problems Sister     High Blood Pressure Brother     No Known Problems Son     No Known Problems Daughter      Social History Socioeconomic History    Marital status:      Spouse name: Not on file    Number of children: Not on file    Years of education: Not on file    Highest education level: Not on file   Occupational History    Not on file   Tobacco Use    Smoking status: Current Every Day Smoker     Packs/day: 1.50     Years: 30.00     Pack years: 45.00     Types: Cigarettes    Smokeless tobacco: Never Used   Vaping Use    Vaping Use: Never used   Substance and Sexual Activity    Alcohol use: No     Alcohol/week: 0.0 standard drinks    Drug use: No    Sexual activity: Not on file   Other Topics Concern    Not on file   Social History Narrative    Not on file     Social Determinants of Health     Financial Resource Strain:     Difficulty of Paying Living Expenses: Not on file   Food Insecurity:     Worried About Running Out of Food in the Last Year: Not on file    Cammy of Food in the Last Year: Not on file   Transportation Needs:     Lack of Transportation (Medical): Not on file    Lack of Transportation (Non-Medical):  Not on file   Physical Activity:     Days of Exercise per Week: Not on file    Minutes of Exercise per Session: Not on file   Stress:     Feeling of Stress : Not on file   Social Connections:     Frequency of Communication with Friends and Family: Not on file    Frequency of Social Gatherings with Friends and Family: Not on file    Attends Yarsanism Services: Not on file    Active Member of [address] [place] [address] or Organizations: Not on file    Attends Club or Organization Meetings: Not on file    Marital Status: Not on file   Intimate Partner Violence:     Fear of Current or Ex-Partner: Not on file    Emotionally Abused: Not on file    Physically Abused: Not on file    Sexually Abused: Not on file   Housing Stability:     Unable to Pay for Housing in the Last Year: Not on file    Number of [place] in the Last Year: Not on file    Unstable Housing in the Last Year: Not on file Current Outpatient Medications on File Prior to Visit   Medication Sig Dispense Refill    naloxone 4 MG/0.1ML LIQD nasal spray 1 spray by Nasal route as needed for Opioid Reversal 1 each 0    Calcium Carb-Cholecalciferol (CALCIUM 1000 + D PO) Take 600 mg by mouth daily      Potassium 99 MG TABS Take 99 mg by mouth daily      levothyroxine (SYNTHROID) 50 MCG tablet daily      vitamin B-12 (CYANOCOBALAMIN) 100 MCG tablet Take 50 mcg by mouth daily      Biotin 1000 MCG TABS Take by mouth 2 times daily      Misc. Devices (RAISED TOILET SEAT/LOCK & ARMS) MISC 1 Piece by Does not apply route as needed (PRN) 2 each 0    ferrous sulfate 325 (65 Fe) MG tablet Take 65 mg by mouth daily (with breakfast)      vitamin C (ASCORBIC ACID) 500 MG tablet Take 500 mg by mouth daily      ondansetron (ZOFRAN) 4 MG tablet Take 4 mg by mouth every 8 hours as needed for Nausea or Vomiting      VENTOLIN  (90 Base) MCG/ACT inhaler 2 puffs 4 times daily       amLODIPine (NORVASC) 5 MG tablet Take 5 mg by mouth daily       omeprazole (PRILOSEC) 20 MG capsule Take 40 mg by mouth three times daily        No current facility-administered medications on file prior to visit. 77-year-old female here today to follow-up chronic back pain. Patient returns today after having MRI and x-ray lumbar spine. Lives in [address] [place] [address] and had gone to the emergency room for severe back pain going into her left leg and states that they gave her Dilaudid which helped. He says she can barely do anything due to the severe pain going from her back down her leg. I gave her gabapentin 600 3 times daily along with a small quantity of Percocet. This did help with the pain. She had lumbar fusion 3/11/2021 with Dr. Salvador Esparza with a previous history of fusion 1/15/2019 and lumbar discectomy 11/27/2017. Apparently she fell July 2021, had broken left ankle not treated. Has been having pain left low back going into buttocks and down leg. Can't feel below her left knee, needs walker. She can't do anything because of the pain. She also states that she cannot hold urine or stool. She denies any illicits. Has received short supplies of narcotics from emergency room visits. States that she sees Dr. Nathan Williamson for seizures who was previously giving her gabapentin 800 mg 3 times daily but since she moved she has not been able to follow-up. 3/11/2021 L4-5 TLIF INSTRUMENTATION AT L5-S1 DECOMPRESSION AT L4, L5 REMOVAL AND REINSERTION OF HARDWARE AT L5-S1with Dr Jerry Lino    Back Pain  Pertinent negatives include no headaches, numbness or weakness. Review of Systems   Constitutional: Negative. Negative for activity change and unexpected weight change. HENT: Negative. Negative for hearing loss. Respiratory: Negative. Cardiovascular: Negative for leg swelling. Gastrointestinal: Negative for constipation and diarrhea. Genitourinary: Negative. Musculoskeletal: Positive for back pain and gait problem. Negative for joint swelling and neck pain. Skin: Negative for rash. Neurological: Negative for dizziness, weakness, numbness and headaches. Psychiatric/Behavioral: Negative. Negative for sleep disturbance. Objective:     Vitals:  /68   Temp 97.7 °F (36.5 °C)   Ht 5' 6\" (1.676 m)   Wt 205 lb (93 kg)   BMI 33.09 kg/m² Pain Score:  10 - Worst pain ever    Physical Exam  Vitals reviewed. Constitutional:       Appearance: She is well-developed. HENT:      Head: Normocephalic. Nose: Nose normal.   Pulmonary:      Effort: Pulmonary effort is normal.   Musculoskeletal:      Cervical back: Normal range of motion and neck supple. Lumbar back: Tenderness present. Decreased range of motion. Positive left straight leg raise test. Negative right straight leg raise test.   Lymphadenopathy:      Cervical: No cervical adenopathy. Skin:     General: Skin is warm and dry. Findings: No erythema or rash.    Neurological: Mental Status: She is alert and oriented to person, place, and time. Cranial Nerves: No cranial nerve deficit. Motor: Weakness present. Gait: Gait abnormal.      Deep Tendon Reflexes: Reflexes are normal and symmetric. Reflexes normal.      Comments: Left leg strength 2/5  Antalgic gait without assistive device  Difficulty getting up from chair   Psychiatric:         Mood and Affect: Mood normal.         Behavior: Behavior normal.         Thought Content: Thought content normal.         Judgment: Judgment normal.            Assessment:        Diagnosis Orders   1. Herniation of left side of L4-L5 intervertebral disc     2. Herniated lumbar intervertebral disc  Tasha Murrell MD, Neurosurgery, [place]   3. History of lumbar spinal fusion  oxyCODONE-acetaminophen (PERCOCET) 5-325 MG per tablet   4. Lumbar radiculopathy  oxyCODONE-acetaminophen (PERCOCET) 5-325 MG per tablet   5. Urinary incontinence, unspecified type  oxyCODONE-acetaminophen (PERCOCET) 5-325 MG per tablet   6. Drug-induced constipation         Plan:     Periodic Controlled Substance Monitoring: Possible medication side effects, risk of tolerance/dependence & alternative treatments discussed. ,No signs of potential drug abuse or diversion identified. ,Assessed functional status. Venessa Alfonso, APRN - CNP)    Orders Placed This Encounter   Medications    gabapentin (NEURONTIN) 600 MG tablet     Sig: Take 1 tablet by mouth 3 times daily for 30 days. Dispense:  90 tablet     Refill:  0    oxyCODONE-acetaminophen (PERCOCET) 5-325 MG per tablet     Sig: Take 1 tablet by mouth 2 times daily as needed for Pain for up to 30 days.      Dispense:  45 tablet     Refill:  0     Reduce doses taken as pain becomes manageable    docusate sodium (COLACE) 100 MG capsule     Sig: Take 1 capsule by mouth 2 times daily as needed for Constipation     Dispense:  60 capsule     Refill:  0       Orders Placed This Encounter   Procedures    Brittany Toney MD, Neurosurgery, [place]     Referral Priority:   Routine     Referral Type:   Eval and Treat     Referral Reason:   Specialty Services Required     Referred to Provider:   Cheryle Gamez MD     Requested Specialty:   Neurosurgery     Number of Visits Requested:   1       -refill gabapentin 600mg TID, #90  -refill Percocet 5mg BID #45 for 30 days  -referral to Dr Marbin Chaparro for MRI. I took the discs over to him to have him review. He saw severe stenosis at L3-4 and recommend she follow-up with him to discuss surgery.  -I spent 60 minutes in consult with Dr. Marbin Chaparro as well as exam and discussion with patient. Discussed options with the patient today. Anatomic model pathology was shown and reviewed with pt. All questions were answered. Relevant imaging reviewed, NDP reviewed and pain generators reviewed. Pt verbalized understanding and agrees with above plan. Will continue medications as they do help pt function with ADL and improve quality of life. Pt understands potential side effects from opioids such as constipation, dry mouth, dizziness, opioid use disorder, and overdose. Pt has failed non opioid therapy and decision was made to prescribe opioids to help with daily function and improve quality of life. Discussed the principles of opioid tolerance as well as the concerns regarding opioid diversion, misuse, and abuse.      Discussed the risks of respiratory depression and death while on pain medications, especially when combined with other sedative substances.     Discussed the elevated risks of excessive sedation while on pain medications. Advised him against driving or operating heavy machinery or performing any activities where he may harm himself or others while on pain medications. Particular caution was emphasized especially during dose adjustments and medication changes.      OARRS was reviewed.     ORT score = 0  Daily MME 15  Narcan prescribed 4/20/22 and understands the proper use in the event of an overdose. This NP saw pt under direct supervision of Dr Darrius Witt.        Follow up:  Return in about 4 weeks (around 6/16/2022) for review meds and reassess pain.     JERROD Sharp - CNP

## 2022-05-23 NOTE — PROGRESS NOTES
Patient Name: Grace Blanton : 1975        Date: 2022      Type of Appt: Consult    Reason for appt: CONSULT PER COREY DOYLE, FOR LUMBAR    Studies done: 22 L/S MRI & X-ray @ [facility] TO BRING DISC     Surgeries: 3-11-21 L4-5 TLIF INSTRUMENTATION AT L5-S1 DECOMPRESSION AT L4, L5 REMOVAL AND REINSERTION OF HARDWARE AT L5-S1 BY DR. OROZCO    1-15-19  L5-S1 POSTERIOR LUMBAR INTERBODY FUSION BY DR. OROZCO    17 L4-5, L5-S1 DISKECTOMY BY DR. Evans Deep    Smoking: Yes     REVIEW OF SYSTEMS:    Nausea, Headaches, Hearing loss, Constipation, Sleep Disturbance, Shortness of breath, Neck Pain, Back Pain   Review of systems is otherwise noncontributory and negative                        [facility]  Neurosurgery and Pain Management [address] [place] [address] 82: [phone]  F: [phone]          Patient:  Grace Blanton  YOB: 1975  Date: 2022    The patient is a 52 y.o. female who presents today for evaluation of the following problems:     Chief Complaint   Patient presents with    Back Pain        Referred by JERROD Martinez - *    Estimated body mass index is 32.59 kg/m² as calculated from the following:    Height as of this encounter: 5' 6.5\" (1.689 m). Weight as of this encounter: 205 lb (93 kg). PAST MEDICAL, FAMILY AND SOCIAL HISTORY:  Past Medical History:   Diagnosis Date    Arthritis     spine    Asthma     since childhood -- smokes x 30 yrs    Cerebral artery occlusion with cerebral infarction ([facility] [address] [facility]     per CT scan -- patient without sx    Hypertension     meds > 3 yrs -- has not taken x 1 month due to mouth dryness.     Lumbar stenosis     Migraines     starts with neck pain    Seizures (HCC)     Thyroid disease     meds > 4 yrs -- intermittent    Uterine anomaly      Past Surgical History:   Procedure Laterality Date    ANTERIOR CRUCIATE LIGAMENT REPAIR Left     APPENDECTOMY      at age 18     BACK SURGERY  2017    lumbar disc     BACK SURGERY  2018    lumbar fusion    CHOLECYSTECTOMY  2015    COLONOSCOPY      ENDOMETRIAL ABLATION  2006    post op sepsis    ENDOSCOPY, COLON, DIAGNOSTIC      KNEE ARTHROSCOPY Left 2004    ACL repair    LUMBAR FUSION N/A 1/15/2019    L4- L5 DECOMPRESSION, L5-S1 POSTERIOR LUMBAR INTERBODY FUSION performed by Dino Gomez MD at [address] [facility] N/A 3/11/2021    L4-5 TLIF INSTRUMENTATION AT L5-S1 DECOMPRESSION AT L4, L5 REMOVAL AND REINSERTION OF HARDWARE AT L5-S1 performed by Dino Gomez MD at [address]  5/9/13    duramorph 1.5 mg epideral  6mg celestone    TONSILLECTOMY      as child    TUBAL LIGATION Bilateral 2002     Family History   Problem Relation Age of Onset   [facility] Cancer Mother         NHL    COPD Mother     Other Mother         ITP & pancreatitis    Heart Failure Maternal Grandmother     No Known Problems Sister     High Blood Pressure Brother     No Known Problems Son     No Known Problems Daughter      Current Outpatient Medications on File Prior to Visit   Medication Sig Dispense Refill    gabapentin (NEURONTIN) 600 MG tablet Take 1 tablet by mouth 3 times daily for 30 days. 90 tablet 0    oxyCODONE-acetaminophen (PERCOCET) 5-325 MG per tablet Take 1 tablet by mouth 2 times daily as needed for Pain for up to 30 days. 45 tablet 0    docusate sodium (COLACE) 100 MG capsule Take 1 capsule by mouth 2 times daily as needed for Constipation 60 capsule 0    naloxone 4 MG/0.1ML LIQD nasal spray 1 spray by Nasal route as needed for Opioid Reversal 1 each 0    Calcium Carb-Cholecalciferol (CALCIUM 1000 + D PO) Take 600 mg by mouth daily      Potassium 99 MG TABS Take 99 mg by mouth daily      levothyroxine (SYNTHROID) 50 MCG tablet daily      vitamin B-12 (CYANOCOBALAMIN) 100 MCG tablet Take 50 mcg by mouth daily      Biotin 1000 MCG TABS Take by mouth 2 times daily      Misc.  Devices (RAISED TOILET SEAT/LOCK & ARMS) MISC 1 Piece by Does not apply route as needed (PRN) 2 each 0    ferrous sulfate 325 (65 Fe) MG tablet Take 65 mg by mouth daily (with breakfast)      vitamin C (ASCORBIC ACID) 500 MG tablet Take 500 mg by mouth daily      ondansetron (ZOFRAN) 4 MG tablet Take 4 mg by mouth every 8 hours as needed for Nausea or Vomiting      VENTOLIN  (90 Base) MCG/ACT inhaler 2 puffs 4 times daily       amLODIPine (NORVASC) 5 MG tablet Take 5 mg by mouth daily       omeprazole (PRILOSEC) 20 MG capsule Take 40 mg by mouth three times daily        No current facility-administered medications on file prior to visit. Social History     Tobacco Use    Smoking status: Current Every Day Smoker     Packs/day: 1.50     Years: 30.00     Pack years: 45.00     Types: Cigarettes    Smokeless tobacco: Never Used   Vaping Use    Vaping Use: Never used   Substance Use Topics    Alcohol use: No     Alcohol/week: 0.0 standard drinks    Drug use: No       ALLERGIES  Allergies   Allergen Reactions    Fentanyl Shortness Of Breath     Throat closes    Prochlorperazine Edisylate Shortness Of Breath and Swelling    Tylenol [Acetaminophen] Nausea Only    Cortizone Swelling    Aspirin Rash    Codeine Rash     Throat closes         REVIEW OF SYSTEMS:  Constitutional: Negative for appetite change. Eyes: Symmetric, negative for redness or swelling. Head, Ears, Nose, Throat: Negative for congestion and tinnitus. No hearing loss. Respiratory: Negative for apnea and chest tightness. No shortness of breath. Cardiovascular: Negative for chest pain. Gastrointestinal: Negative for abdominal distention. No diarrhea nausea constipation. Endocrine: Negative for cold intolerance and heat intolerance. Genitourinary: Negative for difficulty urinating. Integumentary (skin and/or breast): Negative for color change. No rashes. Allergic/Immunologic: Negative for environmental allergies.    Neurological: Negative for dizziness, tremors, seizures and numbness. No headaches. Psychiatric/Behavioral: Negative for agitation, behavioral problems and confusion. No sleep disturbance. Musculoskeletal: Negative for atrophy, fasciculations. Hematologic/lymphatic: Negative for lymphadenopathy. Does not bruise or bleed easily. I have personally reviewed the PMH, PSH, family history, home medications, social history, allergies, ROS. Physical Exam:      Vitals:    05/26/22 0951   BP: 128/76   Site: Right Upper Arm   Temp: 98 °F (36.7 °C)   TempSrc: Temporal   Weight: 205 lb (93 kg)   Height: 5' 6.5\" (1.689 m)       Physical Exam:  Vitals and notes reviewed. Constitutional:  See above height, weight, pulse rate, and blood pressure. BMI 33     Appearance: Normal appearance. Head:      Normocephalic and atraumatic. Ears, Nose, Mouth, and Throat:      Normal shape, no discharge, deglutition intact  Eyes:      Extraocular Movements: Extraocular movements intact. Pupils: Pupils are equal, round, and reactive to light. Cardiovascular:      Pulses: Normal radialis pulses. Heart sounds: Normal heart sounds, regular rate, no rubs or murmurs. Respiratory:      lungs decreased auscultation secondary to smoking  Abdomen:        Soft to palpation. Bowel sounds present. Genitourinary:      Normal for sex  Musculoskeletal: Mild deformity of left ankle secondary to healed fracture  Left lower extremity weakness 2/5. Antalgic gait. Needs assistance to stand and get up out of the chair. Straight leg raising positive on the left side. Tenderness on the back. Loss of sensation lateral aspect left leg and foot. General: Normal range of motion. Extremities well developed and symmetric. Muscle tone normal with no spasticity or fasciculations. Skin:     General: Skin is warm and dry. Capillary Refill: Capillary refill less than 2 seconds. Neurological:      Mental Status: Alert and oriented to person, place, and time.       Cranial nerves individually tested 2 through 12 normal  Hematologic/Lymphatic/Immunologic:      Negative for lymphadenopathy, hematomas. Psychiatric:         Mood and Affect: Mood normal. Appropriate affect    I have reviewed all laboratory studies, reports, data, and pertinent images. 4 29 2022 22 [place]  xr and mri lumbar    4/29/2022 MRI lumbar spine L3-4          HISTORY OF PRESENT ILLNESS:  Patient has back and severe left lower extremity pain having gone to the emergency room in [place]. Pain is so severe all of her activities are limited. Having pain the left low back and the buttocks and down the leg. Cannot feel below her knee and is using a walker. Saw Dr. Davis Mix in [place] who told her that physical therapy was contraindicated and I concur because of the degree of compression on her spinal nerves and neurologic deficits of weakness and sensory loss in the left lower extremity associated with bladder and bowel loss of control    Status post the above surgeries. She fell in July 2021 with a fractured left ankle not treated. Cannot hold stool or urine. Dr. Bernadette Willis is her neurologist to controls her seizures. Studies above shows moderately severe canal stenosis L3-4 adjacent level progression of her osteoarthritis. Plan left bilateral L3-4 laminectomy microdissection decompression    The surgical/medical procedure, indications and risks have been discussed. There is a low chance of having any type of risk, but risks are still present including serious risks as pain, bleeding, infection, death, paralysis, sensory loss, bladder bowel and sexual dysfunction, chance of recurrence and so forth. Surgery is not a guarantee of normalcy. We cannot undo any permanent damage already done. We cannot change the course of any of the other medical diseases. I have discussed alternative procedures, risks and benefits. I have answered all questions. There is understanding and agreement to proceed.     Malena Dyson, MD

## 2022-05-26 ENCOUNTER — INITIAL CONSULT (OUTPATIENT)
Dept: NEUROSURGERY | Age: 47
End: 2022-05-26
Payer: MEDICARE

## 2022-05-26 VITALS
HEIGHT: 67 IN | BODY MASS INDEX: 32.18 KG/M2 | WEIGHT: 205 LBS | TEMPERATURE: 98 F | DIASTOLIC BLOOD PRESSURE: 76 MMHG | SYSTOLIC BLOOD PRESSURE: 128 MMHG

## 2022-05-26 DIAGNOSIS — M48.062 SPINAL STENOSIS OF LUMBAR REGION WITH NEUROGENIC CLAUDICATION: Primary | ICD-10-CM

## 2022-05-26 DIAGNOSIS — M96.1 POSTLAMINECTOMY SYNDROME OF LUMBAR REGION: ICD-10-CM

## 2022-05-26 DIAGNOSIS — F17.200 SMOKER: ICD-10-CM

## 2022-05-26 PROCEDURE — 99243 OFF/OP CNSLTJ NEW/EST LOW 30: CPT | Performed by: NEUROLOGICAL SURGERY

## 2022-05-26 PROCEDURE — G8417 CALC BMI ABV UP PARAM F/U: HCPCS | Performed by: NEUROLOGICAL SURGERY

## 2022-05-26 PROCEDURE — G8427 DOCREV CUR MEDS BY ELIG CLIN: HCPCS | Performed by: NEUROLOGICAL SURGERY

## 2022-06-03 ENCOUNTER — TELEPHONE (OUTPATIENT)
Dept: NEUROSURGERY | Age: 47
End: 2022-06-03

## 2022-06-03 NOTE — TELEPHONE ENCOUNTER
Patient had consult with Dr. Jose Piña on 5/26/22 in which LEFT BILATERAL L 3-4 LAMINECTOMY MICRODISSECTION DECOMPRESSION was ordered. Patient states it was discussed that the \"old screws and rods\" in her back could come out and patient asks if this can occur at the same time as the surgery?

## 2022-06-03 NOTE — TELEPHONE ENCOUNTER
PT WAS CALLED & MADE AWARE OF DR. BLANCAS'S RESPONSE REGARDING SURGERY. PT IS STATING SHE IS HAVING A LOT OF PROBLEMS WITH HER LEFT SIDE. SHE CANNOT LIE DOWN ON HER LEFT SIDE FROM BUTTOCKS DOWN ENTIRE LEG AND CAN BARELY WALK. SHE REPORTS PAIN IS CONSTANT. SHE HAS TRIED THE OXYCODONE TWICE PER DAY (given by pain mgmt), TYLENOL, ICE, HEAT. PT WILL LIKE DR. BLANCAS'S ADVISE RE: HER INCREASED PAIN.

## 2022-06-06 DIAGNOSIS — Z98.1 HISTORY OF LUMBAR SPINAL FUSION: ICD-10-CM

## 2022-06-06 DIAGNOSIS — R32 URINARY INCONTINENCE, UNSPECIFIED TYPE: ICD-10-CM

## 2022-06-06 DIAGNOSIS — M54.16 LUMBAR RADICULOPATHY: ICD-10-CM

## 2022-06-06 NOTE — TELEPHONE ENCOUNTER
Requested Prescriptions     Pending Prescriptions Disp Refills    oxyCODONE-acetaminophen (PERCOCET) 5-325 MG per tablet 28 tablet 0     Sig: Take 1 tablet by mouth 2 times daily as needed for Pain for up to 14 days. Patient last seen on:  5/26/22  Date of last surgery:  n/a  Date of last refill:  5/19/22  Pain level:  n/a  Patient complaining of:  Patient states she will be out of medication on the 9th. Patient is currently prescribed Percocet BID but was told she is allowed to take an extra half tablet as needed.  Therefore patient will run out early and asks for rx as she awaits surgery with Dr. Pierre Juarez   Future appts: 6/23/22

## 2022-06-06 NOTE — TELEPHONE ENCOUNTER
PT WAS CALLED & LMVM. WHEN PT CALLS BACK, PLEASE INFORM OF DR. BLANCAS'S RESPONSE. \"Needs medical treatment by her medical physicians. At this time it is the pain management physicians. I will help her after the surgery with postoperative medications. \"

## 2022-06-08 RX ORDER — OXYCODONE HYDROCHLORIDE AND ACETAMINOPHEN 5; 325 MG/1; MG/1
1 TABLET ORAL 2 TIMES DAILY PRN
Qty: 28 TABLET | Refills: 0 | Status: SHIPPED | OUTPATIENT
Start: 2022-06-09 | End: 2022-06-23

## 2022-06-13 RX ORDER — GABAPENTIN 600 MG/1
TABLET ORAL
Qty: 90 TABLET | Refills: 0 | OUTPATIENT
Start: 2022-06-13

## 2022-06-13 NOTE — TELEPHONE ENCOUNTER
NEXT [facility] APPT:  6/23/22 FOLLOW UP    SURG DATE:  [facility] - 6/17/22 - LEFT BILATERAL L 3-4 LAMINECTOMY MICRODISSECTION DECOMPRESSION     PT WANTS [facility] TO KNOW SHE IS HAVING PAIN LIKE A TYLER HORSE IN HER LOWER BACK ALL THE WAY DOWN INTO HER LEFT THIGH AND FOOT. HER THIGH JUST STARTED TO RECENTLY GO NUMB AND SHE IS ASKING IF THE OXYCODONE CAN BE INCREASED. SHE IS ONLY TAKING 2 PER DAY AND IT IS NOT ENOUGH. PLEASE ADVISE.

## 2022-06-15 ENCOUNTER — HOSPITAL ENCOUNTER (OUTPATIENT)
Dept: PREADMISSION TESTING | Age: 47
Discharge: HOME | DRG: 320 | End: 2022-06-19
Payer: MEDICARE

## 2022-06-15 VITALS
RESPIRATION RATE: 16 BRPM | HEIGHT: 67 IN | DIASTOLIC BLOOD PRESSURE: 90 MMHG | WEIGHT: 217 LBS | OXYGEN SATURATION: 98 % | HEART RATE: 86 BPM | BODY MASS INDEX: 34.06 KG/M2 | TEMPERATURE: 98 F | SYSTOLIC BLOOD PRESSURE: 139 MMHG

## 2022-06-15 LAB
ANION GAP SERPL CALCULATED.3IONS-SCNC: 12 MEQ/L (ref 9–15)
BUN BLDV-MCNC: 14 MG/DL (ref 6–20)
CALCIUM SERPL-MCNC: 9.6 MG/DL (ref 8.5–9.9)
CHLORIDE BLD-SCNC: 102 MEQ/L (ref 95–107)
CO2: 23 MEQ/L (ref 20–31)
CREAT SERPL-MCNC: 0.87 MG/DL (ref 0.5–0.9)
GFR AFRICAN AMERICAN: >60
GFR NON-AFRICAN AMERICAN: >60
GLUCOSE BLD-MCNC: 111 MG/DL (ref 70–99)
HCT VFR BLD CALC: 46.6 % (ref 37–47)
HEMOGLOBIN: 15.7 G/DL (ref 12–16)
INR BLD: 1.6
MCH RBC QN AUTO: 32.9 PG (ref 27–31.3)
MCHC RBC AUTO-ENTMCNC: 33.8 % (ref 33–37)
MCV RBC AUTO: 97.3 FL (ref 82–100)
PDW BLD-RTO: 13.8 % (ref 11.5–14.5)
PLATELET # BLD: 224 K/UL (ref 130–400)
POTASSIUM SERPL-SCNC: 3.8 MEQ/L (ref 3.4–4.9)
PROTHROMBIN TIME: 19 SEC (ref 12.3–14.9)
RBC # BLD: 4.79 M/UL (ref 4.2–5.4)
SODIUM BLD-SCNC: 137 MEQ/L (ref 135–144)
WBC # BLD: 6.3 K/UL (ref 4.8–10.8)

## 2022-06-15 PROCEDURE — 80048 BASIC METABOLIC PNL TOTAL CA: CPT

## 2022-06-15 PROCEDURE — 86901 BLOOD TYPING SEROLOGIC RH(D): CPT

## 2022-06-15 PROCEDURE — 86900 BLOOD TYPING SEROLOGIC ABO: CPT

## 2022-06-15 PROCEDURE — 93005 ELECTROCARDIOGRAM TRACING: CPT

## 2022-06-15 PROCEDURE — 86850 RBC ANTIBODY SCREEN: CPT

## 2022-06-15 PROCEDURE — 85610 PROTHROMBIN TIME: CPT

## 2022-06-15 PROCEDURE — 85027 COMPLETE CBC AUTOMATED: CPT

## 2022-06-15 RX ORDER — SODIUM CHLORIDE 9 MG/ML
INJECTION, SOLUTION INTRAVENOUS PRN
Status: CANCELLED | OUTPATIENT
Start: 2022-06-17

## 2022-06-15 RX ORDER — TIZANIDINE 4 MG/1
4 TABLET ORAL EVERY 6 HOURS PRN
COMMUNITY
End: 2022-09-15 | Stop reason: SDUPTHER

## 2022-06-15 RX ORDER — SODIUM CHLORIDE, SODIUM LACTATE, POTASSIUM CHLORIDE, CALCIUM CHLORIDE 600; 310; 30; 20 MG/100ML; MG/100ML; MG/100ML; MG/100ML
INJECTION, SOLUTION INTRAVENOUS CONTINUOUS
Status: CANCELLED | OUTPATIENT
Start: 2022-06-17

## 2022-06-15 RX ORDER — SODIUM CHLORIDE 0.9 % (FLUSH) 0.9 %
5-40 SYRINGE (ML) INJECTION PRN
Status: CANCELLED | OUTPATIENT
Start: 2022-06-17

## 2022-06-15 RX ORDER — IBUPROFEN 200 MG
200 TABLET ORAL EVERY 6 HOURS PRN
Status: ON HOLD | COMMUNITY
End: 2022-06-17 | Stop reason: HOSPADM

## 2022-06-15 RX ORDER — SODIUM CHLORIDE 0.9 % (FLUSH) 0.9 %
5-40 SYRINGE (ML) INJECTION EVERY 12 HOURS SCHEDULED
Status: CANCELLED | OUTPATIENT
Start: 2022-06-17

## 2022-06-15 RX ORDER — LIDOCAINE HYDROCHLORIDE 10 MG/ML
1 INJECTION, SOLUTION EPIDURAL; INFILTRATION; INTRACAUDAL; PERINEURAL
Status: CANCELLED | OUTPATIENT
Start: 2022-06-17 | End: 2022-06-17

## 2022-06-15 ASSESSMENT — ENCOUNTER SYMPTOMS
EYE ITCHING: 0
ALLERGIC/IMMUNOLOGIC NEGATIVE: 1
ABDOMINAL PAIN: 0
BACK PAIN: 1
EYE DISCHARGE: 0
COUGH: 1
SINUS PRESSURE: 0
EYE PAIN: 0
TROUBLE SWALLOWING: 0
SHORTNESS OF BREATH: 0
BLOOD IN STOOL: 0
ABDOMINAL DISTENTION: 0
RHINORRHEA: 0
CONSTIPATION: 0
SINUS PAIN: 0
WHEEZING: 0
NAUSEA: 0
DIARRHEA: 0
SORE THROAT: 0
VOMITING: 0
CHEST TIGHTNESS: 0
FACIAL SWELLING: 0
CHOKING: 0
PHOTOPHOBIA: 0
EYE REDNESS: 0

## 2022-06-15 NOTE — H&P (VIEW-ONLY)
Pt to D/C Ibuprofen 7 days prior to surgery per Dr. Curt Su. Pt reports she is aware \"I only take that every now and then and haven't taken it in over 2 weeks\".

## 2022-06-15 NOTE — PROGRESS NOTES
Pt to D/C Ibuprofen 7 days prior to surgery per Dr. Ted Brito. Pt reports she is aware \"I only take that every now and then and haven't taken it in over 2 weeks\".

## 2022-06-15 NOTE — H&P
Nurse Practitioner History and Physical      CHIEF COMPLAINT:  Lumbar 3-4 stenosis    HISTORY OF PRESENT ILLNESS:      The patient is a 52 y.o. female with significant past medical history of lumbar 3-4 stenosis who presents for PAT. Pt reports she has chronic left lower back pain that radiates to left hip, ADLs limited, uses walker at home, rates pain 10/10 today \"achy\", takes oxycodone PRN for pain, sees pain management. Pt reports due to her back pain she has urinary frequency and urgency and feels like \"my sensation is off, sometimes I don't know when I need to go\". Pt reports numbness in her left upper and lower leg, greater in upper vs lower. On 4-28-22 L/S MRI & X-ray @ [facility] done. Pt has had prior back surgeries by Dr. Gifford Hammans in 2017, 2019, and 2021. Pt has history of seizures. Pt last known seizure May 2022, dx 20 years ago, \"I take Gabapentin for my back and seizures\". Pt scheduled for left bilateral lumbar 3-4 laminectomy microdissection decompression on 6/17/22 with Dr. Derrick Wyman.      Past Medical History:        Diagnosis Date    Arthritis     spine    Asthma     since childhood -- smokes x 30 yrs    Cerebral artery occlusion with cerebral infarction [facility])     per CT scan -- patient without sx, 2021    Hypertension     meds > 3 yrs    Lumbar stenosis     Migraines     starts with neck pain    Seizures ([facility] [address]     pt last known seizure May 2022, dx 20 years ago, takes gabapentin for back/seizures    Thyroid disease     meds > 4 yrs -- intermittent    Uterine anomaly      Past Surgical History:    Past Surgical History:   Procedure Laterality Date    ANTERIOR CRUCIATE LIGAMENT REPAIR Left     APPENDECTOMY      at age 21    [address]  2017    lumbar disc     BACK SURGERY  2018    lumbar fusion    CHOLECYSTECTOMY  2015    COLONOSCOPY      2017    ENDOMETRIAL ABLATION  2006    post op sepsis    ENDOSCOPY, COLON, DIAGNOSTIC      2017    KNEE ARTHROSCOPY Left 2004    ACL repair    LUMBAR FUSION N/A 1/15/2019    L4- L5 DECOMPRESSION, L5-S1 POSTERIOR LUMBAR INTERBODY FUSION performed by Vanessa Maradiaga MD at  [facility] 3/11/2021    L4-5 TLIF INSTRUMENTATION AT L5-S1 DECOMPRESSION AT L4, L5 REMOVAL AND REINSERTION OF HARDWARE AT L5-S1 performed by Vanessa Maradiaga MD at [address]  5/9/13    duramorph 1.5 mg epideral  6mg celestone    TONSILLECTOMY      as child    TUBAL LIGATION Bilateral 2002         Medications Prior to Admission:    Current Outpatient Medications   Medication Sig Dispense Refill    Potassium 75 MG TABS Take by mouth      tiZANidine (ZANAFLEX) 4 MG tablet Take 4 mg by mouth every 6 hours as needed      ibuprofen (ADVIL;MOTRIN) 200 MG tablet Take 200 mg by mouth every 6 hours as needed for Pain      oxyCODONE-acetaminophen (PERCOCET) 5-325 MG per tablet Take 1 tablet by mouth 2 times daily as needed for Pain for up to 14 days. 28 tablet 0    gabapentin (NEURONTIN) 600 MG tablet Take 1 tablet by mouth 3 times daily for 30 days. 90 tablet 0    docusate sodium (COLACE) 100 MG capsule Take 1 capsule by mouth 2 times daily as needed for Constipation 60 capsule 0    naloxone 4 MG/0.1ML LIQD nasal spray 1 spray by Nasal route as needed for Opioid Reversal 1 each 0    Calcium Carb-Cholecalciferol (CALCIUM 1000 + D PO) Take 600 mg by mouth daily      Potassium 99 MG TABS Take 99 mg by mouth daily      levothyroxine (SYNTHROID) 50 MCG tablet daily (Patient not taking: Reported on 6/15/2022)      vitamin B-12 (CYANOCOBALAMIN) 100 MCG tablet Take 50 mcg by mouth daily      Biotin 1000 MCG TABS Take by mouth 2 times daily      Misc.  Devices (RAISED TOILET SEAT/LOCK & ARMS) MISC 1 Piece by Does not apply route as needed (PRN) 2 each 0    ferrous sulfate 325 (65 Fe) MG tablet Take 65 mg by mouth daily (with breakfast)      vitamin C (ASCORBIC ACID) 500 MG tablet Take 500 mg by mouth daily      ondansetron (ZOFRAN) 4 MG tablet Take 4 mg by mouth every 8 hours as needed for Nausea or Vomiting      VENTOLIN  (90 Base) MCG/ACT inhaler 2 puffs 4 times daily       amLODIPine (NORVASC) 5 MG tablet Take 5 mg by mouth daily       omeprazole (PRILOSEC) 20 MG capsule Take 40 mg by mouth three times daily        No current facility-administered medications for this encounter. Allergies:  Fentanyl, Prochlorperazine edisylate, Tylenol [acetaminophen], Cortizone, Aspirin, and Codeine    Social History:   Social History     Socioeconomic History    Marital status:      Spouse name: Not on file    Number of children: Not on file    Years of education: Not on file    Highest education level: Not on file   Occupational History    Not on file   Tobacco Use    Smoking status: Current Every Day Smoker     Packs/day: 1.50     Years: 30.00     Pack years: 45.00     Types: Cigarettes    Smokeless tobacco: Never Used   Vaping Use    Vaping Use: Never used   Substance and Sexual Activity    Alcohol use: No     Alcohol/week: 0.0 standard drinks    Drug use: No    Sexual activity: Not on file   Other Topics Concern    Not on file   Social History Narrative    Not on file     Social Determinants of Health     Financial Resource Strain:     Difficulty of Paying Living Expenses: Not on file   Food Insecurity:     Worried About Running Out of Food in the Last Year: Not on file    Cammy of Food in the Last Year: Not on file   Transportation Needs:     Lack of Transportation (Medical): Not on file    Lack of Transportation (Non-Medical):  Not on file   Physical Activity:     Days of Exercise per Week: Not on file    Minutes of Exercise per Session: Not on file   Stress:     Feeling of Stress : Not on file   Social Connections:     Frequency of Communication with Friends and Family: Not on file    Frequency of Social Gatherings with Friends and Family: Not on file    Attends Faith Services: Not on file    Active Member of Clubs or Organizations: Not on file    Attends Club or Organization Meetings: Not on file    Marital Status: Not on file   Intimate Partner Violence:     Fear of Current or Ex-Partner: Not on file    Emotionally Abused: Not on file    Physically Abused: Not on file    Sexually Abused: Not on file   Housing Stability:     Unable to Pay for Housing in the Last Year: Not on file    Number of [place] in the Last Year: Not on file    Unstable Housing in the Last Year: Not on file       Family History:       Problem Relation Age of Onset    Cancer Mother         NHL    COPD Mother     Other Mother         ITP & pancreatitis    Heart Failure Maternal Grandmother     No Known Problems Sister     High Blood Pressure Brother     No Known Problems Son     No Known Problems Daughter        Review of Systems   Constitutional: Negative for chills, fatigue, fever and unexpected weight change. HENT: Negative for congestion, ear discharge, ear pain, facial swelling, hearing loss, nosebleeds, postnasal drip, rhinorrhea, sinus pressure, sinus pain, sneezing, sore throat, tinnitus and trouble swallowing. Eyes: Negative for photophobia, pain, discharge, redness, itching and visual disturbance. Respiratory: Positive for cough (intermittent cough-current every day smoker, \"I cough bc I smoke\"). Negative for choking, chest tightness, shortness of breath and wheezing. Cardiovascular: Positive for leg swelling (left lower leg). Negative for chest pain and palpitations. Gastrointestinal: Negative for abdominal distention, abdominal pain, blood in stool, constipation, diarrhea, nausea and vomiting. Endocrine: Negative for cold intolerance and heat intolerance. Genitourinary: Positive for frequency and urgency. Negative for decreased urine volume, difficulty urinating, dysuria and hematuria. Pt reports she has urgency and frequency at times and \"I cant always tell when I have to go, my sensation feels off\".  Urinary symptoms started with increased pain in back over the past 2-3 months. Musculoskeletal: Positive for back pain (chronic left lower back pain that radiates to left hip, ADLs limited, uses walker at home, rates pain 10/10 today \"achy\", takes oxycodone PRN for pain, sees pain management), gait problem (Uses walker at home, no assistive devices present today, limping dt pain in back), myalgias, neck pain and neck stiffness. Skin: Negative for rash and wound. Allergic/Immunologic: Negative. Neurological: Positive for seizures (pt last known seizure May 2022, dx 20 years ago, takes gabapentin for back/seizures), weakness (left leg), numbness (left upper and lower leg, greater in upper vs lower) and headaches (intermittent). Negative for dizziness, speech difficulty and light-headedness. Hematological: Negative. Does not bruise/bleed easily. Psychiatric/Behavioral: Negative for agitation and confusion. The patient is nervous/anxious (\"Im so nervous for this surgery I want them to keep me over night\"). Vitals:  BP (!) 139/90   Pulse 86   Temp 98 °F (36.7 °C) (Temporal)   Resp 16   Ht 5' 6.5\" (1.689 m)   Wt 217 lb (98.4 kg)   SpO2 98%   BMI 34.50 kg/m²     Physical Exam  Constitutional:       General: She is not in acute distress. Appearance: Normal appearance. HENT:      Head: Normocephalic. Right Ear: Tympanic membrane, ear canal and external ear normal. There is no impacted cerumen. Left Ear: Tympanic membrane, ear canal and external ear normal. There is no impacted cerumen. Nose: Nose normal. No congestion or rhinorrhea. Comments: Left nose piercing present       Mouth/Throat:      Mouth: Mucous membranes are moist.      Pharynx: Oropharynx is clear. No oropharyngeal exudate or posterior oropharyngeal erythema. Eyes:      General:         Right eye: No discharge. Left eye: No discharge. Extraocular Movements: Extraocular movements intact. Conjunctiva/sclera: Conjunctivae normal.      Pupils: Pupils are equal, round, and reactive to light. Comments: Right eyebrow piercing present   Neck:      Vascular: No carotid bruit. Cardiovascular:      Rate and Rhythm: Normal rate and regular rhythm. Pulses: Normal pulses. Heart sounds: Normal heart sounds. No murmur heard. Pulmonary:      Effort: Pulmonary effort is normal. No respiratory distress. Breath sounds: Normal breath sounds. No wheezing. Abdominal:      General: Bowel sounds are normal. There is no distension. Palpations: Abdomen is soft. Tenderness: There is no abdominal tenderness. There is no right CVA tenderness or left CVA tenderness. Genitourinary:     Comments: deferred  Musculoskeletal:         General: Normal range of motion. Cervical back: Normal range of motion. No rigidity. Right lower leg: Edema (non pitting) present. Left lower leg: Edema (non pitting, left greater than right) present. Lymphadenopathy:      Cervical: No cervical adenopathy. Skin:     General: Skin is warm and dry. Capillary Refill: Capillary refill takes less than 2 seconds. Findings: No bruising, erythema or rash. Neurological:      General: No focal deficit present. Mental Status: She is alert and oriented to person, place, and time. Motor: Weakness present. Gait: Gait abnormal.   Psychiatric:         Mood and Affect: Mood normal.         Behavior: Behavior normal.         Thought Content:  Thought content normal.         Judgment: Judgment normal.         [unfilled]    Assessment:  Patient Active Problem List   Diagnosis    Back pain    Status migrainosus    Seizure disorder (HCC)    Left-sided weakness    Lumbar (L3-4) stenosis    Asthma    Essential hypertension    Convulsions ([facility] [address]    Psychiatric pseudoseizure    Intractable headache    Herniation of intervertebral disc between L5 and S1    Lumbar degenerative disc disease    Spinal stenosis of lumbar region with neurogenic claudication    Smoker    Postlaminectomy syndrome of lumbar region         Plan:  Scheduled for left bilateral lumbar 3-4 laminectomy microdissection decompression on 6/17/22 with Dr. Kyree Payne, APRN - CNP  6/15/2022  3:26 PM

## 2022-06-16 ENCOUNTER — ANESTHESIA EVENT (OUTPATIENT)
Dept: OPERATING ROOM | Age: 47
DRG: 320 | End: 2022-06-16
Payer: MEDICARE

## 2022-06-16 LAB
ABO/RH: NORMAL
ANTIBODY SCREEN: NORMAL
EKG ATRIAL RATE: 83 BPM
EKG P AXIS: 45 DEGREES
EKG P-R INTERVAL: 182 MS
EKG Q-T INTERVAL: 366 MS
EKG QRS DURATION: 96 MS
EKG QTC CALCULATION (BAZETT): 430 MS
EKG R AXIS: 56 DEGREES
EKG T AXIS: 51 DEGREES
EKG VENTRICULAR RATE: 83 BPM

## 2022-06-17 ENCOUNTER — ANESTHESIA (OUTPATIENT)
Dept: OPERATING ROOM | Age: 47
DRG: 320 | End: 2022-06-17
Payer: MEDICARE

## 2022-06-17 ENCOUNTER — HOSPITAL ENCOUNTER (OUTPATIENT)
Dept: GENERAL RADIOLOGY | Age: 47
Discharge: HOME | DRG: 320 | End: 2022-06-19
Attending: NEUROLOGICAL SURGERY
Payer: MEDICARE

## 2022-06-17 ENCOUNTER — HOSPITAL ENCOUNTER (INPATIENT)
Age: 47
LOS: 6 days | Discharge: HOME | DRG: 320 | End: 2022-06-23
Attending: NEUROLOGICAL SURGERY | Admitting: NEUROLOGICAL SURGERY
Payer: MEDICARE

## 2022-06-17 DIAGNOSIS — M48.062 SPINAL STENOSIS OF LUMBAR REGION WITH NEUROGENIC CLAUDICATION: Primary | ICD-10-CM

## 2022-06-17 DIAGNOSIS — R52 PAIN: ICD-10-CM

## 2022-06-17 PROCEDURE — 76942 ECHO GUIDE FOR BIOPSY: CPT | Performed by: STUDENT IN AN ORGANIZED HEALTH CARE EDUCATION/TRAINING PROGRAM

## 2022-06-17 PROCEDURE — 6360000002 HC RX W HCPCS: Performed by: STUDENT IN AN ORGANIZED HEALTH CARE EDUCATION/TRAINING PROGRAM

## 2022-06-17 PROCEDURE — 3700000001 HC ADD 15 MINUTES (ANESTHESIA): Performed by: NEUROLOGICAL SURGERY

## 2022-06-17 PROCEDURE — 6370000000 HC RX 637 (ALT 250 FOR IP): Performed by: NEUROLOGICAL SURGERY

## 2022-06-17 PROCEDURE — 6360000002 HC RX W HCPCS: Performed by: NEUROLOGICAL SURGERY

## 2022-06-17 PROCEDURE — 63047 LAM FACETEC & FORAMOT LUMBAR: CPT | Performed by: NEUROLOGICAL SURGERY

## 2022-06-17 PROCEDURE — 3600000004 HC SURGERY LEVEL 4 BASE: Performed by: NEUROLOGICAL SURGERY

## 2022-06-17 PROCEDURE — 3209999900 FLUORO FOR SURGICAL PROCEDURES

## 2022-06-17 PROCEDURE — 2580000003 HC RX 258: Performed by: NEUROLOGICAL SURGERY

## 2022-06-17 PROCEDURE — 2500000003 HC RX 250 WO HCPCS: Performed by: ANESTHESIOLOGIST ASSISTANT

## 2022-06-17 PROCEDURE — 3700000000 HC ANESTHESIA ATTENDED CARE: Performed by: NEUROLOGICAL SURGERY

## 2022-06-17 PROCEDURE — 7100000000 HC PACU RECOVERY - FIRST 15 MIN: Performed by: NEUROLOGICAL SURGERY

## 2022-06-17 PROCEDURE — 7100000001 HC PACU RECOVERY - ADDTL 15 MIN: Performed by: NEUROLOGICAL SURGERY

## 2022-06-17 PROCEDURE — 6370000000 HC RX 637 (ALT 250 FOR IP): Performed by: NURSE PRACTITIONER

## 2022-06-17 PROCEDURE — 6360000002 HC RX W HCPCS

## 2022-06-17 PROCEDURE — 2580000003 HC RX 258: Performed by: STUDENT IN AN ORGANIZED HEALTH CARE EDUCATION/TRAINING PROGRAM

## 2022-06-17 PROCEDURE — 2500000003 HC RX 250 WO HCPCS: Performed by: NEUROLOGICAL SURGERY

## 2022-06-17 PROCEDURE — 2580000003 HC RX 258

## 2022-06-17 PROCEDURE — 3600000014 HC SURGERY LEVEL 4 ADDTL 15MIN: Performed by: NEUROLOGICAL SURGERY

## 2022-06-17 PROCEDURE — 6360000002 HC RX W HCPCS: Performed by: ANESTHESIOLOGIST ASSISTANT

## 2022-06-17 PROCEDURE — A4217 STERILE WATER/SALINE, 500 ML: HCPCS | Performed by: NEUROLOGICAL SURGERY

## 2022-06-17 PROCEDURE — 2720000010 HC SURG SUPPLY STERILE: Performed by: NEUROLOGICAL SURGERY

## 2022-06-17 PROCEDURE — 01NB0ZZ RELEASE LUMBAR NERVE, OPEN APPROACH: ICD-10-PCS | Performed by: NEUROLOGICAL SURGERY

## 2022-06-17 PROCEDURE — 1210000000 HC MED SURG R&B

## 2022-06-17 PROCEDURE — 2709999900 HC NON-CHARGEABLE SUPPLY: Performed by: NEUROLOGICAL SURGERY

## 2022-06-17 RX ORDER — SODIUM CHLORIDE 0.9 % (FLUSH) 0.9 %
5-40 SYRINGE (ML) INJECTION PRN
Status: DISCONTINUED | OUTPATIENT
Start: 2022-06-17 | End: 2022-06-23 | Stop reason: HOSPADM

## 2022-06-17 RX ORDER — SODIUM CHLORIDE 0.9 % (FLUSH) 0.9 %
5-40 SYRINGE (ML) INJECTION EVERY 12 HOURS SCHEDULED
Status: DISCONTINUED | OUTPATIENT
Start: 2022-06-17 | End: 2022-06-23 | Stop reason: HOSPADM

## 2022-06-17 RX ORDER — METOCLOPRAMIDE HYDROCHLORIDE 5 MG/ML
10 INJECTION INTRAMUSCULAR; INTRAVENOUS
Status: DISCONTINUED | OUTPATIENT
Start: 2022-06-17 | End: 2022-06-17 | Stop reason: HOSPADM

## 2022-06-17 RX ORDER — OXYCODONE HYDROCHLORIDE 5 MG/1
5 TABLET ORAL PRN
Status: DISCONTINUED | OUTPATIENT
Start: 2022-06-17 | End: 2022-06-17 | Stop reason: HOSPADM

## 2022-06-17 RX ORDER — DIPHENHYDRAMINE HYDROCHLORIDE 50 MG/ML
12.5 INJECTION INTRAMUSCULAR; INTRAVENOUS
Status: DISCONTINUED | OUTPATIENT
Start: 2022-06-17 | End: 2022-06-17 | Stop reason: HOSPADM

## 2022-06-17 RX ORDER — ONDANSETRON 2 MG/ML
4 INJECTION INTRAMUSCULAR; INTRAVENOUS
Status: DISCONTINUED | OUTPATIENT
Start: 2022-06-17 | End: 2022-06-17 | Stop reason: HOSPADM

## 2022-06-17 RX ORDER — SODIUM CHLORIDE 0.9 % (FLUSH) 0.9 %
5-40 SYRINGE (ML) INJECTION EVERY 12 HOURS SCHEDULED
Status: DISCONTINUED | OUTPATIENT
Start: 2022-06-17 | End: 2022-06-17 | Stop reason: HOSPADM

## 2022-06-17 RX ORDER — OXYCODONE HYDROCHLORIDE 5 MG/1
5 TABLET ORAL EVERY 6 HOURS PRN
Status: DISCONTINUED | OUTPATIENT
Start: 2022-06-17 | End: 2022-06-19

## 2022-06-17 RX ORDER — LIDOCAINE HYDROCHLORIDE 10 MG/ML
INJECTION, SOLUTION EPIDURAL; INFILTRATION; INTRACAUDAL; PERINEURAL PRN
Status: DISCONTINUED | OUTPATIENT
Start: 2022-06-17 | End: 2022-06-17 | Stop reason: SDUPTHER

## 2022-06-17 RX ORDER — GABAPENTIN 300 MG/1
CAPSULE ORAL
Status: ON HOLD | COMMUNITY
End: 2022-10-01

## 2022-06-17 RX ORDER — ROCURONIUM BROMIDE 10 MG/ML
INJECTION, SOLUTION INTRAVENOUS PRN
Status: DISCONTINUED | OUTPATIENT
Start: 2022-06-17 | End: 2022-06-17 | Stop reason: SDUPTHER

## 2022-06-17 RX ORDER — SODIUM CHLORIDE 9 MG/ML
INJECTION, SOLUTION INTRAVENOUS CONTINUOUS
Status: DISCONTINUED | OUTPATIENT
Start: 2022-06-17 | End: 2022-06-21 | Stop reason: SDUPTHER

## 2022-06-17 RX ORDER — FERROUS SULFATE 325(65) MG
325 TABLET ORAL
Status: DISCONTINUED | OUTPATIENT
Start: 2022-06-18 | End: 2022-06-23 | Stop reason: HOSPADM

## 2022-06-17 RX ORDER — MEPERIDINE HYDROCHLORIDE 25 MG/ML
12.5 INJECTION INTRAMUSCULAR; INTRAVENOUS; SUBCUTANEOUS
Status: DISCONTINUED | OUTPATIENT
Start: 2022-06-17 | End: 2022-06-17 | Stop reason: HOSPADM

## 2022-06-17 RX ORDER — OXYCODONE HYDROCHLORIDE 5 MG/1
10 TABLET ORAL PRN
Status: DISCONTINUED | OUTPATIENT
Start: 2022-06-17 | End: 2022-06-17 | Stop reason: HOSPADM

## 2022-06-17 RX ORDER — OXYCODONE HYDROCHLORIDE AND ACETAMINOPHEN 5; 325 MG/1; MG/1
1 TABLET ORAL EVERY 6 HOURS PRN
Qty: 28 TABLET | Refills: 0 | Status: SHIPPED | OUTPATIENT
Start: 2022-06-17 | End: 2022-06-24

## 2022-06-17 RX ORDER — GABAPENTIN 300 MG/1
300 CAPSULE ORAL 3 TIMES DAILY
Status: DISCONTINUED | OUTPATIENT
Start: 2022-06-17 | End: 2022-06-23 | Stop reason: HOSPADM

## 2022-06-17 RX ORDER — SODIUM CHLORIDE, SODIUM LACTATE, POTASSIUM CHLORIDE, CALCIUM CHLORIDE 600; 310; 30; 20 MG/100ML; MG/100ML; MG/100ML; MG/100ML
INJECTION, SOLUTION INTRAVENOUS CONTINUOUS
Status: DISCONTINUED | OUTPATIENT
Start: 2022-06-17 | End: 2022-06-17 | Stop reason: HOSPADM

## 2022-06-17 RX ORDER — PANTOPRAZOLE SODIUM 40 MG/1
40 TABLET, DELAYED RELEASE ORAL
Status: DISCONTINUED | OUTPATIENT
Start: 2022-06-18 | End: 2022-06-23 | Stop reason: HOSPADM

## 2022-06-17 RX ORDER — DOCUSATE SODIUM 100 MG/1
100 CAPSULE, LIQUID FILLED ORAL 2 TIMES DAILY PRN
Status: DISCONTINUED | OUTPATIENT
Start: 2022-06-17 | End: 2022-06-23 | Stop reason: HOSPADM

## 2022-06-17 RX ORDER — POLYETHYLENE GLYCOL 3350 17 G/17G
17 POWDER, FOR SOLUTION ORAL DAILY PRN
Status: DISCONTINUED | OUTPATIENT
Start: 2022-06-17 | End: 2022-06-23 | Stop reason: HOSPADM

## 2022-06-17 RX ORDER — ONDANSETRON 2 MG/ML
4 INJECTION INTRAMUSCULAR; INTRAVENOUS EVERY 6 HOURS PRN
Status: DISCONTINUED | OUTPATIENT
Start: 2022-06-17 | End: 2022-06-23 | Stop reason: HOSPADM

## 2022-06-17 RX ORDER — LIDOCAINE HYDROCHLORIDE 10 MG/ML
1 INJECTION, SOLUTION EPIDURAL; INFILTRATION; INTRACAUDAL; PERINEURAL
Status: DISCONTINUED | OUTPATIENT
Start: 2022-06-17 | End: 2022-06-17 | Stop reason: HOSPADM

## 2022-06-17 RX ORDER — ASCORBIC ACID 500 MG
500 TABLET ORAL DAILY
Status: DISCONTINUED | OUTPATIENT
Start: 2022-06-17 | End: 2022-06-23 | Stop reason: HOSPADM

## 2022-06-17 RX ORDER — OXYCODONE HYDROCHLORIDE 5 MG/1
5 TABLET ORAL EVERY 4 HOURS PRN
Status: DISCONTINUED | OUTPATIENT
Start: 2022-06-17 | End: 2022-06-19

## 2022-06-17 RX ORDER — LIDOCAINE HYDROCHLORIDE AND EPINEPHRINE 10; 10 MG/ML; UG/ML
INJECTION, SOLUTION INFILTRATION; PERINEURAL PRN
Status: DISCONTINUED | OUTPATIENT
Start: 2022-06-17 | End: 2022-06-17 | Stop reason: HOSPADM

## 2022-06-17 RX ORDER — SODIUM CHLORIDE 9 MG/ML
INJECTION, SOLUTION INTRAVENOUS PRN
Status: DISCONTINUED | OUTPATIENT
Start: 2022-06-17 | End: 2022-06-17 | Stop reason: HOSPADM

## 2022-06-17 RX ORDER — MIDAZOLAM HYDROCHLORIDE 1 MG/ML
INJECTION INTRAMUSCULAR; INTRAVENOUS PRN
Status: DISCONTINUED | OUTPATIENT
Start: 2022-06-17 | End: 2022-06-17 | Stop reason: SDUPTHER

## 2022-06-17 RX ORDER — ONDANSETRON 2 MG/ML
INJECTION INTRAMUSCULAR; INTRAVENOUS PRN
Status: DISCONTINUED | OUTPATIENT
Start: 2022-06-17 | End: 2022-06-17 | Stop reason: SDUPTHER

## 2022-06-17 RX ORDER — ROPIVACAINE HYDROCHLORIDE 5 MG/ML
INJECTION, SOLUTION EPIDURAL; INFILTRATION; PERINEURAL
Status: COMPLETED | OUTPATIENT
Start: 2022-06-17 | End: 2022-06-17

## 2022-06-17 RX ORDER — ACETAMINOPHEN 325 MG/1
650 TABLET ORAL EVERY 6 HOURS
Status: DISCONTINUED | OUTPATIENT
Start: 2022-06-17 | End: 2022-06-23 | Stop reason: HOSPADM

## 2022-06-17 RX ORDER — SODIUM CHLORIDE 9 MG/ML
INJECTION, SOLUTION INTRAVENOUS PRN
Status: DISCONTINUED | OUTPATIENT
Start: 2022-06-17 | End: 2022-06-23 | Stop reason: HOSPADM

## 2022-06-17 RX ORDER — AMLODIPINE BESYLATE 5 MG/1
5 TABLET ORAL DAILY
Status: DISCONTINUED | OUTPATIENT
Start: 2022-06-18 | End: 2022-06-23 | Stop reason: HOSPADM

## 2022-06-17 RX ORDER — MAGNESIUM HYDROXIDE 1200 MG/15ML
LIQUID ORAL CONTINUOUS PRN
Status: DISCONTINUED | OUTPATIENT
Start: 2022-06-17 | End: 2022-06-17 | Stop reason: HOSPADM

## 2022-06-17 RX ORDER — PROPOFOL 10 MG/ML
INJECTION, EMULSION INTRAVENOUS PRN
Status: DISCONTINUED | OUTPATIENT
Start: 2022-06-17 | End: 2022-06-17 | Stop reason: SDUPTHER

## 2022-06-17 RX ORDER — SODIUM CHLORIDE 0.9 % (FLUSH) 0.9 %
5-40 SYRINGE (ML) INJECTION PRN
Status: DISCONTINUED | OUTPATIENT
Start: 2022-06-17 | End: 2022-06-17 | Stop reason: HOSPADM

## 2022-06-17 RX ORDER — SODIUM CHLORIDE, SODIUM LACTATE, POTASSIUM CHLORIDE, CALCIUM CHLORIDE 600; 310; 30; 20 MG/100ML; MG/100ML; MG/100ML; MG/100ML
INJECTION, SOLUTION INTRAVENOUS CONTINUOUS PRN
Status: DISCONTINUED | OUTPATIENT
Start: 2022-06-17 | End: 2022-06-17 | Stop reason: SDUPTHER

## 2022-06-17 RX ADMIN — SODIUM CHLORIDE: 9 INJECTION, SOLUTION INTRAVENOUS at 17:42

## 2022-06-17 RX ADMIN — ROPIVACAINE HYDROCHLORIDE 20 ML: 5 INJECTION, SOLUTION EPIDURAL; INFILTRATION; PERINEURAL at 10:24

## 2022-06-17 RX ADMIN — ONDANSETRON 4 MG: 2 INJECTION INTRAMUSCULAR; INTRAVENOUS at 12:30

## 2022-06-17 RX ADMIN — PROPOFOL 50 MG: 10 INJECTION, EMULSION INTRAVENOUS at 11:32

## 2022-06-17 RX ADMIN — OXYCODONE HYDROCHLORIDE AND ACETAMINOPHEN 500 MG: 500 TABLET ORAL at 16:46

## 2022-06-17 RX ADMIN — ROCURONIUM BROMIDE 50 MG: 10 INJECTION INTRAVENOUS at 11:07

## 2022-06-17 RX ADMIN — HYDROMORPHONE HYDROCHLORIDE 0.5 MG: 1 INJECTION, SOLUTION INTRAMUSCULAR; INTRAVENOUS; SUBCUTANEOUS at 13:18

## 2022-06-17 RX ADMIN — ACETAMINOPHEN 650 MG: 325 TABLET ORAL at 20:45

## 2022-06-17 RX ADMIN — HYDROMORPHONE HYDROCHLORIDE 0.5 MG: 1 INJECTION, SOLUTION INTRAMUSCULAR; INTRAVENOUS; SUBCUTANEOUS at 12:41

## 2022-06-17 RX ADMIN — OXYCODONE 5 MG: 5 TABLET ORAL at 22:28

## 2022-06-17 RX ADMIN — SUGAMMADEX 200 MG: 100 INJECTION, SOLUTION INTRAVENOUS at 12:57

## 2022-06-17 RX ADMIN — PROPOFOL 200 MG: 10 INJECTION, EMULSION INTRAVENOUS at 11:06

## 2022-06-17 RX ADMIN — LIDOCAINE HYDROCHLORIDE 30 MG: 10 INJECTION, SOLUTION EPIDURAL; INFILTRATION; INTRACAUDAL; PERINEURAL at 11:06

## 2022-06-17 RX ADMIN — MIDAZOLAM HYDROCHLORIDE 2 MG: 1 INJECTION, SOLUTION INTRAMUSCULAR; INTRAVENOUS at 10:21

## 2022-06-17 RX ADMIN — ROCURONIUM BROMIDE 20 MG: 10 INJECTION INTRAVENOUS at 11:31

## 2022-06-17 RX ADMIN — SODIUM CHLORIDE, POTASSIUM CHLORIDE, SODIUM LACTATE AND CALCIUM CHLORIDE: 600; 310; 30; 20 INJECTION, SOLUTION INTRAVENOUS at 12:44

## 2022-06-17 RX ADMIN — HYDROMORPHONE HYDROCHLORIDE 0.5 MG: 1 INJECTION, SOLUTION INTRAMUSCULAR; INTRAVENOUS; SUBCUTANEOUS at 14:16

## 2022-06-17 RX ADMIN — PROPOFOL 50 MG: 10 INJECTION, EMULSION INTRAVENOUS at 12:38

## 2022-06-17 RX ADMIN — GABAPENTIN 300 MG: 300 CAPSULE ORAL at 20:45

## 2022-06-17 RX ADMIN — CEFAZOLIN 2 G: 10 INJECTION, POWDER, FOR SOLUTION INTRAVENOUS at 11:16

## 2022-06-17 RX ADMIN — HYDROMORPHONE HYDROCHLORIDE 0.5 MG: 1 INJECTION, SOLUTION INTRAMUSCULAR; INTRAVENOUS; SUBCUTANEOUS at 14:33

## 2022-06-17 RX ADMIN — GABAPENTIN 300 MG: 300 CAPSULE ORAL at 16:46

## 2022-06-17 RX ADMIN — HYDROMORPHONE HYDROCHLORIDE 0.5 MG: 1 INJECTION, SOLUTION INTRAMUSCULAR; INTRAVENOUS; SUBCUTANEOUS at 13:35

## 2022-06-17 RX ADMIN — SODIUM CHLORIDE, POTASSIUM CHLORIDE, SODIUM LACTATE AND CALCIUM CHLORIDE: 600; 310; 30; 20 INJECTION, SOLUTION INTRAVENOUS at 10:32

## 2022-06-17 RX ADMIN — ONDANSETRON 4 MG: 2 INJECTION INTRAMUSCULAR; INTRAVENOUS at 17:37

## 2022-06-17 RX ADMIN — CEFAZOLIN SODIUM 2000 MG: 10 INJECTION, POWDER, FOR SOLUTION INTRAVENOUS at 20:51

## 2022-06-17 RX ADMIN — HYDROMORPHONE HYDROCHLORIDE 0.5 MG: 1 INJECTION, SOLUTION INTRAMUSCULAR; INTRAVENOUS; SUBCUTANEOUS at 20:45

## 2022-06-17 RX ADMIN — HYDROMORPHONE HYDROCHLORIDE 0.5 MG: 1 INJECTION, SOLUTION INTRAMUSCULAR; INTRAVENOUS; SUBCUTANEOUS at 16:44

## 2022-06-17 RX ADMIN — BISACODYL 5 MG: 5 TABLET, COATED ORAL at 16:46

## 2022-06-17 RX ADMIN — HYDROMORPHONE HYDROCHLORIDE 0.5 MG: 1 INJECTION, SOLUTION INTRAMUSCULAR; INTRAVENOUS; SUBCUTANEOUS at 23:47

## 2022-06-17 RX ADMIN — SODIUM CHLORIDE: 9 INJECTION, SOLUTION INTRAVENOUS at 21:31

## 2022-06-17 RX ADMIN — OXYCODONE 5 MG: 5 TABLET ORAL at 18:31

## 2022-06-17 RX ADMIN — ACETAMINOPHEN 650 MG: 325 TABLET ORAL at 16:46

## 2022-06-17 RX ADMIN — SODIUM CHLORIDE, POTASSIUM CHLORIDE, SODIUM LACTATE AND CALCIUM CHLORIDE: 600; 310; 30; 20 INJECTION, SOLUTION INTRAVENOUS at 10:20

## 2022-06-17 RX ADMIN — HYDROMORPHONE HYDROCHLORIDE 0.5 MG: 1 INJECTION, SOLUTION INTRAMUSCULAR; INTRAVENOUS; SUBCUTANEOUS at 12:38

## 2022-06-17 RX ADMIN — MIDAZOLAM HYDROCHLORIDE 2 MG: 1 INJECTION, SOLUTION INTRAMUSCULAR; INTRAVENOUS at 11:01

## 2022-06-17 ASSESSMENT — PAIN SCALES - GENERAL
PAINLEVEL_OUTOF10: 9
PAINLEVEL_OUTOF10: 8
PAINLEVEL_OUTOF10: 8
PAINLEVEL_OUTOF10: 9
PAINLEVEL_OUTOF10: 9
PAINLEVEL_OUTOF10: 8
PAINLEVEL_OUTOF10: 8

## 2022-06-17 ASSESSMENT — PAIN DESCRIPTION - PAIN TYPE: TYPE: ACUTE PAIN;CHRONIC PAIN;SURGICAL PAIN

## 2022-06-17 ASSESSMENT — PAIN - FUNCTIONAL ASSESSMENT
PAIN_FUNCTIONAL_ASSESSMENT: PREVENTS OR INTERFERES SOME ACTIVE ACTIVITIES AND ADLS
PAIN_FUNCTIONAL_ASSESSMENT: 0-10

## 2022-06-17 ASSESSMENT — PAIN DESCRIPTION - LOCATION
LOCATION: BACK
LOCATION: BACK;LEG
LOCATION: BACK

## 2022-06-17 ASSESSMENT — LIFESTYLE VARIABLES
SMOKING_STATUS: 1
HOW OFTEN DO YOU HAVE A DRINK CONTAINING ALCOHOL: NEVER

## 2022-06-17 ASSESSMENT — PAIN DESCRIPTION - ONSET: ONSET: ON-GOING

## 2022-06-17 ASSESSMENT — PAIN DESCRIPTION - DESCRIPTORS
DESCRIPTORS: BURNING
DESCRIPTORS: PRESSURE;SHARP
DESCRIPTORS: TINGLING;NUMBNESS;SHARP

## 2022-06-17 ASSESSMENT — PAIN DESCRIPTION - ORIENTATION: ORIENTATION: LEFT

## 2022-06-17 ASSESSMENT — PAIN DESCRIPTION - FREQUENCY: FREQUENCY: CONTINUOUS

## 2022-06-17 NOTE — BRIEF OP NOTE
Brief Postoperative Note      Patient: Yola Garibay  YOB: 1975  MRN: [id number]    Date of Procedure: 6/17/2022    Pre-Op Diagnosis: L3-4 STENOSIS    Post-Op Diagnosis: Same       Procedure(s):  LEFT BILATERAL L3-4 LAMINECTOMY MICRODISSECTION DECOMPRESSION    Surgeon(s):  Ramya Crowley MD    Assistant:  First Assistant: Curley Hodgkin    Anesthesia: General    Estimated Blood Loss (mL): less than 50     Complications: None      Drains:   Closed/Suction Drain Inferior;Medial Back Accordion (Active)       Findings: Stenosis    Electronically signed by Indra Garg MD on 6/17/2022 at 12:39 PM

## 2022-06-17 NOTE — DISCHARGE INSTR - COC
Continuity of Care Form    Patient Name: Carline Salazar   :  1975  MRN:  [id number]    Admit date:  2022  Discharge date:  ***    Code Status Order: Prior   Advance Directives:   [address] [place] [address] Documentation       Date/Time Healthcare Directive Type of Healthcare Directive Copy in [address] Agent's Name Healthcare Agent's Phone Number    22 [phone] No, patient does not have an advance directive for healthcare treatment -- -- -- -- --            Admitting Physician:  Arden Keating MD  PCP: No primary care provider on file. Discharging Nurse: [facility] Unit/Room#: MLOZ OR Pool/NONE  Discharging Unit Phone Number: ***    Emergency Contact:   Extended Emergency Contact Information  Primary Emergency Contact: Arnaud Godfrey  Address: [place] [address], [address] Fiona Subramanian of [address] Phone: [phone]  Relation: Child   needed?  No  Secondary Emergency Contact: Eliza Sadler of [address] Phone: [phone]  Relation: Child    Past Surgical History:  Past Surgical History:   Procedure Laterality Date    ANTERIOR CRUCIATE LIGAMENT REPAIR Left     APPENDECTOMY      at age 21     BACK SURGERY  2017    lumbar disc     BACK SURGERY  2018    lumbar fusion    CHOLECYSTECTOMY  2015    COLONOSCOPY      2017    ENDOMETRIAL ABLATION  2006    post op sepsis    ENDOSCOPY, COLON, DIAGNOSTIC      2017    KNEE ARTHROSCOPY Left 2004    ACL repair    LUMBAR FUSION N/A 1/15/2019    L4- L5 DECOMPRESSION, L5-S1 POSTERIOR LUMBAR INTERBODY FUSION performed by Dalia Mendez MD at [facility] 3/11/2021    L4-5 TLIF INSTRUMENTATION AT L5-S1 DECOMPRESSION AT L4, L5 REMOVAL AND REINSERTION OF HARDWARE AT L5-S1 performed by Dalia Mendez MD at [address] [place] [address]  13    duramorph 1.5 mg epideral  6mg celestone    TONSILLECTOMY      as child    TUBAL LIGATION Bilateral  Immunization History: There is no immunization history on file for this patient.     Active Problems:  Patient Active Problem List   Diagnosis Code    Back pain M54.9    Status migrainosus G43.901    Seizure disorder (HCC) G40.909    Left-sided weakness R53.1    Lumbar (L3-4) stenosis M48.061    Asthma J45.909    Essential hypertension I10    Convulsions (HCC) R56.9    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    Intractable headache R51.9    Herniation of intervertebral disc between L5 and S1 M51.27    Lumbar degenerative disc disease M51.36    Spinal stenosis of lumbar region with neurogenic claudication M48.062    Smoker F17.200    Postlaminectomy syndrome of lumbar region M96.1       Isolation/Infection:   Isolation            No Isolation          Patient Infection Status       Infection Onset Added Last Indicated Last Indicated By Review Planned Expiration Resolved Resolved By    None active    Resolved    COVID-19 (Rule Out) 03/04/21 03/04/21 03/04/21 COVID-19 Ambulatory (Ordered)   03/05/21 Rule-Out Test Resulted            Nurse Assessment:  Last Vital Signs: BP (!) 150/82   Pulse 75   Temp 97.6 °F (36.4 °C) (Temporal)   Resp 16   Ht 5' 6.5\" (1.689 m)   Wt 217 lb (98.4 kg)   SpO2 98%   BMI 34.50 kg/m²     Last documented pain score (0-10 scale): Pain Level: 10  Last Weight:   Wt Readings from Last 1 Encounters:   06/17/22 217 lb (98.4 kg)     Mental Status:  {IP PT MENTAL STATUS:20030}    IV Access:  { NICOLETTE IV ACCESS:777314965}    Nursing Mobility/ADLs:  Walking   {[facility] DME GFAV:030700147}  Transfer  {[facility] DME IDIA:428316813}  Bathing  {[facility] DME ZJWY:816885201}  Dressing  {[facility] DME VZZJ:436574469}  Toileting  {[facility] DME SPGZ:331583456}  Feeding  {[facility] DME ISYR:067320949}  Med Admin  {[facility] DME GCBJ:552416585}  Med Delivery   { NICOLETTE MED Delivery:209382160}    Wound Care Documentation and Therapy:  Incision 06/17/22 Back Lower;Medial (Active)   Dressing Status Clean;Dry 06/17/22 1226   Closure Sutures 06/17/22 1226   Margins Approximated 22 1226   Drainage Amount None 22 1226   Odor None 22 1226   Number of days: 0       Incision 01/15/19 Back (Active)   Number of days: 1249       Incision 21 Back Lower;Medial (Active)   Number of days: 463        Elimination:  Continence: Bowel: {YES / GO:85454}  Bladder: {YES / CK:93250}  Urinary Catheter: {Urinary Catheter:501038082}   Colostomy/Ileostomy/Ileal Conduit: {YES / F}       Date of Last BM: ***    Intake/Output Summary (Last 24 hours) at 2022 1245  Last data filed at 2022 1244  Gross per 24 hour   Intake 1000 ml   Output --   Net 1000 ml     No intake/output data recorded.     Safety Concerns:     [address] [employer] Safety Concerns:293619770}    Impairments/Disabilities:      [address] [employer] Impairments/Disabilities:275044973}    Nutrition Therapy:  Current Nutrition Therapy:   [address] [employer] Diet List:860627765}    Routes of Feeding: {CHP DME Other Feedings:677943866}  Liquids: {Slp liquid thickness:70972}  Daily Fluid Restriction: {CHP DME Yes amt example:173670998}  Last Modified Barium Swallow with Video (Video Swallowing Test): {Done Not Done GPKB:552655674}    Treatments at the Time of Hospital Discharge:   Respiratory Treatments: ***  Oxygen Therapy:  {Therapy; copd oxygen:53375}  Ventilator:    { CC Vent HZBR:190735989}    Rehab Therapies: {THERAPEUTIC INTERVENTION:4226472978}  Weight Bearing Status/Restrictions: [employer] Weight Bearin}  Other Medical Equipment (for information only, NOT a DME order):  {EQUIPMENT:113641461}  Other Treatments: ***    Patient's personal belongings (please select all that are sent with patient):  {[facility] DME Belongings:487536448}    RN SIGNATURE:  {Esignature:477073596}    CASE MANAGEMENT/SOCIAL WORK SECTION    Inpatient Status Date: ***    Readmission Risk Assessment Score:  Readmission Risk              Risk of Unplanned Readmission:  0           Discharging to Facility/ Agency   Name: Address:  Phone:  Fax:    Dialysis Facility (if applicable)   Name:  Address:  Dialysis Schedule:  Phone:  Fax:    / signature: {Esignature:013048098}    PHYSICIAN SECTION    Prognosis: {Prognosis:7381306114}    Condition at Discharge: Chema Nelson Patient Condition:833339344}    Rehab Potential (if transferring to Rehab): {Prognosis:6979554207}    Recommended Labs or Other Treatments After Discharge: ***    Physician Certification: I certify the above information and transfer of Oneyda Perdue  is necessary for the continuing treatment of the diagnosis listed and that she requires {Admit to Appropriate Level of Care:31503} for {GREATER/LESS:679537963} 30 days.      Update Admission H&P: {CHP DME Changes in [facility]:182100653}    PHYSICIAN SIGNATURE:  Electronically signed by Tiffanie Ram MD on 6/17/22 at 12:45 PM EDT

## 2022-06-17 NOTE — ANESTHESIA PRE PROCEDURE
Department of Anesthesiology  Preprocedure Note       Name:  Brii Sheth   Age:  52 y.o.  :  1975                                          MRN:  [id number]         Date:  2022      Surgeon: Martha Buchanan):  Adria Curling, MD    Procedure: Procedure(s):  LEFT BILATERAL L3-4 LAMINECTOMY MICRODISSECTION DECOMPRESSION 1 HOUR/ 1 C-ARM/ POWER RONGEUR/ ROOM#1/ NEW MICROSCOPE    Medications prior to admission:   Prior to Admission medications    Medication Sig Start Date End Date Taking? Authorizing Provider   gabapentin (NEURONTIN) 300 MG capsule take 1 capsule (300MG)  by oral route 3 times every day    Historical Provider, MD   Potassium 75 MG TABS Take by mouth    Historical Provider, MD   tiZANidine (ZANAFLEX) 4 MG tablet Take 4 mg by mouth every 6 hours as needed    Historical Provider, MD   ibuprofen (ADVIL;MOTRIN) 200 MG tablet Take 200 mg by mouth every 6 hours as needed for Pain    Historical Provider, MD   oxyCODONE-acetaminophen (PERCOCET) 5-325 MG per tablet Take 1 tablet by mouth 2 times daily as needed for Pain for up to 14 days. 22  JERROD Taylor CNP   docusate sodium (COLACE) 100 MG capsule Take 1 capsule by mouth 2 times daily as needed for Constipation 22   JERROD Taylor CNP   naloxone 4 MG/0.1ML LIQD nasal spray 1 spray by Nasal route as needed for Opioid Reversal  Patient not taking: Reported on 2022   JERROD Taylor CNP   Calcium Carb-Cholecalciferol (CALCIUM 1000 + D PO) Take 600 mg by mouth daily 10/16/19   Historical Provider, MD   Potassium 99 MG TABS Take 99 mg by mouth daily 10/5/17   Historical Provider, MD   levothyroxine (SYNTHROID) 50 MCG tablet daily  Patient not taking: Reported on 6/15/2022 1/26/21   Historical Provider, MD   vitamin B-12 (CYANOCOBALAMIN) 100 MCG tablet Take 50 mcg by mouth daily    Historical Provider, MD   Biotin 1000 MCG TABS Take by mouth 2 times daily    Historical Provider, MD   Misc.  Devices (RAISED TOILET SEAT/LOCK & ARMS) MISC 1 Piece by Does not apply route as needed (PRN) 1/23/19   Vanessa Maradiaga MD   ferrous sulfate 325 (65 Fe) MG tablet Take 65 mg by mouth daily (with breakfast)    Historical Provider, MD   vitamin C (ASCORBIC ACID) 500 MG tablet Take 500 mg by mouth daily    Historical Provider, MD   ondansetron (ZOFRAN) 4 MG tablet Take 4 mg by mouth every 8 hours as needed for Nausea or Vomiting    Historical Provider, MD   VENTOLIN  (90 Base) MCG/ACT inhaler 2 puffs 4 times daily  12/22/18   Historical Provider, MD   amLODIPine (NORVASC) 5 MG tablet Take 5 mg by mouth daily  12/22/18   Historical Provider, MD   omeprazole (PRILOSEC) 20 MG capsule Take 40 mg by mouth three times daily     Historical Provider, MD       Current medications:    Current Facility-Administered Medications   Medication Dose Route Frequency Provider Last Rate Last Admin    0.9 % sodium chloride infusion   IntraVENous PRN Oletha Bihari, APRN - CNP        lactated ringers infusion   IntraVENous Continuous Oletha Bihari, APRN - CNP        lidocaine PF 1 % injection 1 mL  1 mL IntraDERmal Once PRN Oletha Bihari, APRN - CNP        sodium chloride flush 0.9 % injection 5-40 mL  5-40 mL IntraVENous 2 times per day Oletha Bihari, APRN - CNP        sodium chloride flush 0.9 % injection 5-40 mL  5-40 mL IntraVENous PRN Oletha Bihari, APRN - CNP        ceFAZolin (ANCEF) 2000 mg in dextrose 5 % 100 mL IVPB  2,000 mg IntraVENous Once Oletha Bihari, APRN - CNP        lactated ringers infusion   IntraVENous Continuous Oletha Bihari, APRN - CNP 50 mL/hr at 06/17/22 1032 New Bag at 06/17/22 1032       Allergies:     Allergies   Allergen Reactions    Fentanyl Shortness Of Breath     Throat closes    Prochlorperazine Edisylate Shortness Of Breath and Swelling    Tylenol [Acetaminophen] Nausea Only    Cortizone Swelling    Aspirin Rash    Codeine Rash     Throat closes       Problem List:    Patient Active Problem List   Diagnosis Code    Back pain M54.9    Status migrainosus G43.901    Seizure disorder (HCC) G40.909    Left-sided weakness R53.1    Lumbar (L3-4) stenosis M48.061    Asthma J45.909    Essential hypertension I10    Convulsions (HCC) R56.9    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    Intractable headache R51.9    Herniation of intervertebral disc between L5 and S1 M51.27    Lumbar degenerative disc disease M51.36    Spinal stenosis of lumbar region with neurogenic claudication M48.062    Smoker F17.200    Postlaminectomy syndrome of lumbar region M96.1       Past Medical History:        Diagnosis Date    Arthritis     spine    Asthma     since childhood -- smokes x 30 yrs    Cerebral artery occlusion with cerebral infarction ([address]     per CT scan -- patient without sx, 2021    Hypertension     meds > 3 yrs    Lumbar stenosis     Migraines     starts with neck pain    Seizures ([address]     pt last known seizure May 2022, dx 20 years ago, takes gabapentin for back/seizures    Thyroid disease     meds > 4 yrs -- intermittent    Uterine anomaly        Past Surgical History:        Procedure Laterality Date    ANTERIOR CRUCIATE LIGAMENT REPAIR Left     APPENDECTOMY      at age 21    [facility] BACK SURGERY  2017    lumbar disc     BACK SURGERY  2018    lumbar fusion    CHOLECYSTECTOMY  2015    COLONOSCOPY      2017    ENDOMETRIAL ABLATION  2006    post op sepsis    ENDOSCOPY, COLON, DIAGNOSTIC      2017    KNEE ARTHROSCOPY Left 2004    ACL repair    LUMBAR FUSION N/A 1/15/2019    L4- L5 DECOMPRESSION, L5-S1 POSTERIOR LUMBAR INTERBODY FUSION performed by Brandi Soto MD at [address] [place] [address] N/A 3/11/2021    L4-5 TLIF INSTRUMENTATION AT L5-S1 DECOMPRESSION AT L4, L5 REMOVAL AND REINSERTION OF HARDWARE AT L5-S1 [facility]  5/9/13    duramorph 1.5 mg epideral  6mg celestone    TONSILLECTOMY      as child    TUBAL LIGATION Bilateral 2002       Social History:    Social History     Tobacco Use  Smoking status: Current Every Day Smoker     Packs/day: 1.50     Years: 30.00     Pack years: 45.00     Types: Cigarettes    Smokeless tobacco: Never Used   Substance Use Topics    Alcohol use: No     Alcohol/week: 0.0 standard drinks                                Ready to quit: Not Answered  Counseling given: Not Answered      Vital Signs (Current):   Vitals:    06/17/22 0931   BP: (!) 150/82   Pulse: 75   Resp: 16   Temp: 97.6 °F (36.4 °C)   TempSrc: Temporal   SpO2: 98%   Weight: 217 lb (98.4 kg)   Height: 5' 6.5\" (1.689 m)                                              BP Readings from Last 3 Encounters:   06/17/22 (!) 150/82   06/15/22 (!) 139/90   05/26/22 128/76       NPO Status: Time of last liquid consumption: 2200                        Time of last solid consumption: 2200                        Date of last liquid consumption: 06/16/22                        Date of last solid food consumption: 06/16/22    BMI:   Wt Readings from Last 3 Encounters:   06/17/22 217 lb (98.4 kg)   06/15/22 217 lb (98.4 kg)   05/26/22 205 lb (93 kg)     Body mass index is 34.5 kg/m². CBC:   Lab Results   Component Value Date    WBC 6.3 06/15/2022    RBC 4.79 06/15/2022    HGB 15.7 06/15/2022    HCT 46.6 06/15/2022    MCV 97.3 06/15/2022    RDW 13.8 06/15/2022     06/15/2022       CMP:   Lab Results   Component Value Date     06/15/2022    K 3.8 06/15/2022    K 4.3 03/12/2021     06/15/2022    CO2 23 06/15/2022    BUN 14 06/15/2022    CREATININE 0.87 06/15/2022    GFRAA >60.0 06/15/2022    LABGLOM >60.0 06/15/2022    GLUCOSE 111 06/15/2022    CALCIUM 9.6 06/15/2022       POC Tests: No results for input(s): POCGLU, POCNA, POCK, POCCL, POCBUN, POCHEMO, POCHCT in the last 72 hours.     Coags:   Lab Results   Component Value Date    PROTIME 19.0 06/15/2022    INR 1.6 06/15/2022    APTT 28.4 01/14/2019       HCG (If Applicable): No results found for: PREGTESTUR, PREGSERUM, HCG, HCGQUANT     ABGs: No results found for: PHART, PO2ART, KIN2GLK, TYC1SRI, BEART, L2YUKERT     Type & Screen (If Applicable):  No results found for: LABABO, LABRH    Drug/Infectious Status (If Applicable):  No results found for: HIV, HEPCAB    COVID-19 Screening (If Applicable):   Lab Results   Component Value Date    COVID19 Not Detected 03/04/2021           Anesthesia Evaluation  Patient summary reviewed and Nursing notes reviewed no history of anesthetic complications:   Airway: Mallampati: II  TM distance: >3 FB   Neck ROM: full  Mouth opening: > = 3 FB   Dental: normal exam         Pulmonary:normal exam    (+) asthma: current smoker          Patient did not smoke on day of surgery. Cardiovascular:  Exercise tolerance: good (>4 METS),   (+) hypertension:,       ECG reviewed               Beta Blocker:  Not on Beta Blocker         Neuro/Psych:   (+) seizures:, CVA:, headaches:, psychiatric history:            GI/Hepatic/Renal: Neg GI/Hepatic/Renal ROS  (+) morbid obesity         ROS comment: BMI 35. Endo/Other: Negative Endo/Other ROS             Pt had PAT visit. Abdominal:             Vascular: negative vascular ROS. Other Findings:           Anesthesia Plan      general and regional     ASA 3     (ETT  ROQUE  Eyebrow piercing, discussed potential risk of trauma. Patient aware and wishes to leave piercing in place  Discussed risk of post operative visual disturbances, facial trauma, facial edema and pressure sores)  Induction: intravenous. MIPS: Postoperative opioids intended. Anesthetic plan and risks discussed with patient. Plan discussed with CRNA.                     Annabelle Friedman DO   6/17/2022

## 2022-06-17 NOTE — ANESTHESIA PROCEDURE NOTES
Peripheral Block    Patient location during procedure: pre-op  Reason for block: post-op pain management and at surgeon's request  Start time: 6/17/2022 10:24 AM  End time: 6/17/2022 10:36 AM  Staffing  Performed: anesthesiologist   Anesthesiologist: Paul Damon DO  Preanesthetic Checklist  Completed: patient identified, IV checked, site marked, risks and benefits discussed, surgical/procedural consents, equipment checked, pre-op evaluation, timeout performed, anesthesia consent given, oxygen available and monitors applied/VS acknowledged  Peripheral Block   Patient position: supine  Prep: ChloraPrep  Provider prep: mask and sterile gloves (Sterile probe cover)  Patient monitoring: cardiac monitor, continuous pulse ox, frequent blood pressure checks and IV access  Block type: Erector spinae (L3-4)  Laterality: bilateral  Injection technique: single-shot  Guidance: ultrasound guided  Local infiltration: ropivacaine  Infiltration strength: 0.5 %  Local infiltration: ropivacaine  Dose: 30 mL    Needle   Needle type: combined needle/nerve stimulator   Needle gauge: 22 G  Needle localization: anatomical landmarks and ultrasound guidance  Needle length: 10 cm  Assessment   Injection assessment: negative aspiration for heme, no paresthesia on injection and local visualized surrounding nerve on ultrasound  Paresthesia pain: immediately resolved  Slow fractionated injection: yes  Hemodynamics: stable  Real-time US image taken/store: yes    Additional Notes  Ultrasound image printed and saved in patient chart.     Sterile probe cover used    Medications Administered  Ropivacaine (NAROPIN) injection 0.5%, 20 mL

## 2022-06-17 NOTE — OP NOTE
[address]                      [address], [address] [facility]                                OPERATIVE REPORT    PATIENT NAME: Alejandra Florian               :        1975  MED REC NO:   [id number]                            ROOM:  ACCOUNT NO:   [de-identified]                           ADMIT DATE: 2022  PROVIDER:     Bello Fraga MD    DATE OF PROCEDURE:  2022    PREOPERATIVE DIAGNOSIS:  L3-L4 canal stenosis with neurogenic  claudication, status post L4-L5, S1 fusion with instrumentation. POSTOPERATIVE DIAGNOSIS:  L3-L4 canal stenosis with neurogenic  claudication, status post L4-L5, S1 fusion with instrumentation. OPERATIONS PERFORMED:  Left bilateral L3-L4 laminectomy,  microdissection, decompression. SURGEON:  Bello Fraga MD    INDICATIONS:  Back pain, gait deterioration. DESCRIPTION OF PROCEDURE:  The patient was given general endotracheal  anesthesia in supine position, turned to the prone position. Back was  prepped and draped. Time-out, patient identified. Skin was infiltrated  with lidocaine with epinephrine. Previous scar was reopened over the  tips of spinous process of L3. Dissection was carried down through the  scar to the tips of spinous process of L3. Needles were placed. Lateral x-rays were obtained to confirm level. Needles were withdrawn. The scar was then lysed from the lateral aspect of the spinous process  tip of L3 on the left side only to expose the medial facet of L3 and  then the residual lamina of L4. The scar was lysed off the lamina. With use of microscope, I was able to perform a partial hemilaminectomy  at the inferior aspect of L3 working superiorly, not laterally,  performing a very small medial facetectomy of L3-L4 detaching the  hypertrophied ligamentum flavum from the superior lamina of L4 and  performing a foraminotomy for the exit of the nerve root.   Hypertrophied  ligamentum flavum was

## 2022-06-17 NOTE — FLOWSHEET NOTE
Patient arrived to unit from PACU @ 1500 transported via bed vss a&ox4 skin w/d dry dsg intact to medial back accordian wound drain intact to lt medial back expresses ongoing back pain ice pack in place  head to toe assessment completed including 4 eyes/2 nurses resp even/unlabored on RA patient a&ox4 family at bedside patient oriented to unit/room/environment keep safe interventions in place will cont to monitor no s/s distress noted

## 2022-06-17 NOTE — INTERVAL H&P NOTE
Update History & Physical    The patient's History and Physical was reviewed with the patient and I examined the patient. There was no change. The surgical site was confirmed by the patient and me. Plan: The risks, benefits, expected outcome, and alternative to the recommended procedure have been discussed with the patient. Patient understands and wants to proceed with the procedure.      Electronically signed by Lindsey Person MD on 6/17/2022 at 10:14 AM

## 2022-06-17 NOTE — ANESTHESIA POSTPROCEDURE EVALUATION
Department of Anesthesiology  Postprocedure Note    Patient: Rhett Wright  MRN: [id number]  YOB: 1975  Date of evaluation: 6/17/2022  Time:  1:10 PM     Procedure Summary     Date: 06/17/22 Room / Location: [address]    Anesthesia Start: 1101 Anesthesia Stop: 0    Procedure: LEFT BILATERAL L3-4 LAMINECTOMY MICRODISSECTION DECOMPRESSION (N/A ) Diagnosis:       Spinal stenosis of lumbar region, unspecified whether neurogenic claudication present      (L3-4 STENOSIS)    Surgeons: Kelvin Honeycutt MD Responsible Provider: Syd Reno DO    Anesthesia Type: general, regional ASA Status: 3          Anesthesia Type: No value filed. Nigel Phase I: Nigel Score: 9    Nigel Phase II:      Last vitals: Reviewed and per EMR flowsheets.        Anesthesia Post Evaluation    Patient location during evaluation: bedside  Patient participation: complete - patient participated  Level of consciousness: awake and alert  Airway patency: patent  Nausea & Vomiting: no nausea and no vomiting  Complications: no  Cardiovascular status: blood pressure returned to baseline and hemodynamically stable  Respiratory status: acceptable  Hydration status: euvolemic

## 2022-06-17 NOTE — CONSULTS
Consult Note    Reason for Consult: Hypertension, seizure disorder    Requesting Physician:  Shahram Dwyer MD    HISTORY OF PRESENT ILLNESS:    The patient is a 52 y.o. female with past medical history of lumbar stenosis, hypertension, seizure disorder (psychiatric pseudoseizure), current smoker, history of a cerebral artery occlusion and iron deficiency anemia who presents from PACU status post left bilateral lumbar 3-4 laminectomy  microdissection and decompression for ongoing lumbar stenosis with neurogenic claudication. Patient tolerated procedure well, has no concerns at this time. Patient Seen, Chart, Labs, Radiologystudies, and Consults reviewed. Patient denies headache, chest pain, shortness of breath, N/V/D/C, fever/chills.              Past Medical History:   Diagnosis Date    Arthritis     spine    Asthma     since childhood -- smokes x 30 yrs    Cerebral artery occlusion with cerebral infarction [facility])     per CT scan -- patient without sx, 2021    Hypertension     meds > 3 yrs    Lumbar stenosis     Migraines     starts with neck pain    Seizures ([address]     pt last known seizure May 2022, dx 20 years ago, takes gabapentin for back/seizures    Thyroid disease     meds > 4 yrs -- intermittent    Uterine anomaly        Past Surgical History:   Procedure Laterality Date    ANTERIOR CRUCIATE LIGAMENT REPAIR Left     APPENDECTOMY      at age 21    [address]  2017    lumbar disc     BACK SURGERY  2018    lumbar fusion    CHOLECYSTECTOMY  2015    COLONOSCOPY      2017    ENDOMETRIAL ABLATION  2006    post op sepsis    ENDOSCOPY, COLON, DIAGNOSTIC      2017    KNEE ARTHROSCOPY Left 2004    ACL repair    LUMBAR FUSION N/A 1/15/2019    L4- L5 DECOMPRESSION, L5-S1 POSTERIOR LUMBAR INTERBODY FUSION performed by Paula Oakley MD at [facility] N/A 3/11/2021    L4-5 TLIF INSTRUMENTATION AT L5-S1 DECOMPRESSION AT L4, L5 REMOVAL AND REINSERTION OF HARDWARE AT L5-S1 performed by Paula Oakley MD at [address]  5/9/13    duramorph 1.5 mg epideral  6mg celestone    TONSILLECTOMY      as child    TUBAL LIGATION Bilateral 2002       Prior to Admission medications    Medication Sig Start Date End Date Taking? Authorizing Provider   gabapentin (NEURONTIN) 300 MG capsule take 1 capsule (300MG)  by oral route 3 times every day    Historical Provider, MD   oxyCODONE-acetaminophen (PERCOCET) 5-325 MG per tablet Take 1 tablet by mouth every 6 hours as needed for Pain for up to 7 days. Intended supply: 7 days. Take lowest dose possible to manage pain 6/17/22 6/24/22  Spike Doll MD   Potassium 75 MG TABS Take by mouth    Historical Provider, MD   tiZANidine (ZANAFLEX) 4 MG tablet Take 4 mg by mouth every 6 hours as needed    Historical Provider, MD   oxyCODONE-acetaminophen (PERCOCET) 5-325 MG per tablet Take 1 tablet by mouth 2 times daily as needed for Pain for up to 14 days. 6/9/22 6/23/22  JERROD Musa - CNP   docusate sodium (COLACE) 100 MG capsule Take 1 capsule by mouth 2 times daily as needed for Constipation 5/19/22   Willis Rodriguez APRN - CNP   naloxone 4 MG/0.1ML LIQD nasal spray 1 spray by Nasal route as needed for Opioid Reversal  Patient not taking: Reported on 6/17/2022 4/20/22   JERROD Musa - CNP   Calcium Carb-Cholecalciferol (CALCIUM 1000 + D PO) Take 600 mg by mouth daily 10/16/19   Historical Provider, MD   Potassium 99 MG TABS Take 99 mg by mouth daily 10/5/17   Historical Provider, MD   levothyroxine (SYNTHROID) 50 MCG tablet daily  Patient not taking: Reported on 6/15/2022 1/26/21   Historical Provider, MD   vitamin B-12 (CYANOCOBALAMIN) 100 MCG tablet Take 50 mcg by mouth daily    Historical Provider, MD   Biotin 1000 MCG TABS Take by mouth 2 times daily    Historical Provider, MD [place].  Devices (RAISED TOILET SEAT/LOCK & ARMS) MISC 1 Piece by Does not apply route as needed (PRN) 1/23/19   Renae Dietrich MD   ferrous sulfate 325 (65 Fe) MG tablet Take 65 mg by mouth daily (with breakfast)    Historical Provider, MD   vitamin C (ASCORBIC ACID) 500 MG tablet Take 500 mg by mouth daily    Historical Provider, MD   ondansetron (ZOFRAN) 4 MG tablet Take 4 mg by mouth every 8 hours as needed for Nausea or Vomiting    Historical Provider, MD   VENTOLIN  (90 Base) MCG/ACT inhaler 2 puffs 4 times daily  12/22/18   Historical Provider, MD   amLODIPine (NORVASC) 5 MG tablet Take 5 mg by mouth daily  12/22/18   Historical Provider, MD   omeprazole (PRILOSEC) 20 MG capsule Take 40 mg by mouth three times daily     Historical Provider, MD       Scheduled Meds:  Continuous Infusions:  PRN Meds:    Allergies   Allergen Reactions    Fentanyl Shortness Of Breath     Throat closes    Prochlorperazine Edisylate Shortness Of Breath and Swelling    Tylenol [Acetaminophen] Nausea Only    Cortizone Swelling    Aspirin Rash    Codeine Rash     Throat closes       Social History     Socioeconomic History    Marital status:      Spouse name: Not on file    Number of children: Not on file    Years of education: Not on file    Highest education level: Not on file   Occupational History    Not on file   Tobacco Use    Smoking status: Current Every Day Smoker     Packs/day: 1.50     Years: 30.00     Pack years: 45.00     Types: Cigarettes    Smokeless tobacco: Never Used   Vaping Use    Vaping Use: Never used   Substance and Sexual Activity    Alcohol use: No     Alcohol/week: 0.0 standard drinks    Drug use: No    Sexual activity: Not on file   Other Topics Concern    Not on file   Social History Narrative    Not on file     Social Determinants of Health     Financial Resource Strain:     Difficulty of Paying Living Expenses: Not on file   Food Insecurity:     Worried About Running Out of Food in the Last Year: Not on file    Cammy of Food in the Last Year: Not on file   Transportation Needs:     Lack of Transportation (Medical):  Not on file    Lack of Transportation (Non-Medical): Not on file   Physical Activity:     Days of Exercise per Week: Not on file    Minutes of Exercise per Session: Not on file   Stress:     Feeling of Stress : Not on file   Social Connections:     Frequency of Communication with Friends and Family: Not on file    Frequency of Social Gatherings with Friends and Family: Not on file    Attends Faith Services: Not on file    Active Member of [address] [place] [address] [employer] or Organizations: Not on file    Attends Club or Organization Meetings: Not on file    Marital Status: Not on file   Intimate Partner Violence:     Fear of Current or Ex-Partner: Not on file    Emotionally Abused: Not on file    Physically Abused: Not on file    Sexually Abused: Not on file   Housing Stability:     Unable to Pay for Housing in the Last Year: Not on file    Number of [place] in the Last Year: Not on file    Unstable Housing in the Last Year: Not on file       Family History   Problem Relation Age of Onset    Cancer Mother         NHL    COPD Mother     Other Mother         ITP & pancreatitis    Heart Failure Maternal Grandmother     No Known Problems Sister     High Blood Pressure Brother     No Known Problems Son     No Known Problems Daughter        Review Of Systems:   ROS as noted in HPI, 12 point ROS reviewed and otherwise negative.   Physical Exam:  Vitals:    06/17/22 1425 06/17/22 1440 06/17/22 1445 06/17/22 1450   BP: (!) 115/54 (!) 117/57 (!) 111/55 102/66   Pulse: 68 68 66 88   Resp: 12 10 14 12   Temp:    97.2 °F (36.2 °C)   TempSrc:    Temporal   SpO2: 94% 94% 98% 96%   Weight:       Height:           CONSTITUTIONAL:  awake, alert, cooperative, no apparent distress, and appears stated age  EYES:  Lids and lashes normal, pupils equal, round and reactive to light, extra ocular muscles intact, sclera clear, conjunctiva normal  ENT:  Normocephalic, without obvious abnormality, atraumatic, sinuses nontender on palpation, external ears without lesions, oral pharynx with moist mucus membranes, tonsils without erythema or exudates, gums normal and good dentition. LUNGS:  No increased work of breathing, good air exchange, clear to auscultation bilaterally, no crackles or wheezing  CARDIOVASCULAR:  Normal apical impulse, regular rate and rhythm, normal S1 and S2, no S3 or S4, and no murmur noted  ABDOMEN:  No scars, normal bowel sounds, soft, non-distended, non-tender, no masses palpated, no hepatosplenomegally  NEUROLOGIC:  Awake, alert, oriented to name, place and time. Cranial nerves II-XII are grossly intact. Motor is 5 out of 5 bilaterally. Sensory is intact.     SKIN:  no rashes and no lesions    Labs:  Recent Results (from the past 72 hour(s))   EKG 12 Lead    Collection Time: 06/15/22  2:27 PM   Result Value Ref Range    Ventricular Rate 83 BPM    Atrial Rate 83 BPM    P-R Interval 182 ms    QRS Duration 96 ms    Q-T Interval 366 ms    QTc Calculation (Bazett) 430 ms    P Axis 45 degrees    R Axis 56 degrees    T Axis 51 degrees   TYPE AND SCREEN    Collection Time: 06/15/22  3:21 PM   Result Value Ref Range    ABO/Rh A POS     Antibody Screen NEG    CBC    Collection Time: 06/15/22  3:22 PM   Result Value Ref Range    WBC 6.3 4.8 - 10.8 K/uL    RBC 4.79 4.20 - 5.40 M/uL    Hemoglobin 15.7 12.0 - 16.0 g/dL    Hematocrit 46.6 37.0 - 47.0 %    MCV 97.3 82.0 - 100.0 fL    MCH 32.9 (H) 27.0 - 31.3 pg    MCHC 33.8 33.0 - 37.0 %    RDW 13.8 11.5 - 14.5 %    Platelets 258 022 - 802 K/uL   Protime-INR    Collection Time: 06/15/22  3:22 PM   Result Value Ref Range    Protime 19.0 (H) 12.3 - 14.9 sec    INR 1.6    Basic Metabolic Panel    Collection Time: 06/15/22  3:34 PM   Result Value Ref Range    Sodium 137 135 - 144 mEq/L    Potassium 3.8 3.4 - 4.9 mEq/L    Chloride 102 95 - 107 mEq/L    CO2 23 20 - 31 mEq/L    Anion Gap 12 9 - 15 mEq/L    Glucose 111 (H) 70 - 99 mg/dL    BUN 14 6 - 20 mg/dL    CREATININE 0.87 0.50 - 0.90 mg/dL    GFR Non-African American >60.0 >60 GFR  >60.0 >60    Calcium 9.6 8.5 - 9.9 mg/dL       Data:    No results found. Assessment/Plan:  1. Status post left bilateral lumbar 3-4 laminectomy  microdissection and decompression: Postsurgical management per neurosurgery. 2. Hypertension: Continue Norvasc tomorrow a.m.  3. Seizure disorder (psychogenic seizures): Continue Neurontin  4. Iron deficiency anemia: Continue ferrous sulfate and vitamin C  5.  Nicotine dependency: Declines nicotine patch    Electronically signed by JERROD Montes CNP on 6/17/22 at 3:42 PM EDT

## 2022-06-18 ENCOUNTER — APPOINTMENT (OUTPATIENT)
Dept: GENERAL RADIOLOGY | Age: 47
DRG: 320 | End: 2022-06-18
Attending: NEUROLOGICAL SURGERY
Payer: MEDICARE

## 2022-06-18 PROCEDURE — 97161 PT EVAL LOW COMPLEX 20 MIN: CPT

## 2022-06-18 PROCEDURE — 74018 RADEX ABDOMEN 1 VIEW: CPT

## 2022-06-18 PROCEDURE — 2580000003 HC RX 258: Performed by: NEUROLOGICAL SURGERY

## 2022-06-18 PROCEDURE — 6370000000 HC RX 637 (ALT 250 FOR IP): Performed by: FAMILY MEDICINE

## 2022-06-18 PROCEDURE — 6370000000 HC RX 637 (ALT 250 FOR IP): Performed by: NURSE PRACTITIONER

## 2022-06-18 PROCEDURE — 1210000000 HC MED SURG R&B

## 2022-06-18 PROCEDURE — 6370000000 HC RX 637 (ALT 250 FOR IP): Performed by: NEUROLOGICAL SURGERY

## 2022-06-18 PROCEDURE — 6360000002 HC RX W HCPCS: Performed by: NEUROLOGICAL SURGERY

## 2022-06-18 RX ORDER — SODIUM PHOSPHATE, DIBASIC AND SODIUM PHOSPHATE, MONOBASIC 7; 19 G/133ML; G/133ML
1 ENEMA RECTAL AS NEEDED
Status: DISCONTINUED | OUTPATIENT
Start: 2022-06-18 | End: 2022-06-23 | Stop reason: HOSPADM

## 2022-06-18 RX ORDER — ONDANSETRON 4 MG/1
4 TABLET, FILM COATED ORAL EVERY 8 HOURS PRN
Status: DISCONTINUED | OUTPATIENT
Start: 2022-06-18 | End: 2022-06-21 | Stop reason: SDUPTHER

## 2022-06-18 RX ADMIN — ACETAMINOPHEN 650 MG: 325 TABLET ORAL at 09:43

## 2022-06-18 RX ADMIN — POLYETHYLENE GLYCOL 3350 17 G: 17 POWDER, FOR SOLUTION ORAL at 10:19

## 2022-06-18 RX ADMIN — OXYCODONE 5 MG: 5 TABLET ORAL at 23:45

## 2022-06-18 RX ADMIN — FERROUS SULFATE TAB 325 MG (65 MG ELEMENTAL FE) 325 MG: 325 (65 FE) TAB at 08:32

## 2022-06-18 RX ADMIN — GABAPENTIN 300 MG: 300 CAPSULE ORAL at 08:32

## 2022-06-18 RX ADMIN — ACETAMINOPHEN 650 MG: 325 TABLET ORAL at 22:23

## 2022-06-18 RX ADMIN — GABAPENTIN 300 MG: 300 CAPSULE ORAL at 22:24

## 2022-06-18 RX ADMIN — ONDANSETRON HYDROCHLORIDE 4 MG: 4 TABLET, FILM COATED ORAL at 18:45

## 2022-06-18 RX ADMIN — ACETAMINOPHEN 650 MG: 325 TABLET ORAL at 16:48

## 2022-06-18 RX ADMIN — HYDROMORPHONE HYDROCHLORIDE 0.5 MG: 1 INJECTION, SOLUTION INTRAMUSCULAR; INTRAVENOUS; SUBCUTANEOUS at 03:01

## 2022-06-18 RX ADMIN — OXYCODONE 5 MG: 5 TABLET ORAL at 14:46

## 2022-06-18 RX ADMIN — HYDROMORPHONE HYDROCHLORIDE 0.5 MG: 1 INJECTION, SOLUTION INTRAMUSCULAR; INTRAVENOUS; SUBCUTANEOUS at 09:43

## 2022-06-18 RX ADMIN — DOCUSATE SODIUM 100 MG: 100 CAPSULE, LIQUID FILLED ORAL at 08:32

## 2022-06-18 RX ADMIN — OXYCODONE 5 MG: 5 TABLET ORAL at 08:33

## 2022-06-18 RX ADMIN — SODIUM CHLORIDE: 9 INJECTION, SOLUTION INTRAVENOUS at 04:22

## 2022-06-18 RX ADMIN — OXYCODONE HYDROCHLORIDE AND ACETAMINOPHEN 500 MG: 500 TABLET ORAL at 08:32

## 2022-06-18 RX ADMIN — HYDROMORPHONE HYDROCHLORIDE 0.5 MG: 1 INJECTION, SOLUTION INTRAMUSCULAR; INTRAVENOUS; SUBCUTANEOUS at 12:48

## 2022-06-18 RX ADMIN — MAGNESIUM HYDROXIDE 30 ML: 400 SUSPENSION ORAL at 10:19

## 2022-06-18 RX ADMIN — HYDROMORPHONE HYDROCHLORIDE 0.5 MG: 1 INJECTION, SOLUTION INTRAMUSCULAR; INTRAVENOUS; SUBCUTANEOUS at 22:23

## 2022-06-18 RX ADMIN — HYDROMORPHONE HYDROCHLORIDE 0.5 MG: 1 INJECTION, SOLUTION INTRAMUSCULAR; INTRAVENOUS; SUBCUTANEOUS at 06:30

## 2022-06-18 RX ADMIN — BISACODYL 5 MG: 5 TABLET, COATED ORAL at 08:32

## 2022-06-18 RX ADMIN — PANTOPRAZOLE SODIUM 40 MG: 40 TABLET, DELAYED RELEASE ORAL at 06:29

## 2022-06-18 RX ADMIN — ACETAMINOPHEN 650 MG: 325 TABLET ORAL at 04:22

## 2022-06-18 RX ADMIN — ONDANSETRON 4 MG: 2 INJECTION INTRAMUSCULAR; INTRAVENOUS at 04:25

## 2022-06-18 RX ADMIN — Medication 10 ML: at 08:33

## 2022-06-18 RX ADMIN — OXYCODONE 5 MG: 5 TABLET ORAL at 04:20

## 2022-06-18 RX ADMIN — Medication 1 ENEMA: at 14:00

## 2022-06-18 RX ADMIN — OXYCODONE 5 MG: 5 TABLET ORAL at 18:45

## 2022-06-18 RX ADMIN — HYDROMORPHONE HYDROCHLORIDE 0.5 MG: 1 INJECTION, SOLUTION INTRAMUSCULAR; INTRAVENOUS; SUBCUTANEOUS at 19:38

## 2022-06-18 RX ADMIN — AMLODIPINE BESYLATE 5 MG: 5 TABLET ORAL at 08:33

## 2022-06-18 ASSESSMENT — PAIN SCALES - GENERAL
PAINLEVEL_OUTOF10: 9
PAINLEVEL_OUTOF10: 9
PAINLEVEL_OUTOF10: 10
PAINLEVEL_OUTOF10: 10
PAINLEVEL_OUTOF10: 9
PAINLEVEL_OUTOF10: 10
PAINLEVEL_OUTOF10: 1
PAINLEVEL_OUTOF10: 10

## 2022-06-18 NOTE — PLAN OF CARE
Problem: Nutrition Deficit:  Goal: Optimize nutritional status  Outcome: Progressing   Nutrition Problem #1: Inadequate oral intake  Intervention: Food and/or Nutrient Delivery: Continue Current Diet (oral nutritional supplements not started d/t likely ileus; start these if intakes continue to be poor and pt able to take PO)  Nutritional  Goals: Meet at least 75% of estimated needs,PO intake 50% or greater,by next RD assessment

## 2022-06-18 NOTE — PLAN OF CARE
Problem: Discharge Planning  Goal: Discharge to home or other facility with appropriate resources  Outcome: Progressing     Problem: Pain  Goal: Verbalizes/displays adequate comfort level or baseline comfort level  Outcome: Not Progressing     Problem: Safety - Adult  Goal: Free from fall injury  Outcome: Progressing     Problem: Chronic Conditions and Co-morbidities  Goal: Patient's chronic conditions and co-morbidity symptoms are monitored and maintained or improved  Outcome: Progressing     Problem: Physical Therapy - Adult  Goal: By Discharge: Performs mobility at highest level of function for planned discharge setting. See evaluation for individualized goals.   6/18/2022 0957 by Fabian Damian, PT  Outcome: Progressing

## 2022-06-18 NOTE — PROGRESS NOTES
Stop day #1 patient has significant incisional pain. Lower extremities have good sensation and strength. Stomach is tight no bowel sounds impression postoperative ileus. Give stool softeners. May require an enema. Unable to be discharged at this time. Request hospitalist evaluation and treatment most appreciated.

## 2022-06-18 NOTE — PROGRESS NOTES
Physical Therapy Med Surg Initial Assessment  Facility/Department: [facility]  [id number]       NAME: Scott Elena  : 1975 (52 y.o.)  MRN: [id number]  CODE STATUS: Full Code    Date of Service: 2022    Patient Diagnosis(es): Spinal stenosis of lumbar region, unspecified whether neurogenic claudication present [M48.061]  Spinal stenosis, lumbar region, with neurogenic claudication [M48.062]   No chief complaint on file.     Patient Active Problem List    Diagnosis Date Noted    Spinal stenosis, lumbar region, with neurogenic claudication 2022    Smoker 2022    Postlaminectomy syndrome of lumbar region 2022    Spinal stenosis of lumbar region with neurogenic claudication 2021    Lumbar degenerative disc disease 01/15/2019    Herniation of intervertebral disc between L5 and S1 2019    Psychiatric pseudoseizure     Intractable headache     Essential hypertension     Convulsions ([address]     Status migrainosus 2015    Seizure disorder ([address] 2015    Left-sided weakness 2015    Lumbar (L3-4) stenosis     Asthma     Back pain 2013        Past Medical History:   Diagnosis Date    Arthritis     spine    Asthma     since childhood -- smokes x 30 yrs    Cerebral artery occlusion with cerebral infarction ([address]     per CT scan -- patient without sx,     Hypertension     meds > 3 yrs    Lumbar stenosis     Migraines     starts with neck pain    Seizures ([address]     pt last known seizure May 2022, dx 20 years ago, takes gabapentin for back/seizures    Thyroid disease     meds > 4 yrs -- intermittent    Uterine anomaly      Past Surgical History:   Procedure Laterality Date    ANTERIOR CRUCIATE LIGAMENT REPAIR Left     APPENDECTOMY      at age 21    [facility]GERY  2017    lumbar disc     BACK SURGERY  2018    lumbar fusion    CHOLECYSTECTOMY  2015    COLONOSCOPY      2017    ENDOMETRIAL ABLATION  2006    post op Good  Sitting - Dynamic: Good;Fair  Standing - Static: Good  Standing - Dynamic: Fair                      Tone: Normal    Sensation: Impaired (L LE numb)    Bed mobility  Rolling to Left: Supervision  Supine to Sit: Supervision  Sit to Supine: Supervision  Bed Mobility Comments: Verbal encouragement provided. Cues for exhalation with exertion. Transfers  Sit to Stand: Supervision  Stand to sit: Supervision  Bed to Chair: Supervision  Comment: Slow to complete. Pain limited. Cues for hip hinge and breathing. Ambulation  Surface: level tile  Device: Rolling Walker  Assistance: Supervision  Quality of Gait: Slow to complete. Requires frequent standing rest breaks d/t pain. Encouraged to breathe through pain. Cues for extended posture and core activation. Distance: 15ft X 2  Comments: Further distance limited by pain. Activity Tolerance  Activity Tolerance: Patient limited by pain    Patient Education  Education Given To: Patient  Education Provided: Role of Therapy;Plan of Care  Education Method: Verbal  Education Outcome: Verbalized understanding       ASSESSMENT:   Body Structures, Functions, Activity Limitations Requiring Skilled Therapeutic Intervention: Decreased functional mobility ; Decreased ADL status; Decreased body mechanics; Decreased endurance;Decreased sensation;Decreased coordination; Increased pain  Decision Making: Medium Complexity  History: High  Exam: Med  Clinical Presentation: Med    Therapy Prognosis: Good  Barriers to Learning: none         DISCHARGE RECOMMENDATIONS:  Discharge Recommendations: Continue to assess pending progress,Patient would benefit from continued therapy after discharge    Assessment: Continued PT indicated to progress mobility and facilitate DC at highest level of indep and safety. Anticipate functional improvement with pain control.   Requires PT Follow-Up: Yes      PLAN OF CARE:  Plan  Plan:  (1-2 visits total)  Current Treatment Recommendations: Strengthening,Balance training,Functional mobility training,Transfer training,Endurance training,Gait training,Stair training,Neuromuscular re-education,Manual Therapy - Soft Tissue Mobilization,Pain management,Home exercise program,Safety education & training,Patient/Caregiver education & training,Equipment evaluation, education, & procurement,Modalities,Positioning    Safety Devices  Type of Devices: All fall risk precautions in place    Goals:  Long Term Goals  Long term goal 1: Pt to complete bed mobility with indep utilizing logroll  Long term goal 2: Pt to complete transfers with indep  Long term goal 3: Pt to ambulate 50-150ft with LRD and indep  Long term goal 4: Pt to complete HEP with indep  Long term goal 5: Pt to manage 5 steps with HR and indep    AMPAC (6 CLICK) BASIC MOBILITY  AM-PAC Inpatient Mobility Raw Score : 18     Therapy Time:   Individual   Time In 0906   Time Out 0924   Minutes [address] [place] [address], 06/18/22 at 9:56 AM         Definitions for assistance levels  Independent = pt does not require any physical supervision or assistance from another person for activity completion. Device may be needed.   Stand by assistance = pt requires verbal cues or instructions from another person, close to but not touching, to perform the activity  Minimal assistance= pt performs 75% or more of the activity; assistance is required to complete the activity  Moderate assistance= pt performs 50% of the activity; assistance is required to complete the activity  Maximal assistance = pt performs 25% of the activity; assistance is required to complete the activity  Dependent = pt requires total physical assistance to accomplish the task

## 2022-06-18 NOTE — PROGRESS NOTES
Comprehensive Nutrition Assessment    Type and Reason for Visit:  Initial,Positive Nutrition Screen (14-23 lb wt loss and poor appetite/intake)    Nutrition Recommendations/Plan:   1. Continue regular diet  2. Recommend adding oral nutritional supplements if intakes continue to be poor - awaiting results of abd X-ray before adding  3. Continue to encourage and monitor PO intakes     Malnutrition Assessment:  Malnutrition Status: At risk for malnutrition (Comment) (06/18/22 6315)    Context:  Acute Illness     Findings of the 6 clinical characteristics of malnutrition:  Energy Intake:  Mild decrease in energy intake (Comment) (x2 days per pt)  Weight Loss:  No significant weight loss (none reported by pt or noted in EMR)     Body Fat Loss:  Unable to assess (pt in extreme pain and unable to sit or stand comfortably. Deferred at this time)     Muscle Mass Loss:  Unable to assess (pt in extreme pain and unable to sit or stand comfortably. Deferred at this time)    Fluid Accumulation:  No significant fluid accumulation     Strength:  Not Performed    Nutrition Assessment:    Pt at nutritional risk d/t current poor intakes. Pt states has abdominal pain and reports N/V. No emesis documented in EMR. If intakes do not improve, recommend adding frozen oral nutritional supplement BID and fortified pudding once daily per pt preference. Nutrition Related Findings:    PMHx includes seizures, arthritis, asthma, HTN, migraines, thyroid disease. Pt s/p left bilateral lumbar 3-4 laminectomy microdissection decompression on 6/17/22 with Dr. Taniya Torres. Noted pt with suspected post-op ileus. Abd X-ray pending. Last BM 6/17. Pt given bowel meds and enema 6/18 without BM. Noted no bowel sounds per EMR. Pt reports abdominal pain in addition to N/V. Pt with 2 V8 juices at bedside. States appetite/intake decreased since surgery though usually eats well at home and hasn't noticed any recent wt loss. No edema.  No recent nutrition-related

## 2022-06-18 NOTE — DISCHARGE SUMMARY
[address]                      [address], [address] [facility] SUMMARY    PATIENT NAME: Nadia Lawrence               :        1975  MED REC NO:   [id number]                            ROOM:       [id number]  ACCOUNT NO:   [de-identified]                           ADMIT DATE: 2022  PROVIDER:     Nehal Damon MD                      [facility] DATE: 1305    HOSPITAL COURSE:  2022, left bilateral L3-4 laminectomy,  microdissection, decompression. The patient had prior instrumented  fusion L4, L5, S1. Wound drain in place, removal the next day. Postoperative day #1 patient has ileus. Treated with laxatives stool softeners. Developed increasing pain over the next 2 postoperative days with increasing numbness in the lower extremities. Patient is able to ambulate and did have bowel movement. She felt weak and her legs were rubbery. MRI lumbar 2022 showed L34 epidural hematoma. Patient returned to surgery 2022 for evacuation L34 epidural hematoma. Wound drain removed postoperative day #1. Patient has resolution of much of the pain in her lumbar spine with increased near normal feeling in both lower extremities subjective numbness on the right side. Ambulating with improved strength and having normal bowel movements. Bladder control intact. Significant improvement. Postoperative MRI lumbar spine 2022 shows excellent decompression at L3-4 resolution of the epidural hematoma. Once the patient is ambulatory with bladder and bowel control, plan discharge to home in good condition. DISCHARGE MEDICATIONS:  Percocet 5/325, #28. Procedures  2022 left bilateral L3-4 laminectomy microdissection decompression. 2022 L3-4 removal epidural hematoma    DISCHARGE DIAGNOSES:  1. L3-4 canal stenosis with neurogenic claudication, improved.   2. Postoperative L3-4 epidural hematoma, removed  3. Status post lumbar fusion L4, L5, S1 with instrumentation. DISCHARGE INSTRUCTIONS:  The patient has been instructed to keep the  wound clean and dry about 3 days, avoiding soaking or soap for a week. Recheck in a month. If she has any questions or problems, please  contact the office. Delicia Sanchez MD  Addended 6/22/2022.   D: 06/17/2022 12:49:10       T: 06/17/2022 12:52:54     [email or web address]  Job#: [id number]     Doc#: [id number]    CC:

## 2022-06-18 NOTE — PROGRESS NOTES
Pt states she passed a small amount of gas, but that pain is still 8/10 with dilaudid and Roxicodone. encouraged pt to increase ambulation and explained the importance of trying to wean off IV pain medication.

## 2022-06-18 NOTE — PROGRESS NOTES
Pt given colace, glycolax, milk of mag, dulcolax, and a fleet enema, no BM or ability to pass gas. Pt complaining of a lot of abdominal pain. Dr Kal Wiggins notified     Jaylin canales.     Pt requesting no NG at this time

## 2022-06-19 PROCEDURE — 1210000000 HC MED SURG R&B

## 2022-06-19 PROCEDURE — 6360000002 HC RX W HCPCS: Performed by: NEUROLOGICAL SURGERY

## 2022-06-19 PROCEDURE — 6370000000 HC RX 637 (ALT 250 FOR IP): Performed by: NURSE PRACTITIONER

## 2022-06-19 PROCEDURE — 97116 GAIT TRAINING THERAPY: CPT

## 2022-06-19 PROCEDURE — 2580000003 HC RX 258: Performed by: NEUROLOGICAL SURGERY

## 2022-06-19 PROCEDURE — 6370000000 HC RX 637 (ALT 250 FOR IP): Performed by: NEUROLOGICAL SURGERY

## 2022-06-19 RX ORDER — OXYCODONE HYDROCHLORIDE 5 MG/1
5 TABLET ORAL EVERY 6 HOURS PRN
Status: DISCONTINUED | OUTPATIENT
Start: 2022-06-19 | End: 2022-06-21 | Stop reason: SDUPTHER

## 2022-06-19 RX ADMIN — GABAPENTIN 300 MG: 300 CAPSULE ORAL at 13:32

## 2022-06-19 RX ADMIN — FERROUS SULFATE TAB 325 MG (65 MG ELEMENTAL FE) 325 MG: 325 (65 FE) TAB at 08:49

## 2022-06-19 RX ADMIN — ACETAMINOPHEN 650 MG: 325 TABLET ORAL at 06:55

## 2022-06-19 RX ADMIN — MAGNESIUM HYDROXIDE 30 ML: 400 SUSPENSION ORAL at 08:48

## 2022-06-19 RX ADMIN — HYDROMORPHONE HYDROCHLORIDE 0.5 MG: 1 INJECTION, SOLUTION INTRAMUSCULAR; INTRAVENOUS; SUBCUTANEOUS at 11:44

## 2022-06-19 RX ADMIN — OXYCODONE HYDROCHLORIDE AND ACETAMINOPHEN 500 MG: 500 TABLET ORAL at 08:49

## 2022-06-19 RX ADMIN — HYDROMORPHONE HYDROCHLORIDE 0.25 MG: 1 INJECTION, SOLUTION INTRAMUSCULAR; INTRAVENOUS; SUBCUTANEOUS at 15:02

## 2022-06-19 RX ADMIN — ACETAMINOPHEN 650 MG: 325 TABLET ORAL at 13:32

## 2022-06-19 RX ADMIN — OXYCODONE 5 MG: 5 TABLET ORAL at 23:30

## 2022-06-19 RX ADMIN — GABAPENTIN 300 MG: 300 CAPSULE ORAL at 08:49

## 2022-06-19 RX ADMIN — POLYETHYLENE GLYCOL 3350 17 G: 17 POWDER, FOR SOLUTION ORAL at 06:55

## 2022-06-19 RX ADMIN — HYDROMORPHONE HYDROCHLORIDE 0.5 MG: 1 INJECTION, SOLUTION INTRAMUSCULAR; INTRAVENOUS; SUBCUTANEOUS at 02:10

## 2022-06-19 RX ADMIN — HYDROMORPHONE HYDROCHLORIDE 0.25 MG: 1 INJECTION, SOLUTION INTRAMUSCULAR; INTRAVENOUS; SUBCUTANEOUS at 19:49

## 2022-06-19 RX ADMIN — OXYCODONE 5 MG: 5 TABLET ORAL at 08:54

## 2022-06-19 RX ADMIN — Medication 10 ML: at 08:49

## 2022-06-19 RX ADMIN — AMLODIPINE BESYLATE 5 MG: 5 TABLET ORAL at 08:49

## 2022-06-19 RX ADMIN — DOCUSATE SODIUM 100 MG: 100 CAPSULE, LIQUID FILLED ORAL at 19:48

## 2022-06-19 RX ADMIN — ACETAMINOPHEN 650 MG: 325 TABLET ORAL at 19:48

## 2022-06-19 RX ADMIN — Medication 10 ML: at 19:50

## 2022-06-19 RX ADMIN — BISACODYL 5 MG: 5 TABLET, COATED ORAL at 08:49

## 2022-06-19 RX ADMIN — GABAPENTIN 300 MG: 300 CAPSULE ORAL at 19:48

## 2022-06-19 RX ADMIN — PANTOPRAZOLE SODIUM 40 MG: 40 TABLET, DELAYED RELEASE ORAL at 06:55

## 2022-06-19 RX ADMIN — HYDROMORPHONE HYDROCHLORIDE 0.5 MG: 1 INJECTION, SOLUTION INTRAMUSCULAR; INTRAVENOUS; SUBCUTANEOUS at 06:55

## 2022-06-19 ASSESSMENT — PAIN SCALES - GENERAL
PAINLEVEL_OUTOF10: 9
PAINLEVEL_OUTOF10: 10
PAINLEVEL_OUTOF10: 9
PAINLEVEL_OUTOF10: 8
PAINLEVEL_OUTOF10: 9

## 2022-06-19 ASSESSMENT — PAIN DESCRIPTION - PAIN TYPE: TYPE: ACUTE PAIN;SURGICAL PAIN

## 2022-06-19 ASSESSMENT — PAIN DESCRIPTION - LOCATION: LOCATION: BACK

## 2022-06-19 NOTE — CARE COORDINATION
MET W/PT TO ASSESS NEEDS AND DISCUSS DISCHARGE PLAN. PER PT ACTIVITY THIS MORNING LIMITED BY PAIN. CM TO ASSESS NEEDS. PT RECOMMENDS HOME W/ OP PT. WILL FOLLOW COURSE.

## 2022-06-19 NOTE — PROGRESS NOTES
Patient continues have significant ileus with clearing of the stomach starting to pass some gas no bowel movement. Must reduce amount of narcotic intake as that is contributing to her ileus.

## 2022-06-19 NOTE — PROGRESS NOTES
Hospitalist Daily Progress Note  Name: Faheem Armando  Age: 52 y.o. Gender: female  CodeStatus: Full Code  Allergies: Fentanyl  Prochlorperazine Edisylate  Tylenol [Acetaminophen]  Cortizone  Aspirin  Codeine    Chief Complaint:No chief complaint on file. Primary Care Provider: No primary care provider on file. InpatientTreatment Team: Treatment Team: Attending Provider: Latoya Hernandez MD; Surgeon: Latoya Hernandez MD; Consulting Physician: Holden Nickerson MD; : Lola Kearney, MARY; Registered Nurse: Javier Singh RN    Admission Date: 6/17/2022      Subjective: No chest pain, sob. Complains of nausea, abd discomfort, distension. Physical Exam  Vitals and nursing note reviewed. Constitutional:       Appearance: Normal appearance. Cardiovascular:      Rate and Rhythm: Normal rate and regular rhythm. Pulmonary:      Effort: Pulmonary effort is normal.      Breath sounds: Normal breath sounds. Abdominal:      General: There is distension. Tenderness: There is abdominal tenderness. Musculoskeletal:         General: Normal range of motion. Skin:     General: Skin is warm and dry. Neurological:      Mental Status: She is alert and oriented to person, place, and time. Mental status is at baseline.          Medications:  Reviewed    Infusion Medications:    sodium chloride Stopped (06/18/22 0831)    sodium chloride       Scheduled Medications:    sodium chloride flush  5-40 mL IntraVENous 2 times per day    acetaminophen  650 mg Oral Q6H    bisacodyl  5 mg Oral Daily    gabapentin  300 mg Oral TID    ferrous sulfate  325 mg Oral Daily with breakfast    amLODIPine  5 mg Oral Daily    vitamin C  500 mg Oral Daily    pantoprazole  40 mg Oral QAM AC     PRN Meds: oxyCODONE, HYDROmorphone, fleet, ondansetron, sodium chloride flush, sodium chloride, ondansetron, magnesium hydroxide, polyethylene glycol, docusate sodium    Labs:   No results for input(s): WBC, HGB, HCT, PLT in the last 72 hours. No results for input(s): NA, K, CL, CO2, BUN, CREATININE, CALCIUM, PHOS in the last 72 hours. Invalid input(s): MAGNES  No results for input(s): AST, ALT, BILIDIR, BILITOT, ALKPHOS in the last 72 hours. No results for input(s): INR in the last 72 hours. No results for input(s): Ligia Mould in the last 72 hours. Urinalysis:   Lab Results   Component Value Date    NITRU Negative 01/14/2019    WBCUA 3-5 01/14/2019    BACTERIA MANY 01/14/2019    RBCUA 0-2 01/14/2019    BLOODU Negative 01/14/2019    SPECGRAV 1.027 01/14/2019    GLUCOSEU Negative 01/14/2019       Radiology:   Most recent    Chest CT      WITH CONTRAST:No results found for this or any previous visit. WITHOUT CONTRAST: No results found for this or any previous visit. CXR      2-view: No results found for this or any previous visit. Portable: No results found for this or any previous visit. Echo No results found for this or any previous visit. Assessment/Plan:    Active Hospital Problems    Diagnosis Date Noted    Spinal stenosis, lumbar region, with neurogenic claudication [M48.062] 06/17/2022     Priority: Medium    Postlaminectomy syndrome of lumbar region [M96.1] 05/26/2022     Priority: Medium    Spinal stenosis of lumbar region with neurogenic claudication [M48.062] 03/11/2021    Seizure disorder ([facility] [address] [G40.909] 09/17/2015    Left-sided weakness [R53.1] 09/17/2015    Lumbar (L3-4) stenosis [M48.061]     Back pain [M54.9] 05/08/2013     1. Status post left bilateral lumbar 3-4 laminectomy  microdissection and decompression: Postsurgical management per neurosurgery. 2. Hypertension: Continue Norvasc tomorrow a.m.  3. Seizure disorder (psychogenic seizures): Continue Neurontin  4. Iron deficiency anemia: Continue ferrous sulfate and vitamin C  5. Nicotine dependency: Declines nicotine patch  6.  Constipation/partial ileus  1. 6/18 - on multiple medications, offered anti-emetics and NG tube however patient would like to wait on NG tube until conservative options exhausted.        Electronically signed by Cris Epps MD on 6/19/2022 at 3:31 PM

## 2022-06-19 NOTE — PROGRESS NOTES
Physical Therapy Med Surg Daily Treatment Note  [facility]       NAME: Yola Garibay  : 1975 (52 y.o.)  MRN: [id number]  CODE STATUS: Full Code    Date of Service: 2022    Patient Diagnosis(es): Spinal stenosis of lumbar region, unspecified whether neurogenic claudication present [M48.061]  Spinal stenosis, lumbar region, with neurogenic claudication [M48.062]   No chief complaint on file.     Patient Active Problem List    Diagnosis Date Noted    Spinal stenosis, lumbar region, with neurogenic claudication 2022    Smoker 2022    Postlaminectomy syndrome of lumbar region 2022    Spinal stenosis of lumbar region with neurogenic claudication 2021    Lumbar degenerative disc disease 01/15/2019    Herniation of intervertebral disc between L5 and S1 2019    Psychiatric pseudoseizure     Intractable headache     Essential hypertension     Convulsions ([address]     Status migrainosus 2015    Seizure disorder ([address] 2015    Left-sided weakness 2015    Lumbar (L3-4) stenosis     Asthma     Back pain 2013        Past Medical History:   Diagnosis Date    Arthritis     spine    Asthma     since childhood -- smokes x 30 yrs    Cerebral artery occlusion with cerebral infarction ([address]     per CT scan -- patient without sx,     Hypertension     meds > 3 yrs    Lumbar stenosis     Migraines     starts with neck pain    Seizures ([address]     pt last known seizure May 2022, dx 20 years ago, takes gabapentin for back/seizures    Thyroid disease     meds > 4 yrs -- intermittent    Uterine anomaly      Past Surgical History:   Procedure Laterality Date    ANTERIOR CRUCIATE LIGAMENT REPAIR Left     APPENDECTOMY      at age 21    [facility] SURGERY  2017    lumbar disc     BACK SURGERY  2018    lumbar fusion    CHOLECYSTECTOMY  2015    COLONOSCOPY      2017    ENDOMETRIAL ABLATION  2006    post op sepsis    ENDOSCOPY, COLON, DIAGNOSTIC      2017    KNEE ARTHROSCOPY Left 2004    ACL repair    LUMBAR FUSION N/A 1/15/2019    L4- L5 DECOMPRESSION, L5-S1 POSTERIOR LUMBAR INTERBODY FUSION performed by Nathanael Hawkins MD at [address] [place] [address] Drive N/A 3/11/2021    L4-5 TLIF INSTRUMENTATION AT L5-S1 DECOMPRESSION AT L4, L5 REMOVAL AND REINSERTION OF HARDWARE AT L5-S1 performed by Nathanael Hawkins MD at [address]  5/9/13    duramorph 1.5 mg epideral  6mg celestone    TONSILLECTOMY      as child    TUBAL LIGATION Bilateral 2002       Restrictions:  Restrictions/Precautions: Fall Risk  Position Activity Restriction  Spinal Precautions: Limit excessive/forceful trunk flexion/rotation or lifting >5-10lbs    SUBJECTIVE:     Pain   10/10 in lb achy nsg notified    OBJECTIVE:     Bed mobility  Rolling to Left: Supervision  Supine to Sit: Supervision  Sit to Supine: Supervision         Ambulation  Device: Rolling Walker  Assistance: Supervision  Quality of Gait: slow to complete d/t pain. Distance: 25x2  Comments: Further distance limited by pain. Activity Tolerance  Activity Tolerance: Patient limited by pain       ASSESSMENT   Body Structures, Functions, Activity Limitations Requiring Skilled Therapeutic Intervention: Decreased functional mobility ; Decreased ADL status; Decreased body mechanics; Decreased endurance;Decreased sensation;Decreased coordination; Increased pain  Assessment: Pt with limitation of gait due to pain. Pt able to ambulate further this date.      Discharge Recommendations:  Continue to assess pending progress,Patient would benefit from continued therapy after discharge      Goals  Long Term Goals  Long term goal 1: Pt to complete bed mobility with indep utilizing logroll  Long term goal 2: Pt to complete transfers with indep  Long term goal 3: Pt to ambulate 50-150ft with LRD and indep  Long term goal 4: Pt to complete HEP with indep  Long term goal 5: Pt to manage 5 steps with HR and indep    PLAN    Plan:  (1-2 visits total)  Safety Devices  Type of Devices: All fall risk precautions in place     AMPAC (6 CLICK) BASIC MOBILITY  AM-PAC Inpatient Mobility Raw Score : 19     Therapy Time   Individual   Time In 1025   Time Out 1040   Minutes 15     Gait 12 min  Transfer 3 min    Dick Singh PTA, 06/19/22 at 10:48 AM      Definitions for assistance levels  Independent = pt does not require any physical supervision or assistance from another person for activity completion. Device may be needed.   Stand by assistance = pt requires verbal cues or instructions from another person, close to but not touching, to perform the activity  Minimal assistance= pt performs 75% or more of the activity; assistance is required to complete the activity  Moderate assistance= pt performs 50% of the activity; assistance is required to complete the activity  Maximal assistance = pt performs 25% of the activity; assistance is required to complete the activity  Dependent = pt requires total physical assistance to accomplish the task

## 2022-06-19 NOTE — PROGRESS NOTES
Patient informed dilaudid was discontinued, states \"you need to call dr Marie Treadwell because it is the only thing that is helping me\" Dr Marie Treadwell notified, dilaudid reordered at a lower dose. Instructed the pt she must try and wean off of the IV narcotics today as it is making her ileus worse, patient verbalized understanding. This RN got patient up for multiple walks in the hallway today. Pt states she is starting to pass more gas though no BM yet.

## 2022-06-20 ENCOUNTER — APPOINTMENT (OUTPATIENT)
Dept: MRI IMAGING | Age: 47
DRG: 320 | End: 2022-06-20
Attending: NEUROLOGICAL SURGERY
Payer: MEDICARE

## 2022-06-20 ENCOUNTER — APPOINTMENT (OUTPATIENT)
Dept: GENERAL RADIOLOGY | Age: 47
DRG: 320 | End: 2022-06-20
Attending: NEUROLOGICAL SURGERY
Payer: MEDICARE

## 2022-06-20 PROBLEM — M48.062 NEUROGENIC CLAUDICATION DUE TO LUMBAR SPINAL STENOSIS: Status: ACTIVE | Noted: 2022-06-20

## 2022-06-20 PROCEDURE — 6370000000 HC RX 637 (ALT 250 FOR IP): Performed by: NEUROLOGICAL SURGERY

## 2022-06-20 PROCEDURE — 6370000000 HC RX 637 (ALT 250 FOR IP): Performed by: NURSE PRACTITIONER

## 2022-06-20 PROCEDURE — 97535 SELF CARE MNGMENT TRAINING: CPT

## 2022-06-20 PROCEDURE — 1210000000 HC MED SURG R&B

## 2022-06-20 PROCEDURE — 6360000002 HC RX W HCPCS: Performed by: NEUROLOGICAL SURGERY

## 2022-06-20 PROCEDURE — 72100 X-RAY EXAM L-S SPINE 2/3 VWS: CPT

## 2022-06-20 PROCEDURE — 2580000003 HC RX 258: Performed by: NEUROLOGICAL SURGERY

## 2022-06-20 PROCEDURE — 72148 MRI LUMBAR SPINE W/O DYE: CPT

## 2022-06-20 PROCEDURE — 97116 GAIT TRAINING THERAPY: CPT

## 2022-06-20 RX ADMIN — HYDROMORPHONE HYDROCHLORIDE 0.25 MG: 1 INJECTION, SOLUTION INTRAMUSCULAR; INTRAVENOUS; SUBCUTANEOUS at 04:32

## 2022-06-20 RX ADMIN — OXYCODONE 5 MG: 5 TABLET ORAL at 20:39

## 2022-06-20 RX ADMIN — HYDROMORPHONE HYDROCHLORIDE 0.25 MG: 1 INJECTION, SOLUTION INTRAMUSCULAR; INTRAVENOUS; SUBCUTANEOUS at 22:37

## 2022-06-20 RX ADMIN — MAGNESIUM HYDROXIDE 30 ML: 400 SUSPENSION ORAL at 09:27

## 2022-06-20 RX ADMIN — HYDROMORPHONE HYDROCHLORIDE 0.25 MG: 1 INJECTION, SOLUTION INTRAMUSCULAR; INTRAVENOUS; SUBCUTANEOUS at 00:35

## 2022-06-20 RX ADMIN — AMLODIPINE BESYLATE 5 MG: 5 TABLET ORAL at 09:26

## 2022-06-20 RX ADMIN — ACETAMINOPHEN 650 MG: 325 TABLET ORAL at 09:26

## 2022-06-20 RX ADMIN — FERROUS SULFATE TAB 325 MG (65 MG ELEMENTAL FE) 325 MG: 325 (65 FE) TAB at 09:25

## 2022-06-20 RX ADMIN — GABAPENTIN 300 MG: 300 CAPSULE ORAL at 09:25

## 2022-06-20 RX ADMIN — BISACODYL 5 MG: 5 TABLET, COATED ORAL at 09:25

## 2022-06-20 RX ADMIN — HYDROMORPHONE HYDROCHLORIDE 0.25 MG: 1 INJECTION, SOLUTION INTRAMUSCULAR; INTRAVENOUS; SUBCUTANEOUS at 18:11

## 2022-06-20 RX ADMIN — POLYETHYLENE GLYCOL 3350 17 G: 17 POWDER, FOR SOLUTION ORAL at 09:27

## 2022-06-20 RX ADMIN — Medication 10 ML: at 09:26

## 2022-06-20 RX ADMIN — GABAPENTIN 300 MG: 300 CAPSULE ORAL at 20:38

## 2022-06-20 RX ADMIN — HYDROMORPHONE HYDROCHLORIDE 0.25 MG: 1 INJECTION, SOLUTION INTRAMUSCULAR; INTRAVENOUS; SUBCUTANEOUS at 13:04

## 2022-06-20 RX ADMIN — OXYCODONE HYDROCHLORIDE AND ACETAMINOPHEN 500 MG: 500 TABLET ORAL at 09:26

## 2022-06-20 RX ADMIN — PANTOPRAZOLE SODIUM 40 MG: 40 TABLET, DELAYED RELEASE ORAL at 06:21

## 2022-06-20 RX ADMIN — GABAPENTIN 300 MG: 300 CAPSULE ORAL at 13:06

## 2022-06-20 RX ADMIN — DOCUSATE SODIUM 100 MG: 100 CAPSULE, LIQUID FILLED ORAL at 09:27

## 2022-06-20 RX ADMIN — ACETAMINOPHEN 650 MG: 325 TABLET ORAL at 03:58

## 2022-06-20 RX ADMIN — Medication 10 ML: at 20:39

## 2022-06-20 RX ADMIN — ACETAMINOPHEN 650 MG: 325 TABLET ORAL at 20:38

## 2022-06-20 RX ADMIN — OXYCODONE 5 MG: 5 TABLET ORAL at 09:44

## 2022-06-20 ASSESSMENT — PAIN SCALES - GENERAL
PAINLEVEL_OUTOF10: 10
PAINLEVEL_OUTOF10: 9
PAINLEVEL_OUTOF10: 8
PAINLEVEL_OUTOF10: 6
PAINLEVEL_OUTOF10: 10
PAINLEVEL_OUTOF10: 8
PAINLEVEL_OUTOF10: 6
PAINLEVEL_OUTOF10: 10

## 2022-06-20 NOTE — PROGRESS NOTES
Hospitalist Daily Progress Note  Name: Renee Trejo  Age: 52 y.o. Gender: female  CodeStatus: Full Code  Allergies: Fentanyl  Prochlorperazine Edisylate  Tylenol [Acetaminophen]  Cortizone  Aspirin  Codeine    Chief Complaint:No chief complaint on file. Primary Care Provider: No primary care provider on file. InpatientTreatment Team: Treatment Team: Attending Provider: Char Daley MD; Surgeon: Char Daley MD; Consulting Physician: Mary Casey MD; : Matteo Larry, MARY; Registered Nurse: Maire Kocher, RN; Registered Nurse: Thompson Altamirano RN    Admission Date: 6/17/2022      Subjective: No chest pain, sob. Complains of nausea, abd discomfort, distension. Passing minimal gas but no bm. Physical Exam  Vitals and nursing note reviewed. Constitutional:       Appearance: Normal appearance. Cardiovascular:      Rate and Rhythm: Normal rate and regular rhythm. Pulmonary:      Effort: Pulmonary effort is normal.      Breath sounds: Normal breath sounds. Abdominal:      General: There is distension. Tenderness: There is abdominal tenderness. Musculoskeletal:         General: Normal range of motion. Skin:     General: Skin is warm and dry. Neurological:      Mental Status: She is alert and oriented to person, place, and time. Mental status is at baseline.          Medications:  Reviewed    Infusion Medications:    sodium chloride Stopped (06/18/22 0831)    sodium chloride       Scheduled Medications:    sodium chloride flush  5-40 mL IntraVENous 2 times per day    acetaminophen  650 mg Oral Q6H    bisacodyl  5 mg Oral Daily    gabapentin  300 mg Oral TID    ferrous sulfate  325 mg Oral Daily with breakfast    amLODIPine  5 mg Oral Daily    vitamin C  500 mg Oral Daily    pantoprazole  40 mg Oral QAM AC     PRN Meds: oxyCODONE, HYDROmorphone, fleet, ondansetron, sodium chloride flush, sodium chloride, ondansetron, magnesium hydroxide, polyethylene glycol, docusate sodium    Labs:   No results for input(s): WBC, HGB, HCT, PLT in the last 72 hours. No results for input(s): NA, K, CL, CO2, BUN, CREATININE, CALCIUM, PHOS in the last 72 hours. Invalid input(s): MAGNES  No results for input(s): AST, ALT, BILIDIR, BILITOT, ALKPHOS in the last 72 hours. No results for input(s): INR in the last 72 hours. No results for input(s): Lynette Ill in the last 72 hours. Urinalysis:   Lab Results   Component Value Date    NITRU Negative 01/14/2019    WBCUA 3-5 01/14/2019    BACTERIA MANY 01/14/2019    RBCUA 0-2 01/14/2019    BLOODU Negative 01/14/2019    SPECGRAV 1.027 01/14/2019    GLUCOSEU Negative 01/14/2019       Radiology:   Most recent    Chest CT      WITH CONTRAST:No results found for this or any previous visit. WITHOUT CONTRAST: No results found for this or any previous visit. CXR      2-view: No results found for this or any previous visit. Portable: No results found for this or any previous visit. Echo No results found for this or any previous visit. Assessment/Plan:    Active Hospital Problems    Diagnosis Date Noted    Spinal stenosis, lumbar region, with neurogenic claudication [M48.062] 06/17/2022     Priority: Medium    Postlaminectomy syndrome of lumbar region [M96.1] 05/26/2022     Priority: Medium    Spinal stenosis of lumbar region with neurogenic claudication [M48.062] 03/11/2021    Seizure disorder ([facility] [address] [G40.909] 09/17/2015    Left-sided weakness [R53.1] 09/17/2015    Lumbar (L3-4) stenosis [M48.061]     Back pain [M54.9] 05/08/2013     1. Status post left bilateral lumbar 3-4 laminectomy  microdissection and decompression: Postsurgical management per neurosurgery. 2. Hypertension: Continue Norvasc tomorrow a.m.  3. Seizure disorder (psychogenic seizures): Continue Neurontin  4. Iron deficiency anemia: Continue ferrous sulfate and vitamin C  5.  Nicotine dependency: Declines nicotine patch  6. Constipation/partial ileus  1. 6/18 - on multiple medications, offered anti-emetics and NG tube however patient would like to wait on NG tube until conservative options exhausted. 2. 6/19 - cont miralax, likely BM soon with flatus. Dilaudid dose decreased per Dr Jordan Torrez.          Electronically signed by Magali Panda MD on 6/20/2022 at 1:10 AM

## 2022-06-20 NOTE — PROGRESS NOTES
Physical Therapy Med Surg Daily Treatment Note  [facility]  [facility]       NAME: Bob Paredes  : 1975 (52 y.o.)  MRN: [id number]  CODE STATUS: Full Code    Date of Service: 2022    Patient Diagnosis(es): Spinal stenosis of lumbar region, unspecified whether neurogenic claudication present [M48.061]  Spinal stenosis, lumbar region, with neurogenic claudication [M48.062]   No chief complaint on file.     Patient Active Problem List    Diagnosis Date Noted    Spinal stenosis, lumbar region, with neurogenic claudication 2022    Smoker 2022    Postlaminectomy syndrome of lumbar region 2022    Spinal stenosis of lumbar region with neurogenic claudication 2021    Lumbar degenerative disc disease 01/15/2019    Herniation of intervertebral disc between L5 and S1 2019    Psychiatric pseudoseizure     Intractable headache     Essential hypertension     Convulsions ([address]     Status migrainosus 2015    Seizure disorder ([address] 2015    Left-sided weakness 2015    Lumbar (L3-4) stenosis     Asthma     Back pain 2013        Past Medical History:   Diagnosis Date    Arthritis     spine    Asthma     since childhood -- smokes x 30 yrs    Cerebral artery occlusion with cerebral infarction ([address]     per CT scan -- patient without sx,     Hypertension     meds > 3 yrs    Lumbar stenosis     Migraines     starts with neck pain    Seizures ([address]     pt last known seizure May 2022, dx 20 years ago, takes gabapentin for back/seizures    Thyroid disease     meds > 4 yrs -- intermittent    Uterine anomaly      Past Surgical History:   Procedure Laterality Date    ANTERIOR CRUCIATE LIGAMENT REPAIR Left     APPENDECTOMY      at age 21    [address] [facility] SURGERY  2017    lumbar disc     BACK SURGERY  2018    lumbar fusion    CHOLECYSTECTOMY  2015    COLONOSCOPY      2017    ENDOMETRIAL ABLATION  2006    post op sepsis    ENDOSCOPY, COLON, DIAGNOSTIC      2017    KNEE ARTHROSCOPY Left 2004    ACL repair    LAMINECTOMY N/A 6/17/2022    LEFT BILATERAL L3-4 LAMINECTOMY MICRODISSECTION DECOMPRESSION performed by Arden Keating MD at [facility] N/A 1/15/2019    L4- L5 DECOMPRESSION, L5-S1 POSTERIOR LUMBAR INTERBODY FUSION performed by Dalia Mendez MD at [address] [facility] 3/11/2021    L4-5 TLIF INSTRUMENTATION AT L5-S1 DECOMPRESSION AT L4, L5 REMOVAL AND REINSERTION OF HARDWARE AT L5-S1 performed by Dalia Mendez MD at . [address]  5/9/13    duramorph 1.5 mg epideral  6mg celestone    TONSILLECTOMY      as child    TUBAL LIGATION Bilateral 2002     Restrictions:  Restrictions/Precautions: Fall Risk  Position Activity Restriction  Spinal Precautions: Limit excessive/forceful trunk flexion/rotation or lifting >5-10lbs    SUBJECTIVE:   Subjective: \"I can go home if I can go to the bathroom\"    Pain  Pain: 10/10 pre and post session pain. RN notified. OBJECTIVE:        Bed Mobility Training  Bed Mobility Training: Yes  Overall Level of Assistance: Independent  Supine to Sit: Independent  Sit to Supine: Independent    Transfer Training  Transfer Training: Yes  Sit to Stand: Supervision;Modified independent (Heavy reliance on BUE to push up to stand)  Stand to Sit: Independent  Bed to Chair: Independent    Gait Training: Yes  Overall Level of Assistance: Modified independent  Distance (ft): 25 Feet (x 2)  Assistive Device: Walker, rolling  [place] - Level of Assistance: Contact-guard assistance    Stairs - Level of Assistance: Modified independent  Number of Stairs Trained: 4    Wheelchair Management  Wheelchair Management: No    PT Exercises  Exercise Treatment: Reviewed seated and supine HEP: Patient states she has completed the exercises  previously.  Discussed frequency and reps with patient     ASSESSMENT   Assessment: Patient limited by elevated pain levels requiring increased time and effort for all mobility. Good safety noted with transfers bed mobility and gait. CGA for stair negotiation  due to patient stating her legs give out once in a while     Discharge Recommendations:  Continue to assess pending progress,Patient would benefit from continued therapy after discharge    Goals  Long Term Goals  Long term goal 1: Pt to complete bed mobility with indep utilizing logroll  Long term goal 2: Pt to complete transfers with indep  Long term goal 3: Pt to ambulate 50-150ft with LRD and indep  Long term goal 4: Pt to complete HEP with indep  Long term goal 5: Pt to manage 5 steps with HR and indep    PLAN    Plan:  (1-2 visits total)        [facility] (6 CLICK) BASIC MOBILITY  AM-PAC Inpatient Mobility Raw Score : 22     Therapy Time   Individual   Time In 0900   Time Out 0925   Minutes 25      Gait 15  BM/ Transfers 10  [facility] [place], [place], 06/20/22 at 9:43 AM         Definitions for assistance levels  Independent = pt does not require any physical supervision or assistance from another person for activity completion. Device may be needed.   Stand by assistance = pt requires verbal cues or instructions from another person, close to but not touching, to perform the activity  Minimal assistance= pt performs 75% or more of the activity; assistance is required to complete the activity  Moderate assistance= pt performs 50% of the activity; assistance is required to complete the activity  Maximal assistance = pt performs 25% of the activity; assistance is required to complete the activity  Dependent = pt requires total physical assistance to accomplish the task

## 2022-06-20 NOTE — CARE COORDINATION
PATIENT PLANS TO RETURN HOME W/ NO NEEDS. PER NURSING PATIENT NEEDS TO HAVE A BM PRIOR TO D/C. CM TO FOLLOW FOR ANY NEW D/C NEEDS OR CHANGES TO THE D/C PLAN. PATIENT TO HAVE OUTPT PT WILL NEED TO BE ORDERED PRIOR TO D/C. NURSING AWARE.

## 2022-06-21 ENCOUNTER — APPOINTMENT (OUTPATIENT)
Dept: GENERAL RADIOLOGY | Age: 47
DRG: 320 | End: 2022-06-21
Attending: NEUROLOGICAL SURGERY
Payer: MEDICARE

## 2022-06-21 ENCOUNTER — ANESTHESIA (OUTPATIENT)
Dept: OPERATING ROOM | Age: 47
DRG: 320 | End: 2022-06-21
Payer: MEDICARE

## 2022-06-21 ENCOUNTER — ANESTHESIA EVENT (OUTPATIENT)
Dept: OPERATING ROOM | Age: 47
DRG: 320 | End: 2022-06-21
Payer: MEDICARE

## 2022-06-21 PROBLEM — S06.4XAA EPIDURAL HEMATOMA (HCC): Status: ACTIVE | Noted: 2022-06-21

## 2022-06-21 PROCEDURE — 2500000003 HC RX 250 WO HCPCS: Performed by: NEUROLOGICAL SURGERY

## 2022-06-21 PROCEDURE — 7100000001 HC PACU RECOVERY - ADDTL 15 MIN: Performed by: NEUROLOGICAL SURGERY

## 2022-06-21 PROCEDURE — 6360000002 HC RX W HCPCS: Performed by: STUDENT IN AN ORGANIZED HEALTH CARE EDUCATION/TRAINING PROGRAM

## 2022-06-21 PROCEDURE — 2709999900 HC NON-CHARGEABLE SUPPLY: Performed by: NEUROLOGICAL SURGERY

## 2022-06-21 PROCEDURE — 3600000014 HC SURGERY LEVEL 4 ADDTL 15MIN: Performed by: NEUROLOGICAL SURGERY

## 2022-06-21 PROCEDURE — 6370000000 HC RX 637 (ALT 250 FOR IP): Performed by: NEUROLOGICAL SURGERY

## 2022-06-21 PROCEDURE — 2580000003 HC RX 258: Performed by: NEUROLOGICAL SURGERY

## 2022-06-21 PROCEDURE — 7100000000 HC PACU RECOVERY - FIRST 15 MIN: Performed by: NEUROLOGICAL SURGERY

## 2022-06-21 PROCEDURE — 00CU0ZZ EXTIRPATION OF MATTER FROM SPINAL CANAL, OPEN APPROACH: ICD-10-PCS | Performed by: NEUROLOGICAL SURGERY

## 2022-06-21 PROCEDURE — 3600000004 HC SURGERY LEVEL 4 BASE: Performed by: NEUROLOGICAL SURGERY

## 2022-06-21 PROCEDURE — 63267 EXCISE INTRSPINL LESION LMBR: CPT | Performed by: NEUROLOGICAL SURGERY

## 2022-06-21 PROCEDURE — 94664 DEMO&/EVAL PT USE INHALER: CPT

## 2022-06-21 PROCEDURE — 1210000000 HC MED SURG R&B

## 2022-06-21 PROCEDURE — 6370000000 HC RX 637 (ALT 250 FOR IP): Performed by: NURSE PRACTITIONER

## 2022-06-21 PROCEDURE — 2720000010 HC SURG SUPPLY STERILE: Performed by: NEUROLOGICAL SURGERY

## 2022-06-21 PROCEDURE — 6360000002 HC RX W HCPCS: Performed by: NEUROLOGICAL SURGERY

## 2022-06-21 PROCEDURE — 2580000003 HC RX 258: Performed by: STUDENT IN AN ORGANIZED HEALTH CARE EDUCATION/TRAINING PROGRAM

## 2022-06-21 PROCEDURE — 2500000003 HC RX 250 WO HCPCS: Performed by: STUDENT IN AN ORGANIZED HEALTH CARE EDUCATION/TRAINING PROGRAM

## 2022-06-21 PROCEDURE — A4217 STERILE WATER/SALINE, 500 ML: HCPCS | Performed by: NEUROLOGICAL SURGERY

## 2022-06-21 PROCEDURE — 3700000001 HC ADD 15 MINUTES (ANESTHESIA): Performed by: NEUROLOGICAL SURGERY

## 2022-06-21 PROCEDURE — 3700000000 HC ANESTHESIA ATTENDED CARE: Performed by: NEUROLOGICAL SURGERY

## 2022-06-21 PROCEDURE — 2580000003 HC RX 258

## 2022-06-21 RX ORDER — OXYCODONE HYDROCHLORIDE 5 MG/1
5 TABLET ORAL EVERY 4 HOURS PRN
Status: DISCONTINUED | OUTPATIENT
Start: 2022-06-21 | End: 2022-06-23 | Stop reason: HOSPADM

## 2022-06-21 RX ORDER — MEPERIDINE HYDROCHLORIDE 25 MG/ML
12.5 INJECTION INTRAMUSCULAR; INTRAVENOUS; SUBCUTANEOUS EVERY 5 MIN PRN
Status: DISCONTINUED | OUTPATIENT
Start: 2022-06-21 | End: 2022-06-21 | Stop reason: HOSPADM

## 2022-06-21 RX ORDER — ALBUTEROL SULFATE 90 UG/1
2 AEROSOL, METERED RESPIRATORY (INHALATION) 4 TIMES DAILY
Status: DISCONTINUED | OUTPATIENT
Start: 2022-06-21 | End: 2022-06-21

## 2022-06-21 RX ORDER — LABETALOL HYDROCHLORIDE 5 MG/ML
10 INJECTION, SOLUTION INTRAVENOUS
Status: DISCONTINUED | OUTPATIENT
Start: 2022-06-21 | End: 2022-06-21 | Stop reason: HOSPADM

## 2022-06-21 RX ORDER — MAGNESIUM HYDROXIDE 1200 MG/15ML
LIQUID ORAL CONTINUOUS PRN
Status: DISCONTINUED | OUTPATIENT
Start: 2022-06-21 | End: 2022-06-21 | Stop reason: HOSPADM

## 2022-06-21 RX ORDER — SODIUM CHLORIDE 9 MG/ML
INJECTION, SOLUTION INTRAVENOUS PRN
Status: DISCONTINUED | OUTPATIENT
Start: 2022-06-21 | End: 2022-06-23 | Stop reason: HOSPADM

## 2022-06-21 RX ORDER — POLYETHYLENE GLYCOL 3350 17 G/17G
17 POWDER, FOR SOLUTION ORAL DAILY PRN
Status: DISCONTINUED | OUTPATIENT
Start: 2022-06-21 | End: 2022-06-21 | Stop reason: SDUPTHER

## 2022-06-21 RX ORDER — OXYCODONE HYDROCHLORIDE 5 MG/1
5 TABLET ORAL EVERY 6 HOURS PRN
Status: DISCONTINUED | OUTPATIENT
Start: 2022-06-21 | End: 2022-06-23 | Stop reason: HOSPADM

## 2022-06-21 RX ORDER — OXYCODONE HYDROCHLORIDE 5 MG/1
10 TABLET ORAL PRN
Status: DISCONTINUED | OUTPATIENT
Start: 2022-06-21 | End: 2022-06-21 | Stop reason: HOSPADM

## 2022-06-21 RX ORDER — ACETAMINOPHEN 325 MG/1
650 TABLET ORAL EVERY 6 HOURS
Status: DISCONTINUED | OUTPATIENT
Start: 2022-06-21 | End: 2022-06-21 | Stop reason: SDUPTHER

## 2022-06-21 RX ORDER — VANCOMYCIN HYDROCHLORIDE 1 G/20ML
INJECTION, POWDER, LYOPHILIZED, FOR SOLUTION INTRAVENOUS PRN
Status: DISCONTINUED | OUTPATIENT
Start: 2022-06-21 | End: 2022-06-21 | Stop reason: HOSPADM

## 2022-06-21 RX ORDER — SODIUM CHLORIDE 0.9 % (FLUSH) 0.9 %
5-40 SYRINGE (ML) INJECTION EVERY 12 HOURS SCHEDULED
Status: DISCONTINUED | OUTPATIENT
Start: 2022-06-21 | End: 2022-06-23 | Stop reason: HOSPADM

## 2022-06-21 RX ORDER — SODIUM CHLORIDE, SODIUM LACTATE, POTASSIUM CHLORIDE, CALCIUM CHLORIDE 600; 310; 30; 20 MG/100ML; MG/100ML; MG/100ML; MG/100ML
INJECTION, SOLUTION INTRAVENOUS
Status: COMPLETED
Start: 2022-06-21 | End: 2022-06-21

## 2022-06-21 RX ORDER — ALBUTEROL SULFATE 90 UG/1
2 AEROSOL, METERED RESPIRATORY (INHALATION) EVERY 4 HOURS PRN
Status: DISCONTINUED | OUTPATIENT
Start: 2022-06-21 | End: 2022-06-23 | Stop reason: HOSPADM

## 2022-06-21 RX ORDER — ONDANSETRON 2 MG/ML
4 INJECTION INTRAMUSCULAR; INTRAVENOUS EVERY 6 HOURS PRN
Status: DISCONTINUED | OUTPATIENT
Start: 2022-06-21 | End: 2022-06-21 | Stop reason: SDUPTHER

## 2022-06-21 RX ORDER — OXYCODONE HYDROCHLORIDE AND ACETAMINOPHEN 5; 325 MG/1; MG/1
1 TABLET ORAL EVERY 6 HOURS PRN
Status: DISCONTINUED | OUTPATIENT
Start: 2022-06-21 | End: 2022-06-23 | Stop reason: HOSPADM

## 2022-06-21 RX ORDER — SODIUM CHLORIDE, SODIUM LACTATE, POTASSIUM CHLORIDE, CALCIUM CHLORIDE 600; 310; 30; 20 MG/100ML; MG/100ML; MG/100ML; MG/100ML
INJECTION, SOLUTION INTRAVENOUS CONTINUOUS PRN
Status: DISCONTINUED | OUTPATIENT
Start: 2022-06-21 | End: 2022-06-21 | Stop reason: SDUPTHER

## 2022-06-21 RX ORDER — HYDRALAZINE HYDROCHLORIDE 20 MG/ML
10 INJECTION INTRAMUSCULAR; INTRAVENOUS
Status: DISCONTINUED | OUTPATIENT
Start: 2022-06-21 | End: 2022-06-21 | Stop reason: HOSPADM

## 2022-06-21 RX ORDER — SODIUM CHLORIDE 9 MG/ML
25 INJECTION, SOLUTION INTRAVENOUS PRN
Status: DISCONTINUED | OUTPATIENT
Start: 2022-06-21 | End: 2022-06-21 | Stop reason: HOSPADM

## 2022-06-21 RX ORDER — DIPHENHYDRAMINE HYDROCHLORIDE 50 MG/ML
12.5 INJECTION INTRAMUSCULAR; INTRAVENOUS
Status: DISCONTINUED | OUTPATIENT
Start: 2022-06-21 | End: 2022-06-21 | Stop reason: HOSPADM

## 2022-06-21 RX ORDER — OXYCODONE HYDROCHLORIDE AND ACETAMINOPHEN 5; 325 MG/1; MG/1
1 TABLET ORAL EVERY 6 HOURS PRN
Status: DISCONTINUED | OUTPATIENT
Start: 2022-06-21 | End: 2022-06-21 | Stop reason: SDUPTHER

## 2022-06-21 RX ORDER — OXYCODONE HYDROCHLORIDE 5 MG/1
5 TABLET ORAL PRN
Status: DISCONTINUED | OUTPATIENT
Start: 2022-06-21 | End: 2022-06-21 | Stop reason: HOSPADM

## 2022-06-21 RX ORDER — ONDANSETRON 4 MG/1
4 TABLET, FILM COATED ORAL EVERY 8 HOURS PRN
Status: DISCONTINUED | OUTPATIENT
Start: 2022-06-21 | End: 2022-06-23 | Stop reason: HOSPADM

## 2022-06-21 RX ORDER — SODIUM CHLORIDE, SODIUM LACTATE, POTASSIUM CHLORIDE, CALCIUM CHLORIDE 600; 310; 30; 20 MG/100ML; MG/100ML; MG/100ML; MG/100ML
INJECTION, SOLUTION INTRAVENOUS CONTINUOUS
Status: DISCONTINUED | OUTPATIENT
Start: 2022-06-21 | End: 2022-06-21 | Stop reason: HOSPADM

## 2022-06-21 RX ORDER — UBIDECARENONE 75 MG
50 CAPSULE ORAL DAILY
Status: DISCONTINUED | OUTPATIENT
Start: 2022-06-22 | End: 2022-06-23 | Stop reason: HOSPADM

## 2022-06-21 RX ORDER — ROCURONIUM BROMIDE 10 MG/ML
INJECTION, SOLUTION INTRAVENOUS PRN
Status: DISCONTINUED | OUTPATIENT
Start: 2022-06-21 | End: 2022-06-21 | Stop reason: SDUPTHER

## 2022-06-21 RX ORDER — MIDAZOLAM HYDROCHLORIDE 1 MG/ML
INJECTION INTRAMUSCULAR; INTRAVENOUS PRN
Status: DISCONTINUED | OUTPATIENT
Start: 2022-06-21 | End: 2022-06-21 | Stop reason: SDUPTHER

## 2022-06-21 RX ORDER — ONDANSETRON 2 MG/ML
INJECTION INTRAMUSCULAR; INTRAVENOUS PRN
Status: DISCONTINUED | OUTPATIENT
Start: 2022-06-21 | End: 2022-06-21 | Stop reason: SDUPTHER

## 2022-06-21 RX ORDER — SODIUM CHLORIDE 0.9 % (FLUSH) 0.9 %
5-40 SYRINGE (ML) INJECTION EVERY 12 HOURS SCHEDULED
Status: DISCONTINUED | OUTPATIENT
Start: 2022-06-21 | End: 2022-06-21 | Stop reason: HOSPADM

## 2022-06-21 RX ORDER — MIDAZOLAM HYDROCHLORIDE 1 MG/ML
INJECTION INTRAMUSCULAR; INTRAVENOUS
Status: COMPLETED
Start: 2022-06-21 | End: 2022-06-21

## 2022-06-21 RX ORDER — SODIUM CHLORIDE 9 MG/ML
INJECTION, SOLUTION INTRAVENOUS CONTINUOUS
Status: DISCONTINUED | OUTPATIENT
Start: 2022-06-21 | End: 2022-06-23 | Stop reason: HOSPADM

## 2022-06-21 RX ORDER — TIZANIDINE 4 MG/1
4 TABLET ORAL EVERY 6 HOURS PRN
Status: DISCONTINUED | OUTPATIENT
Start: 2022-06-21 | End: 2022-06-23 | Stop reason: HOSPADM

## 2022-06-21 RX ORDER — PROPOFOL 10 MG/ML
INJECTION, EMULSION INTRAVENOUS PRN
Status: DISCONTINUED | OUTPATIENT
Start: 2022-06-21 | End: 2022-06-21 | Stop reason: SDUPTHER

## 2022-06-21 RX ORDER — SODIUM CHLORIDE 0.9 % (FLUSH) 0.9 %
5-40 SYRINGE (ML) INJECTION PRN
Status: DISCONTINUED | OUTPATIENT
Start: 2022-06-21 | End: 2022-06-21 | Stop reason: HOSPADM

## 2022-06-21 RX ORDER — LIDOCAINE HYDROCHLORIDE 10 MG/ML
INJECTION, SOLUTION EPIDURAL; INFILTRATION; INTRACAUDAL; PERINEURAL PRN
Status: DISCONTINUED | OUTPATIENT
Start: 2022-06-21 | End: 2022-06-21 | Stop reason: SDUPTHER

## 2022-06-21 RX ORDER — ONDANSETRON 2 MG/ML
4 INJECTION INTRAMUSCULAR; INTRAVENOUS
Status: DISCONTINUED | OUTPATIENT
Start: 2022-06-21 | End: 2022-06-21 | Stop reason: HOSPADM

## 2022-06-21 RX ORDER — SODIUM CHLORIDE, SODIUM LACTATE, POTASSIUM CHLORIDE, CALCIUM CHLORIDE 600; 310; 30; 20 MG/100ML; MG/100ML; MG/100ML; MG/100ML
INJECTION, SOLUTION INTRAVENOUS CONTINUOUS
Status: DISCONTINUED | OUTPATIENT
Start: 2022-06-21 | End: 2022-06-23 | Stop reason: HOSPADM

## 2022-06-21 RX ORDER — SODIUM CHLORIDE 0.9 % (FLUSH) 0.9 %
5-40 SYRINGE (ML) INJECTION PRN
Status: DISCONTINUED | OUTPATIENT
Start: 2022-06-21 | End: 2022-06-23 | Stop reason: HOSPADM

## 2022-06-21 RX ADMIN — SUGAMMADEX 200 MG: 100 INJECTION, SOLUTION INTRAVENOUS at 18:50

## 2022-06-21 RX ADMIN — PROPOFOL 150 MG: 10 INJECTION, EMULSION INTRAVENOUS at 17:19

## 2022-06-21 RX ADMIN — SODIUM CHLORIDE, POTASSIUM CHLORIDE, SODIUM LACTATE AND CALCIUM CHLORIDE: 600; 310; 30; 20 INJECTION, SOLUTION INTRAVENOUS at 20:38

## 2022-06-21 RX ADMIN — Medication 10 ML: at 20:44

## 2022-06-21 RX ADMIN — SODIUM CHLORIDE, POTASSIUM CHLORIDE, SODIUM LACTATE AND CALCIUM CHLORIDE: 600; 310; 30; 20 INJECTION, SOLUTION INTRAVENOUS at 15:55

## 2022-06-21 RX ADMIN — SODIUM CHLORIDE, SODIUM LACTATE, POTASSIUM CHLORIDE, CALCIUM CHLORIDE: 600; 310; 30; 20 INJECTION, SOLUTION INTRAVENOUS at 15:55

## 2022-06-21 RX ADMIN — GABAPENTIN 300 MG: 300 CAPSULE ORAL at 20:43

## 2022-06-21 RX ADMIN — PANTOPRAZOLE SODIUM 40 MG: 40 TABLET, DELAYED RELEASE ORAL at 06:43

## 2022-06-21 RX ADMIN — HYDROMORPHONE HYDROCHLORIDE 0.25 MG: 1 INJECTION, SOLUTION INTRAMUSCULAR; INTRAVENOUS; SUBCUTANEOUS at 02:36

## 2022-06-21 RX ADMIN — ACETAMINOPHEN 650 MG: 325 TABLET ORAL at 20:43

## 2022-06-21 RX ADMIN — CEFAZOLIN SODIUM 2000 MG: 10 INJECTION, POWDER, FOR SOLUTION INTRAVENOUS at 17:29

## 2022-06-21 RX ADMIN — MIDAZOLAM HYDROCHLORIDE 2 MG: 1 INJECTION, SOLUTION INTRAMUSCULAR; INTRAVENOUS at 17:13

## 2022-06-21 RX ADMIN — ROCURONIUM BROMIDE 60 MG: 10 INJECTION INTRAVENOUS at 17:19

## 2022-06-21 RX ADMIN — HYDROMORPHONE HYDROCHLORIDE 0.5 MG: 1 INJECTION, SOLUTION INTRAMUSCULAR; INTRAVENOUS; SUBCUTANEOUS at 19:17

## 2022-06-21 RX ADMIN — HYDROMORPHONE HYDROCHLORIDE 0.5 MG: 1 INJECTION, SOLUTION INTRAMUSCULAR; INTRAVENOUS; SUBCUTANEOUS at 19:39

## 2022-06-21 RX ADMIN — HYDROMORPHONE HYDROCHLORIDE 0.25 MG: 1 INJECTION, SOLUTION INTRAMUSCULAR; INTRAVENOUS; SUBCUTANEOUS at 10:56

## 2022-06-21 RX ADMIN — HYDROMORPHONE HYDROCHLORIDE 0.25 MG: 1 INJECTION, SOLUTION INTRAMUSCULAR; INTRAVENOUS; SUBCUTANEOUS at 15:04

## 2022-06-21 RX ADMIN — ONDANSETRON 4 MG: 2 INJECTION INTRAMUSCULAR; INTRAVENOUS at 18:45

## 2022-06-21 RX ADMIN — OXYCODONE 5 MG: 5 TABLET ORAL at 02:40

## 2022-06-21 RX ADMIN — HYDROMORPHONE HYDROCHLORIDE 0.25 MG: 1 INJECTION, SOLUTION INTRAMUSCULAR; INTRAVENOUS; SUBCUTANEOUS at 06:42

## 2022-06-21 RX ADMIN — SODIUM CHLORIDE, POTASSIUM CHLORIDE, SODIUM LACTATE AND CALCIUM CHLORIDE: 600; 310; 30; 20 INJECTION, SOLUTION INTRAVENOUS at 17:14

## 2022-06-21 RX ADMIN — OXYCODONE 5 MG: 5 TABLET ORAL at 20:43

## 2022-06-21 RX ADMIN — ACETAMINOPHEN 650 MG: 325 TABLET ORAL at 02:40

## 2022-06-21 RX ADMIN — HYDROMORPHONE HYDROCHLORIDE 1 MG: 1 INJECTION, SOLUTION INTRAMUSCULAR; INTRAVENOUS; SUBCUTANEOUS at 17:19

## 2022-06-21 RX ADMIN — SODIUM CHLORIDE, PRESERVATIVE FREE 10 ML: 5 INJECTION INTRAVENOUS at 21:00

## 2022-06-21 RX ADMIN — LIDOCAINE HYDROCHLORIDE 50 MG: 10 INJECTION, SOLUTION EPIDURAL; INFILTRATION; INTRACAUDAL; PERINEURAL at 17:19

## 2022-06-21 ASSESSMENT — PAIN - FUNCTIONAL ASSESSMENT
PAIN_FUNCTIONAL_ASSESSMENT: 0-10
PAIN_FUNCTIONAL_ASSESSMENT: PREVENTS OR INTERFERES SOME ACTIVE ACTIVITIES AND ADLS

## 2022-06-21 ASSESSMENT — PAIN DESCRIPTION - LOCATION
LOCATION: BACK
LOCATION: BACK
LOCATION: BACK;LEG
LOCATION: LEG;BACK
LOCATION: BACK

## 2022-06-21 ASSESSMENT — PAIN SCALES - GENERAL
PAINLEVEL_OUTOF10: 6
PAINLEVEL_OUTOF10: 9
PAINLEVEL_OUTOF10: 6
PAINLEVEL_OUTOF10: 6
PAINLEVEL_OUTOF10: 9
PAINLEVEL_OUTOF10: 8
PAINLEVEL_OUTOF10: 6

## 2022-06-21 ASSESSMENT — PAIN DESCRIPTION - ORIENTATION
ORIENTATION: RIGHT;LEFT
ORIENTATION: LOWER
ORIENTATION: RIGHT;LEFT
ORIENTATION: LOWER
ORIENTATION: LOWER

## 2022-06-21 ASSESSMENT — PAIN DESCRIPTION - PAIN TYPE: TYPE: ACUTE PAIN

## 2022-06-21 ASSESSMENT — PAIN DESCRIPTION - ONSET: ONSET: ON-GOING

## 2022-06-21 ASSESSMENT — PAIN DESCRIPTION - DESCRIPTORS
DESCRIPTORS: ACHING

## 2022-06-21 ASSESSMENT — PAIN DESCRIPTION - FREQUENCY: FREQUENCY: CONTINUOUS

## 2022-06-21 NOTE — ANESTHESIA PRE PROCEDURE
Historical Provider, MD   Misc.  Devices (RAISED TOILET SEAT/LOCK & ARMS) MISC 1 Piece by Does not apply route as needed (PRN) 1/23/19   Ralph Sesay MD   ferrous sulfate 325 (65 Fe) MG tablet Take 65 mg by mouth daily (with breakfast)    Historical Provider, MD   vitamin C (ASCORBIC ACID) 500 MG tablet Take 500 mg by mouth daily    Historical Provider, MD   ondansetron (ZOFRAN) 4 MG tablet Take 4 mg by mouth every 8 hours as needed for Nausea or Vomiting    Historical Provider, MD   VENTOLIN  (90 Base) MCG/ACT inhaler 2 puffs 4 times daily  12/22/18   Historical Provider, MD   amLODIPine (NORVASC) 5 MG tablet Take 5 mg by mouth daily  12/22/18   Historical Provider, MD   omeprazole (PRILOSEC) 20 MG capsule Take 40 mg by mouth three times daily     Historical Provider, MD       Current medications:    Current Facility-Administered Medications   Medication Dose Route Frequency Provider Last Rate Last Admin    ceFAZolin (ANCEF) 2000 mg in dextrose 5 % 100 mL IVPB  2,000 mg IntraVENous Once Kayla Ray MD        lactated ringers infusion   IntraVENous Continuous Kayla Ray MD        lactated ringers infusion             oxyCODONE (ROXICODONE) immediate release tablet 5 mg  5 mg Oral Q6H PRN Kayla Ray MD   5 mg at 06/21/22 0240    HYDROmorphone (DILAUDID) injection 0.25 mg  0.25 mg IntraVENous Q4H PRN Kayla Ray MD   0.25 mg at 06/21/22 1504    fleet rectal enema 1 enema  1 enema Rectal PRN Kayla Ray MD   1 enema at 06/18/22 1400    ondansetron (ZOFRAN) tablet 4 mg  4 mg Oral Q8H PRN Jose Barros MD   4 mg at 06/18/22 1845    0.9 % sodium chloride infusion   IntraVENous Continuous Kayla Ray MD   Stopped at 06/18/22 0831    sodium chloride flush 0.9 % injection 5-40 mL  5-40 mL IntraVENous 2 times per day Kayla Ray MD   10 mL at 06/20/22 2039    sodium chloride flush 0.9 % injection 5-40 mL  5-40 mL IntraVENous PRN Kayla Ray MD        0.9 % sodium chloride infusion   IntraVENous PRN Lisa Ambrosio MD        acetaminophen (TYLENOL) tablet 650 mg  650 mg Oral Q6H Lisa Ambrosio MD   650 mg at 06/21/22 0240    ondansetron [facility]) injection 4 mg  4 mg IntraVENous Q6H PRN Lisa Ambrosio MD   4 mg at 06/18/22 0425    bisacodyl (DULCOLAX) EC tablet 5 mg  5 mg Oral Daily Lisa Ambrosio MD   5 mg at 06/20/22 0925    magnesium hydroxide (MILK OF MAGNESIA) 400 MG/5ML suspension 30 mL  30 mL Oral Daily PRN Lisa Ambrosio MD   30 mL at 06/20/22 0927    polyethylene glycol (GLYCOLAX) packet 17 g  17 g Oral Daily PRN Lisa Ambrosio MD   17 g at 06/20/22 0192    gabapentin (NEURONTIN) capsule 300 mg  300 mg Oral TID Emily Roman APRN - CNP   300 mg at 06/20/22 2038    ferrous sulfate (IRON 325) tablet 325 mg  325 mg Oral Daily with breakfast Emily Roman APRN - CNP   325 mg at 06/20/22 0925    docusate sodium (COLACE) capsule 100 mg  100 mg Oral BID PRN Emily Roman APRN - CNP   100 mg at 06/20/22 0927    amLODIPine (NORVASC) tablet 5 mg  5 mg Oral Daily Emily Roman, APRN - CNP   5 mg at 06/20/22 2120    ascorbic acid (VITAMIN C) tablet 500 mg  500 mg Oral Daily Emily Roman, APRN - CNP   500 mg at 06/20/22 0926    pantoprazole (PROTONIX) tablet 40 mg  40 mg Oral QAM AC Emily Roman, APRN - CNP   40 mg at 06/21/22 3129       Allergies:     Allergies   Allergen Reactions    Fentanyl Shortness Of Breath     Throat closes    Prochlorperazine Edisylate Shortness Of Breath and Swelling    Tylenol [Acetaminophen] Nausea Only    Cortizone Swelling    Aspirin Rash    Codeine Rash     Throat closes       Problem List:    Patient Active Problem List   Diagnosis Code    Back pain M54.9    Status migrainosus G43.901    Seizure disorder (HCC) G40.909    Left-sided weakness R53.1    Lumbar (L3-4) stenosis M48.061    Asthma J45.909    Essential hypertension I10    Convulsions ([address] R56.9    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 4X8A       Past Medical History:        Diagnosis Date    Arthritis     spine    Asthma     since childhood -- smokes x 30 yrs    Cerebral artery occlusion with cerebral infarction ([address]     per CT scan -- patient without sx, 2021    Hypertension     meds > 3 yrs    Lumbar stenosis     Migraines     starts with neck pain    Seizures ([address]     pt last known seizure May 2022, dx 20 years ago, takes gabapentin for back/seizures    Thyroid disease     meds > 4 yrs -- intermittent    Uterine anomaly        Past Surgical History:        Procedure Laterality Date    ANTERIOR CRUCIATE LIGAMENT REPAIR Left     APPENDECTOMY      at age 21    [address]  2017    lumbar disc     BACK SURGERY  2018    lumbar fusion    CHOLECYSTECTOMY  2015    COLONOSCOPY      2017    ENDOMETRIAL ABLATION  2006    post op sepsis    ENDOSCOPY, COLON, DIAGNOSTIC      2017    KNEE ARTHROSCOPY Left 2004    ACL repair    LAMINECTOMY N/A 6/17/2022    LEFT BILATERAL L3-4 LAMINECTOMY MICRODISSECTION DECOMPRESSION performed by Florinda Singh MD at [address] [place] [address] N/A 1/15/2019    L4- L5 DECOMPRESSION, L5-S1 POSTERIOR LUMBAR INTERBODY FUSION performed by Dino Gomez MD at [address] [place] [address] N/A 3/11/2021    L4-5 TLIF INSTRUMENTATION AT L5-S1 DECOMPRESSION AT L4, L5 REMOVAL AND REINSERTION OF HARDWARE AT L5-S1 performed by Dino Gomez MD at [address]  5/9/13    duramorph 1.5 mg epideral  6mg celestone    TONSILLECTOMY      as child    TUBAL LIGATION Bilateral 2002       Social History:    Social History     Tobacco Use    Smoking status: Current Every Day Smoker     Packs/day: 1.50     Years: 30.00     Pack years: 45.00     Types: Cigarettes    Smokeless tobacco: Never Used   Substance Use Topics    Alcohol use: No     Alcohol/week: 0.0 standard drinks                                Ready to quit: Not Answered  Counseling given: Not Answered      Vital Signs (Current):   Vitals:    06/19/22 0854 06/19/22 1947 06/20/22 2244 06/21/22 1528   BP: 116/66 129/71 120/64 139/75   Pulse: 82 84 85 77   Resp: 18 18  18   Temp: 99.1 °F (37.3 °C) 99.4 °F (37.4 °C) 98.6 °F (37 °C) 98 °F (36.7 °C)   TempSrc: Oral Oral  Temporal   SpO2:   97% 97%   Weight:       Height:                                                  BP Readings from Last 3 Encounters:   06/21/22 139/75   06/15/22 (!) 139/90   05/26/22 128/76       NPO Status: Time of last liquid consumption: 2100                        Time of last solid consumption: 2100                        Date of last liquid consumption: 06/20/22                        Date of last solid food consumption: 06/20/22    BMI:   Wt Readings from Last 3 Encounters:   06/17/22 224 lb 4 oz (101.7 kg)   06/15/22 217 lb (98.4 kg)   05/26/22 205 lb (93 kg)     Body mass index is 36.19 kg/m². CBC:   Lab Results   Component Value Date    WBC 6.3 06/15/2022    RBC 4.79 06/15/2022    HGB 15.7 06/15/2022    HCT 46.6 06/15/2022    MCV 97.3 06/15/2022    RDW 13.8 06/15/2022     06/15/2022       CMP:   Lab Results   Component Value Date     06/15/2022    K 3.8 06/15/2022    K 4.3 03/12/2021     06/15/2022    CO2 23 06/15/2022    BUN 14 06/15/2022    CREATININE 0.87 06/15/2022    GFRAA >60.0 06/15/2022    LABGLOM >60.0 06/15/2022    GLUCOSE 111 06/15/2022    CALCIUM 9.6 06/15/2022       POC Tests: No results for input(s): POCGLU, POCNA, POCK, POCCL, POCBUN, POCHEMO, POCHCT in the last 72 hours.     Coags:   Lab Results   Component Value Date    PROTIME 19.0 06/15/2022    INR 1.6 06/15/2022    APTT 28.4 01/14/2019       HCG (If Applicable): No results found for: PREGTESTUR, PREGSERUM, HCG, HCGQUANT     ABGs: No results found for: PHART, PO2ART, GHT3MZF, NFW2JGP, BEART, M2IHMNCL     Type & Screen (If Applicable):  No results found for: LABABO, LABRH    Drug/Infectious Status (If Applicable):  No results found for: HIV, HEPCAB    COVID-19 Screening (If Applicable):   Lab Results   Component Value Date    COVID19 Not Detected 03/04/2021           Anesthesia Evaluation  Patient summary reviewed and Nursing notes reviewed no history of anesthetic complications:   Airway: Mallampati: II  TM distance: >3 FB   Neck ROM: full  Mouth opening: > = 3 FB   Dental: normal exam         Pulmonary:Negative Pulmonary ROS and normal exam    (+) asthma:                            Cardiovascular:Negative CV ROS  Exercise tolerance: good (>4 METS),   (+) hypertension:,       ECG reviewed               Beta Blocker:  Not on Beta Blocker         Neuro/Psych:   Negative Neuro/Psych ROS  (+) CVA:, headaches:, psychiatric history:            GI/Hepatic/Renal: Neg GI/Hepatic/Renal ROS            Endo/Other: Negative Endo/Other ROS             Pt had PAT visit. Abdominal:             Vascular: negative vascular ROS. Other Findings:           Anesthesia Plan      general     ASA 3 - emergent     (ETT)  Induction: intravenous. MIPS: Postoperative opioids intended and Prophylactic antiemetics administered. Anesthetic plan and risks discussed with patient. Plan discussed with CRNA.     Attending anesthesiologist reviewed and agrees with Pre Eval content                Carlos Rayo MD   6/21/2022

## 2022-06-21 NOTE — BRIEF OP NOTE
Brief Postoperative Note      Patient: Boone Peabody  YOB: 1975  MRN: [id number]    Date of Procedure: 6/21/2022    Pre-Op Diagnosis: LUMBAR EPIDURAL HEMATOMA    Post-Op Diagnosis: Same       Procedure(s):  LUMBAR EPIDURAL HEMATOMA EVACUATION    Surgeon(s):  Cheryl Maya MD    Assistant:  First Assistant: Inna Guajardo    Anesthesia: General    Estimated Blood Loss (mL): Minimal    Complications: None        Drains:   Closed/Suction Drain Midline; Inferior Back Accordion (Active)       [REMOVED] Closed/Suction Drain Inferior;Medial Back Accordion (Removed)   Site Description Clean, dry & intact 06/18/22 1011   Dressing Status Clean, dry & intact 06/18/22 1011   Drainage Appearance Serous 06/18/22 1011   Drain Status Compressed 06/18/22 1011   Output (ml) 20 ml 06/18/22 1111       Findings: Epidural hematoma    Electronically signed by Lindsey Person MD on 6/21/2022 at 6:42 PM

## 2022-06-21 NOTE — PROGRESS NOTES
Patient ID:  Lieutenant Villagran  [id number]  52 y.o.  1975  BOVIE PAD SITE CLEAR AND INTACT PRE AND POST OP. TAKEN TO PACU,   ATTACHED TO MONITOR AND REPORT GIVEN TO RN.   VSS DRSG DRY AND INTACT        Electronically signed by Sofi Flower RN on 6/21/2022

## 2022-06-21 NOTE — ADT AUTH CERT
Utilization Reviews         Lumbar Diskectomy, Foraminotomy, or Laminotomy - Care Day 5 (6/21/2022) by Daren Feliciano RN       Review Entered Review Status   6/21/2022 14:19 Completed      Criteria Review      Care Day: 5 Care Date: 6/21/2022 Level of Care:    Guideline Day 1    Level Of Care    (X) OR to floor to discharge    (X) Discharge planning    Clinical Status    (X) * Procedure completed    ( ) * No new neurologic deficits    ( ) * No new voiding difficulty    ( ) * No evidence of infection    ( ) * Pain absent or managed    ( ) * Discharge plans and education understood    Activity    ( ) * Ambulatory or acceptable for next level of care    Routes    ( ) * Oral hydration    ( ) * Oral medications or regimen acceptable for next level of care    (X) * Oral diet or acceptable for next level of care    6/21/2022 2:19 PM EDT by Jazmin TORRES (Order #[id number]) on 6/21/22    * Milestone   Additional Notes   DATE: 6/21/22 care day 5      Pertinent Updates:Per neurosurgery physician note:   Patient still feels some weakness some new pains into the thighs legs both lower extremities.  She is able to ambulate.  Has had a bowel movement and feels improved.  Significant  lower back pain.  MRI study lumbar spine 6/20/2022 shows postoperative epidural hematoma.       Plan return to surgery removal L3-4 epidural hematoma   -------------------------------------------------------------------------------   Vitals:t 97.8 hr 70 bp 120/63 spo2 97%ra         Pertinent Labs/Radiology/Diagnostic Studies:   No lab results for this day,at this time, per chart review. Ordered 6/20/22 Resulted 6/21/22   MRI LUMBAR SPINE WO CONTRAST   Impression       Posterior epidural hematoma at L3-L4 measures approximately 2.5 x 2.8 x 2.8 cm and results in spinal canal stenosis.  A second fluid collection within the posterior soft tissues superficial to the thecal sac at L4-L5 may represent postoperative seroma or hematoma is not excluded. ----------------------------------------------------------------------------   Physical Exam:not available at time of this review,for this day per chart review. MD Consults/Assessments & Plans:not available at time of this review,for this day per chart review.          Medications:   acetaminophen (TYLENOL) tablet 650 mg   Dose: 650 mg   Freq: EVERY 6 HOURS Route: PO   Start: 06/17/22 1615 x1 dose thus far this day   pantoprazole (PROTONIX) tablet 40 mg   Dose: 40 mg   Freq: DAILY BEFORE BREAKFAST Route: PO   HYDROmorphone (DILAUDID) injection 0.25 mg   Dose: 0.25 mg   Freq: EVERY 4 HOURS PRN Route: IV   PRN Reason: Pain Moderate (4-6) x3 doses thus far this day   oxyCODONE (ROXICODONE) immediate release tablet 5 mg   Dose: 5 mg   Freq: EVERY 6 HOURS PRN Route: PO   PRN Reason: Pain Moderate (4-6) x1      Orders:chart in mcg        Lumbar Diskectomy, Foraminotomy, or Laminotomy - Care Day 4 (6/20/2022) by Christina Ross RN       Review Entered Review Status   6/21/2022 13:10 Completed      Criteria Review      Care Day: 4 Care Date: 6/20/2022 Level of Care:    Guideline Day 1    Level Of Care    (X) OR to floor to discharge    6/21/2022 1:10 PM EDT by Rogelio christopher  t 98.6 °F (37 °C) hr 85 bp 120/64 spo2 97 %    (X) Discharge planning    Clinical Status    (X) * Procedure completed    ( ) * No new neurologic deficits    ( ) * No new voiding difficulty    ( ) * No evidence of infection    ( ) * Pain absent or managed    ( ) * Discharge plans and education understood    Activity    ( ) * Ambulatory or acceptable for next level of care    Routes    ( ) * Oral hydration    ( ) * Oral medications or regimen acceptable for next level of care    (X) * Oral diet or acceptable for next level of care    Medications    ( ) Oral analgesics    6/21/2022 1:10 PM EDT by Rogelio Roper      acetaminophen (TYLENOL) tablet 650 mg  Dose: 650 mg  Freq: EVERY 6 HOURS Route: PO    * Milestone   Additional Notes   DATE: 6/20/22 care day 4         Pertinent Updates:Per internal medicine physician note: Attempted to see patient; not on floor.  In MRI         Pertinent Labs/Radiology/Diagnostic Studies:   XR LUMBAR SPINE (2-3 VIEWS)   Impression   Posterior fusion is again seen and appears intact. There is no evidence of loosening. Physical Exam:not available for this day,at this time,per chart review. MD Consults/Assessments & Plans:not available for this day,at this time,per chart review. Medications:   acetaminophen (TYLENOL) tablet 650 mg   Dose: 650 mg   Freq: EVERY 6 HOURS Route: PO   Start: 06/17/22 1615 x3   amLODIPine (NORVASC) tablet 5 mg   Dose: 5 mg   Freq: DAILY Route: PO   bisacodyl (DULCOLAX) EC tablet 5 mg   Dose: 5 mg   Freq: DAILY Route: PO   ferrous sulfate (IRON 325) tablet 325 mg   Dose: 325 mg   Freq: DAILY WITH BREAKFAST Route: PO   gabapentin (NEURONTIN) capsule 300 mg   Dose: 300 mg   Freq: 3 TIMES DAILY Route: PO   sodium chloride flush 0.9 % injection 5-40 mL   Dose: 5-40 mL   Freq: 2 times per day Route: IV   HYDROmorphone (DILAUDID) injection 0.25 mg   Dose: 0.25 mg   Freq: EVERY 4 HOURS PRN Route: IV   PRN Reason: Pain Moderate (4-6)   Start: 06/19/22 1350 x5   magnesium hydroxide (MILK OF MAGNESIA) 400 MG/5ML suspension 30 mL   Dose: 30 mL   Freq: DAILY PRN Route: PO   PRN Reason: Constipation x1   oxyCODONE (ROXICODONE) immediate release tablet 5 mg   Dose: 5 mg   Freq: EVERY 6 HOURS PRN Route: PO   PRN Reason: Pain Moderate (4-6) x2   polyethylene glycol (GLYCOLAX) packet 17 g   Dose: 17 g   Freq: DAILY PRN Route: PO   PRN Reason: Constipation x1      Orders:charted in mcg         PT/OT/SLP/CM Assessments or Notes:   Care Coordination Nursing Note:   PATIENT PLANS TO RETURN HOME W/ NO NEEDS. PER NURSING PATIENT NEEDS TO HAVE A BM PRIOR TO D/C. CM TO FOLLOW FOR ANY NEW D/C NEEDS OR CHANGES TO THE D/C PLAN.  PATIENT TO HAVE OUTPT PT WILL NEED TO BE ORDERED PRIOR TO D/C. NURSING AWARE       PT Note:   ASSESSMENT    Assessment: Patient limited by elevated pain levels requiring increased time and effort for all mobility. Good safety noted with transfers bed mobility and gait. CGA for stair negotiation  due to patient stating her legs give out once in a while        Discharge Recommendations:   Continue to assess pending progress,Patient would benefit from continued therapy after discharge       Goals   Long Term Goals   Long term goal 1: Pt to complete bed mobility with indep utilizing logroll   Long term goal 2: Pt to complete transfers with indep   Long term goal 3: Pt to ambulate 50-150ft with LRD and indep   Long term goal 4: Pt to complete HEP with indep   Long term goal 5: Pt to manage 5 steps with HR and indep       PLAN     Plan:  (1-2 visits total)       [facility] (6 CLICK) BASIC MOBILITY   AM-PAC Inpatient Mobility Raw Score : 22    -------   Attestation signed by Winsome Strange PT at 6/20/2022  9:55 AM   Pt has not yet achieved functional indep in house.  Plan to extend visits 3-6v/wk until goals met or pt DCd from hospital.    Electronically signed by Winsome Strange PT on 6/20/2022 at 9:55 AM

## 2022-06-21 NOTE — ANESTHESIA POSTPROCEDURE EVALUATION
Department of Anesthesiology  Postprocedure Note    Patient: Laird Lundborg  MRN: [id number]  YOB: 1975  Date of evaluation: 6/21/2022      Procedure Summary     Date: 06/21/22 Room / Location: [facility] OR [address] [place] [address]    Anesthesia Start: 1714 Anesthesia Stop: 1906    Procedure: LUMBAR EPIDURAL HEMATOMA EVACUATION (N/A Spine Lumbar) Diagnosis:       Hematoma of lumbar muscle, initial encounter      (LUMBAR EPIDURAL HEMATOMA)    Surgeons: Kala Tripathi MD Responsible Provider: Kit Bass MD    Anesthesia Type: general ASA Status: 3 - Emergent          Anesthesia Type: No value filed. Nigel Phase I: Nigel Score: 9    Nigel Phase II:      Last vitals:   Vitals Value Taken Time   /91 06/21/22 1902   Temp  06/21/22 1906   Pulse 83 06/21/22 1904   Resp 18 06/21/22 1904   SpO2 94 % 06/21/22 1904   Vitals shown include unvalidated device data.       Anesthesia Post Evaluation    Patient location during evaluation: bedside  Patient participation: complete - patient participated  Level of consciousness: awake and sleepy but conscious  Airway patency: patent  Nausea & Vomiting: no nausea and no vomiting  Complications: no  Cardiovascular status: blood pressure returned to baseline and hemodynamically stable  Respiratory status: acceptable  Hydration status: euvolemic

## 2022-06-22 ENCOUNTER — APPOINTMENT (OUTPATIENT)
Dept: MRI IMAGING | Age: 47
DRG: 320 | End: 2022-06-22
Attending: NEUROLOGICAL SURGERY
Payer: MEDICARE

## 2022-06-22 PROCEDURE — 2580000003 HC RX 258: Performed by: NEUROLOGICAL SURGERY

## 2022-06-22 PROCEDURE — 6370000000 HC RX 637 (ALT 250 FOR IP): Performed by: NEUROLOGICAL SURGERY

## 2022-06-22 PROCEDURE — 2700000000 HC OXYGEN THERAPY PER DAY

## 2022-06-22 PROCEDURE — 72148 MRI LUMBAR SPINE W/O DYE: CPT

## 2022-06-22 PROCEDURE — 6360000002 HC RX W HCPCS: Performed by: NEUROLOGICAL SURGERY

## 2022-06-22 PROCEDURE — 97166 OT EVAL MOD COMPLEX 45 MIN: CPT

## 2022-06-22 PROCEDURE — 1210000000 HC MED SURG R&B

## 2022-06-22 PROCEDURE — 97162 PT EVAL MOD COMPLEX 30 MIN: CPT

## 2022-06-22 RX ADMIN — GABAPENTIN 300 MG: 300 CAPSULE ORAL at 07:34

## 2022-06-22 RX ADMIN — ACETAMINOPHEN 650 MG: 325 TABLET ORAL at 05:56

## 2022-06-22 RX ADMIN — HYDROMORPHONE HYDROCHLORIDE 0.5 MG: 1 INJECTION, SOLUTION INTRAMUSCULAR; INTRAVENOUS; SUBCUTANEOUS at 10:59

## 2022-06-22 RX ADMIN — Medication 50 MCG: at 07:37

## 2022-06-22 RX ADMIN — Medication 10 ML: at 20:08

## 2022-06-22 RX ADMIN — OXYCODONE HYDROCHLORIDE AND ACETAMINOPHEN 500 MG: 500 TABLET ORAL at 07:35

## 2022-06-22 RX ADMIN — OXYCODONE 5 MG: 5 TABLET ORAL at 05:56

## 2022-06-22 RX ADMIN — SODIUM CHLORIDE, PRESERVATIVE FREE 10 ML: 5 INJECTION INTRAVENOUS at 20:07

## 2022-06-22 RX ADMIN — OXYCODONE 5 MG: 5 TABLET ORAL at 14:02

## 2022-06-22 RX ADMIN — AMLODIPINE BESYLATE 5 MG: 5 TABLET ORAL at 07:34

## 2022-06-22 RX ADMIN — GABAPENTIN 300 MG: 300 CAPSULE ORAL at 14:02

## 2022-06-22 RX ADMIN — BISACODYL 5 MG: 5 TABLET, COATED ORAL at 07:38

## 2022-06-22 RX ADMIN — CEFAZOLIN 2000 MG: 10 INJECTION, POWDER, FOR SOLUTION INTRAVENOUS at 02:07

## 2022-06-22 RX ADMIN — FERROUS SULFATE TAB 325 MG (65 MG ELEMENTAL FE) 325 MG: 325 (65 FE) TAB at 07:35

## 2022-06-22 RX ADMIN — OXYCODONE 5 MG: 5 TABLET ORAL at 01:54

## 2022-06-22 RX ADMIN — ACETAMINOPHEN 650 MG: 325 TABLET ORAL at 20:26

## 2022-06-22 RX ADMIN — GABAPENTIN 300 MG: 300 CAPSULE ORAL at 20:05

## 2022-06-22 RX ADMIN — HYDROMORPHONE HYDROCHLORIDE 0.5 MG: 1 INJECTION, SOLUTION INTRAMUSCULAR; INTRAVENOUS; SUBCUTANEOUS at 16:06

## 2022-06-22 RX ADMIN — ACETAMINOPHEN 650 MG: 325 TABLET ORAL at 18:21

## 2022-06-22 RX ADMIN — HYDROMORPHONE HYDROCHLORIDE 0.5 MG: 1 INJECTION, SOLUTION INTRAMUSCULAR; INTRAVENOUS; SUBCUTANEOUS at 07:35

## 2022-06-22 RX ADMIN — SODIUM CHLORIDE: 9 INJECTION, SOLUTION INTRAVENOUS at 03:56

## 2022-06-22 RX ADMIN — OXYCODONE 5 MG: 5 TABLET ORAL at 23:15

## 2022-06-22 RX ADMIN — HYDROMORPHONE HYDROCHLORIDE 0.5 MG: 1 INJECTION, SOLUTION INTRAMUSCULAR; INTRAVENOUS; SUBCUTANEOUS at 03:12

## 2022-06-22 RX ADMIN — PANTOPRAZOLE SODIUM 40 MG: 40 TABLET, DELAYED RELEASE ORAL at 05:56

## 2022-06-22 RX ADMIN — HYDROMORPHONE HYDROCHLORIDE 0.5 MG: 1 INJECTION, SOLUTION INTRAMUSCULAR; INTRAVENOUS; SUBCUTANEOUS at 20:06

## 2022-06-22 RX ADMIN — ACETAMINOPHEN 650 MG: 325 TABLET ORAL at 10:58

## 2022-06-22 RX ADMIN — BISACODYL 5 MG: 5 TABLET, COATED ORAL at 07:34

## 2022-06-22 RX ADMIN — CEFAZOLIN 2000 MG: 10 INJECTION, POWDER, FOR SOLUTION INTRAVENOUS at 10:55

## 2022-06-22 RX ADMIN — HYDROMORPHONE HYDROCHLORIDE 0.5 MG: 1 INJECTION, SOLUTION INTRAMUSCULAR; INTRAVENOUS; SUBCUTANEOUS at 23:15

## 2022-06-22 RX ADMIN — HYDROMORPHONE HYDROCHLORIDE 0.5 MG: 1 INJECTION, SOLUTION INTRAMUSCULAR; INTRAVENOUS; SUBCUTANEOUS at 00:11

## 2022-06-22 RX ADMIN — SODIUM CHLORIDE, PRESERVATIVE FREE 10 ML: 5 INJECTION INTRAVENOUS at 07:33

## 2022-06-22 RX ADMIN — OXYCODONE 5 MG: 5 TABLET ORAL at 18:21

## 2022-06-22 ASSESSMENT — ENCOUNTER SYMPTOMS
VOICE CHANGE: 0
SHORTNESS OF BREATH: 0
ABDOMINAL PAIN: 0
COUGH: 0
CONSTIPATION: 0
SORE THROAT: 0
COLOR CHANGE: 0
BACK PAIN: 1
TROUBLE SWALLOWING: 0
VOMITING: 0
DIARRHEA: 0
NAUSEA: 0

## 2022-06-22 ASSESSMENT — PAIN DESCRIPTION - LOCATION
LOCATION: SHOULDER;BACK
LOCATION: BACK;LEG
LOCATION: BACK
LOCATION: SHOULDER;BACK
LOCATION: BACK;SHOULDER
LOCATION: BACK

## 2022-06-22 ASSESSMENT — PAIN DESCRIPTION - DIRECTION
RADIATING_TOWARDS: LEFT LEG
RADIATING_TOWARDS: LEFT LEG

## 2022-06-22 ASSESSMENT — PAIN DESCRIPTION - ORIENTATION
ORIENTATION: LOWER;MID
ORIENTATION: RIGHT;LEFT
ORIENTATION: LOWER
ORIENTATION: LOWER;MID
ORIENTATION: RIGHT;LEFT
ORIENTATION: LOWER
ORIENTATION: LEFT
ORIENTATION: RIGHT;LEFT
ORIENTATION: LOWER;MID
ORIENTATION: LOWER
ORIENTATION: RIGHT;LEFT

## 2022-06-22 ASSESSMENT — PAIN SCALES - GENERAL
PAINLEVEL_OUTOF10: 7
PAINLEVEL_OUTOF10: 8
PAINLEVEL_OUTOF10: 8
PAINLEVEL_OUTOF10: 5
PAINLEVEL_OUTOF10: 8
PAINLEVEL_OUTOF10: 7
PAINLEVEL_OUTOF10: 8
PAINLEVEL_OUTOF10: 5
PAINLEVEL_OUTOF10: 6
PAINLEVEL_OUTOF10: 8
PAINLEVEL_OUTOF10: 8
PAINLEVEL_OUTOF10: 7
PAINLEVEL_OUTOF10: 8
PAINLEVEL_OUTOF10: 8
PAINLEVEL_OUTOF10: 5

## 2022-06-22 ASSESSMENT — PAIN DESCRIPTION - DESCRIPTORS
DESCRIPTORS: ACHING
DESCRIPTORS: ACHING
DESCRIPTORS: ACHING;DISCOMFORT
DESCRIPTORS: ACHING

## 2022-06-22 ASSESSMENT — PAIN DESCRIPTION - FREQUENCY
FREQUENCY: CONTINUOUS

## 2022-06-22 ASSESSMENT — PAIN DESCRIPTION - PAIN TYPE
TYPE: ACUTE PAIN

## 2022-06-22 ASSESSMENT — PAIN - FUNCTIONAL ASSESSMENT
PAIN_FUNCTIONAL_ASSESSMENT: PREVENTS OR INTERFERES SOME ACTIVE ACTIVITIES AND ADLS

## 2022-06-22 ASSESSMENT — PAIN DESCRIPTION - ONSET
ONSET: ON-GOING

## 2022-06-22 NOTE — PROGRESS NOTES
[facility] Neurosurgery Daily Progress Note  Name: Jose A Munson  Age: 52 y.o. Gender: female  CodeStatus: Full Code  Allergies: Fentanyl  Prochlorperazine Edisylate  Tylenol [Acetaminophen]  Cortizone  Aspirin  Codeine    Chief Complaint:No chief complaint on file. Primary Care Provider: No primary care provider on file. InpatientTreatment Team: Treatment Team: Attending Provider: Gabriel Young MD; Surgeon: Gabriel Young MD; Consulting Physician: Arnulfo Griggs MD; Utilization Reviewer: Leslie Dunne, MARY; Registered Nurse: Pritesh Lozano, RN; Registered Nurse: Kaya Alarcon RN  Admission Date: 6/17/2022      HPI   Pt seen and examined for neurosurgery follow up. Patient is sitting up in bed moving all extremities without difficulty. She reports to have some discomfort of her back however reports improvement since surgery yesterday for L3-4 epidural hematoma evacuation. She reports some numbness to left lateral leg however contributes this to sleeping on that side. Also reports incisional back pain. Denies any saddle anesthesia or loss of bowel or bladder. Vitals:    06/22/22 0418   BP: (!) 101/55   Pulse: 68   Resp: 15   Temp: 98.4 °F (36.9 °C)   SpO2:       Review of Systems   Constitutional: Negative for appetite change and fever. HENT: Negative for drooling, ear pain, sore throat, trouble swallowing and voice change. Respiratory: Negative for cough and shortness of breath. Cardiovascular: Negative for chest pain. Gastrointestinal: Negative for abdominal pain, constipation, diarrhea, nausea and vomiting. Genitourinary: Negative for decreased urine volume and dysuria. Musculoskeletal: Positive for back pain. Negative for arthralgias. Skin: Positive for wound (lumbar surgical incision ). Negative for color change. Neurological: Positive for numbness (intermittent lateral right leg numbness contributing to sleeping on that side throughout the night ).  Negative for dizziness, weakness, light-headedness and headaches. Psychiatric/Behavioral: Negative for agitation and behavioral problems. All other systems reviewed and are negative. Physical Exam  Vitals and nursing note reviewed. Constitutional:       General: She is not in acute distress. Appearance: Normal appearance. She is well-developed. HENT:      Head: Normocephalic and atraumatic. Nose: Nose normal.      Mouth/Throat:      Mouth: Mucous membranes are moist.   Eyes:      General:         Right eye: No discharge. Left eye: No discharge. Conjunctiva/sclera: Conjunctivae normal.   Neck:      Vascular: No JVD. Trachea: No tracheal deviation. Cardiovascular:      Rate and Rhythm: Normal rate. Pulmonary:      Effort: Pulmonary effort is normal.      Breath sounds: Normal breath sounds. Abdominal:      General: Bowel sounds are normal.      Palpations: Abdomen is soft. Musculoskeletal:         General: Normal range of motion. Cervical back: Normal, normal range of motion and neck supple. No swelling or rigidity. No muscular tenderness. Thoracic back: Normal.      Lumbar back: Tenderness (mild tenderness to palpation near surgical site. No palpable fluid collection or crepitus noted ) present. No swelling, edema or deformity. Lymphadenopathy:      Cervical: No cervical adenopathy. Skin:     General: Skin is warm and dry. Capillary Refill: Capillary refill takes less than 2 seconds. Neurological:      Mental Status: She is alert and oriented to person, place, and time. Cranial Nerves: Cranial nerves are intact. Motor: No weakness, tremor or abnormal muscle tone. Coordination: Coordination is intact. Gait: Gait is intact.    Psychiatric:         Mood and Affect: Mood normal.         Behavior: Behavior normal.               Medications:  Reviewed    Infusion Medications:    lactated ringers 125 mL/hr at 06/21/22 2038    sodium chloride 100 mL/hr at 06/22/22 0356    sodium chloride      sodium chloride       Scheduled Medications:    Potassium  99 mg Oral Daily    vitamin B-12  50 mcg Oral Daily    sodium chloride flush  5-40 mL IntraVENous 2 times per day    ceFAZolin (ANCEF) IVPB  2,000 mg IntraVENous Q8H    sodium chloride flush  5-40 mL IntraVENous 2 times per day    acetaminophen  650 mg Oral Q6H    bisacodyl  5 mg Oral Daily    gabapentin  300 mg Oral TID    ferrous sulfate  325 mg Oral Daily with breakfast    amLODIPine  5 mg Oral Daily    vitamin C  500 mg Oral Daily    pantoprazole  40 mg Oral QAM AC     PRN Meds: ondansetron, tiZANidine, oxyCODONE-acetaminophen, sodium chloride flush, sodium chloride, oxyCODONE **OR** oxyCODONE, HYDROmorphone **OR** HYDROmorphone, albuterol sulfate HFA, fleet, sodium chloride flush, sodium chloride, ondansetron, magnesium hydroxide, polyethylene glycol, docusate sodium    Labs:   No results for input(s): WBC, HGB, HCT, PLT in the last 72 hours. No results for input(s): NA, K, CL, CO2, BUN, CREATININE, CALCIUM, PHOS in the last 72 hours. Invalid input(s): MAGNES  No results for input(s): AST, ALT, BILIDIR, BILITOT, ALKPHOS in the last 72 hours. No results for input(s): INR in the last 72 hours. No results for input(s): Ligia Mould in the last 72 hours. Urinalysis:   Lab Results   Component Value Date    NITRU Negative 01/14/2019    WBCUA 3-5 01/14/2019    BACTERIA MANY 01/14/2019    RBCUA 0-2 01/14/2019    BLOODU Negative 01/14/2019    SPECGRAV 1.027 01/14/2019    GLUCOSEU Negative 01/14/2019       Radiology:   Most recent    EEG No valid procedures specified. MRI of Brain No results found for this or any previous visit. No results found for this or any previous visit. MRA of the Head and Neck: No results found for this or any previous visit. No results found for this or any previous visit. No results found for this or any previous visit.

## 2022-06-22 NOTE — PROGRESS NOTES
[facility] EMERGENCY [facility] AT [place] Respiratory Therapy Evaluation   Current Order:  Albuterol MDI QID      Home Regimen: PRN      Ordering Physician: Vijay Medina  Re-evaluation Date:  N/A     Diagnosis: Lumbar hematoma      Patient Status: Stable / Unstable + Physician notified    The following MDI Criteria must be met in order to convert aerosol to MDI with spacer. If unable to meet, MDI will be converted to aerosol:  []  Patient able to demonstrate the ability to use MDI effectively  []  Patient alert and cooperative  []  Patient able to take deep breath with 5-10 second hold  []  Medication(s) available in this delivery method   []  Peak flow greater than or equal to 200 ml/min            Current Order Substituted To  (same drug, same frequency)   Aerosol to MDI [] Albuterol Sulfate 0.083% unit dose by aerosol Albuterol Sulfate MDI 2 puffs by inhalation with spacer    [] Levalbuterol 1.25 mg unit dose by aerosol Levalbuterol MDI 2 puffs by inhalation with spacer    [] Levalbuterol 0.63 mg unit dose by aerosol Levalbuterol MDI 2 puffs by inhalation with spacer    [] Ipratropium Bromide 0.02% unit dose by aerosol Ipratropium Bromide MDI 2 puffs by inhalation with spacer    [] Duoneb (Ipratropium + Albuterol) unit dose by aerosol Ipratropium MDI + Albuterol MDI 2 puffs by inhalation w/spacer   MDI to Aerosol [] Albuterol Sulfate MDI Albuterol Sulfate 0.083% unit dose by aerosol    [] Levalbuterol MDI 2 puffs by inhalation Levalbuterol 1.25 mg unit dose by aerosol    [] Ipratropium Bromide MDI by inhalation Ipratropium Bromide 0.02% unit dose by aerosol    [] Combivent (Ipratropium + Albuterol) MDI by inhalation Duoneb (Ipratropium + Albuterol) unit dose by aerosol       Treatment Assessment [Frequency/Schedule]:  Change frequency to: ____Albuterol MDI Q4 PRN______________________________________________per Protocol, P&T, MEC      Points 0 1 2 3 4   Pulmonary Status  Non-Smoker  []   Smoking history   < 20 pack years  []   Smoking history  ? 20 pack years  []   Pulmonary Disorder  (acute or chronic)  [x]   Severe or Chronic w/ Exacerbation  []     Surgical Status No []   Surgeries     General [x]   Surgery Lower []   Abdominal Thoracic or []   Upper Abdominal Thoracic with  PulmonaryDisorder  []     Chest X-ray Clear/Not  Ordered     [x]  Chronic Changes  Results Pending  []  Infiltrates, atelectasis, pleural effusion, or edema  []  Infiltrates in more than one lobe []  Infiltrate + Atelectasis, &/or pleural effusion  []    Respiratory Pattern Regular,  RR = 12-20 [x]  Increased,  RR = 21-25 []  TRUONG, irregular,  or RR = 26-30 []  Decreased FEV1  or RR = 31-35 []  Severe SOB, use  of accessory muscles, or RR ? 35  []    Mental Status Alert, oriented,  Cooperative [x]  Confused but Follows commands []  Lethargic or unable to follow commands []  Obtunded  []  Comatose  []    Breath Sounds Clear to  auscultation  [x]  Decreased unilaterally or  in bases only []  Decreased  bilaterally  []  Crackles or intermittent wheezes []  Wheezes []    Cough Strong, Spontan., & nonproductive [x]  Strong,  spontaneous, &  productive []  Weak,  Nonproductive []  Weak, productive or  with wheezes []  No spontaneous  cough or may require suctioning []    Level of Activity Ambulatory []  Ambulatory w/ Assist  [x]  Non-ambulatory []  Paraplegic []  Quadriplegic []    Total    Score:____5___     Triage Score:____5____      Tri       Triage:     1. (>20) Freq: Q3    2. (16-20) Freq: Q4   3. (11-15) Freq: QID & Albuterol Q2 PRN    4. (6-10) Freq: TID & Albuterol Q2 PRN    5. (0-5) Freq Q4prn

## 2022-06-22 NOTE — PROGRESS NOTES
Physical Therapy Med Surg Initial Assessment  Facility/Department: [facility]  [facility]       NAME: Carline Salazar  : 1975 (52 y.o.)  MRN: [id number]  CODE STATUS: Full Code    Date of Service: 2022    Patient Diagnosis(es): Spinal stenosis of lumbar region, unspecified whether neurogenic claudication present [M48.061]  Spinal stenosis, lumbar region, with neurogenic claudication [M48.062]  Neurogenic claudication due to lumbar spinal stenosis [M48.062]   No chief complaint on file.     Patient Active Problem List    Diagnosis Date Noted    Epidural hematoma ([address] 2022    Neurogenic claudication due to lumbar spinal stenosis 2022    Spinal stenosis, lumbar region, with neurogenic claudication 2022    Smoker 2022    Postlaminectomy syndrome of lumbar region 2022    Spinal stenosis of lumbar region with neurogenic claudication 2021    Lumbar degenerative disc disease 01/15/2019    Herniation of intervertebral disc between L5 and S1 2019    Psychiatric pseudoseizure     Intractable headache     Essential hypertension     Convulsions ([address]     Status migrainosus 2015    Seizure disorder ([address] 2015    Left-sided weakness 2015    Lumbar (L3-4) stenosis     Asthma     Back pain 2013      Past Medical History:   Diagnosis Date    Arthritis     spine    Asthma     since childhood -- smokes x 30 yrs    Cerebral artery occlusion with cerebral infarction ([address]     per CT scan -- patient without sx,     Hypertension     meds > 3 yrs    Lumbar stenosis     Migraines     starts with neck pain    Seizures ([address]     pt last known seizure May 2022, dx 20 years ago, takes gabapentin for back/seizures    Thyroid disease     meds > 4 yrs -- intermittent    Uterine anomaly      Past Surgical History:   Procedure Laterality Date    ANTERIOR CRUCIATE LIGAMENT REPAIR Left     APPENDECTOMY      at age 18     BACK SURGERY  2017    lumbar disc     BACK SURGERY  2018    lumbar fusion    CHOLECYSTECTOMY  2015    COLONOSCOPY      2017    ENDOMETRIAL ABLATION  2006    post op sepsis    ENDOSCOPY, COLON, DIAGNOSTIC      2017    KNEE ARTHROSCOPY Left 2004    ACL repair    LAMINECTOMY N/A 2022    LEFT BILATERAL L3-4 LAMINECTOMY MICRODISSECTION DECOMPRESSION performed by Rachele Spear MD at [facility] N/A 2022    LUMBAR EPIDURAL HEMATOMA EVACUATION performed by Rachele Spear MD at [facility] N/A 1/15/2019    L4- L5 DECOMPRESSION, L5-S1 POSTERIOR LUMBAR INTERBODY FUSION performed by Shayy Bledsoe MD at [address] [facility] 3/11/2021    L4-5 TLIF INSTRUMENTATION AT L5-S1 DECOMPRESSION AT L4, L5 REMOVAL AND REINSERTION OF HARDWARE AT L5-S1 performed by Shayy Bledsoe MD at [address]    duramorph 1.5 mg epideral  6mg celestone    TONSILLECTOMY      as child    TUBAL LIGATION Bilateral    Patient assessed for rehabilitation services?: Yes  Restrictions:  Restrictions/Precautions: Up as Tolerated  Position Activity Restriction  Spinal Precautions: Limit excessive/forceful trunk flexion/rotation or lifting >5-10lbs   SUBJECTIVE: Pt reports that she is unable to lay on R side d/t pain, relief in supine and L sidelying. Pain   Pre treatment screenin  Location: low back  Description: ache/sore  Onset/duration: ongoing, post op pain  []  Pt declined intervention  [x]  Pt able to proceed PT session  []  RN or physician notified  []  RN called to bedside to administer meds.   Post treatment screening 8/10, described as same as above     Prior Level of Function:  Social/Functional History  Lives With: Significant other  Type of Home: House  Home Layout: One level  Home Access: Stairs to enter with rails  Entrance Stairs - Number of Steps: 5  Entrance Stairs - Rails: Right  Bathroom Shower/Tub: Tub/Shower unit  Home Equipment: Walker, rolling  ADL Assistance: Independent  Homemaking Assistance:  (SO completes)  Ambulation Assistance: Independent  Transfer Assistance: Independent  Active : Yes (Not lately- s/o)    OBJECTIVE:   Vision  Vision: Within Functional Limits  Hearing: Within functional limits    Cognition:  Overall Orientation Status: Within Functional Limits  Follows Commands: Within Functional Limits  Overall Cognitive Status: WFL    Observation/Palpation  Observation: No acute distress noted. Pt pleasant and motivated. ROM:  AROM: Generally decreased, functional  PROM: Generally decreased, functional (impaired hip flexor, HS, and gastroc flexibility)    Strength:  Strength: Generally decreased, functional (functionally >/= 3+/5)    Neuro:  Balance  Sitting - Static: Good  Sitting - Dynamic: Good  Standing - Static: Good  Standing - Dynamic: Good;-                    Tone: Normal  Coordination: Within functional limits    Sensation: Impaired (Radicular pain in B LE, L LE numbness)    Bed mobility  Supine to Sit: Modified independent  Sit to Supine: Modified independent  Bed Mobility Comments: VC encouragement for performance of log roll, instruction given, pt states understanding    Transfers  Sit to Stand: Modified independent  Stand to sit: Modified independent    Ambulation  Surface: level tile  Device: No Device  Assistance: Supervision  Quality of Gait: slow to complete  Distance: 250ft    Stairs/Curb  Stairs? :  (pt ed in safe stair sequencing with B handrails vs 1 handrail. Pt states understanding)    Activity Tolerance  Activity Tolerance: Patient limited by pain      Patient Education  Education Given To: Patient  Education Provided: Role of Therapy;Plan of Care  Education Method: Verbal  Education Outcome: Verbalized understanding     ASSESSMENT:   Body Structures, Functions, Activity Limitations Requiring Skilled Therapeutic Intervention: Decreased functional mobility ; Decreased ADL status; Decreased body mechanics; Decreased endurance;Decreased sensation;Decreased coordination; Increased pain  Decision Making: Medium Complexity  History: med  Exam: med  Clinical Presentation: med    Therapy Prognosis: Good  Barriers to Learning: none     DISCHARGE RECOMMENDATIONS:  Discharge Recommendations: Continue to assess pending progress  Assessment: Pt presents s/p L3-4 laminectomy microdissection decompression 6/17/22 and lumbar epidural hematoma evacuation on 6/21/22. Pt demonstrates decline in mobility compared to her previous baseline. Pt demonstrates mobility including ambulation at a level that is conducive to home going with family support. Pt ed in safety strategies, progressive ambulation, frequent reposition for pain relief, and safe stair negotiation. PLAN OF CARE:  Plan  Plan:  (2-3v follow up trial)  Current Treatment Recommendations: Strengthening,Balance training,Functional mobility training,Transfer training,Endurance training,Gait training,Stair training,Neuromuscular re-education,Manual Therapy - Soft Tissue Mobilization,Pain management,Home exercise program,Safety education & training,Patient/Caregiver education & training,Equipment evaluation, education, & procurement,Modalities,Positioning    Safety Devices  Type of Devices: All fall risk precautions in place    Goals:  Long Term Goals  Long term goal 1: Pt to complete bed mobility with indep utilizing logroll  Long term goal 2: Pt to complete transfers with indep  Long term goal 3: Pt to ambulate 300ft with indep  Long term goal 4: TUG <13 seconds to demonstrate decreased fall risk    AMPA (6 CLICK) BASIC MOBILITY  AM-PAC Inpatient Mobility Raw Score : 22     Therapy Time:   Individual   Time In 0907   Time Out 0918   Minutes [address] [place] [address], 06/22/22 at 9:35 AM     Definitions for assistance levels  Independent = pt does not require any physical supervision or assistance from another person for activity completion. Device may be needed.   Stand by assistance = pt requires verbal cues or instructions from another person, close to but not touching, to perform the activity  Minimal assistance= pt performs 75% or more of the activity; assistance is required to complete the activity  Moderate assistance= pt performs 50% of the activity; assistance is required to complete the activity  Maximal assistance = pt performs 25% of the activity; assistance is required to complete the activity  Dependent = pt requires total physical assistance to accomplish the task

## 2022-06-22 NOTE — PROGRESS NOTES
[facility] OCCUPATIONAL THERAPY EVALUATION - ACUTE     NAME: Beverly Zheng  : 1975 (52 y.o.)  MRN: [id number]  CODE STATUS: Full Code  [facility]    Date of Service: 2022    Patient Diagnosis(es): Spinal stenosis of lumbar region, unspecified whether neurogenic claudication present [M48.061]  Spinal stenosis, lumbar region, with neurogenic claudication [M48.062]  Neurogenic claudication due to lumbar spinal stenosis [M48.062]   Patient Active Problem List    Diagnosis Date Noted    Epidural hematoma ([address] 2022    Neurogenic claudication due to lumbar spinal stenosis 2022    Spinal stenosis, lumbar region, with neurogenic claudication 2022    Smoker 2022    Postlaminectomy syndrome of lumbar region 2022    Spinal stenosis of lumbar region with neurogenic claudication 2021    Lumbar degenerative disc disease 01/15/2019    Herniation of intervertebral disc between L5 and S1 2019    Psychiatric pseudoseizure     Intractable headache     Essential hypertension     Convulsions ([address]     Status migrainosus 2015    Seizure disorder ([address] 2015    Left-sided weakness 2015    Lumbar (L3-4) stenosis     Asthma     Back pain 2013        Past Medical History:   Diagnosis Date    Arthritis     spine    Asthma     since childhood -- smokes x 30 yrs    Cerebral artery occlusion with cerebral infarction ([address]     per CT scan -- patient without sx,     Hypertension     meds > 3 yrs    Lumbar stenosis     Migraines     starts with neck pain    Seizures ([address]     pt last known seizure May 2022, dx 20 years ago, takes gabapentin for back/seizures    Thyroid disease     meds > 4 yrs -- intermittent    Uterine anomaly      Past Surgical History:   Procedure Laterality Date    ANTERIOR CRUCIATE LIGAMENT REPAIR Left     APPENDECTOMY      at age 21    Jamas Roys BACK SURGERY  2017    lumbar disc     BACK SURGERY  2018 lumbar fusion    CHOLECYSTECTOMY  2015    COLONOSCOPY      2017    ENDOMETRIAL ABLATION  2006    post op sepsis    ENDOSCOPY, COLON, DIAGNOSTIC      2017    KNEE ARTHROSCOPY Left 2004    ACL repair    LAMINECTOMY N/A 6/17/2022    LEFT BILATERAL L3-4 LAMINECTOMY MICRODISSECTION DECOMPRESSION performed by Anuradha Barry MD at [facility] N/A 6/21/2022    LUMBAR EPIDURAL HEMATOMA EVACUATION performed by Anuradha Barry MD at [facility] N/A 1/15/2019    L4- L5 DECOMPRESSION, L5-S1 POSTERIOR LUMBAR INTERBODY FUSION performed by Mary Kate Mayes MD at [address] [facility] 3/11/2021    L4-5 TLIF INSTRUMENTATION AT L5-S1 DECOMPRESSION AT L4, L5 REMOVAL AND REINSERTION OF HARDWARE AT L5-S1 performed by Mary Kate Mayes MD at [facility] [place] [address]  5/9/13    duramorph 1.5 mg epideral  6mg celestone    TONSILLECTOMY      as child    TUBAL LIGATION Bilateral 2002        Restrictions  Restrictions/Precautions: Up as Tolerated         Spinal Precautions: Limit excessive/forceful trunk flexion/rotation or lifting >5-10lbs    Safety Devices: Safety Devices  Type of Devices:  All fall risk precautions in place     Patient's date of birth confirmed: Yes    General:  Chart Reviewed: Yes  Patient assessed for rehabilitation services?: Yes    Subjective  Subjective: \"My back still hurts\"       Pain at start of treatment: Yes: 7/10    Pain at end of treatment: Yes: 7/10    Location: Back  Nursing notified: No  RN: Hector Paiz RN aware per pt, pt. states she knows she is not yet due for pain meds  Intervention: Repositioned    Prior Level of Function:  Social/Functional History  Lives With: Significant other  Type of Home: House  Home Layout: One level  Home Access: Stairs to enter with rails  Entrance Stairs - Number of Steps: 5  Entrance Stairs - Rails: Right  Bathroom Shower/Tub: Tub/Shower unit  Home Equipment: Walker, rolling  ADL Assistance: Independent  Homemaking Assistance:  (SO completes)  Ambulation Assistance: Independent  Transfer Assistance: Independent  Active : Yes (Not lately- s/o)    OBJECTIVE:     Orientation Status:  Orientation  Overall Orientation Status: Within Functional Limits    Observation:  Observation/Palpation  Posture: Good  Observation: Pt alert and attentive, pt agreeable to OT eval    Cognition Status:  Cognition  Overall Cognitive Status: WFL    Perception Status:  Perception  Overall Perceptual Status: WFL    Vision and Hearing Status:  Hearing  Hearing: Within functional limits   Vision - Basic Assessment  Prior Vision: No visual deficits  Visual History: No significant visual history  Patient Visual Report: No visual complaint reported. Visual Field Cut: No  Oculo Motor Control: WNL    GROSS ASSESSMENT AROM/PROM:  AROM: Within functional limits  PROM: Within functional limits    ROM:   LUE AROM (degrees)  LUE AROM : WFL  Left Hand AROM (degrees)  Left Hand AROM: WFL  RUE AROM (degrees)  RUE AROM : WFL  Right Hand AROM (degrees)  Right Hand AROM: WFL    UE STRENGTH:  Strength: Within functional limits    UE COORDINATION:  Coordination: Within functional limits    UE TONE:  Tone: Normal    UE SENSATION:  Sensation: Impaired (BLE numbness/tingling and LLE subjective weakness/worse tingling)    Hand Dominance:  Hand Dominance  Hand Dominance: Right    ADL Status:  ADL  Feeding: Independent  Grooming: Modified independent   UE Bathing: Supervision  LE Bathing: Supervision  UE Dressing: Supervision  LE Dressing: Minimal assistance  Toileting: Supervision  Additional Comments: Simulated ADLs as above, difficulty reaching feet d/t LE weakness and pain with figure 4. Expresses anxiety regarding tub transfers; educated on safe transfer with tub chair. Pt would benefit from tub chair and grab bars.   Toilet Transfers  Toilet Transfer: Unable to assess  Toilet Transfers Comments: Declined, denies difficulty    Functional Mobility:  Patient ambulated household distance in hallway with No and taking off regular upper body clothing?: A Little  How much help for taking care of personal grooming?: A Little  How much help for eating meals?: None  AM-[facility] Inpatient Daily Activity Raw Score: 19  AM-PAC Inpatient ADL T-Scale Score : 40.22  ADL Inpatient CMS 0-100% Score: 42.8    Therapy key for assistance levels -   Independent/Mod I = Pt. is able to perform task with no assistance but may require a device   Stand by assistance = Pt. does not perform task at an independent level but does not need physical assistance, requires verbal cues  Minimal, Moderate, Maximal Assistance = Pt. requires physical assistance (25%, 50%, 75% assist from helper) for task but is able to actively participate in task   Dependent = Pt. requires total assistance with task and is not able to actively participate with task completion     Plan:  Plan  Times per Week: 1-4x  Plan Weeks: Length of acute stay  Current Treatment Recommendations: Balance training,Functional mobility training,Endurance training,Patient/Caregiver education & training,Safety education & training,Self-Care / Leslie Guevara management,Equipment evaluation, education, & procurement,Neuromuscular re-education,Home management training    Goals:   Patient will:    - Improve functional endurance to tolerate/complete 30 mins of ADL's  - Be Mod I in UB ADLs   - Be Mod I in LB ADLs  - Be Mod I in ADL transfers without LOB  - Be Mod I in toileting tasks  - Access appropriate D/C site with as few architectural barriers as possible. - Sequence self-care tasks with no verbal cues for back protection techniques    Patient Goal: Patient goals : \"I want to get home\"     Discussed and agreed upon: Yes Comments:       Therapy Time:   Individual   Time In 0907   Time Out 0918   Minutes 11     Eval: 11 minutes     Electronically signed by:     Candido Goltz, OTR/L,   6/22/2022, 9:36 AM

## 2022-06-22 NOTE — PROGRESS NOTES
Postoperative MRI lumbar spine shows excellent decompression and resolution of epidural hematoma at L3-4

## 2022-06-22 NOTE — PROGRESS NOTES
Hospitalist Progress Note      Date of Admission: 6/17/2022  Chief Complaint:    No chief complaint on file. Subjective:  No new complaints, no nausea vomiting chest pain or headache. Patient complains of back pain, but when asked if back pain is better than it was yesterday morning, patient acknowledges that it is significantly better. Medications:    Infusion Medications    lactated ringers 125 mL/hr at 06/21/22 2038    sodium chloride 100 mL/hr at 06/22/22 9385    sodium chloride      sodium chloride       Scheduled Medications    Potassium  99 mg Oral Daily    vitamin B-12  50 mcg Oral Daily    sodium chloride flush  5-40 mL IntraVENous 2 times per day    sodium chloride flush  5-40 mL IntraVENous 2 times per day    acetaminophen  650 mg Oral Q6H    bisacodyl  5 mg Oral Daily    gabapentin  300 mg Oral TID    ferrous sulfate  325 mg Oral Daily with breakfast    amLODIPine  5 mg Oral Daily    vitamin C  500 mg Oral Daily    pantoprazole  40 mg Oral QAM AC     PRN Meds: ondansetron, tiZANidine, oxyCODONE-acetaminophen, sodium chloride flush, sodium chloride, oxyCODONE **OR** oxyCODONE, HYDROmorphone **OR** HYDROmorphone, albuterol sulfate HFA, fleet, sodium chloride flush, sodium chloride, ondansetron, magnesium hydroxide, polyethylene glycol, docusate sodium    Intake/Output Summary (Last 24 hours) at 6/22/2022 1920  Last data filed at 6/22/2022 0612  Gross per 24 hour   Intake 410 ml   Output 35 ml   Net 375 ml     Exam:  /70   Pulse 72   Temp 97.9 °F (36.6 °C) (Oral)   Resp 15   Ht 5' 6\" (1.676 m)   Wt 224 lb 4 oz (101.7 kg)   SpO2 96%   BMI 36.19 kg/m²   Head: Normocephalic, atraumatic  Sclera clear  Neck JVD flat  Lungs: Normal effort of breathing    Labs:   No results for input(s): WBC, HGB, HCT, PLT in the last 72 hours. No results for input(s): NA, K, CL, CO2, BUN, CREATININE, CALCIUM, PHOS, AST, ALT, BILIDIR, BILITOT, ALKPHOS in the last 72 hours.     Invalid input(s): RAFITA  No results for input(s): INR in the last 72 hours. No results for input(s): Annabella Petersonarch in the last 72 hours. Radiology:  MRI LUMBAR SPINE WO CONTRAST   Final Result   Status post posterior decompression is at L4 and L5 with L4-5 and L5 discectomies and posterior fusion. There is no acute fracture or subluxation. There is mild increased marrow signal involving the superior articular surface of L4 and the articular surfaces at L4-5 indicating marrow edema which may be a specific pain generator at this level. Status post posterior decompression at L3-4 with presence of a 1.6 cm fluid collection, postoperative seroma versus evolving hematoma. There is mild mass effect on the posterior thecal sac with narrowing of the thecal sac at this level. AP diameter is 7    mm. The remaining levels do not demonstrate significant central canal narrowing or neural foraminal narrowing. MRI LUMBAR SPINE WO CONTRAST   Final Result      Posterior epidural hematoma at L3-L4 measures approximately 2.5 x 2.8 x 2.8 cm and results in spinal canal stenosis. A second fluid collection within the posterior soft tissues superficial to the thecal sac at L4-L5 may represent postoperative seroma or    hematoma is not excluded. XR LUMBAR SPINE (2-3 VIEWS)   Final Result   Posterior fusion is again seen and appears intact. There is no evidence of loosening. XR ABDOMEN (KUB) (SINGLE AP VIEW)   Final Result   CHOLECYSTECTOMY. INTERNAL FIXATION L4-S1. OTHERWISE, NONSPECIFIC ABDOMEN        Assessment/Plan:    Recent spine surgery with subsequent epidural hematoma that was addressed on 6/21. Hypertension: Monitor blood pressure, adjust medications as he has  Migraine: Currently without symptoms.     25 minutes total care time, >1/2 in unit/floor time and care coordination     Kade Red MD ,MD

## 2022-06-22 NOTE — OP NOTE
[address]                      [address], [address] [facility]                                OPERATIVE REPORT    PATIENT NAME: Xiomy Sanchez               :        1975  MED REC NO:   [id number]                            ROOM:       [id number]  ACCOUNT NO:   [de-identified]                           ADMIT DATE: 2022  PROVIDER:     Spike Doll MD    DATE OF PROCEDURE:  2022    PREOPERATIVE DIAGNOSIS:  L3-4 epidural hematoma. POSTOPERATIVE DIAGNOSIS:  L3-4 epidural hematoma. OPERATION PERFORMED:  Removal of L3-4 epidural hematoma. SURGEON:  Spike Doll MD    INDICATIONS:  Increased back pain. DESCRIPTION OF PROCEDURE:  The patient was given general endotracheal  anesthesia in supine position, turned to prone position. Back was  prepped and draped. Time-out, patient identified. On 2022, left  bilateral L3-4 laminectomy, microdissection decompression. Postoperative epidural hematoma. Previous sutures were removed from the  skin. The skin was reopened, removing all the sutures down to the  fascia. Again, the sutures were removed. The Dennis gently used to  mobilize the fascia and muscle wall off the lateral aspect of the left  spinous process of L3 and the lamina. Self-retaining Zeus  retractor was placed. With use of the microscope, the epidural hematoma  was removed off the dural sac. The dural sac re-expanded with  pulsations. The wound was thoroughly irrigated. Surgiflo was placed  and irrigated after several minutes. Vancomycin powder was applied. A  wound drain was placed. Microscope retractor removed. Fascia closed  with 0 Vicryl suture. Subcutaneous tissues were closed with 2-0, 3-0  Vicryl suture. EBL less than 5 mL. No blood transfused.         Uziel Herrera MD    D: 2022 18:49:25       T: 2022 18:52:57     [id number]  Job#: [id number]     Doc#: [id number]    CC:

## 2022-06-22 NOTE — PLAN OF CARE
See OT evaluation for all goals and OT POC.  Electronically signed by HERB Reddy/OZ on 6/22/2022 at 9:37 AM

## 2022-06-23 VITALS
OXYGEN SATURATION: 99 % | DIASTOLIC BLOOD PRESSURE: 63 MMHG | RESPIRATION RATE: 15 BRPM | HEIGHT: 66 IN | HEART RATE: 74 BPM | SYSTOLIC BLOOD PRESSURE: 127 MMHG | WEIGHT: 224.25 LBS | TEMPERATURE: 98.1 F | BODY MASS INDEX: 36.04 KG/M2

## 2022-06-23 PROCEDURE — 6370000000 HC RX 637 (ALT 250 FOR IP): Performed by: NEUROLOGICAL SURGERY

## 2022-06-23 PROCEDURE — 2580000003 HC RX 258: Performed by: NEUROLOGICAL SURGERY

## 2022-06-23 PROCEDURE — 2700000000 HC OXYGEN THERAPY PER DAY

## 2022-06-23 PROCEDURE — 97116 GAIT TRAINING THERAPY: CPT

## 2022-06-23 PROCEDURE — 6360000002 HC RX W HCPCS: Performed by: NEUROLOGICAL SURGERY

## 2022-06-23 RX ADMIN — ACETAMINOPHEN 650 MG: 325 TABLET ORAL at 09:51

## 2022-06-23 RX ADMIN — HYDROMORPHONE HYDROCHLORIDE 0.5 MG: 1 INJECTION, SOLUTION INTRAMUSCULAR; INTRAVENOUS; SUBCUTANEOUS at 10:01

## 2022-06-23 RX ADMIN — Medication 50 MCG: at 09:51

## 2022-06-23 RX ADMIN — SODIUM CHLORIDE, PRESERVATIVE FREE 10 ML: 5 INJECTION INTRAVENOUS at 10:01

## 2022-06-23 RX ADMIN — HYDROMORPHONE HYDROCHLORIDE 0.5 MG: 1 INJECTION, SOLUTION INTRAMUSCULAR; INTRAVENOUS; SUBCUTANEOUS at 06:26

## 2022-06-23 RX ADMIN — BISACODYL 5 MG: 5 TABLET, COATED ORAL at 09:50

## 2022-06-23 RX ADMIN — HYDROMORPHONE HYDROCHLORIDE 0.5 MG: 1 INJECTION, SOLUTION INTRAMUSCULAR; INTRAVENOUS; SUBCUTANEOUS at 02:37

## 2022-06-23 RX ADMIN — PANTOPRAZOLE SODIUM 40 MG: 40 TABLET, DELAYED RELEASE ORAL at 06:26

## 2022-06-23 RX ADMIN — OXYCODONE HYDROCHLORIDE AND ACETAMINOPHEN 500 MG: 500 TABLET ORAL at 09:50

## 2022-06-23 RX ADMIN — AMLODIPINE BESYLATE 5 MG: 5 TABLET ORAL at 09:50

## 2022-06-23 RX ADMIN — GABAPENTIN 300 MG: 300 CAPSULE ORAL at 09:50

## 2022-06-23 RX ADMIN — OXYCODONE 5 MG: 5 TABLET ORAL at 06:26

## 2022-06-23 RX ADMIN — Medication 10 ML: at 10:07

## 2022-06-23 RX ADMIN — FERROUS SULFATE TAB 325 MG (65 MG ELEMENTAL FE) 325 MG: 325 (65 FE) TAB at 10:03

## 2022-06-23 RX ADMIN — ACETAMINOPHEN 650 MG: 325 TABLET ORAL at 02:37

## 2022-06-23 ASSESSMENT — ENCOUNTER SYMPTOMS
COUGH: 0
VOMITING: 0
ABDOMINAL PAIN: 0
SHORTNESS OF BREATH: 0
VOICE CHANGE: 0
CONSTIPATION: 0
BACK PAIN: 1
COLOR CHANGE: 0
SORE THROAT: 0
NAUSEA: 0
DIARRHEA: 0
TROUBLE SWALLOWING: 0

## 2022-06-23 ASSESSMENT — PAIN DESCRIPTION - LOCATION
LOCATION: BACK
LOCATION: BACK

## 2022-06-23 ASSESSMENT — PAIN DESCRIPTION - DESCRIPTORS
DESCRIPTORS: ACHING
DESCRIPTORS: ACHING

## 2022-06-23 ASSESSMENT — PAIN SCALES - GENERAL
PAINLEVEL_OUTOF10: 8
PAINLEVEL_OUTOF10: 8
PAINLEVEL_OUTOF10: 7

## 2022-06-23 NOTE — PROGRESS NOTES
Physical Therapy Med Surg Daily Treatment Note  Facility/Department: [facility]  Room: [place] [id number]       NAME: Oneyda Perdue  : 1975 (52 y.o.)  MRN: [id number]  CODE STATUS: Full Code    Date of Service: 2022    Patient Diagnosis(es): Spinal stenosis of lumbar region, unspecified whether neurogenic claudication present [M48.061]  Spinal stenosis, lumbar region, with neurogenic claudication [M48.062]  Neurogenic claudication due to lumbar spinal stenosis [M48.062]   No chief complaint on file.     Patient Active Problem List    Diagnosis Date Noted    Epidural hematoma ([address] 2022    Neurogenic claudication due to lumbar spinal stenosis 2022    Spinal stenosis, lumbar region, with neurogenic claudication 2022    Smoker 2022    Postlaminectomy syndrome of lumbar region 2022    Spinal stenosis of lumbar region with neurogenic claudication 2021    Lumbar degenerative disc disease 01/15/2019    Herniation of intervertebral disc between L5 and S1 2019    Psychiatric pseudoseizure     Intractable headache     Essential hypertension     Convulsions ([address]     Status migrainosus 2015    Seizure disorder ([address] 2015    Left-sided weakness 2015    Lumbar (L3-4) stenosis     Asthma     Back pain 2013        Past Medical History:   Diagnosis Date    Arthritis     spine    Asthma     since childhood -- smokes x 30 yrs    Cerebral artery occlusion with cerebral infarction ([address]     per CT scan -- patient without sx,     Hypertension     meds > 3 yrs    Lumbar stenosis     Migraines     starts with neck pain    Seizures ([address]     pt last known seizure May 2022, dx 20 years ago, takes gabapentin for back/seizures    Thyroid disease     meds > 4 yrs -- intermittent    Uterine anomaly      Past Surgical History:   Procedure Laterality Date    ANTERIOR CRUCIATE LIGAMENT REPAIR Left     APPENDECTOMY      at age 21 gait)  Distance: 250ft  Comments: Distance limited by pain. TUG test: 11.82 avg of multiple trials. Activity Tolerance  Activity Tolerance: Patient limited by pain          ASSESSMENT   Body Structures, Functions, Activity Limitations Requiring Skilled Therapeutic Intervention: Decreased functional mobility ; Decreased ADL status; Decreased body mechanics; Decreased endurance;Decreased sensation;Decreased coordination; Increased pain  Assessment: Patient limited with ambulation distance d/t pain this date. Occ use of rails in hallway and demos antalgic gait with shortened steps when ambulating longer distances. Demonstrates ability to ambulate with good cadance and balance when performing TUG test in 11.82 seconds. Therapy Prognosis: Good  Barriers to Learning: none     Discharge Recommendations:  Continue to assess pending progress         Goals  Long Term Goals  Long term goal 1: Pt to complete bed mobility with indep utilizing logroll  Long term goal 2: Pt to complete transfers with indep  Long term goal 3: Pt to ambulate 300ft with indep  Long term goal 4: TUG <13 seconds to demonstrate decreased fall risk  Long term goal 5: Pt to manage 5 steps with HR and indep    PLAN    Plan:  (2-3v follow up trial)  Safety Devices  Type of Devices: All fall risk precautions in place     [facility] (6 CLICK) BASIC MOBILITY  AM-PAC Inpatient Mobility Raw Score : 22     Therapy Time   Individual   Time In 0850   Time Out 0903   Minutes 13     Timed Code Treatment Minutes: 13 Minutes   Gait: 10  BM/TR: [address]", 06/23/22 at 9:11 AM         Definitions for assistance levels  Independent = pt does not require any physical supervision or assistance from another person for activity completion. Device may be needed.   Stand by assistance = pt requires verbal cues or instructions from another person, close to but not touching, to perform the activity  Minimal assistance= pt performs 75% or more of the activity; assistance is required to complete the activity  Moderate assistance= pt performs 50% of the activity; assistance is required to complete the activity  Maximal assistance = pt performs 25% of the activity; assistance is required to complete the activity  Dependent = pt requires total physical assistance to accomplish the task

## 2022-06-23 NOTE — PROGRESS NOTES
Negative for color change. Neurological: Positive for numbness (intermittent lateral right leg numbness contributing to sleeping on that side throughout the night ). Negative for dizziness, weakness, light-headedness and headaches. Psychiatric/Behavioral: Negative for agitation and behavioral problems. All other systems reviewed and are negative. Physical Exam  Vitals and nursing note reviewed. Constitutional:       General: She is not in acute distress. Appearance: Normal appearance. She is well-developed. HENT:      Head: Normocephalic and atraumatic. Nose: Nose normal.      Mouth/Throat:      Mouth: Mucous membranes are moist.   Eyes:      General:         Right eye: No discharge. Left eye: No discharge. Conjunctiva/sclera: Conjunctivae normal.   Neck:      Vascular: No JVD. Trachea: No tracheal deviation. Cardiovascular:      Rate and Rhythm: Normal rate. Pulmonary:      Effort: Pulmonary effort is normal.      Breath sounds: Normal breath sounds. Abdominal:      General: Bowel sounds are normal.      Palpations: Abdomen is soft. Musculoskeletal:         General: Normal range of motion. Cervical back: Normal, normal range of motion and neck supple. No swelling or rigidity. No muscular tenderness. Thoracic back: Normal.      Lumbar back: Tenderness (mild tenderness to palpation near surgical site. No palpable fluid collection or crepitus noted ) present. No swelling, edema or deformity. Lymphadenopathy:      Cervical: No cervical adenopathy. Skin:     General: Skin is warm and dry. Capillary Refill: Capillary refill takes less than 2 seconds. Neurological:      Mental Status: She is alert and oriented to person, place, and time. Cranial Nerves: Cranial nerves are intact. Motor: No weakness, tremor or abnormal muscle tone. Coordination: Coordination is intact. Gait: Gait is intact.    Psychiatric:         Mood and Affect: Mood normal.         Behavior: Behavior normal.               Medications:  Reviewed    Infusion Medications:    lactated ringers 125 mL/hr at 06/21/22 2038    sodium chloride 100 mL/hr at 06/22/22 3428    sodium chloride      sodium chloride       Scheduled Medications:    Potassium  99 mg Oral Daily    vitamin B-12  50 mcg Oral Daily    sodium chloride flush  5-40 mL IntraVENous 2 times per day    sodium chloride flush  5-40 mL IntraVENous 2 times per day    acetaminophen  650 mg Oral Q6H    bisacodyl  5 mg Oral Daily    gabapentin  300 mg Oral TID    ferrous sulfate  325 mg Oral Daily with breakfast    amLODIPine  5 mg Oral Daily    vitamin C  500 mg Oral Daily    pantoprazole  40 mg Oral QAM AC     PRN Meds: ondansetron, tiZANidine, oxyCODONE-acetaminophen, sodium chloride flush, sodium chloride, oxyCODONE **OR** oxyCODONE, HYDROmorphone **OR** HYDROmorphone, albuterol sulfate HFA, fleet, sodium chloride flush, sodium chloride, ondansetron, magnesium hydroxide, polyethylene glycol, docusate sodium    Labs:   No results for input(s): WBC, HGB, HCT, PLT in the last 72 hours. No results for input(s): NA, K, CL, CO2, BUN, CREATININE, CALCIUM, PHOS in the last 72 hours. Invalid input(s): TRACIES  No results for input(s): AST, ALT, BILIDIR, BILITOT, ALKPHOS in the last 72 hours. No results for input(s): INR in the last 72 hours. No results for input(s): Julio Montgomery in the last 72 hours. Urinalysis:   Lab Results   Component Value Date    NITRU Negative 01/14/2019    WBCUA 3-5 01/14/2019    BACTERIA MANY 01/14/2019    RBCUA 0-2 01/14/2019    BLOODU Negative 01/14/2019    SPECGRAV 1.027 01/14/2019    GLUCOSEU Negative 01/14/2019       Radiology:   Most recent    EEG No valid procedures specified. MRI of Brain No results found for this or any previous visit. No results found for this or any previous visit.                             MRA of the Head and Neck: No results found for this or any previous visit. No results found for this or any previous visit. No results found for this or any previous visit. CT of the Head: No results found for this or any previous visit. No results found for this or any previous visit. No results found for this or any previous visit. Carotid duplex: No results found for this or any previous visit. No results found for this or any previous visit. No results found for this or any previous visit. Echo No results found for this or any previous visit. Assessment/Plan:  6/22/22  Patient pod1 evacuation of L3-4 epidural hematoma. Patient reports improvement in pain post-operatively. She is having bowel movements and passing gas. She did have an ileus during this admission secondary to narcotic use. She is seen moving all extremities without difficulty and able to stand up on side of bed without assistance or difficulty. Will repeat MRI without contrast of lumbar spine today s/p evacuation of epidural hematoma with plans for possible discharge tomorrow. 6/23/22  Patient pod2 evacuation of L3-4 hematoma. Patient reportsmoderate low back pain and is seen sitting in bed moving all extremities without difficulty. I am concerned that the patient is reliant on the IV dilaudid for pain relief as she is asking for it every three hours around the clock. She was going to be switched to fentanyl patch and discontinue dilaudid however she experiences shortness of breath with fentanyl. She has muscle relaxant ordered as well and is hesitant to use this reporting that it makes her tired. I have explained to the patient that she will need to wean off of the IV pain medication prior to discharge today because she will be going home on oral pain medications. Patient reports the percocet will help but is requesting a higher dose.  I have explained to patient that she will need to reach out to her pain management doctor for a stronger prescription. She was to see pain management today however is still in the hospital and will miss her appointment. Plan for discharge today.          Collaborating physicians: Dr. Hussain Mi     Electronically signed by JERROD Arita CNP on 6/23/2022 at 8:45 AM

## 2022-06-24 ENCOUNTER — TELEPHONE (OUTPATIENT)
Dept: NEUROSURGERY | Age: 47
End: 2022-06-24

## 2022-06-24 DIAGNOSIS — Z98.1 HISTORY OF LUMBAR SPINAL FUSION: ICD-10-CM

## 2022-06-24 DIAGNOSIS — M47.817 LUMBOSACRAL SPONDYLOSIS WITHOUT MYELOPATHY: Primary | ICD-10-CM

## 2022-06-24 DIAGNOSIS — K59.03 THERAPEUTIC OPIOID-INDUCED CONSTIPATION (OIC): ICD-10-CM

## 2022-06-24 DIAGNOSIS — S06.4XAA EPIDURAL HEMATOMA: ICD-10-CM

## 2022-06-24 DIAGNOSIS — T40.2X5A THERAPEUTIC OPIOID-INDUCED CONSTIPATION (OIC): ICD-10-CM

## 2022-06-27 ENCOUNTER — APPOINTMENT (OUTPATIENT)
Dept: MRI IMAGING | Age: 47
End: 2022-06-27
Payer: MEDICARE

## 2022-06-27 ENCOUNTER — HOSPITAL ENCOUNTER (EMERGENCY)
Age: 47
Discharge: ANOTHER HOSPITAL | End: 2022-06-28
Attending: EMERGENCY MEDICINE
Payer: MEDICARE

## 2022-06-27 DIAGNOSIS — T81.40XA POSTOPERATIVE INFECTION, UNSPECIFIED TYPE, INITIAL ENCOUNTER: ICD-10-CM

## 2022-06-27 DIAGNOSIS — G06.2 EPIDURAL ABSCESS: Primary | ICD-10-CM

## 2022-06-27 LAB
ANION GAP SERPL CALCULATED.3IONS-SCNC: 10 MEQ/L (ref 9–15)
BASOPHILS ABSOLUTE: 0.2 K/UL (ref 0–0.2)
BASOPHILS RELATIVE PERCENT: 2.2 %
BUN BLDV-MCNC: 14 MG/DL (ref 6–20)
CALCIUM SERPL-MCNC: 9.3 MG/DL (ref 8.5–9.9)
CHLORIDE BLD-SCNC: 101 MEQ/L (ref 95–107)
CO2: 25 MEQ/L (ref 20–31)
CREAT SERPL-MCNC: 0.73 MG/DL (ref 0.5–0.9)
EOSINOPHILS ABSOLUTE: 0.3 K/UL (ref 0–0.7)
EOSINOPHILS RELATIVE PERCENT: 2.8 %
GFR AFRICAN AMERICAN: >60
GFR NON-AFRICAN AMERICAN: >60
GLUCOSE BLD-MCNC: 103 MG/DL (ref 70–99)
HCT VFR BLD CALC: 42.4 % (ref 37–47)
HEMOGLOBIN: 14.5 G/DL (ref 12–16)
LYMPHOCYTES ABSOLUTE: 2 K/UL (ref 1–4.8)
LYMPHOCYTES RELATIVE PERCENT: 22.1 %
MCH RBC QN AUTO: 32.9 PG (ref 27–31.3)
MCHC RBC AUTO-ENTMCNC: 34.1 % (ref 33–37)
MCV RBC AUTO: 96.4 FL (ref 82–100)
MONOCYTES ABSOLUTE: 0.5 K/UL (ref 0.2–0.8)
MONOCYTES RELATIVE PERCENT: 5.8 %
NEUTROPHILS ABSOLUTE: 6.1 K/UL (ref 1.4–6.5)
NEUTROPHILS RELATIVE PERCENT: 67.1 %
PDW BLD-RTO: 13.1 % (ref 11.5–14.5)
PLATELET # BLD: 352 K/UL (ref 130–400)
POTASSIUM SERPL-SCNC: 5.2 MEQ/L (ref 3.4–4.9)
RBC # BLD: 4.4 M/UL (ref 4.2–5.4)
SODIUM BLD-SCNC: 136 MEQ/L (ref 135–144)
WBC # BLD: 9 K/UL (ref 4.8–10.8)

## 2022-06-27 PROCEDURE — 80048 BASIC METABOLIC PNL TOTAL CA: CPT

## 2022-06-27 PROCEDURE — 6360000002 HC RX W HCPCS: Performed by: EMERGENCY MEDICINE

## 2022-06-27 PROCEDURE — 36415 COLL VENOUS BLD VENIPUNCTURE: CPT

## 2022-06-27 PROCEDURE — 96376 TX/PRO/DX INJ SAME DRUG ADON: CPT

## 2022-06-27 PROCEDURE — 85025 COMPLETE CBC W/AUTO DIFF WBC: CPT

## 2022-06-27 PROCEDURE — 72157 MRI CHEST SPINE W/O & W/DYE: CPT

## 2022-06-27 PROCEDURE — 96375 TX/PRO/DX INJ NEW DRUG ADDON: CPT

## 2022-06-27 PROCEDURE — 96368 THER/DIAG CONCURRENT INF: CPT

## 2022-06-27 PROCEDURE — 96365 THER/PROPH/DIAG IV INF INIT: CPT

## 2022-06-27 PROCEDURE — 99285 EMERGENCY DEPT VISIT HI MDM: CPT

## 2022-06-27 PROCEDURE — 72158 MRI LUMBAR SPINE W/O & W/DYE: CPT

## 2022-06-27 RX ORDER — OXYCODONE AND ACETAMINOPHEN 7.5; 325 MG/1; MG/1
1 TABLET ORAL 3 TIMES DAILY PRN
Qty: 21 TABLET | Refills: 0 | Status: SHIPPED | OUTPATIENT
Start: 2022-06-27 | End: 2022-06-30

## 2022-06-27 RX ORDER — POLYETHYLENE GLYCOL 3350 17 G/17G
17 POWDER, FOR SOLUTION ORAL DAILY PRN
Qty: 510 G | Refills: 0 | Status: SHIPPED | OUTPATIENT
Start: 2022-06-27 | End: 2022-07-27

## 2022-06-27 RX ORDER — MORPHINE SULFATE 4 MG/ML
4 INJECTION, SOLUTION INTRAMUSCULAR; INTRAVENOUS ONCE
Status: COMPLETED | OUTPATIENT
Start: 2022-06-27 | End: 2022-06-27

## 2022-06-27 RX ADMIN — MORPHINE SULFATE 4 MG: 4 INJECTION, SOLUTION INTRAMUSCULAR; INTRAVENOUS at 22:24

## 2022-06-27 ASSESSMENT — PAIN DESCRIPTION - ORIENTATION: ORIENTATION: RIGHT;LEFT

## 2022-06-27 ASSESSMENT — PAIN SCALES - GENERAL
PAINLEVEL_OUTOF10: 10
PAINLEVEL_OUTOF10: 10

## 2022-06-27 ASSESSMENT — PAIN DESCRIPTION - LOCATION
LOCATION: BACK;LEG
LOCATION: BACK

## 2022-06-27 ASSESSMENT — PAIN DESCRIPTION - DESCRIPTORS: DESCRIPTORS: ACHING

## 2022-06-27 ASSESSMENT — PAIN - FUNCTIONAL ASSESSMENT: PAIN_FUNCTIONAL_ASSESSMENT: 0-10

## 2022-06-27 ASSESSMENT — PAIN DESCRIPTION - PAIN TYPE: TYPE: ACUTE PAIN

## 2022-06-28 VITALS
BODY MASS INDEX: 32.28 KG/M2 | RESPIRATION RATE: 18 BRPM | SYSTOLIC BLOOD PRESSURE: 110 MMHG | DIASTOLIC BLOOD PRESSURE: 94 MMHG | WEIGHT: 200 LBS | OXYGEN SATURATION: 99 % | TEMPERATURE: 98.4 F | HEART RATE: 73 BPM

## 2022-06-28 LAB
C-REACTIVE PROTEIN: 6.7 MG/L (ref 0–5)
LACTIC ACID, SEPSIS: 0.9 MMOL/L (ref 0.5–1.9)
SARS-COV-2, NAAT: DETECTED
SEDIMENTATION RATE, ERYTHROCYTE: 20 MM (ref 0–20)

## 2022-06-28 PROCEDURE — 72157 MRI CHEST SPINE W/O & W/DYE: CPT

## 2022-06-28 PROCEDURE — A9579 GAD-BASE MR CONTRAST NOS,1ML: HCPCS | Performed by: EMERGENCY MEDICINE

## 2022-06-28 PROCEDURE — 2580000003 HC RX 258: Performed by: EMERGENCY MEDICINE

## 2022-06-28 PROCEDURE — 86140 C-REACTIVE PROTEIN: CPT

## 2022-06-28 PROCEDURE — 87040 BLOOD CULTURE FOR BACTERIA: CPT

## 2022-06-28 PROCEDURE — 83605 ASSAY OF LACTIC ACID: CPT

## 2022-06-28 PROCEDURE — 6360000002 HC RX W HCPCS: Performed by: EMERGENCY MEDICINE

## 2022-06-28 PROCEDURE — 99285 EMERGENCY DEPT VISIT HI MDM: CPT

## 2022-06-28 PROCEDURE — 36415 COLL VENOUS BLD VENIPUNCTURE: CPT

## 2022-06-28 PROCEDURE — 72158 MRI LUMBAR SPINE W/O & W/DYE: CPT

## 2022-06-28 PROCEDURE — 87635 SARS-COV-2 COVID-19 AMP PRB: CPT

## 2022-06-28 PROCEDURE — 6360000004 HC RX CONTRAST MEDICATION: Performed by: EMERGENCY MEDICINE

## 2022-06-28 PROCEDURE — 85652 RBC SED RATE AUTOMATED: CPT

## 2022-06-28 RX ORDER — MORPHINE SULFATE 4 MG/ML
4 INJECTION, SOLUTION INTRAMUSCULAR; INTRAVENOUS ONCE
Status: COMPLETED | OUTPATIENT
Start: 2022-06-28 | End: 2022-06-28

## 2022-06-28 RX ORDER — 0.9 % SODIUM CHLORIDE 0.9 %
500 INTRAVENOUS SOLUTION INTRAVENOUS ONCE
Status: COMPLETED | OUTPATIENT
Start: 2022-06-28 | End: 2022-06-28

## 2022-06-28 RX ADMIN — VANCOMYCIN HYDROCHLORIDE 1500 MG: 1.5 INJECTION, POWDER, LYOPHILIZED, FOR SOLUTION INTRAVENOUS at 03:52

## 2022-06-28 RX ADMIN — HYDROMORPHONE HYDROCHLORIDE 1 MG: 1 INJECTION, SOLUTION INTRAMUSCULAR; INTRAVENOUS; SUBCUTANEOUS at 02:55

## 2022-06-28 RX ADMIN — SODIUM CHLORIDE 500 ML: 9 INJECTION, SOLUTION INTRAVENOUS at 03:11

## 2022-06-28 RX ADMIN — PIPERACILLIN AND TAZOBACTAM 3375 MG: 3; .375 INJECTION, POWDER, LYOPHILIZED, FOR SOLUTION INTRAVENOUS at 03:08

## 2022-06-28 RX ADMIN — GADOTERIDOL 20 ML: 279.3 INJECTION, SOLUTION INTRAVENOUS at 00:49

## 2022-06-28 RX ADMIN — MORPHINE SULFATE 4 MG: 4 INJECTION, SOLUTION INTRAMUSCULAR; INTRAVENOUS at 04:07

## 2022-06-28 ASSESSMENT — PAIN DESCRIPTION - DESCRIPTORS: DESCRIPTORS: ACHING

## 2022-06-28 ASSESSMENT — PAIN - FUNCTIONAL ASSESSMENT: PAIN_FUNCTIONAL_ASSESSMENT: 0-10

## 2022-06-28 ASSESSMENT — PAIN SCALES - GENERAL
PAINLEVEL_OUTOF10: 10
PAINLEVEL_OUTOF10: 10

## 2022-06-28 ASSESSMENT — PAIN DESCRIPTION - LOCATION
LOCATION: BACK
LOCATION: BACK

## 2022-06-28 ASSESSMENT — PAIN DESCRIPTION - ORIENTATION: ORIENTATION: RIGHT;LEFT

## 2022-06-28 NOTE — ED NOTES
Pt states she wants Dilaudid and that morphine doesn't work for her and that she normally takes Jose Barger, RN  06/28/22 3912

## 2022-06-28 NOTE — ED PROVIDER NOTES
[facility] ED  EMERGENCY DEPARTMENT ENCOUNTER      Pt Name: Rhett Wright  MRN: [id number]  [place] 1975  Date of evaluation: 6/27/2022  Provider: Miah Enciso MD    CHIEF COMPLAINT       Chief Complaint   Patient presents with    Post-op Problem     sent by Dr. Jose Piña for leg pain & numbness after recent lumbar laminectomy & blood clot evacuation         HISTORY OF PRESENT ILLNESS   (Location/Symptom, Timing/Onset, Context/Setting, Quality, Duration, Modifying Factors, Severity)  Note limiting factors. Rhett Wright is a 52 y.o. female who presents to the emergency department via private vehicle for evaluation of back discomfort, postop problem. History of tobacco dependence, pseudoseizures, hypertension, spinal stenosis status post L3-L4 laminectomy microdissection and decompression on 6/18 with Dr. Jose Piña, and hematoma evacuation 6/21. She was seen by pain management today, they were informed that she continue to have pain and had some tingling in her right arm and she was told to come in for evaluation. She says that the day after she was discharged she noticed that she continued to have lower back pain, shooting down both of her legs, she says that she is able to ambulate but she feels like her legs are weaker than normal.  Notes paresthesias but denies any overt numbness or focal weakness. Denies new traumatic injury, anticoagulation use, saddle anesthesia, numbness or focal weakness. HPI  Chart review from 2 days ago notes unremarkable CT of the head/brain, C-spine and chest with contrast-unremarkable  Nursing Notes were reviewed. REVIEW OF SYSTEMS    (2-9 systems for level 4, 10 or more for level 5)     Review of Systems   Constitutional: Negative for fever. Back pain, sharp and shooting pain, tingling to lower extremities, subjective weakness   All other systems reviewed and are negative.       Except as noted above the remainder of the review of systems was reviewed and negative.        PAST MEDICAL HISTORY     Past Medical History:   Diagnosis Date    Arthritis     spine    Asthma     since childhood -- smokes x 30 yrs    Cerebral artery occlusion with cerebral infarction [facility])     per CT scan -- patient without sx, 2021    Hypertension     meds > 3 yrs    Lumbar stenosis     Migraines     starts with neck pain    Seizures ([address]     pt last known seizure May 2022, dx 20 years ago, takes gabapentin for back/seizures    Thyroid disease     meds > 4 yrs -- intermittent    Uterine anomaly          SURGICAL HISTORY       Past Surgical History:   Procedure Laterality Date    ANTERIOR CRUCIATE LIGAMENT REPAIR Left     APPENDECTOMY      at age 21    [facility] [address] [facility]  2017    lumbar disc     BACK SURGERY  2018    lumbar fusion    CHOLECYSTECTOMY  2015    COLONOSCOPY      2017    ENDOMETRIAL ABLATION  2006    post op sepsis    ENDOSCOPY, COLON, DIAGNOSTIC      2017    KNEE ARTHROSCOPY Left 2004    ACL repair    LAMINECTOMY N/A 6/17/2022    LEFT BILATERAL L3-4 LAMINECTOMY MICRODISSECTION DECOMPRESSION performed by Kayla Ray MD at [place] [address] 6/21/2022    LUMBAR EPIDURAL HEMATOMA EVACUATION performed by Kayla Ray MD at [address] [facility] N/A 1/15/2019    L4- L5 DECOMPRESSION, L5-S1 POSTERIOR LUMBAR INTERBODY FUSION performed by Ralph Sesay MD at [facility] N/A 3/11/2021    L4-5 TLIF INSTRUMENTATION AT L5-S1 DECOMPRESSION AT L4, L5 REMOVAL AND REINSERTION OF HARDWARE AT L5-S1 performed by Ralph Sesay MD at [address]  5/9/13    duramorph 1.5 mg epideral  6mg celestone    TONSILLECTOMY      as child    TUBAL LIGATION Bilateral 2002         CURRENT MEDICATIONS       Previous Medications    AMLODIPINE (NORVASC) 5 MG TABLET    Take 5 mg by mouth daily     BIOTIN 1000 MCG TABS    Take by mouth 2 times daily    CALCIUM CARB-CHOLECALCIFEROL (CALCIUM 1000 + D PO)    Take 600 mg by mouth daily    DOCUSATE SODIUM (COLACE) 100 MG CAPSULE    Take 1 capsule by mouth 2 times daily as needed for Constipation    FERROUS SULFATE 325 (65 FE) MG TABLET    Take 65 mg by mouth daily (with breakfast)    GABAPENTIN (NEURONTIN) 300 MG CAPSULE    take 1 capsule (300MG)  by oral route 3 times every day    LEVOTHYROXINE (SYNTHROID) 50 MCG TABLET    daily    MISC. DEVICES (RAISED TOILET SEAT/LOCK & ARMS) MISC    1 Piece by Does not apply route as needed (PRN)    NALOXONE 4 MG/0.1ML LIQD NASAL SPRAY    1 spray by Nasal route as needed for Opioid Reversal    OMEPRAZOLE (PRILOSEC) 20 MG CAPSULE    Take 40 mg by mouth three times daily     ONDANSETRON (ZOFRAN) 4 MG TABLET    Take 4 mg by mouth every 8 hours as needed for Nausea or Vomiting    OXYCODONE-ACETAMINOPHEN (PERCOCET) 7.5-325 MG PER TABLET    Take 1 tablet by mouth 3 times daily as needed for Pain for up to 7 days.     POLYETHYLENE GLYCOL (GLYCOLAX) 17 GM/SCOOP POWDER    Take 17 g by mouth daily as needed (constipation)    POTASSIUM 75 MG TABS    Take by mouth    POTASSIUM 99 MG TABS    Take 99 mg by mouth daily    TIZANIDINE (ZANAFLEX) 4 MG TABLET    Take 4 mg by mouth every 6 hours as needed    VENTOLIN  (90 BASE) MCG/ACT INHALER    2 puffs 4 times daily     VITAMIN B-12 (CYANOCOBALAMIN) 100 MCG TABLET    Take 50 mcg by mouth daily    VITAMIN C (ASCORBIC ACID) 500 MG TABLET    Take 500 mg by mouth daily       ALLERGIES     Fentanyl, Prochlorperazine edisylate, Tylenol [acetaminophen], Cortizone, Aspirin, and Codeine    FAMILY HISTORY       Family History   Problem Relation Age of Onset    Cancer Mother         NHL    COPD Mother     Other Mother         ITP & pancreatitis    Heart Failure Maternal Grandmother     No Known Problems Sister     High Blood Pressure Brother     No Known Problems Son     No Known Problems Daughter           SOCIAL HISTORY       Social History     Socioeconomic History    Marital status:      Spouse name: None    Number of children: None    Years of education: None    Highest education level: None   Occupational History    None   Tobacco Use    Smoking status: Current Every Day Smoker     Packs/day: 1.50     Years: 30.00     Pack years: 45.00     Types: Cigarettes    Smokeless tobacco: Never Used   Vaping Use    Vaping Use: Never used   Substance and Sexual Activity    Alcohol use: No     Alcohol/week: 0.0 standard drinks    Drug use: No    Sexual activity: None   Other Topics Concern    None   Social History Narrative    None     Social Determinants of Health     Financial Resource Strain:     Difficulty of Paying Living Expenses: Not on file   Food Insecurity:     Worried About Running Out of Food in the Last Year: Not on file    Cammy of Food in the Last Year: Not on file   Transportation Needs:     Lack of Transportation (Medical): Not on file    Lack of Transportation (Non-Medical):  Not on file   Physical Activity:     Days of Exercise per Week: Not on file    Minutes of Exercise per Session: Not on file   Stress:     Feeling of Stress : Not on file   Social Connections:     Frequency of Communication with Friends and Family: Not on file    Frequency of Social Gatherings with Friends and Family: Not on file    Attends Episcopal Services: Not on file    Active Member of [address] [place] [address] [employer] or Organizations: Not on file    Attends Club or Organization Meetings: Not on file    Marital Status: Not on file   Intimate Partner Violence:     Fear of Current or Ex-Partner: Not on file    Emotionally Abused: Not on file    Physically Abused: Not on file    Sexually Abused: Not on file   Housing Stability:     Unable to Pay for Housing in the Last Year: Not on file    Number of [place] in the Last Year: Not on file    Unstable Housing in the Last Year: Not on file       SCREENINGS         Americus Coma Scale  Eye Opening: Spontaneous  Best Verbal Response: Oriented  Best Motor Response: Obeys commands  Americus Coma Scale Score: 15                     CIWA Assessment  BP: (!) 110/94  Heart Rate: 73                 PHYSICAL EXAM    (up to 7 for level 4, 8 or more for level 5)     ED Triage Vitals [06/27/22 2118]   BP Temp Temp src Heart Rate Resp SpO2 Height Weight   (!) 148/88 98.4 °F (36.9 °C) -- 85 20 97 % -- 200 lb (90.7 kg)       Physical Exam  Constitutional:       General: She is not in acute distress. Appearance: She is not toxic-appearing or diaphoretic. Comments: Midline lumbar postoperative incision healing without significant surrounding erythema/warmth/induration or fluctuance. No palpable mass. Sutures in place with no dehiscence. Mild general tenderness with no crepitus. Patient also points to her thoracic spine   HENT:      Head: Normocephalic and atraumatic. Mouth/Throat:      Mouth: Mucous membranes are moist.   Eyes:      General: No scleral icterus. Extraocular Movements: Extraocular movements intact. Pupils: Pupils are equal, round, and reactive to light. Cardiovascular:      Rate and Rhythm: Normal rate and regular rhythm. Pulses: Normal pulses. Pulmonary:      Effort: Pulmonary effort is normal.      Breath sounds: Normal breath sounds. Abdominal:      Tenderness: There is no abdominal tenderness. There is no guarding or rebound. Musculoskeletal:      Cervical back: Normal range of motion. No tenderness. Right lower leg: No edema. Left lower leg: No edema. Skin:     Capillary Refill: Capillary refill takes less than 2 seconds. Findings: No rash. Neurological:      General: No focal deficit present. Mental Status: She is alert and oriented to person, place, and time. Cranial Nerves: No cranial nerve deficit. Sensory: No sensory deficit. Motor: No weakness. Deep Tendon Reflexes: Reflexes normal.   Psychiatric:         Mood and Affect: Mood normal.     No saddle anesthesia.   Neurovascular tact bilateral lower extremities    DIAGNOSTIC RESULTS     RADIOLOGY:   Non-plain film images such as CT, Ultrasound and MRI are read by the radiologist. Plain radiographic images are visualized and preliminarily interpreted by the emergency physician with the below findings:      Interpretation per the Radiologist below, if available at the time of this note:    MRI Gustavo Serrano 157    (Results Pending)   MRI Ana M 12    (Results Pending)         ED BEDSIDE ULTRASOUND:   Performed by ED Physician - none    LABS:  Labs Reviewed   CBC WITH AUTO DIFFERENTIAL - Abnormal; Notable for the following components:       Result Value    MCH 32.9 (*)     All other components within normal limits   BASIC METABOLIC PANEL - Abnormal; Notable for the following components:    Potassium 5.2 (*)     Glucose 103 (*)     All other components within normal limits   C-REACTIVE PROTEIN - Abnormal; Notable for the following components:    CRP 6.7 (*)     All other components within normal limits   CULTURE, BLOOD 1   CULTURE, BLOOD 2   COVID-19, RAPID   SEDIMENTATION RATE   LACTATE, SEPSIS   LACTATE, SEPSIS       All other labs were within normal range or not returned as of this dictation. EMERGENCY DEPARTMENT COURSE and DIFFERENTIAL DIAGNOSIS/MDM:   Vitals:    Vitals:    06/27/22 2118 06/28/22 0233   BP: (!) 148/88 (!) 110/94   Pulse: 85 73   Resp: 20 18   Temp: 98.4 °F (36.9 °C)    SpO2: 97% 99%   Weight: 200 lb (90.7 kg)      OARRS reviewed  No leukocytosis  MDM  Patient had midline back pain, reported weakness to her legs that was subjective. Nonfocal neurologic exam on evaluation. Spoke to radiologist states that he believes that the abnormalities on MRI concerning for epidural infection, abscess rather than hematoma or fluid collection otherwise.   We attempted to contact his answering service who said that he is signed out of the system, out of office and \" absolutely not to be paged\" and \" there is no one on-call for him\"  I still attempted to contact Dr Abilio Canas via his cell phone and was unable to speak with him  Hospitalist luis fernando she needs to be somewhere with NS consultation immediately available  Will transfer for neurosurgical care    REASSESSMENT      Patient updated that she will be [facility] to [facility] Dr. Francy Valle neurosurgery will evaluate the patient in the emergency department        CONSULTS:  None    PROCEDURES:  Unless otherwise noted below, none     Procedures        FINAL IMPRESSION      1. Epidural abscess    2. Postoperative infection, unspecified type, initial encounter          DISPOSITION/PLAN   DISPOSITION        PATIENT REFERRED TO:  No follow-up provider specified. DISCHARGE MEDICATIONS:  New Prescriptions    No medications on file     Controlled Substances Monitoring:     RX Monitoring 6/27/2022   Attestation -   Periodic Controlled Substance Monitoring Obtaining appropriate analgesic effect of treatment.    Chronic Pain > 50 MEDD -   Chronic Pain > 80 MEDD -       (Please note that portions of this note were completed with a voice recognition program.  Efforts were made to edit the dictations but occasionally words are mis-transcribed.)    Ewa Nava MD (electronically signed)  Attending Emergency Physician           Ewa Nava MD  06/28/22 8366

## 2022-06-30 RX ORDER — ONDANSETRON 4 MG/1
4 TABLET, FILM COATED ORAL DAILY PRN
Refills: 0 | Status: CANCELLED | OUTPATIENT
Start: 2022-06-30

## 2022-06-30 RX ORDER — OXYCODONE AND ACETAMINOPHEN 10; 325 MG/1; MG/1
1 TABLET ORAL EVERY 8 HOURS PRN
Qty: 33 TABLET | Refills: 0 | Status: SHIPPED | OUTPATIENT
Start: 2022-06-30 | End: 2022-07-11

## 2022-07-01 NOTE — TELEPHONE ENCOUNTER
I called and spoke with patient to let her know per Dewayne Miranda cnp has reviewed her message and had sent Percocet 10 mg 3 times daily to her pharmacy. She was given enough of the 10 mg 3 times daily until her follow-up visit that is planned on 7/11/2022. We are also reaching out to our practice manager as I recommend she needs to see Dr. Rivera Him sooner than her scheduled appointment.        Patient currently has COVID and has been scheduled for 14 days out already (7/11/22). Patient is requesting something specifically for nausea. She had Zofran in the past that seemed to help. Will Dewayne Miranda cnp send Zofran to pharmacy?
I returned call to patient and left detailed message to let her know per Ophelia Barrios,  Is \"unable to prescribe Zofran.  I am not sure if this is new problem for her and where she got her original [address] she needs to make an appointment to follow-up next week with someone\"     Patient currently has Covid, so would be unable to be seen in our office at this time.
I returned call to patient to let her know Mojgan Dash cnp is unable to prescribe Zofran for her and recommending her to follow up with PCP. Pt stated that she does not have a PCP. I asked if patient could take something OTC, she stated that it doesn't help only thing that will help is the zofran. I advised that I could check with Early Number, cnp tomorrow if she can prescribe or recommend anything else?
I tried to call her again. I called her twice over a period of 1520 minutes. No answer.   I did leave messages as to what she may do to be more comfortable
PT WAS CALLED & MADE AWARE OF DR. BRUMFIELD'S RESPONSE. POST OP APPT ON 7/5 WAS RESCHEDULED DUE TO PT HAVING COVID. PT NOW SCHEDULED FOR POST OP WITH YANIRA ON 7-11-22. PT IS GOING TO  HER RX'S AND CALL US IF THEY DO NOT HELP.
Patient called stating that she is having a lot of back and leg pain and weakness. She states that she has trouble laying or sitting. She is even having difficulty to breathe without pain. She is asking what Dr. Andrea Echols advises?     LEEANN - 6/17/22 - LEFT BILATERAL L 3-4 LAMINECTOMY MICRODISSECTION DECOMPRESSION & 6/21/22 REMOVAL OF L 3-4 EPIDURAL HEMATOMA
Patient states she is still having pain. She says her right arm is tingling, it feels like electric shocks. She also states that she is having difficulty walking. She is wondering if she could be prescribed something stronger?
Patient states she is still having pain. She says her right arm is tingling, it feels like electric shocks. She also states that she is having difficulty walking. She is wondering if she could be prescribed something stronger? PT IS STATUS POST 6-22-22 REMOVAL OF L3-4 EPIDURAL HEMATOMA AND 6-18-22 LEFT BILATERAL L3-L4 LAMINECTOMY, MICRODISSECTION, DECOMPRESSION BY DR. BLANCAS. WILL [facility] ADVISE IN ABSENCE OF DR. Daniel Wallis?
Patient states that she went to [facility] who did an MRI and they found a cyst and fluid in her back. She was taken to [facility] and was just sent home there. PT also tested positive for covid.
Patient states the current Percocet prescription is doing very little to help the pain. Patient states the pain is on left side of lower back, down into butt and hip and thigh. Patient states thigh is experiencing numbness. New pain recently started occurring on right leg. Patient states she is taking tylenol as well with Percocet but it is not helping. Patient states she is feeling nauseous and asks if she can be prescribed a medication to help with that. Patient is currently positive for COVID so does not want to go to the ER again but does not know what to do as she is miserable.
Please call patient back and inform Percocet 10 mg 3 times daily was sent to her pharmacy. She was given enough of the 10 mg 3 times daily until her follow-up visit that is planned on 7/11/2022. We are also reaching out to our practice manager as I recommend she needs to see Dr. Queenie Glez sooner than her scheduled appointment. Jasmyne- Please see messages below regarding this post op patient of Dr. Queenie Glez. She has been to the ER on 6/28 and 6/29 with pain. She needs to be seen sooner by [address] [place] [address].
Please review chart and message.
Thank you for the update. We will schedule you for follow up with our office.
Unfortunately we do not prescribe Zofran. Recommend she call her PCP.
analgesic effect of treatment.    Chronic Pain > 50 MEDD -   Chronic Pain > 80 MEDD -

## 2022-07-03 LAB
BLOOD CULTURE, ROUTINE: NORMAL
CULTURE, BLOOD 2: NORMAL

## 2022-07-05 ENCOUNTER — TELEPHONE (OUTPATIENT)
Dept: PAIN MANAGEMENT | Age: 47
End: 2022-07-05

## 2022-07-05 NOTE — TELEPHONE ENCOUNTER
Patient called stating that she's getting worse as far as the post op pain. (See earlier encounters from 6/24/22) Her pain is excessive starting in the lumbar region, shooting down into both of her hips and her thighs. She states that she still has the \"bump on her back\" that she claims to be a possible abscess at the surgery site. Patient has also tested positive for COVID and is afraid to go back to the hospital because they \"will want her to see the doctor that did her surgery\".  She's asking that Dr Curt Su please call her when he's available July 6,2022  Sidney Baker [phone]

## 2022-07-07 ENCOUNTER — CLINICAL DOCUMENTATION (OUTPATIENT)
Dept: NEUROSURGERY | Age: 47
End: 2022-07-07

## 2022-07-07 NOTE — TELEPHONE ENCOUNTER
PT WAS CALLED & MADE AWARE OF DR. BLANCAS'S RESPONSE. PT STATES SHE IS NOT SCHEDULED TO SEE THE PAIN MGMT UNTIL 7-11-22. PT SHOULD HAVE ENOUGH PERCOCET UNTIL THEN.

## 2022-07-07 NOTE — TELEPHONE ENCOUNTER
Patient called stating that she's getting worse as far as the post op pain. (See earlier encounters from 6/24/22) Her pain is excessive starting in the lumbar region, shooting down into both of her hips and her thighs. She states that she still has the \"bump on her back\" that she claims to be a possible abscess at the surgery site. Patient has also tested positive for COVID and is afraid to go back to the hospital because they \"will want her to see the doctor that did her surgery\". She's asking that Dr Vijay Medina please call her when he's available July 6,2022  Raimundo Joyner ([address], NP ON Monday, July 11TH. POST OP APPT WITH DR. BLANCAS ON 7-19-22. ENCOUNTER WAS JUST REC'D TODAY FOR NEURO CLINICAL POOL. WILL DR. BLANCAS ADVISE?

## 2022-07-07 NOTE — TELEPHONE ENCOUNTER
Patient states that for forgot to tell Dr Jordan Torrez that she has taken dilauded and mophine in pill form in the past and gotten relief from them. She says that she took the oxycodone for breakthrough pain.

## 2022-07-07 NOTE — PROGRESS NOTES
6/28/2022 seen at [address] [facility] MRI reviewed unable to paced here. Seen by by orthopedic spine surgery who did not feel that she has an abscess. Okay for discharge to home.

## 2022-07-07 NOTE — TELEPHONE ENCOUNTER
Patient has a pain management doctor who is helping her. Should continue. I will see in the office as soon as possible. She cannot take fentanyl patches. She is using Percocet 10 mg 3 times a day and Neurontin or gabapentin 600 mg 3 times a day. She is also using over-the-counter analgesics.

## 2022-07-11 ENCOUNTER — TELEPHONE (OUTPATIENT)
Dept: PAIN MANAGEMENT | Age: 47
End: 2022-07-11

## 2022-07-11 ENCOUNTER — OFFICE VISIT (OUTPATIENT)
Dept: PAIN MANAGEMENT | Age: 47
End: 2022-07-11

## 2022-07-11 VITALS
TEMPERATURE: 97.6 F | HEIGHT: 66 IN | DIASTOLIC BLOOD PRESSURE: 94 MMHG | WEIGHT: 205 LBS | SYSTOLIC BLOOD PRESSURE: 136 MMHG | BODY MASS INDEX: 32.95 KG/M2

## 2022-07-11 DIAGNOSIS — Z48.89 ENCOUNTER FOR POST SURGICAL WOUND CHECK: ICD-10-CM

## 2022-07-11 DIAGNOSIS — Z76.89 ENCOUNTER TO ESTABLISH CARE: Primary | ICD-10-CM

## 2022-07-11 DIAGNOSIS — Z98.1 HISTORY OF LUMBAR SPINAL FUSION: ICD-10-CM

## 2022-07-11 PROCEDURE — 99024 POSTOP FOLLOW-UP VISIT: CPT | Performed by: NURSE PRACTITIONER

## 2022-07-11 RX ORDER — HYDROMORPHONE HYDROCHLORIDE 2 MG/1
2 TABLET ORAL 3 TIMES DAILY PRN
Qty: 21 TABLET | Refills: 0 | Status: SHIPPED | OUTPATIENT
Start: 2022-07-11 | End: 2022-07-18

## 2022-07-11 RX ORDER — NALOXONE HYDROCHLORIDE 4 MG/.1ML
1 SPRAY NASAL PRN
Qty: 1 EACH | Refills: 0 | Status: SHIPPED | OUTPATIENT
Start: 2022-07-11

## 2022-07-11 NOTE — TELEPHONE ENCOUNTER
Requested Prescriptions     Pending Prescriptions Disp Refills    gabapentin (NEURONTIN) 600 MG tablet 24 tablet 0     Sig: Take 1 tablet by mouth 3 times daily for 8 days. Patient last seen on:  5/26/22  Date of last surgery:  n/a  Date of last refill:  6/17/22  Pain level:  n/a  Patient complaining of:  Pt requesting partial refill until next appt  Future appts: 7/19/22    PATIENT ALSO REQUESTING TIZANIDINE BUT I CAN'T FIND ONE THAT'S CURRENT?

## 2022-07-11 NOTE — TELEPHONE ENCOUNTER
Patient/pharmacy is stating that the Dilaudid 2MG that was sent over today requires a prior authroization.

## 2022-07-11 NOTE — TELEPHONE ENCOUNTER
Hydromorphone (Dilaudid) 2 mg tablets submitted on line Cover My Meds, clinical uploaded, authorization pending, Key: [id number] - PA Case ID: [id number] - Rx #: [id number].

## 2022-07-11 NOTE — PROGRESS NOTES
S/P LEFT BILATERAL L3-4 LAMINECTOMY MICRODISSECTION DECOMPRESSION on 6/17/22 with Dr Taniya Torres. Taking Percocet 10mg TID plus half PRN. Fentanyl she has allergies, allergic to steroids (swelling and rash). Pain is severe to both legs. States at hospital dilaudid works. After Percocet she gets \"some\" relief for one or two. Muscle relaxers with no relief. Walks without assistive device and gets in and out of chair with difficulty    Pain meds Percocet 10mg 3 to 3.5 daily. Discussed with Dr. Samara Winter. Stop Percocet and start Dilaudid 2 mg 3 times daily as needed pain for 1 week, #21, fill date 7/11/2022  Return in 1 week. Narcan sent. He is allergic to fentanyl and steroids. She is not currently constipated and has medications for this.  is with her today. Incision healed.        PLAN: Follow-up with Dr. Taniya Torres

## 2022-07-11 NOTE — TELEPHONE ENCOUNTER
Patient called back stating that she no longer requires prior auth because she was able to pay cash for her RX

## 2022-07-12 RX ORDER — GABAPENTIN 600 MG/1
600 TABLET ORAL 3 TIMES DAILY
Qty: 90 TABLET | Refills: 0 | Status: ON HOLD | OUTPATIENT
Start: 2022-07-12 | End: 2022-10-02 | Stop reason: HOSPADM

## 2022-07-12 RX ORDER — TIZANIDINE 4 MG/1
4 TABLET ORAL DAILY
Qty: 30 TABLET | Refills: 0 | Status: SHIPPED | OUTPATIENT
Start: 2022-07-12 | End: 2022-08-11

## 2022-07-12 NOTE — TELEPHONE ENCOUNTER
Authorization request had already been submitted online (CoverMyMeds. com) by the time we received patient's message she had paid out of pocket for medication. Per CoverMyMeds. com, medication approved.         Approved today  Your PA request has been approved.  Additional information will be provided in the approval communication      Auth #[id number]   7- until 1-8-2023

## 2022-07-12 NOTE — TELEPHONE ENCOUNTER
Authorization request had already been submitted online (CoverMyMeds. com) by the time we received patient's message she had paid out of pocket for medication. Per CoverMyMeds. com, medication approved. Approved today  Your PA request has been approved.  Additional information will be provided in the approval communication    Auth #[id number]   7- until 1-8-2023

## 2022-07-14 DIAGNOSIS — Z98.1 HISTORY OF LUMBAR SPINAL FUSION: ICD-10-CM

## 2022-07-14 DIAGNOSIS — K59.03 THERAPEUTIC OPIOID-INDUCED CONSTIPATION (OIC): ICD-10-CM

## 2022-07-14 DIAGNOSIS — M47.817 LUMBOSACRAL SPONDYLOSIS WITHOUT MYELOPATHY: ICD-10-CM

## 2022-07-14 DIAGNOSIS — S06.4XAA EPIDURAL HEMATOMA: ICD-10-CM

## 2022-07-14 DIAGNOSIS — T40.2X5A THERAPEUTIC OPIOID-INDUCED CONSTIPATION (OIC): ICD-10-CM

## 2022-07-14 RX ORDER — OXYCODONE AND ACETAMINOPHEN 10; 325 MG/1; MG/1
1 TABLET ORAL EVERY 8 HOURS PRN
Qty: 90 TABLET | Refills: 0 | OUTPATIENT
Start: 2022-07-18 | End: 2022-08-17

## 2022-07-14 NOTE — TELEPHONE ENCOUNTER
No refill for Percocet. We gave her a different narcotic at her last visit. She was to stop Percocet.

## 2022-07-14 NOTE — TELEPHONE ENCOUNTER
Requested Prescriptions     Pending Prescriptions Disp Refills    oxyCODONE-acetaminophen (PERCOCET)  MG per tablet 90 tablet 0     Sig: Take 1 tablet by mouth every 8 hours as needed for Pain for up to 30 days. Intended supply: 30 days       Patient last seen on:  7/11/22  Date of last surgery:  6/21/22  Date of last refill:  6/30/22  Pain level:  n/a  Patient complaining of:  Patient asks Brenda Raymundo if after hydromorphone prescription ends on 7/18/22 if she can return to previous prescription of Percocet?   Future appts: 8/17/22

## 2022-07-18 ENCOUNTER — OFFICE VISIT (OUTPATIENT)
Dept: PAIN MANAGEMENT | Age: 47
End: 2022-07-18

## 2022-07-18 VITALS
HEIGHT: 66 IN | DIASTOLIC BLOOD PRESSURE: 78 MMHG | WEIGHT: 205 LBS | BODY MASS INDEX: 32.95 KG/M2 | TEMPERATURE: 96.8 F | SYSTOLIC BLOOD PRESSURE: 136 MMHG

## 2022-07-18 DIAGNOSIS — M51.26 HERNIATED LUMBAR INTERVERTEBRAL DISC: ICD-10-CM

## 2022-07-18 DIAGNOSIS — M96.1 POSTLAMINECTOMY SYNDROME OF LUMBAR REGION: ICD-10-CM

## 2022-07-18 DIAGNOSIS — M47.817 LUMBOSACRAL SPONDYLOSIS WITHOUT MYELOPATHY: Primary | ICD-10-CM

## 2022-07-18 PROCEDURE — 99024 POSTOP FOLLOW-UP VISIT: CPT | Performed by: NURSE PRACTITIONER

## 2022-07-18 RX ORDER — OXYCODONE AND ACETAMINOPHEN 10; 325 MG/1; MG/1
1 TABLET ORAL EVERY 8 HOURS PRN
Qty: 42 TABLET | Refills: 0 | Status: SHIPPED | OUTPATIENT
Start: 2022-07-18 | End: 2022-07-27 | Stop reason: SDUPTHER

## 2022-07-27 ENCOUNTER — SCHEDULED TELEPHONE ENCOUNTER (OUTPATIENT)
Dept: PAIN MANAGEMENT | Age: 47
End: 2022-07-27

## 2022-07-27 DIAGNOSIS — M51.26 HERNIATED LUMBAR INTERVERTEBRAL DISC: ICD-10-CM

## 2022-07-27 DIAGNOSIS — M47.817 LUMBOSACRAL SPONDYLOSIS WITHOUT MYELOPATHY: ICD-10-CM

## 2022-07-27 PROCEDURE — 99024 POSTOP FOLLOW-UP VISIT: CPT | Performed by: NURSE PRACTITIONER

## 2022-07-27 RX ORDER — OXYCODONE AND ACETAMINOPHEN 10; 325 MG/1; MG/1
1 TABLET ORAL EVERY 8 HOURS PRN
Qty: 42 TABLET | Refills: 0 | Status: SHIPPED | OUTPATIENT
Start: 2022-08-03 | End: 2022-08-15 | Stop reason: SDUPTHER

## 2022-07-27 NOTE — PROGRESS NOTES
Ross Burns  (1975)    7/27/2022    TELEHEALTH EVALUATION -- Audio Only (During GHQVO-78 public health emergency)    Due to [employer] outbreak, patient's office visit was converted to a virtual visit. Patient was contacted and agreed to proceed with a audio only telehealth service. Patient understands that this encounter is a billable visit and that insurance coverage and co-pays are up to their individual insurance plans. At the beginning of this telehealth encounter, I verified with the patient their name and date of birth. Verbal consent for telehealth encounter was obtained. Subjective:       Chief Complaint   Patient presents with    Follow-up     Patient reports she is in pain and hasn't been able to schedule with physical therapy. Problems eating/drinking (keeping food down)       Ross Burns is 52 y.o. female who complains today of:          Allergies:  Fentanyl, Prochlorperazine edisylate, Tylenol [acetaminophen], Cortizone, Aspirin, and Codeine    History:  Past Medical History:   Diagnosis Date    Arthritis     spine    Asthma     since childhood -- smokes x 30 yrs    Cerebral artery occlusion with cerebral infarction [facility])     per CT scan -- patient without sx, 2021    Hypertension     meds > 3 yrs    Lumbar stenosis     Migraines     starts with neck pain    Seizures ([address]     pt last known seizure May 2022, dx 20 years ago, takes gabapentin for back/seizures    Thyroid disease     meds > 4 yrs -- intermittent    Uterine anomaly      Past Surgical History:   Procedure Laterality Date    ANTERIOR CRUCIATE LIGAMENT REPAIR Left     APPENDECTOMY      at age 21     [address]  2017    lumbar disc     BACK SURGERY  2018    lumbar fusion    CHOLECYSTECTOMY  2015    COLONOSCOPY      2017    ENDOMETRIAL ABLATION  2006    post op sepsis    ENDOSCOPY, COLON, DIAGNOSTIC      2017    KNEE ARTHROSCOPY Left 2004    ACL repair    LAMINECTOMY N/A 6/17/2022    LEFT BILATERAL L3-4 LAMINECTOMY MICRODISSECTION DECOMPRESSION performed by Bello Fraga MD at [address] N/A 6/21/2022    LUMBAR EPIDURAL HEMATOMA EVACUATION performed by Bello Fraga MD at [address] [place] [address] Drive N/A 1/15/2019    L4- L5 DECOMPRESSION, L5-S1 POSTERIOR LUMBAR INTERBODY FUSION performed by Shilpa Goodman MD at [address] [place] [address] N/A 3/11/2021    L4-5 TLIF INSTRUMENTATION AT L5-S1 DECOMPRESSION AT L4, L5 REMOVAL AND REINSERTION OF HARDWARE AT L5-S1 performed by Shilpa Goodman MD at [address] [place] [address]  5/9/13    duramorph 1.5 mg epideral  6mg celestone    TONSILLECTOMY      as child    TUBAL LIGATION Bilateral 2002     Family History   Problem Relation Age of Onset    Cancer Mother         NHL    COPD Mother     Other Mother         ITP & pancreatitis    Heart Failure Maternal Grandmother     No Known Problems Sister     High Blood Pressure Brother     No Known Problems Son     No Known Problems Daughter      Social History     Socioeconomic History    Marital status:      Spouse name: Not on file    Number of children: Not on file    Years of education: Not on file    Highest education level: Not on file   Occupational History    Not on file   Tobacco Use    Smoking status: Every Day     Packs/day: 1.50     Years: 30.00     Pack years: 45.00     Types: Cigarettes    Smokeless tobacco: Never   Vaping Use    Vaping Use: Never used   Substance and Sexual Activity    Alcohol use: No     Alcohol/week: 0.0 standard drinks    Drug use: No    Sexual activity: Not on file   Other Topics Concern    Not on file   Social History Narrative    Not on file     Social Determinants of Health     Financial Resource Strain: Not on file   Food Insecurity: Not on file   Transportation Needs: Not on file   Physical Activity: Not on file   Stress: Not on file   Social Connections: Not on file   Intimate Partner Violence: Not on file   Housing Stability: Not on file       Current Outpatient Medications on File Prior to Visit Medication Sig Dispense Refill    gabapentin (NEURONTIN) 600 MG tablet Take 1 tablet by mouth 3 times daily for 30 days. 90 tablet 0    tiZANidine (ZANAFLEX) 4 MG tablet Take 1 tablet by mouth daily 30 tablet 0    naloxone 4 MG/0.1ML LIQD nasal spray 1 spray by Nasal route as needed for Opioid Reversal 1 each 0    polyethylene glycol (GLYCOLAX) 17 GM/SCOOP powder Take 17 g by mouth daily as needed (constipation) 510 g 0    gabapentin (NEURONTIN) 300 MG capsule take 1 capsule (300MG)  by oral route 3 times every day      Potassium 75 MG TABS Take by mouth      tiZANidine (ZANAFLEX) 4 MG tablet Take 4 mg by mouth every 6 hours as needed      docusate sodium (COLACE) 100 MG capsule Take 1 capsule by mouth 2 times daily as needed for Constipation 60 capsule 0    Calcium Carb-Cholecalciferol (CALCIUM 1000 + D PO) Take 600 mg by mouth daily      Potassium 99 MG TABS Take 99 mg by mouth daily      vitamin B-12 (CYANOCOBALAMIN) 100 MCG tablet Take 50 mcg by mouth daily      Biotin 1000 MCG TABS Take by mouth 2 times daily      Misc. Devices (RAISED TOILET SEAT/LOCK & ARMS) MISC 1 Piece by Does not apply route as needed (PRN) 2 each 0    ferrous sulfate 325 (65 Fe) MG tablet Take 65 mg by mouth daily (with breakfast)      vitamin C (ASCORBIC ACID) 500 MG tablet Take 500 mg by mouth daily      ondansetron (ZOFRAN) 4 MG tablet Take 4 mg by mouth every 8 hours as needed for Nausea or Vomiting      VENTOLIN  (90 Base) MCG/ACT inhaler 2 puffs 4 times daily       amLODIPine (NORVASC) 5 MG tablet Take 5 mg by mouth daily       omeprazole (PRILOSEC) 20 MG capsule Take 40 mg by mouth three times daily        No current facility-administered medications on file prior to visit. She  tried physical therapy. Date of lastof last PT treatment:     S/P LEFT BILATERAL L3-4 LAMINECTOMY MICRODISSECTION DECOMPRESSION on 6/17/22 with Dr Josefina Tim. Her chief complaint is hip and thigh pain. Kala Callahan   She was on dilaudid Dilaudid 2 mg 3 times daily for the past week and she had nausea. She would like to go back to the Percocet 10 mg 3 times daily. We switched to Percocet. Was in the emergency room for nausea nausea vomiting and they gave her Percocet 5 mg10 tablets. Percocet 10 mg was filled on 7/18 for 14 days. She is 19 left plus the 5 mg tablets. He has a follow-up with Dr. Leopoldo Manual on August 9. She is waiting to get an appointment for physical therapy up in her area. Fentanyl she has allergies, allergic to steroids (swelling and rash). Pain is severe to both legs. Muscle relaxers with no relief. Walks without assistive device and gets in and out of chair with difficulty       Narcan sent last week. He is allergic to fentanyl and steroids. She is not currently constipated and has medications for this.  is with her today. Incision healed. Review of Systems    Objective:     Vitals: There were no vitals taken for this visit. PHYSICAL EXAMINATION:    [x] Alert  [x] Oriented to person/place/time  [x] No apparent distress      [x] Mood and affect normal  [x] Recent and remote memory intact  [x] Judgement and insight normal     [] OTHER:      Due to this being a TeleHealth encounter, evaluation of the following organ systems is limited: Vitals/Constitutional/EENT/Resp/CV/GI//MS/Neuro/Skin/Heme-Lymph-Imm. Assessment:      Diagnosis Orders   1. Lumbosacral spondylosis without myelopathy  oxyCODONE-acetaminophen (PERCOCET)  MG per tablet      2. Herniated lumbar intervertebral disc  oxyCODONE-acetaminophen (PERCOCET)  MG per tablet          Plan:          Orders Placed This Encounter   Medications    oxyCODONE-acetaminophen (PERCOCET)  MG per tablet     Sig: Take 1 tablet by mouth every 8 hours as needed for Pain for up to 14 days.  Stop dilaudid     Dispense:  42 tablet     Refill:  0     Reduce doses taken as pain becomes manageable       No orders of the defined types were placed in this encounter. Refill Percocet 10 mg 3 times daily as needed pain for 14 days valid 8/3 to 8/17/2022. She has a follow-up with Dr. Tasha Rojo on August 9. We plan to reduce her down to the 5 mg at her next visit. She understands and is in agreement. Follow up:  No follow-ups on file. Darylene Glenn, APRN - CNP      >50% of 11 minute appointment was spent with discussion, addressing questions and concerns, including prognosis, treatment options, patient education, and recommendations. [id number]    Pursuant to the emergency declaration under the [facility], [facility] waiver authority and the [employer] Act, this Virtual  Visit was conducted, with patient's consent, to reduce the patient's risk of exposure to COVID-19 and provide continuity of care for an established patient. Services were provided through an audio discussion to substitute for in-person clinic visit.

## 2022-08-05 NOTE — PROGRESS NOTES
Patient Name: Argelia Lu : 1975        Date: 2022      Type of Appt: Post OP    Reason for appt: POST OP:  [facility] - 22 - LEFT BILATERAL L 3-4 LAMINECTOMY MICRODISSECTION DECOMPRESSION & 22 - REMOVAL OF L 3-4 EPIDURAL HEMATOMA    Studies done: 22 T/S & L/S MRI @ [facility]     Physical therapy: 22 physical therapy ordered by [facility], [address] [facility]  Neurosurgery and Pain [facility]  [address] [facility] 14 [facility] [address] [place] [address]: [phone]  F: [phone]      Patient: Argelia Lu  YOB: 1975  Date: 2022    The patient is a 52 y.o. female who presents today for follow up. Patient remains weak especially in the left lower extremity with left foot drop. She is regaining strength on the right side. Has chronic pain issues both before and after surgery. She has a hard time sleeping cannot get comfortable. Has a headache when her low back pain is severe. She has been on Dilaudid oxycodone and other medications for pain management. Gabapentin is given to her for her seizures but also is helping with the chronic pain and can be managed also by pain management. I will refill her gabapentin today until she sees pain management. Dosages 300 mg 3 times a day. We did talk about trying to reduce the dosages of all of her medications. She has no tenderness on the back of back wound is well-healed. She walks dragging her left leg she has a left foot drop 3/5. Chronic pain back in both lower extremities more so on the left than on the right. Plan recheck 2 months. Rx gabapentin   at that time consider obtaining repeat MRI study of the lumbar spine and x-rays. Continue pain management. Lives in [facility].       Clarence Mckeon MD

## 2022-08-08 RX ORDER — GABAPENTIN 600 MG/1
TABLET ORAL
Qty: 90 TABLET | Refills: 0 | OUTPATIENT
Start: 2022-08-08

## 2022-08-09 ENCOUNTER — OFFICE VISIT (OUTPATIENT)
Dept: NEUROSURGERY | Age: 47
End: 2022-08-09

## 2022-08-09 VITALS — WEIGHT: 215 LBS | BODY MASS INDEX: 34.55 KG/M2 | TEMPERATURE: 97.4 F | HEIGHT: 66 IN

## 2022-08-09 DIAGNOSIS — G89.29 CHRONIC BILATERAL LOW BACK PAIN WITH LEFT-SIDED SCIATICA: ICD-10-CM

## 2022-08-09 DIAGNOSIS — F17.200 SMOKER: Primary | ICD-10-CM

## 2022-08-09 DIAGNOSIS — R56.9 CONVULSIONS, UNSPECIFIED CONVULSION TYPE (HCC): ICD-10-CM

## 2022-08-09 DIAGNOSIS — M54.42 CHRONIC BILATERAL LOW BACK PAIN WITH LEFT-SIDED SCIATICA: ICD-10-CM

## 2022-08-09 DIAGNOSIS — M96.1 POSTLAMINECTOMY SYNDROME OF LUMBAR REGION: ICD-10-CM

## 2022-08-09 PROCEDURE — 99024 POSTOP FOLLOW-UP VISIT: CPT | Performed by: NEUROLOGICAL SURGERY

## 2022-08-09 RX ORDER — GABAPENTIN 300 MG/1
300 CAPSULE ORAL 3 TIMES DAILY PRN
Qty: 90 CAPSULE | Refills: 2 | Status: SHIPPED | OUTPATIENT
Start: 2022-08-09 | End: 2023-02-05

## 2022-08-15 DIAGNOSIS — M51.26 HERNIATED LUMBAR INTERVERTEBRAL DISC: ICD-10-CM

## 2022-08-15 DIAGNOSIS — M47.817 LUMBOSACRAL SPONDYLOSIS WITHOUT MYELOPATHY: ICD-10-CM

## 2022-08-15 RX ORDER — OXYCODONE AND ACETAMINOPHEN 10; 325 MG/1; MG/1
1 TABLET ORAL EVERY 8 HOURS PRN
Qty: 90 TABLET | Refills: 0 | Status: SHIPPED | OUTPATIENT
Start: 2022-08-17 | End: 2022-09-15 | Stop reason: DRUGHIGH

## 2022-08-15 NOTE — TELEPHONE ENCOUNTER
Requested Prescriptions     Pending Prescriptions Disp Refills    oxyCODONE-acetaminophen (PERCOCET)  MG per tablet 42 tablet 0     Sig: Take 1 tablet by mouth every 8 hours as needed for Pain for up to 14 days.  Stop dilaudid       Patient last seen on:  8/9/22  Date of last surgery:  n/a  Date of last refill:  8/3/22  Pain level:  n/a  Patient complaining of:  Pt asks for rx as next appt is after due date  Future appts: 9/7/22

## 2022-09-06 ENCOUNTER — TELEPHONE (OUTPATIENT)
Dept: NEUROSURGERY | Age: 47
End: 2022-09-06

## 2022-09-06 DIAGNOSIS — G89.4 CHRONIC PAIN SYNDROME: ICD-10-CM

## 2022-09-06 DIAGNOSIS — M96.1 POSTLAMINECTOMY SYNDROME OF LUMBAR REGION: Primary | ICD-10-CM

## 2022-09-06 NOTE — TELEPHONE ENCOUNTER
PT WAS CALLED & LMVM. WHEN PT CALLS BACK, PLEASE INFORM PT OF DR. BLANCAS'S RESPONSE:    \"This request should be sent to pain management please\"    PT CAN SCHEDULE APPT WITH OUR PAIN MGMT OR ONE CLOSER TO HER HOME AND/OR DISCUSS WITH HER FAMILY PHYSICIAN.

## 2022-09-06 NOTE — TELEPHONE ENCOUNTER
Patient states she has been feeling very sick and thinks it is because she has been taking too muc tylenol. She is wondering if Dr. Dex Bal can prescribe  her something that does not have tylenol in it?

## 2022-09-15 ENCOUNTER — OFFICE VISIT (OUTPATIENT)
Dept: PAIN MANAGEMENT | Age: 47
End: 2022-09-15
Payer: MEDICARE

## 2022-09-15 VITALS
WEIGHT: 208 LBS | DIASTOLIC BLOOD PRESSURE: 76 MMHG | SYSTOLIC BLOOD PRESSURE: 124 MMHG | BODY MASS INDEX: 33.43 KG/M2 | HEIGHT: 66 IN | TEMPERATURE: 97.1 F

## 2022-09-15 DIAGNOSIS — G89.29 CHRONIC BILATERAL LOW BACK PAIN WITH LEFT-SIDED SCIATICA: ICD-10-CM

## 2022-09-15 DIAGNOSIS — M54.42 CHRONIC BILATERAL LOW BACK PAIN WITH LEFT-SIDED SCIATICA: ICD-10-CM

## 2022-09-15 DIAGNOSIS — Z98.890 BACK PAIN WITH HISTORY OF SPINAL SURGERY: ICD-10-CM

## 2022-09-15 DIAGNOSIS — F11.90 OPIOID USE: ICD-10-CM

## 2022-09-15 DIAGNOSIS — M47.817 LUMBOSACRAL SPONDYLOSIS WITHOUT MYELOPATHY: Primary | ICD-10-CM

## 2022-09-15 DIAGNOSIS — M54.9 BACK PAIN WITH HISTORY OF SPINAL SURGERY: ICD-10-CM

## 2022-09-15 LAB
6-ACETYLMORPHINE, UR: NORMAL
AMPHETAMINE SCREEN, URINE: NORMAL
BARBITURATE SCREEN, URINE: NORMAL
BENZODIAZEPINE SCREEN, URINE: NORMAL
CANNABINOID SCREEN URINE: NORMAL
COCAINE METABOLITE, URINE: NORMAL
CREATININE URINE: NORMAL
EDDP, URINE: NORMAL
ETHANOL URINE: NORMAL
MDMA URINE: NORMAL
METHADONE SCREEN, URINE: NORMAL
METHAMPHETAMINE, URINE: NORMAL
OPIATES, URINE: NORMAL
OXYCODONE: NORMAL
PCP: NORMAL
PH, URINE: NORMAL
PHENCYCLIDINE, URINE: NORMAL
PROPOXYPHENE, URINE: NORMAL
TRICYCLIC ANTIDEPRESSANTS, UR: NORMAL

## 2022-09-15 PROCEDURE — G8417 CALC BMI ABV UP PARAM F/U: HCPCS | Performed by: NURSE PRACTITIONER

## 2022-09-15 PROCEDURE — 4004F PT TOBACCO SCREEN RCVD TLK: CPT | Performed by: NURSE PRACTITIONER

## 2022-09-15 PROCEDURE — G8427 DOCREV CUR MEDS BY ELIG CLIN: HCPCS | Performed by: NURSE PRACTITIONER

## 2022-09-15 PROCEDURE — 99213 OFFICE O/P EST LOW 20 MIN: CPT | Performed by: NURSE PRACTITIONER

## 2022-09-15 RX ORDER — OXYCODONE AND ACETAMINOPHEN 7.5; 325 MG/1; MG/1
1 TABLET ORAL EVERY 8 HOURS PRN
Qty: 90 TABLET | Refills: 0 | Status: SHIPPED | OUTPATIENT
Start: 2022-09-16 | End: 2022-09-15

## 2022-09-15 RX ORDER — TIZANIDINE 4 MG/1
4 TABLET ORAL
Qty: 30 TABLET | Refills: 1 | Status: SHIPPED | OUTPATIENT
Start: 2022-09-15 | End: 2022-10-15

## 2022-09-15 RX ORDER — ALBUTEROL SULFATE 90 UG/1
2 AEROSOL, METERED RESPIRATORY (INHALATION) EVERY 6 HOURS PRN
COMMUNITY

## 2022-09-15 RX ORDER — OXYCODONE AND ACETAMINOPHEN 7.5; 325 MG/1; MG/1
1 TABLET ORAL EVERY 8 HOURS PRN
Qty: 42 TABLET | Refills: 0 | Status: SHIPPED | OUTPATIENT
Start: 2022-09-17 | End: 2022-09-16 | Stop reason: SDUPTHER

## 2022-09-15 RX ORDER — FLUTICASONE PROPIONATE AND SALMETEROL 50; 250 UG/1; UG/1
POWDER RESPIRATORY (INHALATION)
COMMUNITY
Start: 2022-09-12

## 2022-09-15 ASSESSMENT — ENCOUNTER SYMPTOMS
CONSTIPATION: 0
DIARRHEA: 0
RESPIRATORY NEGATIVE: 1
BACK PAIN: 1

## 2022-09-15 NOTE — PROGRESS NOTES
Patient: Ji Chowdhury  YOB: 1975  Date: 9/15/22        Subjective:     Ji Chowdhury is a 52 y.o. female who complains today of:    Chief Complaint   Patient presents with    Back Pain         Allergies:  Fentanyl, Prochlorperazine edisylate, Tylenol [acetaminophen], Cortizone, Aspirin, and Codeine    Past Medical History:   Diagnosis Date    Arthritis     spine    Asthma     since childhood -- smokes x 30 yrs    Cerebral artery occlusion with cerebral infarction ([address]     per CT scan -- patient without sx, 2021    Hypertension     meds > 3 yrs    Lumbar stenosis     Migraines     starts with neck pain    Seizures ([address]     pt last known seizure May 2022, dx 20 years ago, takes gabapentin for back/seizures    Thyroid disease     meds > 4 yrs -- intermittent    Uterine anomaly      Past Surgical History:   Procedure Laterality Date    ANTERIOR CRUCIATE LIGAMENT REPAIR Left     APPENDECTOMY      at age 21     [address] [facility]  2017    lumbar disc     BACK SURGERY  2018    lumbar fusion    CHOLECYSTECTOMY  2015    COLONOSCOPY      2017    ENDOMETRIAL ABLATION  2006    post op sepsis    ENDOSCOPY, COLON, DIAGNOSTIC      2017    KNEE ARTHROSCOPY Left 2004    ACL repair    LAMINECTOMY N/A 6/17/2022    LEFT BILATERAL L3-4 LAMINECTOMY MICRODISSECTION DECOMPRESSION performed by Leni Lewis MD at [address] [place] [address] N/A 6/21/2022    LUMBAR EPIDURAL HEMATOMA EVACUATION performed by Leni Lewis MD at [facility] 1/15/2019    L4- L5 DECOMPRESSION, L5-S1 POSTERIOR LUMBAR INTERBODY FUSION performed by Agus Mann MD at [facility] 3/11/2021    L4-5 TLIF INSTRUMENTATION AT L5-S1 DECOMPRESSION AT L4, L5 REMOVAL AND REINSERTION OF HARDWARE AT L5-S1 performed by Agus Mann MD at [address] [place] [address]  5/9/13    duramorph 1.5 mg epideral  6mg celestone    TONSILLECTOMY      as child    TUBAL LIGATION Bilateral 2002     Family History   Problem Relation Age of Onset Cancer Mother         NHL    COPD Mother     Other Mother         ITP & pancreatitis    Heart Failure Maternal Grandmother     No Known Problems Sister     High Blood Pressure Brother     No Known Problems Son     No Known Problems Daughter      Social History     Socioeconomic History    Marital status:      Spouse name: Not on file    Number of children: Not on file    Years of education: Not on file    Highest education level: Not on file   Occupational History    Not on file   Tobacco Use    Smoking status: Every Day     Packs/day: 1.50     Years: 30.00     Pack years: 45.00     Types: Cigarettes    Smokeless tobacco: Never   Vaping Use    Vaping Use: Never used   Substance and Sexual Activity    Alcohol use: No     Alcohol/week: 0.0 standard drinks    Drug use: No    Sexual activity: Not on file   Other Topics Concern    Not on file   Social History Narrative    Not on file     Social Determinants of Health     Financial Resource Strain: Not on file   Food Insecurity: Not on file   Transportation Needs: Not on file   Physical Activity: Not on file   Stress: Not on file   Social Connections: Not on file   Intimate Partner Violence: Not on file   Housing Stability: Not on file       Current Outpatient Medications on File Prior to Visit   Medication Sig Dispense Refill    albuterol sulfate HFA (VENTOLIN HFA) 108 (90 Base) MCG/ACT inhaler Inhale 2 puffs into the lungs every 6 hours as needed for Wheezing      gabapentin (NEURONTIN) 300 MG capsule Take 1 capsule by mouth 3 times daily as needed (Pain) for up to 180 days.  Intended supply: 90 days 90 capsule 2    naloxone 4 MG/0.1ML LIQD nasal spray 1 spray by Nasal route as needed for Opioid Reversal 1 each 0    gabapentin (NEURONTIN) 300 MG capsule take 1 capsule (300MG)  by oral route 3 times every day      Potassium 75 MG TABS Take by mouth      docusate sodium (COLACE) 100 MG capsule Take 1 capsule by mouth 2 times daily as needed for Constipation 60 capsule 0    Calcium Carb-Cholecalciferol (CALCIUM 1000 + D PO) Take 600 mg by mouth daily      vitamin B-12 (CYANOCOBALAMIN) 100 MCG tablet Take 50 mcg by mouth daily      Biotin 1000 MCG TABS Take by mouth 2 times daily      Misc. Devices (RAISED TOILET SEAT/LOCK & ARMS) MISC 1 Piece by Does not apply route as needed (PRN) 2 each 0    ferrous sulfate 325 (65 Fe) MG tablet Take 65 mg by mouth daily (with breakfast)      vitamin C (ASCORBIC ACID) 500 MG tablet Take 500 mg by mouth daily      ondansetron (ZOFRAN) 4 MG tablet Take 4 mg by mouth every 8 hours as needed for Nausea or Vomiting      VENTOLIN  (90 Base) MCG/ACT inhaler 2 puffs 4 times daily       amLODIPine (NORVASC) 5 MG tablet Take 5 mg by mouth daily       omeprazole (PRILOSEC) 20 MG capsule Take 40 mg by mouth three times daily       ADVAIR DISKUS 250-50 MCG/ACT AEPB diskus inhaler inhale 1 puff by mouth and INTO THE LUNGS twice a day      gabapentin (NEURONTIN) 600 MG tablet Take 1 tablet by mouth 3 times daily for 30 days. 90 tablet 0     No current facility-administered medications on file prior to visit. S/P LEFT BILATERAL L3-4 LAMINECTOMY MICRODISSECTION DECOMPRESSION on 6/17/22 with Dr Maximo Stack in bath tub twice since her surgery. Has a  lot of pain and says she can barley walk. Had post-op with Dr Curt Su and he will see her in two months, November,  and order another MRI. She is currently taking oxycodone 10mg TID and gabapentin 300mg TID. Both Dr Curt Su and I agree that her opioids are for short term and we will continue to reduce at every visit. Back Pain  Associated symptoms include headaches. Pertinent negatives include no numbness or weakness. Review of Systems   Constitutional: Negative. Negative for activity change and unexpected weight change. HENT: Negative. Negative for hearing loss. Respiratory: Negative. Cardiovascular:  Negative for leg swelling.    Gastrointestinal:  Negative for constipation and Orders Placed This Encounter   Medications    DISCONTD: oxyCODONE-acetaminophen (PERCOCET) 7.5-325 MG per tablet     Sig: Take 1 tablet by mouth every 8 hours as needed for Pain for up to 30 days. Intended supply: 30 days     Dispense:  90 tablet     Refill:  0     Reduce doses taken as pain becomes manageable    tiZANidine (ZANAFLEX) 4 MG tablet     Sig: Take 1 tablet by mouth nightly as needed (spasm)     Dispense:  30 tablet     Refill:  1    oxyCODONE-acetaminophen (PERCOCET) 7.5-325 MG per tablet     Sig: Take 1 tablet by mouth every 8 hours as needed for Pain for up to 14 days. Intended supply: 30 days     Dispense:  42 tablet     Refill:  0     Reduce doses taken as pain becomes manageable         Orders Placed This Encounter   Procedures    Urine Drug Screen     Chronic pain/illicits     UDS, Percocet last night, gabapentin last night, denies illicits  -reduce percocet from 10mg TID to 7.5mg TID for 14 days. Await UDS to refill again. Follow up:  Return in about 4 weeks (around 10/13/2022) for review meds and reassess pain.     David Romeo, APRN - CNP

## 2022-09-16 ENCOUNTER — TELEPHONE (OUTPATIENT)
Dept: PAIN MANAGEMENT | Age: 47
End: 2022-09-16

## 2022-09-16 DIAGNOSIS — M47.817 LUMBOSACRAL SPONDYLOSIS WITHOUT MYELOPATHY: ICD-10-CM

## 2022-09-16 RX ORDER — OXYCODONE AND ACETAMINOPHEN 7.5; 325 MG/1; MG/1
1 TABLET ORAL EVERY 8 HOURS PRN
Qty: 42 TABLET | Refills: 0 | Status: ON HOLD | OUTPATIENT
Start: 2022-09-17 | End: 2022-10-02 | Stop reason: HOSPADM

## 2022-09-16 NOTE — TELEPHONE ENCOUNTER
Script was sent in for a fill date of 9/17/22 again. Please set date for 9/16/22 and resend, thanks.

## 2022-09-16 NOTE — TELEPHONE ENCOUNTER
Spoke with patient and pharmacy in which pt asks if Percocet prescription which is dated for 9/17/22 can be filled today? Pt last filled a 30 day supply on 8/17/22 so 30 days would be 9/16/22. If so, a new script would need to be sent over with new fill date.

## 2022-09-17 RX ORDER — OXYCODONE AND ACETAMINOPHEN 7.5; 325 MG/1; MG/1
1 TABLET ORAL EVERY 8 HOURS PRN
Qty: 42 TABLET | Refills: 0 | OUTPATIENT
Start: 2022-09-17 | End: 2022-10-01

## 2022-09-18 NOTE — TELEPHONE ENCOUNTER
Appears that Percocet 10/325 mg #90 was filled on 8/17/22  Yes, the patient would be due on 9/16. This message is addressed on 9/17/22, when the prescription that was sent in was due. [place] not yet reflect the filled prescription. Controlled Substance Monitoring:    Acute and Chronic Pain Monitoring:   RX Monitoring 9/17/2022   Attestation -   Periodic Controlled Substance Monitoring Obtaining appropriate analgesic effect of treatment.    Chronic Pain > 50 MEDD -   Chronic Pain > 80 MEDD -

## 2022-09-23 ENCOUNTER — TELEPHONE (OUTPATIENT)
Dept: NEUROSURGERY | Age: 47
End: 2022-09-23

## 2022-09-23 NOTE — TELEPHONE ENCOUNTER
----- Message from JERROD Ventura CNP sent at 9/21/2022  2:20 PM EDT -----  Due to abnormal urine drug screen we can no longer prescribe narcotics.

## 2022-09-23 NOTE — TELEPHONE ENCOUNTER
Patient states she does not understand what abnormal means, she would like to know how her results were abnormal? She is asking for a call back.

## 2022-09-23 NOTE — TELEPHONE ENCOUNTER
PT WAS CALLED & MADE AWARE RESULTS CAN BE FURTHER DISCUSSED WITH PROVIDER AT HER NEXT APPT. PT STATES SHE DOESN'T UNDERSTAND WHY PERCOCET WASN'T IN HER RESULTS, BECAUSE SHE DOES TAKE IT. PT WAS MADE AWARE THC WAS ALSO FOUND IN RESULTS. PT AWARE OF HER UPCOMING APPT WITH NP AND CAN FURTHER DISCUSS.

## 2022-09-23 NOTE — TELEPHONE ENCOUNTER
PT WAS CALLED & LMVM. WHEN PT CALLS BACK, PLEASE INFORM PT OF YANIRA'S RESPONSE RE: UDS RESULTS.    ----- Message from JERROD Wolfe CNP sent at 9/21/2022  2:20 PM EDT -----  Due to abnormal urine drug screen we can no longer prescribe narcotics. 150

## 2022-09-30 ENCOUNTER — HOSPITAL ENCOUNTER (OUTPATIENT)
Age: 47
Setting detail: OBSERVATION
Discharge: HOME | End: 2022-10-02
Attending: STUDENT IN AN ORGANIZED HEALTH CARE EDUCATION/TRAINING PROGRAM | Admitting: INTERNAL MEDICINE
Payer: MEDICARE

## 2022-09-30 ENCOUNTER — CLINICAL DOCUMENTATION (OUTPATIENT)
Dept: NEUROSURGERY | Age: 47
End: 2022-09-30

## 2022-09-30 ENCOUNTER — APPOINTMENT (OUTPATIENT)
Dept: CT IMAGING | Age: 47
End: 2022-09-30
Payer: MEDICARE

## 2022-09-30 DIAGNOSIS — M48.062 SPINAL STENOSIS, LUMBAR REGION, WITH NEUROGENIC CLAUDICATION: ICD-10-CM

## 2022-09-30 DIAGNOSIS — M54.42 CHRONIC BILATERAL LOW BACK PAIN WITH LEFT-SIDED SCIATICA: Primary | ICD-10-CM

## 2022-09-30 DIAGNOSIS — G89.29 OTHER CHRONIC PAIN: ICD-10-CM

## 2022-09-30 DIAGNOSIS — G89.29 CHRONIC BILATERAL LOW BACK PAIN WITH LEFT-SIDED SCIATICA: Primary | ICD-10-CM

## 2022-09-30 LAB
BILIRUBIN URINE: NEGATIVE
BLOOD, URINE: NEGATIVE
CLARITY: CLEAR
COLOR: YELLOW
GLUCOSE URINE: NEGATIVE MG/DL
KETONES, URINE: NEGATIVE MG/DL
LEUKOCYTE ESTERASE, URINE: NEGATIVE
NITRITE, URINE: NEGATIVE
PH UA: 5 (ref 5–9)
PROTEIN UA: NEGATIVE MG/DL
SPECIFIC GRAVITY UA: 1.02 (ref 1–1.03)
URINE REFLEX TO CULTURE: NORMAL
UROBILINOGEN, URINE: 0.2 E.U./DL

## 2022-09-30 PROCEDURE — 6370000000 HC RX 637 (ALT 250 FOR IP): Performed by: PHYSICIAN ASSISTANT

## 2022-09-30 PROCEDURE — 72128 CT CHEST SPINE W/O DYE: CPT

## 2022-09-30 PROCEDURE — 99285 EMERGENCY DEPT VISIT HI MDM: CPT

## 2022-09-30 PROCEDURE — 72192 CT PELVIS W/O DYE: CPT

## 2022-09-30 PROCEDURE — 96372 THER/PROPH/DIAG INJ SC/IM: CPT

## 2022-09-30 PROCEDURE — 6360000002 HC RX W HCPCS: Performed by: PHYSICIAN ASSISTANT

## 2022-09-30 PROCEDURE — 81003 URINALYSIS AUTO W/O SCOPE: CPT

## 2022-09-30 PROCEDURE — 72131 CT LUMBAR SPINE W/O DYE: CPT

## 2022-09-30 RX ORDER — OXYCODONE HYDROCHLORIDE 5 MG/1
5 TABLET ORAL ONCE
Status: COMPLETED | OUTPATIENT
Start: 2022-09-30 | End: 2022-10-01

## 2022-09-30 RX ORDER — ONDANSETRON 4 MG/1
4 TABLET, ORALLY DISINTEGRATING ORAL ONCE
Status: COMPLETED | OUTPATIENT
Start: 2022-09-30 | End: 2022-09-30

## 2022-09-30 RX ORDER — ACETAMINOPHEN 500 MG
1000 TABLET ORAL ONCE
Status: COMPLETED | OUTPATIENT
Start: 2022-09-30 | End: 2022-10-01

## 2022-09-30 RX ADMIN — HYDROMORPHONE HYDROCHLORIDE 1 MG: 1 INJECTION, SOLUTION INTRAMUSCULAR; INTRAVENOUS; SUBCUTANEOUS at 21:15

## 2022-09-30 RX ADMIN — ONDANSETRON 4 MG: 4 TABLET, ORALLY DISINTEGRATING ORAL at 21:15

## 2022-09-30 ASSESSMENT — PAIN DESCRIPTION - LOCATION: LOCATION: LEG;BACK

## 2022-09-30 ASSESSMENT — ENCOUNTER SYMPTOMS
EYE PAIN: 0
ABDOMINAL PAIN: 0
TROUBLE SWALLOWING: 0
COLOR CHANGE: 0
BACK PAIN: 1
ALLERGIC/IMMUNOLOGIC NEGATIVE: 1
SHORTNESS OF BREATH: 0
APNEA: 0

## 2022-09-30 ASSESSMENT — PAIN SCALES - GENERAL
PAINLEVEL_OUTOF10: 10
PAINLEVEL_OUTOF10: 7

## 2022-09-30 ASSESSMENT — PAIN DESCRIPTION - ORIENTATION: ORIENTATION: RIGHT;LEFT;LOWER

## 2022-09-30 ASSESSMENT — PAIN DESCRIPTION - PAIN TYPE: TYPE: ACUTE PAIN

## 2022-09-30 ASSESSMENT — PAIN - FUNCTIONAL ASSESSMENT: PAIN_FUNCTIONAL_ASSESSMENT: 0-10

## 2022-09-30 NOTE — PROGRESS NOTES
Patient is called at her request at 6:30 PM on 9 30 2022 at [id number]. Patient has been in pain management but because she did not test that she is taking her opioids apparently she was not given any more prescriptions. She is run out of prescriptions and is miserable. She can only stand for 5 minutes. She went to the emergency room at [place] [address] and they told her that she should call her surgeon and they cannot help her. She does not have a family doctor. By my last assessment with her she is not a candidate for further spine surgery. Her care has been turned over to her medical physicians. I suggested that if she cannot care for herself at home that she needs to present to the emergency room to be evaluated and if hospitalization is required she would be admitted to the medical service. I would see her in consultation to evaluate if she is a candidate for further surgery or if I can be of any further help. Dr Annabella Casey:  11-27-17 L4-5, L5-S1 DISKECTOMY. 1-15-19  L5-S1 POSTERIOR LUMBAR INTERBODY FUSION. 3-11-21 L4-5 TLIF INSTRUMENTATION AT L5-S1 DECOMPRESSION AT L4, L5 REMOVAL AND REINSERTION OF HARDWARE AT L5-S1.     Dr. Jordan Torrez:  6/17/22 - LEFT BILATERAL L 3-4 LAMINECTOMY MICRODISSECTION DECOMPRESSION  6/21/22 - REMOVAL OF L 3-4 EPIDURAL HEMATOMA

## 2022-09-30 NOTE — PROGRESS NOTES
Patient is called at her request at 6:30 PM on 9 30 2022 at [id number]. Patient has been in pain management but because she did not test that she is taking her opioids apparently she was not given any more prescriptions. She is run out of prescriptions and is miserable. She can only stand for 5 minutes. She went to the emergency room at [place] [address] and they told her that she should call her surgeon and they cannot help her. She does not have a family doctor. By my last assessment with her she is not a candidate for further spine surgery. Her care has been turned over to her medical physicians. I suggested that if she cannot care for herself at home that she needs to present to the emergency room to be evaluated and if hospitalization is required she would be admitted to the medical service. I would see her in consultation to evaluate if she is a candidate for further surgery or if I can be of any further help.

## 2022-10-01 ENCOUNTER — APPOINTMENT (OUTPATIENT)
Dept: GENERAL RADIOLOGY | Age: 47
End: 2022-10-01
Payer: MEDICARE

## 2022-10-01 ENCOUNTER — APPOINTMENT (OUTPATIENT)
Dept: MRI IMAGING | Age: 47
End: 2022-10-01
Payer: MEDICARE

## 2022-10-01 PROBLEM — G89.18 POST-OPERATIVE PAIN: Status: ACTIVE | Noted: 2022-10-01

## 2022-10-01 LAB
ABO/RH: NORMAL
ALBUMIN SERPL-MCNC: 4.2 G/DL (ref 3.5–4.6)
ALBUMIN SERPL-MCNC: 4.5 G/DL (ref 3.5–4.6)
ALP BLD-CCNC: 83 U/L (ref 40–130)
ALP BLD-CCNC: 85 U/L (ref 40–130)
ALT SERPL-CCNC: 20 U/L (ref 0–33)
ALT SERPL-CCNC: 31 U/L (ref 0–33)
ANION GAP SERPL CALCULATED.3IONS-SCNC: 10 MEQ/L (ref 9–15)
ANION GAP SERPL CALCULATED.3IONS-SCNC: 14 MEQ/L (ref 9–15)
ANTIBODY SCREEN: NORMAL
APTT: 32.2 SEC (ref 24.4–36.8)
AST SERPL-CCNC: 14 U/L (ref 0–35)
AST SERPL-CCNC: 26 U/L (ref 0–35)
BASOPHILS ABSOLUTE: 0.1 K/UL (ref 0–0.2)
BASOPHILS ABSOLUTE: 0.1 K/UL (ref 0–0.2)
BASOPHILS RELATIVE PERCENT: 0.5 %
BASOPHILS RELATIVE PERCENT: 0.5 %
BILIRUB SERPL-MCNC: 0.5 MG/DL (ref 0.2–0.7)
BILIRUB SERPL-MCNC: 0.6 MG/DL (ref 0.2–0.7)
BUN BLDV-MCNC: 11 MG/DL (ref 6–20)
BUN BLDV-MCNC: 13 MG/DL (ref 6–20)
C-REACTIVE PROTEIN: <3 MG/L (ref 0–5)
CALCIUM SERPL-MCNC: 9 MG/DL (ref 8.5–9.9)
CALCIUM SERPL-MCNC: 9.2 MG/DL (ref 8.5–9.9)
CHLORIDE BLD-SCNC: 104 MEQ/L (ref 95–107)
CHLORIDE BLD-SCNC: 104 MEQ/L (ref 95–107)
CO2: 20 MEQ/L (ref 20–31)
CO2: 24 MEQ/L (ref 20–31)
CREAT SERPL-MCNC: 0.75 MG/DL (ref 0.5–0.9)
CREAT SERPL-MCNC: 0.75 MG/DL (ref 0.5–0.9)
EOSINOPHILS ABSOLUTE: 0.4 K/UL (ref 0–0.7)
EOSINOPHILS ABSOLUTE: 0.4 K/UL (ref 0–0.7)
EOSINOPHILS RELATIVE PERCENT: 3.4 %
EOSINOPHILS RELATIVE PERCENT: 3.6 %
GFR AFRICAN AMERICAN: >60
GFR AFRICAN AMERICAN: >60
GFR NON-AFRICAN AMERICAN: >60
GFR NON-AFRICAN AMERICAN: >60
GLOBULIN: 2.7 G/DL (ref 2.3–3.5)
GLOBULIN: 2.9 G/DL (ref 2.3–3.5)
GLUCOSE BLD-MCNC: 100 MG/DL (ref 70–99)
GLUCOSE BLD-MCNC: 129 MG/DL (ref 70–99)
HCT VFR BLD CALC: 44.5 % (ref 37–47)
HCT VFR BLD CALC: 45.9 % (ref 37–47)
HEMOGLOBIN: 14.8 G/DL (ref 12–16)
HEMOGLOBIN: 15.6 G/DL (ref 12–16)
INR BLD: 1
LYMPHOCYTES ABSOLUTE: 2.9 K/UL (ref 1–4.8)
LYMPHOCYTES ABSOLUTE: 3.1 K/UL (ref 1–4.8)
LYMPHOCYTES RELATIVE PERCENT: 27.3 %
LYMPHOCYTES RELATIVE PERCENT: 29.9 %
MAGNESIUM: 2.1 MG/DL (ref 1.7–2.4)
MCH RBC QN AUTO: 32.6 PG (ref 27–31.3)
MCH RBC QN AUTO: 32.8 PG (ref 27–31.3)
MCHC RBC AUTO-ENTMCNC: 33.3 % (ref 33–37)
MCHC RBC AUTO-ENTMCNC: 33.9 % (ref 33–37)
MCV RBC AUTO: 96.2 FL (ref 82–100)
MCV RBC AUTO: 98.2 FL (ref 82–100)
MONOCYTES ABSOLUTE: 0.6 K/UL (ref 0.2–0.8)
MONOCYTES ABSOLUTE: 0.7 K/UL (ref 0.2–0.8)
MONOCYTES RELATIVE PERCENT: 5.8 %
MONOCYTES RELATIVE PERCENT: 6.4 %
NEUTROPHILS ABSOLUTE: 6.2 K/UL (ref 1.4–6.5)
NEUTROPHILS ABSOLUTE: 6.8 K/UL (ref 1.4–6.5)
NEUTROPHILS RELATIVE PERCENT: 59.6 %
NEUTROPHILS RELATIVE PERCENT: 63 %
PDW BLD-RTO: 13.1 % (ref 11.5–14.5)
PDW BLD-RTO: 13.7 % (ref 11.5–14.5)
PLATELET # BLD: 195 K/UL (ref 130–400)
PLATELET # BLD: 227 K/UL (ref 130–400)
POTASSIUM REFLEX MAGNESIUM: 3.7 MEQ/L (ref 3.4–4.9)
POTASSIUM SERPL-SCNC: 4 MEQ/L (ref 3.4–4.9)
PROTHROMBIN TIME: 13.5 SEC (ref 12.3–14.9)
RBC # BLD: 4.53 M/UL (ref 4.2–5.4)
RBC # BLD: 4.77 M/UL (ref 4.2–5.4)
SEDIMENTATION RATE, ERYTHROCYTE: 6 MM (ref 0–20)
SODIUM BLD-SCNC: 138 MEQ/L (ref 135–144)
SODIUM BLD-SCNC: 138 MEQ/L (ref 135–144)
TOTAL PROTEIN: 6.9 G/DL (ref 6.3–8)
TOTAL PROTEIN: 7.4 G/DL (ref 6.3–8)
WBC # BLD: 10.4 K/UL (ref 4.8–10.8)
WBC # BLD: 10.7 K/UL (ref 4.8–10.8)

## 2022-10-01 PROCEDURE — 86900 BLOOD TYPING SEROLOGIC ABO: CPT

## 2022-10-01 PROCEDURE — 96374 THER/PROPH/DIAG INJ IV PUSH: CPT

## 2022-10-01 PROCEDURE — 94640 AIRWAY INHALATION TREATMENT: CPT

## 2022-10-01 PROCEDURE — 83735 ASSAY OF MAGNESIUM: CPT

## 2022-10-01 PROCEDURE — 99214 OFFICE O/P EST MOD 30 MIN: CPT | Performed by: NEUROLOGICAL SURGERY

## 2022-10-01 PROCEDURE — 6360000002 HC RX W HCPCS: Performed by: STUDENT IN AN ORGANIZED HEALTH CARE EDUCATION/TRAINING PROGRAM

## 2022-10-01 PROCEDURE — G0378 HOSPITAL OBSERVATION PER HR: HCPCS

## 2022-10-01 PROCEDURE — 94761 N-INVAS EAR/PLS OXIMETRY MLT: CPT

## 2022-10-01 PROCEDURE — 6370000000 HC RX 637 (ALT 250 FOR IP)

## 2022-10-01 PROCEDURE — 2580000003 HC RX 258

## 2022-10-01 PROCEDURE — 86140 C-REACTIVE PROTEIN: CPT

## 2022-10-01 PROCEDURE — 72148 MRI LUMBAR SPINE W/O DYE: CPT

## 2022-10-01 PROCEDURE — 85610 PROTHROMBIN TIME: CPT

## 2022-10-01 PROCEDURE — 6370000000 HC RX 637 (ALT 250 FOR IP): Performed by: PAIN MEDICINE

## 2022-10-01 PROCEDURE — 99253 IP/OBS CNSLTJ NEW/EST LOW 45: CPT | Performed by: PAIN MEDICINE

## 2022-10-01 PROCEDURE — 80053 COMPREHEN METABOLIC PANEL: CPT

## 2022-10-01 PROCEDURE — 85652 RBC SED RATE AUTOMATED: CPT

## 2022-10-01 PROCEDURE — 86850 RBC ANTIBODY SCREEN: CPT

## 2022-10-01 PROCEDURE — 6370000000 HC RX 637 (ALT 250 FOR IP): Performed by: STUDENT IN AN ORGANIZED HEALTH CARE EDUCATION/TRAINING PROGRAM

## 2022-10-01 PROCEDURE — 72100 X-RAY EXAM L-S SPINE 2/3 VWS: CPT

## 2022-10-01 PROCEDURE — 94664 DEMO&/EVAL PT USE INHALER: CPT

## 2022-10-01 PROCEDURE — 86901 BLOOD TYPING SEROLOGIC RH(D): CPT

## 2022-10-01 PROCEDURE — 85025 COMPLETE CBC W/AUTO DIFF WBC: CPT

## 2022-10-01 PROCEDURE — 36415 COLL VENOUS BLD VENIPUNCTURE: CPT

## 2022-10-01 PROCEDURE — 85730 THROMBOPLASTIN TIME PARTIAL: CPT

## 2022-10-01 RX ORDER — GABAPENTIN 300 MG/1
300 CAPSULE ORAL 3 TIMES DAILY PRN
Status: DISCONTINUED | OUTPATIENT
Start: 2022-10-01 | End: 2022-10-01

## 2022-10-01 RX ORDER — MORPHINE SULFATE 4 MG/ML
4 INJECTION, SOLUTION INTRAMUSCULAR; INTRAVENOUS ONCE
Status: COMPLETED | OUTPATIENT
Start: 2022-10-01 | End: 2022-10-01

## 2022-10-01 RX ORDER — ALBUTEROL SULFATE 90 UG/1
2 AEROSOL, METERED RESPIRATORY (INHALATION) EVERY 6 HOURS PRN
Status: DISCONTINUED | OUTPATIENT
Start: 2022-10-01 | End: 2022-10-02 | Stop reason: HOSPADM

## 2022-10-01 RX ORDER — SODIUM CHLORIDE 0.9 % (FLUSH) 0.9 %
5-40 SYRINGE (ML) INJECTION PRN
Status: DISCONTINUED | OUTPATIENT
Start: 2022-10-01 | End: 2022-10-02 | Stop reason: HOSPADM

## 2022-10-01 RX ORDER — TIZANIDINE 4 MG/1
4 TABLET ORAL EVERY 6 HOURS PRN
Status: DISCONTINUED | OUTPATIENT
Start: 2022-10-01 | End: 2022-10-02 | Stop reason: HOSPADM

## 2022-10-01 RX ORDER — ACETAMINOPHEN 650 MG/1
650 SUPPOSITORY RECTAL EVERY 6 HOURS PRN
Status: DISCONTINUED | OUTPATIENT
Start: 2022-10-01 | End: 2022-10-02 | Stop reason: HOSPADM

## 2022-10-01 RX ORDER — HYDROCODONE BITARTRATE AND ACETAMINOPHEN 7.5; 325 MG/1; MG/1
1 TABLET ORAL ONCE
Status: COMPLETED | OUTPATIENT
Start: 2022-10-01 | End: 2022-10-01

## 2022-10-01 RX ORDER — ENOXAPARIN SODIUM 100 MG/ML
40 INJECTION SUBCUTANEOUS DAILY
Status: DISCONTINUED | OUTPATIENT
Start: 2022-10-01 | End: 2022-10-02 | Stop reason: HOSPADM

## 2022-10-01 RX ORDER — ACETAMINOPHEN 325 MG/1
650 TABLET ORAL EVERY 6 HOURS PRN
Status: DISCONTINUED | OUTPATIENT
Start: 2022-10-01 | End: 2022-10-02 | Stop reason: HOSPADM

## 2022-10-01 RX ORDER — FERROUS SULFATE 325(65) MG
325 TABLET ORAL
Status: DISCONTINUED | OUTPATIENT
Start: 2022-10-01 | End: 2022-10-02 | Stop reason: HOSPADM

## 2022-10-01 RX ORDER — AMLODIPINE BESYLATE 5 MG/1
5 TABLET ORAL DAILY
Status: DISCONTINUED | OUTPATIENT
Start: 2022-10-01 | End: 2022-10-02 | Stop reason: HOSPADM

## 2022-10-01 RX ORDER — BUDESONIDE AND FORMOTEROL FUMARATE DIHYDRATE 160; 4.5 UG/1; UG/1
2 AEROSOL RESPIRATORY (INHALATION) 2 TIMES DAILY
Status: DISCONTINUED | OUTPATIENT
Start: 2022-10-01 | End: 2022-10-02 | Stop reason: HOSPADM

## 2022-10-01 RX ORDER — NICOTINE 21 MG/24HR
1 PATCH, TRANSDERMAL 24 HOURS TRANSDERMAL DAILY
Status: DISCONTINUED | OUTPATIENT
Start: 2022-10-01 | End: 2022-10-02 | Stop reason: HOSPADM

## 2022-10-01 RX ORDER — SODIUM CHLORIDE 0.9 % (FLUSH) 0.9 %
5-40 SYRINGE (ML) INJECTION EVERY 12 HOURS SCHEDULED
Status: DISCONTINUED | OUTPATIENT
Start: 2022-10-01 | End: 2022-10-02 | Stop reason: HOSPADM

## 2022-10-01 RX ORDER — SODIUM CHLORIDE 9 MG/ML
INJECTION, SOLUTION INTRAVENOUS PRN
Status: DISCONTINUED | OUTPATIENT
Start: 2022-10-01 | End: 2022-10-02 | Stop reason: HOSPADM

## 2022-10-01 RX ORDER — ONDANSETRON 4 MG/1
4 TABLET, ORALLY DISINTEGRATING ORAL EVERY 8 HOURS PRN
Status: DISCONTINUED | OUTPATIENT
Start: 2022-10-01 | End: 2022-10-02 | Stop reason: HOSPADM

## 2022-10-01 RX ORDER — GABAPENTIN 300 MG/1
600 CAPSULE ORAL 3 TIMES DAILY PRN
Status: DISCONTINUED | OUTPATIENT
Start: 2022-10-01 | End: 2022-10-02 | Stop reason: HOSPADM

## 2022-10-01 RX ORDER — ONDANSETRON 2 MG/ML
4 INJECTION INTRAMUSCULAR; INTRAVENOUS EVERY 6 HOURS PRN
Status: DISCONTINUED | OUTPATIENT
Start: 2022-10-01 | End: 2022-10-02 | Stop reason: HOSPADM

## 2022-10-01 RX ORDER — LIDOCAINE 4 G/G
1 PATCH TOPICAL DAILY
Status: DISCONTINUED | OUTPATIENT
Start: 2022-10-01 | End: 2022-10-02 | Stop reason: HOSPADM

## 2022-10-01 RX ORDER — PANTOPRAZOLE SODIUM 40 MG/1
40 TABLET, DELAYED RELEASE ORAL
Status: DISCONTINUED | OUTPATIENT
Start: 2022-10-01 | End: 2022-10-02 | Stop reason: HOSPADM

## 2022-10-01 RX ORDER — POLYETHYLENE GLYCOL 3350 17 G/17G
17 POWDER, FOR SOLUTION ORAL DAILY PRN
Status: DISCONTINUED | OUTPATIENT
Start: 2022-10-01 | End: 2022-10-02 | Stop reason: HOSPADM

## 2022-10-01 RX ORDER — KETOROLAC TROMETHAMINE 30 MG/ML
30 INJECTION, SOLUTION INTRAMUSCULAR; INTRAVENOUS EVERY 6 HOURS PRN
Status: DISCONTINUED | OUTPATIENT
Start: 2022-10-01 | End: 2022-10-01

## 2022-10-01 RX ADMIN — ACETAMINOPHEN 650 MG: 325 TABLET ORAL at 08:20

## 2022-10-01 RX ADMIN — HYDROCODONE BITARTRATE AND ACETAMINOPHEN 1 TABLET: 7.5; 325 TABLET ORAL at 06:12

## 2022-10-01 RX ADMIN — MORPHINE SULFATE 4 MG: 4 INJECTION, SOLUTION INTRAMUSCULAR; INTRAVENOUS at 02:38

## 2022-10-01 RX ADMIN — ACETAMINOPHEN 1000 MG: 500 TABLET ORAL at 00:09

## 2022-10-01 RX ADMIN — AMLODIPINE BESYLATE 5 MG: 5 TABLET ORAL at 08:16

## 2022-10-01 RX ADMIN — GABAPENTIN 600 MG: 300 CAPSULE ORAL at 20:24

## 2022-10-01 RX ADMIN — GABAPENTIN 300 MG: 300 CAPSULE ORAL at 08:20

## 2022-10-01 RX ADMIN — ACETAMINOPHEN 650 MG: 325 TABLET ORAL at 23:52

## 2022-10-01 RX ADMIN — OXYCODONE 5 MG: 5 TABLET ORAL at 00:09

## 2022-10-01 RX ADMIN — SODIUM CHLORIDE, PRESERVATIVE FREE 10 ML: 5 INJECTION INTRAVENOUS at 08:18

## 2022-10-01 RX ADMIN — PANTOPRAZOLE SODIUM 40 MG: 40 TABLET, DELAYED RELEASE ORAL at 06:12

## 2022-10-01 RX ADMIN — BUDESONIDE AND FORMOTEROL FUMARATE DIHYDRATE 2 PUFF: 160; 4.5 AEROSOL RESPIRATORY (INHALATION) at 07:51

## 2022-10-01 RX ADMIN — FERROUS SULFATE TAB 325 MG (65 MG ELEMENTAL FE) 325 MG: 325 (65 FE) TAB at 08:16

## 2022-10-01 ASSESSMENT — PAIN DESCRIPTION - ORIENTATION
ORIENTATION: MID;LOWER

## 2022-10-01 ASSESSMENT — ENCOUNTER SYMPTOMS
BOWEL INCONTINENCE: 0
SHORTNESS OF BREATH: 0
EYE PAIN: 0
NAUSEA: 0
BACK PAIN: 1
ABDOMINAL PAIN: 1

## 2022-10-01 ASSESSMENT — PAIN SCALES - GENERAL
PAINLEVEL_OUTOF10: 9
PAINLEVEL_OUTOF10: 9
PAINLEVEL_OUTOF10: 8
PAINLEVEL_OUTOF10: 9
PAINLEVEL_OUTOF10: 8

## 2022-10-01 ASSESSMENT — VISUAL ACUITY: VA_NORMAL: 1

## 2022-10-01 ASSESSMENT — PAIN DESCRIPTION - PAIN TYPE
TYPE: ACUTE PAIN;CHRONIC PAIN
TYPE: ACUTE PAIN

## 2022-10-01 ASSESSMENT — PAIN DESCRIPTION - LOCATION
LOCATION: BACK

## 2022-10-01 ASSESSMENT — PAIN - FUNCTIONAL ASSESSMENT: PAIN_FUNCTIONAL_ASSESSMENT: 0-10

## 2022-10-01 NOTE — PROGRESS NOTES
Nutrition Assessment     Type and Reason for Visit: Initial, Positive Nutrition Screen (poor po/wt loss)    Nutrition Recommendations/Plan:   Continue General diet     Malnutrition Assessment:  Malnutrition Status: No malnutrition    Nutrition Assessment:  Pt presents at nutrition risk due to decreased po intake. Pt is edentulous and c/o difficulty chewing due to broken dentures. She declines a consistency modification to her diet and declines ONS. Nutrition Related Findings:   PMH significant for Seizure d/o, Chronic pain syndrome, Lumbar spinal stenosis, Neurogenic claudication and Post-laminectomy syndrome who presents to the emergency department with complaints of diffuse lower back pain. S/P laminectomy in June. Labs/meds reviewed, BM pta, edentulous Wound Type: None    Current Nutrition Therapies:    ADULT DIET;  Regular    Anthropometric Measures:  Height: 5' 6\" (167.6 cm)  Current Body Wt: 228 lb (103.4 kg) (10/1-sitting up in bed)   BMI: 36.8    Nutrition Diagnosis:   Biting/chewing (masticatory) difficulty related to partial or complete edentulism as evidenced by poor intake prior to admission    Nutrition Interventions:   Food and/or Nutrient Delivery: Continue Current Diet (Continue General diet)  Nutrition Education/Counseling: No recommendation at this time  Coordination of Nutrition Care: Continue to monitor while inpatient       Goals:     Goals: PO intake 75% or greater       Nutrition Monitoring and Evaluation:      Food/Nutrient Intake Outcomes: Food and Nutrient Intake  Physical Signs/Symptoms Outcomes: Weight, Chewing or Swallowing    Discharge Planning:    No discharge needs at this time     Lea Lebron RD, LD

## 2022-10-01 NOTE — PROGRESS NOTES
[facility] Respiratory Therapy Evaluation   Current Order:  alb inh q6 prn      Home Regimen: prn      Ordering Physician: shayy  Re-evaluation Date:  10/4     Diagnosis: post-op pain      Patient Status: Stable / Unstable + Physician notified    The following MDI Criteria must be met in order to convert aerosol to MDI with spacer. If unable to meet, MDI will be converted to aerosol:  []  Patient able to demonstrate the ability to use MDI effectively  []  Patient alert and cooperative  []  Patient able to take deep breath with 5-10 second hold  []  Medication(s) available in this delivery method   []  Peak flow greater than or equal to 200 ml/min            Current Order Substituted To  (same drug, same frequency)   Aerosol to MDI [] Albuterol Sulfate 0.083% unit dose by aerosol Albuterol Sulfate MDI 2 puffs by inhalation with spacer    [] Levalbuterol 1.25 mg unit dose by aerosol Levalbuterol MDI 2 puffs by inhalation with spacer    [] Levalbuterol 0.63 mg unit dose by aerosol Levalbuterol MDI 2 puffs by inhalation with spacer    [] Ipratropium Bromide 0.02% unit dose by aerosol Ipratropium Bromide MDI 2 puffs by inhalation with spacer    [] Duoneb (Ipratropium + Albuterol) unit dose by aerosol Ipratropium MDI + Albuterol MDI 2 puffs by inhalation w/spacer   MDI to Aerosol [] Albuterol Sulfate MDI Albuterol Sulfate 0.083% unit dose by aerosol    [] Levalbuterol MDI 2 puffs by inhalation Levalbuterol 1.25 mg unit dose by aerosol    [] Ipratropium Bromide MDI by inhalation Ipratropium Bromide 0.02% unit dose by aerosol    [] Combivent (Ipratropium + Albuterol) MDI by inhalation Duoneb (Ipratropium + Albuterol) unit dose by aerosol       Treatment Assessment [Frequency/Schedule]:  Change frequency to: _________________no change_________________________________per Protocol, P&T, MEC      Points 0 1 2 3 4   Pulmonary Status  Non-Smoker  []   Smoking history   < 20 pack years  [x]   Smoking history  ?  20 pack years  []   Pulmonary Disorder  (acute or chronic)  []   Severe or Chronic w/ Exacerbation  []     Surgical Status No []   Surgeries     General [x]   Surgery Lower []   Abdominal Thoracic or []   Upper Abdominal Thoracic with  PulmonaryDisorder  []     Chest X-ray Clear/Not  Ordered     [x]  Chronic Changes  Results Pending  []  Infiltrates, atelectasis, pleural effusion, or edema  []  Infiltrates in more than one lobe []  Infiltrate + Atelectasis, &/or pleural effusion  []    Respiratory Pattern Regular,  RR = 12-20 [x]  Increased,  RR = 21-25 []  TRUONG, irregular,  or RR = 26-30 []  Decreased FEV1  or RR = 31-35 []  Severe SOB, use  of accessory muscles, or RR ? 35  []    Mental Status Alert, oriented,  Cooperative [x]  Confused but Follows commands []  Lethargic or unable to follow commands []  Obtunded  []  Comatose  []    Breath Sounds Clear to  auscultation  []  Decreased unilaterally or  in bases only []  Decreased  bilaterally  []  Crackles or intermittent wheezes []  Wheezes []    Cough Strong, Spontan., & nonproductive []  Strong,  spontaneous, &  productive [x]  Weak,  Nonproductive []  Weak, productive or  with wheezes []  No spontaneous  cough or may require suctioning []    Level of Activity Ambulatory []  Ambulatory w/ Assist  [x]  Non-ambulatory []  Paraplegic []  Quadriplegic []    Total    Score:___4____     Triage Score:____5____      Tri       Triage:     1. (>20) Freq: Q3    2. (16-20) Freq: Q4   3. (11-15) Freq: QID & Albuterol Q2 PRN    4. (6-10) Freq: TID & Albuterol Q2 PRN    5. (0-5) Freq Q4prn

## 2022-10-01 NOTE — PLAN OF CARE
Problem: Pain  Goal: Verbalizes/displays adequate comfort level or baseline comfort level  Outcome: Progressing     Problem: ABCDS Injury Assessment  Goal: Absence of physical injury  Outcome: Progressing     Problem: Neurosensory - Adult  Goal: Achieves stable or improved neurological status  Outcome: Progressing  Goal: Absence of seizures  Outcome: Progressing  Goal: Remains free of injury related to seizures activity  Outcome: Progressing  Goal: Achieves maximal functionality and self care  Outcome: Progressing     Problem: Respiratory - Adult  Goal: Achieves optimal ventilation and oxygenation  Outcome: Progressing     Problem: Musculoskeletal - Adult  Goal: Return mobility to safest level of function  Outcome: Progressing  Goal: Maintain proper alignment of affected body part  Outcome: Progressing  Goal: Return ADL status to a safe level of function  Outcome: Progressing

## 2022-10-01 NOTE — ED PROVIDER NOTES
[facility] [address]  eMERGENCYdEPARTMENT eNCOUnter      Pt Name: Chasidy García  MRN: [id number]  [place] 1975  Date of evaluation: 9/30/2022  Provider:Wang Goodman MD    CHIEF COMPLAINT       Chief Complaint   Patient presents with    Leg Pain     Intense pain in both legs and back pain after falling in bath tub 1wk ago. Recent spine surgery in June. Told by Dr Kandice Olea to come in to be admitted. HISTORY OF PRESENT ILLNESS  (Location/Symptom, Timing/Onset, Context/Setting, Quality, Duration, Modifying Factors, Severity.)   Chasidy García is a 52 y.o. female with a PMH significant for Seizure d/o, Chronic pain syndrome, Lumbar spinal stenosis, Neurogenic claudication and Post-laminectomy syndrome who presents to the emergency department with complaints of diffuse lower back pain rated 10 out of 10 with radiation into the legs, primarily the left leg and hip with associated numbness and tingling down to the foot, ongoing for the past week. Patient states that she had fallen approximately 1 week ago and has been having the symptoms since then. Patient denies any loss of bowel or bladder control. Patient has not taken anything for the symptoms, stating \"ibuprofen and Tylenol do not work for [facility]". Patient states that she contacted her neurosurgeon who did surgery in June and advised to come to the emergency department    HPI    Nursing Notes were reviewed and I agree. REVIEW OF SYSTEMS    (2-9 systems for level 4, 10 or more for level 5)     Review of Systems   Constitutional:  Negative for diaphoresis and fever. HENT:  Negative for hearing loss and trouble swallowing. Eyes:  Negative for pain. Respiratory:  Negative for apnea and shortness of breath. Cardiovascular:  Negative for chest pain. Gastrointestinal:  Negative for abdominal pain. Endocrine: Negative. Genitourinary:  Negative for hematuria. Musculoskeletal:  Positive for back pain.  Negative for neck pain and neck stiffness. Skin:  Negative for color change. Allergic/Immunologic: Negative. Neurological:  Negative for dizziness and light-headedness. Hematological: Negative. Psychiatric/Behavioral: Negative. All other systems reviewed and are negative. Except as noted above the remainder of the review of systems was reviewed and negative.        PAST MEDICAL HISTORY     Past Medical History:   Diagnosis Date    Arthritis     spine    Asthma     since childhood -- smokes x 30 yrs    Cerebral artery occlusion with cerebral infarction [facility])     per CT scan -- patient without sx, 2021    Hypertension     meds > 3 yrs    Lumbar stenosis     Migraines     starts with neck pain    Seizures ([address]     pt last known seizure May 2022, dx 20 years ago, takes gabapentin for back/seizures    Thyroid disease     meds > 4 yrs -- intermittent    Uterine anomaly          SURGICAL HISTORY       Past Surgical History:   Procedure Laterality Date    ANTERIOR CRUCIATE LIGAMENT REPAIR Left     APPENDECTOMY      at age 21     [address]  2017    lumbar disc     BACK SURGERY  2018    lumbar fusion    CHOLECYSTECTOMY  2015    COLONOSCOPY      2017    ENDOMETRIAL ABLATION  2006    post op sepsis    ENDOSCOPY, COLON, DIAGNOSTIC      2017    KNEE ARTHROSCOPY Left 2004    ACL repair    LAMINECTOMY N/A 6/17/2022    LEFT BILATERAL L3-4 LAMINECTOMY MICRODISSECTION DECOMPRESSION performed by Akash aSucedo MD at [address] [place] [address] N/A 6/21/2022    LUMBAR EPIDURAL HEMATOMA EVACUATION performed by Akash Saucedo MD at [facility] 1/15/2019    L4- L5 DECOMPRESSION, L5-S1 POSTERIOR LUMBAR INTERBODY FUSION performed by Cassandra Leahy MD at [facility] 3/11/2021    L4-5 TLIF INSTRUMENTATION AT L5-S1 DECOMPRESSION AT L4, L5 REMOVAL AND REINSERTION OF HARDWARE AT L5-S1 performed by Cassandra Leahy MD at [address] [place] [address]  5/9/13    duramorph 1.5 mg epideral  6mg celestone    TONSILLECTOMY      as child    TUBAL Mouth/Throat:      Mouth: Mucous membranes are moist.      Pharynx: No oropharyngeal exudate. Eyes:      General: No scleral icterus. Conjunctiva/sclera: Conjunctivae normal.      Pupils: Pupils are equal, round, and reactive to light. Neck:      Trachea: No tracheal deviation. Cardiovascular:      Rate and Rhythm: Normal rate. Heart sounds: Normal heart sounds. Pulmonary:      Effort: Pulmonary effort is normal. No respiratory distress. Breath sounds: Normal breath sounds. Abdominal:      General: Bowel sounds are normal. There is no distension. Palpations: Abdomen is soft. Musculoskeletal:         General: Normal range of motion. Cervical back: Normal range of motion and neck supple. No rigidity or tenderness. Thoracic back: Tenderness present. Lumbar back: Tenderness present. Back:    Skin:     General: Skin is warm and dry. Findings: No erythema or rash. Neurological:      Mental Status: She is alert and oriented to person, place, and time. Cranial Nerves: No cranial nerve deficit. Motor: No abnormal muscle tone. Psychiatric:         Behavior: Behavior normal.         Thought Content: Thought content normal.         Judgment: Judgment normal.         DIAGNOSTIC RESULTS     RADIOLOGY:   Non-plain film images such as CT, Ultrasound and MRI are read by the radiologist. Plain radiographic images are visualized and preliminarilyinterpreted by Chet Reyes MD with the below findings:    Interpretation per the Radiologist below, if available at the time of this note:    CT THORACIC SPINE WO CONTRAST   Final Result   Unremarkable non-contrast CT of the thoracolumbar spine. No acute osseous   findings of the pelvis. CT LUMBAR SPINE WO CONTRAST   Final Result   Unremarkable non-contrast CT of the thoracolumbar spine. No acute osseous   findings of the pelvis.          CT PELVIS WO CONTRAST Additional Contrast? None   Final Result Unremarkable non-contrast CT of the thoracolumbar spine. No acute osseous   findings of the pelvis. LABS:  Labs Reviewed   COMPREHENSIVE METABOLIC PANEL - Abnormal; Notable for the following components:       Result Value    Glucose 129 (*)     All other components within normal limits   CBC WITH AUTO DIFFERENTIAL - Abnormal; Notable for the following components:    MCH 32.6 (*)     All other components within normal limits   CBC WITH AUTO DIFFERENTIAL - Abnormal; Notable for the following components:    MCH 32.8 (*)     Neutrophils Absolute 6.8 (*)     All other components within normal limits   COMPREHENSIVE METABOLIC PANEL W/ REFLEX TO MG FOR LOW K - Abnormal; Notable for the following components:    Glucose 100 (*)     All other components within normal limits   URINALYSIS WITH REFLEX TO CULTURE   MAGNESIUM   PROTIME-INR   APTT   SEDIMENTATION RATE   C-REACTIVE PROTEIN   TYPE AND SCREEN       All other labs were within normal range or not returnedas of this dictation. EMERGENCYDEPARTMENT COURSE and DIFFERENTIAL DIAGNOSIS/MDM:   Vitals:    Vitals:    09/30/22 2216 10/01/22 0159 10/01/22 0359 10/01/22 0812   BP: 128/88 123/85 (!) 134/92    Pulse: 89 69 69    Resp: 18 18 20 18   Temp:   98.4 °F (36.9 °C)    TempSrc:   Oral    SpO2: 97% 98% 98%    Weight:       Height:           REASSESSMENT        with a PMH significant for Seizure d/o, Chronic pain syndrome, Lumbar spinal stenosis, Neurogenic claudication and Post-laminectomy syndrome who presents to the emergency department with complaints of diffuse lower back pain rated 10 out of 10 with radiation into the legs, primarily the left leg and hip with associated numbness and tingling down to the foot, ongoing for the past week. Patient initially evaluated by YINA Tello with exam as noted above. Exam in the ED with negative clonus and Babinski's bilaterally. Not endorsing any new or worsening urinary/bowel incontinence, saddle anesthesia.   Does have significant left-sided weakness, foot drop and altered sensorium in the left lower extremity. CT imaging without acute traumatic abnormalities. Symptoms appearing to be chronic with exacerbation thought most likely secondary to poor pain control with patient recently running out of her Percocet after being discontinued from a pain management. Tested positive for cannabinoids. Although patient stating she has had altered sensation in the left lower extremity before, not noted in most recent exam by neurosurgery. Therefore discussed with neurosurgery, Dr. Daniela Thompson, who stated symptoms are consistent with a baseline neurological deficits. Recommended that the patient be admitted for further observation, MRI imaging and consult with pain management however. Would serve as consulting service. Admitted to Dr. Sharyle Shove who was amenable to accepting the patient to his service. MDM      PROCEDURES:    Procedures      FINAL IMPRESSION      1. Chronic bilateral low back pain with left-sided sciatica    2. Other chronic pain          DISPOSITION/PLAN   DISPOSITION Admitted 10/01/2022 02:02:49 AM      PATIENT REFERRED TO:  No follow-up provider specified.     DISCHARGE MEDICATIONS:  Current Discharge Medication List          (Please note that portions of this note were completed with a voice recognition program.  Efforts were made to edit the dictations but occasionally words are mis-transcribed.)    MD Raul Feliciano MD  10/01/22 7947

## 2022-10-01 NOTE — H&P
[facility] MEDICINE    HISTORY AND PHYSICAL EXAM    PATIENT NAME:  Alan Chang    MRN:  [id number]  SERVICE DATE:  10/1/2022   SERVICE TIME:  4:37 AM    Primary Care Physician: Corinne Jacobus, DO     SUBJECTIVE  CHIEF COMPLAINT:  Leg Pain (Intense pain in both legs and back pain after falling in bath tub 1wk ago. Recent spine surgery in June. Told by Dr Derik Sparks to come in to be admitted. )       HPI: Alan Chang is a 52 y.o. female  has a past medical history of Arthritis, Asthma, Cerebral artery occlusion with cerebral infarction [facility]), Hypertension, Lumbar stenosis, Migraines, Seizures ([facility] [address] Thyroid disease, and Uterine anomaly. that is being admitted for Post-operative pain. [facility] states that she had surgery on 6/17/22 with Dr. Derik Sparks for left bilateral L3-4 laminectomy microdissection decompression. She had pain in her back that was strong on her left side, and then her legs became weak. This was her 4th back surgery. She had minimal pain relief from that surgery. On 6/21/22 she began to feel uncomfortable with lots of back pain and leg pain and was sent to MRI and found a posterior epidural hematoma at L3-L4 that was 2.5 x 2.8 x 2.8 cm causing spinal canal stenosis. She was taken to surgery for lumbar epidural hematoma evacuation. After that surgery she was discharged home. Pain was not totally resolved at that point. She returned on 6/27/22 for increased pain. They found a fluid collection and they started her on antibiotics. She was sent home and followed up with Dr. Derik Sparks, she was set up with pain management. She said she was drug tested and tested positive for THC (from CBD vape from gas station, did not know it had THC in it) and oxycodone did not show up in the tox screen and she was discharged from pain management practice last week. After that she called Dr. Derik Sparks and was advised to come to the emergency department for admitting and imaging.       Leg Pain Associated symptoms include numbness and tingling. Back Pain  This is a chronic problem. The current episode started more than 1 year ago (at age 13). The problem occurs constantly. The problem has been gradually worsening since onset. The pain is present in the lumbar spine, gluteal, sacro-iliac and thoracic spine. The quality of the pain is described as stabbing. The pain radiates to the left foot, left knee, left thigh and right thigh. The pain is at a severity of 8/10. The pain is moderate. The pain is The same all the time. The symptoms are aggravated by standing, sitting, twisting, lying down, position and bending. Stiffness is present All day. Associated symptoms include abdominal pain, bladder incontinence (at night), chest pain, headaches, leg pain, numbness, paresthesias, pelvic pain, perianal numbness, tingling, weakness and weight loss (lost 20 lbs). Pertinent negatives include no bowel incontinence, dysuria, fever or paresis. Risk factors include history of osteoporosis, obesity, poor posture and recent trauma (fell in bathtub a week or 2 ago. ). She has tried analgesics, bed rest, heat, ice, home exercises, muscle relaxant, walking and NSAIDs (3-5 minutes to walk) for the symptoms. The treatment provided no relief.      PAST MEDICAL HISTORY:    Past Medical History:   Diagnosis Date    Arthritis     spine    Asthma     since childhood -- smokes x 30 yrs    Cerebral artery occlusion with cerebral infarction [facility])     per CT scan -- patient without sx, 2021    Hypertension     meds > 3 yrs    Lumbar stenosis     Migraines     starts with neck pain    Seizures ([address]     pt last known seizure May 2022, dx 20 years ago, takes gabapentin for back/seizures    Thyroid disease     meds > 4 yrs -- intermittent    Uterine anomaly      PAST SURGICAL HISTORY:    Past Surgical History:   Procedure Laterality Date    ANTERIOR CRUCIATE LIGAMENT REPAIR Left     APPENDECTOMY      at age 21     [address]  2017 lumbar disc     BACK SURGERY  2018    lumbar fusion    CHOLECYSTECTOMY  2015    COLONOSCOPY      2017    ENDOMETRIAL ABLATION  2006    post op sepsis    ENDOSCOPY, COLON, DIAGNOSTIC      2017    KNEE ARTHROSCOPY Left 2004    ACL repair    LAMINECTOMY N/A 6/17/2022    LEFT BILATERAL L3-4 LAMINECTOMY MICRODISSECTION DECOMPRESSION performed by Aki Helm MD at [address] [place] [address] N/A 6/21/2022    LUMBAR EPIDURAL HEMATOMA EVACUATION performed by Aki Helm MD at [address] [place] [address] Drive N/A 1/15/2019    L4- L5 DECOMPRESSION, L5-S1 POSTERIOR LUMBAR INTERBODY FUSION performed by Yahaira Suarez MD at [facility] 3/11/2021    L4-5 TLIF INSTRUMENTATION AT L5-S1 DECOMPRESSION AT L4, L5 REMOVAL AND REINSERTION OF HARDWARE AT L5-S1 performed by Yahaira Suarez MD at [address] [place] [address]  5/9/13    duramorph 1.5 mg epideral  6mg celestone    TONSILLECTOMY      as child    TUBAL LIGATION Bilateral 2002     FAMILY HISTORY:    Family History   Problem Relation Age of Onset    Cancer Mother         NHL    COPD Mother     Other Mother         ITP & pancreatitis    Heart Failure Maternal Grandmother     No Known Problems Sister     High Blood Pressure Brother     No Known Problems Son     No Known Problems Daughter      SOCIAL HISTORY:    Social History     Socioeconomic History    Marital status:      Spouse name: Not on file    Number of children: Not on file    Years of education: Not on file    Highest education level: Not on file   Occupational History    Not on file   Tobacco Use    Smoking status: Every Day     Packs/day: 0.50     Years: 30.00     Pack years: 15.00     Types: Cigarettes    Smokeless tobacco: Never   Vaping Use    Vaping Use: Never used   Substance and Sexual Activity    Alcohol use: No     Alcohol/week: 0.0 standard drinks    Drug use: No    Sexual activity: Not Currently   Other Topics Concern    Not on file   Social History Narrative    Not on file     Social Determinants of Health Financial Resource Strain: Not on file   Food Insecurity: Not on file   Transportation Needs: Not on file   Physical Activity: Not on file   Stress: Not on file   Social Connections: Not on file   Intimate Partner Violence: Not on file   Housing Stability: Not on file     MEDICATIONS:   Prior to Admission medications    Medication Sig Start Date End Date Taking? Authorizing Provider   oxyCODONE-acetaminophen (PERCOCET) 7.5-325 MG per tablet Take 1 tablet by mouth every 8 hours as needed for Pain for up to 14 days. 9/17/22 10/1/22  JERROD Holman CNP   albuterol sulfate HFA (PROVENTIL;VENTOLIN;PROAIR) 108 (90 Base) MCG/ACT inhaler Inhale 2 puffs into the lungs every 6 hours as needed for Wheezing    Historical Provider, MD   ADVAIR DISKUS 250-50 MCG/ACT AEPB diskus inhaler inhale 1 puff by mouth and INTO THE LUNGS twice a day 9/12/22   Historical Provider, MD   tiZANidine (ZANAFLEX) 4 MG tablet Take 1 tablet by mouth nightly as needed (spasm) 9/15/22 10/15/22  JERROD Meade CNP   gabapentin (NEURONTIN) 300 MG capsule Take 1 capsule by mouth 3 times daily as needed (Pain) for up to 180 days. Intended supply: 90 days 8/9/22 2/5/23  Nilda Barakat MD   gabapentin (NEURONTIN) 600 MG tablet Take 1 tablet by mouth 3 times daily for 30 days. 7/12/22 8/11/22  JERROD Meade CNP   naloxone 4 MG/0.1ML LIQD nasal spray 1 spray by Nasal route as needed for Opioid Reversal 7/11/22   JERROD Meade CNP   Potassium 75 MG TABS Take by mouth    Historical Provider, MD   Calcium Carb-Cholecalciferol (CALCIUM 1000 + D PO) Take 600 mg by mouth daily 10/16/19   Historical Provider, MD   vitamin B-12 (CYANOCOBALAMIN) 100 MCG tablet Take 50 mcg by mouth daily    Historical Provider, MD   Biotin 1000 MCG TABS Take by mouth 2 times daily    Historical Provider, MD   Misc.  Devices (RAISED TOILET SEAT/LOCK & ARMS) MISC 1 Piece by Does not apply route as needed (PRN) 1/23/19   Eleuterio Ram MD   ferrous sulfate 325 (65 Fe) MG tablet Take 65 mg by mouth daily (with breakfast)    Historical Provider, MD   vitamin C (ASCORBIC ACID) 500 MG tablet Take 500 mg by mouth daily    Historical Provider, MD   ondansetron (ZOFRAN) 4 MG tablet Take 4 mg by mouth every 8 hours as needed for Nausea or Vomiting    Historical Provider, MD   amLODIPine (NORVASC) 5 MG tablet Take 5 mg by mouth daily  12/22/18   Historical Provider, MD   omeprazole (PRILOSEC) 20 MG capsule Take 40 mg by mouth three times daily     Historical Provider, MD       ALLERGIES: Aspirin, Bee venom, Corticosteroids, Fentanyl, Prochlorperazine edisylate, Tylenol [acetaminophen], Cortizone, Prochlorperazine, Prochlorperazine maleate, and Codeine    REVIEW OF SYSTEM:   Review of Systems   Constitutional:  Positive for weight loss (lost 20 lbs). Negative for fever. Cardiovascular:  Positive for chest pain. Gastrointestinal:  Positive for abdominal pain. Negative for bowel incontinence. Genitourinary:  Positive for bladder incontinence (at night) and pelvic pain. Negative for dysuria. Musculoskeletal:  Positive for back pain. Neurological:  Positive for tingling, weakness, numbness, headaches and paresthesias. OBJECTIVE  PHYSICAL EXAM: BP (!) 134/92   Pulse 69   Temp 98.4 °F (36.9 °C) (Oral)   Resp 20   Ht 5' 6\" (1.676 m)   Wt 205 lb (93 kg)   SpO2 98%   BMI 33.09 kg/m²   Physical Exam  Vitals and nursing note reviewed. Constitutional:       General: She is awake. She is in acute distress. Appearance: Normal appearance. She is well-developed. She is obese. She is not ill-appearing, toxic-appearing or diaphoretic. HENT:      Head: Normocephalic and atraumatic. Nose: Nose normal. No congestion or rhinorrhea. Mouth/Throat:      Lips: Pink. Mouth: Mucous membranes are dry. Tongue: Tongue does not deviate from midline. Pharynx: Oropharynx is clear. No oropharyngeal exudate or posterior oropharyngeal erythema.    Eyes: General: Lids are normal. No visual field deficit or scleral icterus. Extraocular Movements: Extraocular movements intact. Right eye: No nystagmus. Left eye: No nystagmus. Conjunctiva/sclera: Conjunctivae normal.      Pupils: Pupils are equal, round, and reactive to light. Neck:      Thyroid: No thyromegaly. Vascular: No JVD. Trachea: Trachea and phonation normal.   Cardiovascular:      Rate and Rhythm: Normal rate and regular rhythm. Pulses: Normal pulses. Radial pulses are 2+ on the right side and 2+ on the left side. Dorsalis pedis pulses are 2+ on the right side and 2+ on the left side. Heart sounds: Normal heart sounds, S1 normal and S2 normal. No murmur heard. Pulmonary:      Effort: Pulmonary effort is normal. No respiratory distress. Breath sounds: Normal breath sounds and air entry. No stridor or decreased air movement. No decreased breath sounds, wheezing, rhonchi or rales. Chest:      Chest wall: No tenderness. Abdominal:      General: Abdomen is flat. Bowel sounds are normal. There is no distension. Palpations: Abdomen is soft. Tenderness: There is no abdominal tenderness. There is no guarding. Musculoskeletal:         General: No swelling, tenderness, deformity or signs of injury. Normal range of motion. Cervical back: Normal range of motion and neck supple. No rigidity or tenderness. Right lower leg: No edema. Left lower leg: No edema. Feet:      Right foot:      Skin integrity: Skin integrity normal.      Left foot:      Skin integrity: Skin integrity normal.   Skin:     General: Skin is warm and dry. Capillary Refill: Capillary refill takes less than 2 seconds. Coloration: Skin is not jaundiced or pale. Findings: No bruising, erythema, lesion or rash. Nails: There is no clubbing. Neurological:      General: No focal deficit present.       Mental Status: She is alert and oriented to person, place, and time. Mental status is at baseline. Sensory: Sensation is intact. Motor: Motor function is intact. No weakness. Coordination: Finger-Nose-Finger Test abnormal and Heel to [employer] abnormal.   Psychiatric:         Attention and Perception: Attention and perception normal.         Mood and Affect: Mood and affect normal.         Speech: Speech normal.         Behavior: Behavior normal. Behavior is cooperative. Thought Content: Thought content normal.         Cognition and Memory: Cognition and memory normal.         Judgment: Judgment normal.     Neurologic Exam     Mental Status   Oriented to person, place, and time. Oriented to person. Oriented to place. Oriented to time. Attention: normal. Concentration: normal.   Speech: speech is normal (Patient is missing dentures and it alters her speech slightly)  Level of consciousness: alert  Knowledge: good. Able to perform simple calculations. Cranial Nerves     CN II   Visual acuity: normal  Right visual field deficit: none  Left visual field deficit: none     CN III, IV, VI   Pupils are equal, round, and reactive to light. Right pupil: Size: 3 mm. Shape: regular. Reactivity: brisk. Left pupil: Size: 3 mm. Shape: regular. Reactivity: brisk.    Nystagmus: none   Diplopia: none    CN V   Right facial sensation deficit: none  Left facial sensation deficit: complete    CN VIII   Hearing: intact    CN XI   Right sternocleidomastoid strength: normal  Left sternocleidomastoid strength: weak  Right trapezius strength: normal  Left trapezius strength: weak    CN XII   Tongue: not atrophic  Tongue deviation: none    Motor Exam   Right arm pronator drift: absent  Left arm pronator drift: absent    Sensory Exam   Right arm pinprick: normal  Left arm pinprick: decreased from elbow  Right leg pinprick: normal  Left leg pinprick: decreased from knee    Gait, Coordination, and Reflexes     Coordination   Finger to nose coordination: abnormal  Heel to shin coordination: abnormal    Tremor   Resting tremor: absent  Intention tremor: absent  Action tremor: absent         NIH Stroke Scale Calculator    1.  a. Level of consciousness: Alert (0 points)      b. Patient asked current month and age: Answers both correctly (0 points)      c. Patient asked to open And close eyes: Obeys both correctly (0 points)  2. Best gaze: Normal (0 points)  3. Visual field testing: No visual field loss (0 points)  4. Facial paresis: Normal symmetrical movement (0 points)  5.  a. Motor function - left arm: Some effort against gravity (2 points)      b. Motor function - right arm: Normal (0 points)  6.  a. Motor function - left leg: Some effort against gravity (2 points)(pain in back prevented her from lifting her legs)      b. Motor function - right leg: Some effort against gravity (2 points) (pain in back prevented her from lifting her legs)  7. Limb ataxia: No ataxia (0 points)  8. Sensory: Mild to moderate decrease in sensation (1 point)  9. Best language: No aphasia (0 points)  10. Dysarthria: Normal articulation (0 points)  11. Exinction and inattention: Normal (0 points)    Total: 7 points. DATA:     Diagnostic tests reviewed for today's visit:    Most recent labs and imaging results reviewed.      LABS:    Recent Results (from the past 24 hour(s))   Urinalysis with Reflex to Culture    Collection Time: 09/30/22 10:15 PM    Specimen: Urine   Result Value Ref Range    Color, UA Yellow Straw/Yellow    Clarity, UA Clear Clear    Glucose, Ur Negative Negative mg/dL    Bilirubin Urine Negative Negative    Ketones, Urine Negative Negative mg/dL    Specific Gravity, UA 1.017 1.005 - 1.030    Blood, Urine Negative Negative    pH, UA 5.0 5.0 - 9.0    Protein, UA Negative Negative mg/dL    Urobilinogen, Urine 0.2 <2.0 E.U./dL    Nitrite, Urine Negative Negative    Leukocyte Esterase, Urine Negative Negative    Urine Reflex to Culture Not Indicated    Comprehensive Metabolic Panel    Collection Time: 10/01/22 12:54 AM   Result Value Ref Range    Sodium 138 135 - 144 mEq/L    Potassium 4.0 3.4 - 4.9 mEq/L    Chloride 104 95 - 107 mEq/L    CO2 24 20 - 31 mEq/L    Anion Gap 10 9 - 15 mEq/L    Glucose 129 (H) 70 - 99 mg/dL    BUN 11 6 - 20 mg/dL    Creatinine 0.75 0.50 - 0.90 mg/dL    GFR Non-African American >60.0 >60    GFR  >60.0 >60    Calcium 9.2 8.5 - 9.9 mg/dL    Total Protein 7.4 6.3 - 8.0 g/dL    Albumin 4.5 3.5 - 4.6 g/dL    Total Bilirubin 0.5 0.2 - 0.7 mg/dL    Alkaline Phosphatase 83 40 - 130 U/L    ALT 20 0 - 33 U/L    AST 14 0 - 35 U/L    Globulin 2.9 2.3 - 3.5 g/dL   Magnesium    Collection Time: 10/01/22 12:54 AM   Result Value Ref Range    Magnesium 2.1 1.7 - 2.4 mg/dL   CBC with Auto Differential    Collection Time: 10/01/22 12:54 AM   Result Value Ref Range    WBC 10.4 4.8 - 10.8 K/uL    RBC 4.77 4.20 - 5.40 M/uL    Hemoglobin 15.6 12.0 - 16.0 g/dL    Hematocrit 45.9 37.0 - 47.0 %    MCV 96.2 82.0 - 100.0 fL    MCH 32.6 (H) 27.0 - 31.3 pg    MCHC 33.9 33.0 - 37.0 %    RDW 13.1 11.5 - 14.5 %    Platelets 034 603 - 862 K/uL    Neutrophils % 59.6 %    Lymphocytes % 29.9 %    Monocytes % 6.4 %    Eosinophils % 3.6 %    Basophils % 0.5 %    Neutrophils Absolute 6.2 1.4 - 6.5 K/uL    Lymphocytes Absolute 3.1 1.0 - 4.8 K/uL    Monocytes Absolute 0.7 0.2 - 0.8 K/uL    Eosinophils Absolute 0.4 0.0 - 0.7 K/uL    Basophils Absolute 0.1 0.0 - 0.2 K/uL   Protime-INR    Collection Time: 10/01/22  2:19 AM   Result Value Ref Range    Protime 13.5 12.3 - 14.9 sec    INR 1.0    APTT    Collection Time: 10/01/22  2:19 AM   Result Value Ref Range    aPTT 32.2 24.4 - 36.8 sec   Sedimentation Rate    Collection Time: 10/01/22  2:21 AM   Result Value Ref Range    Sed Rate 6 0 - 20 mm       IMAGING:   No results found.      VTE Prophylaxis: low molecular weight heparin -  start     ASSESSMENT AND PLAN    Principal Problem:      Post-operative pain -patient has lumbar back pain with left leg and left-sided numbness. New onset of incontinence of urine when she wakes up. History of stroke. Patient out of pain medicine and has been discharged from pain management. Dr. Sun Cook was per neurosurgery. Recent procedure done in June for left bilateral L3-4 laminectomy microdissection decompression followed by a lumbar epidural hematoma evacuation. Plan: Admit, consult Dr. Sun Cook for neurosurgery, Tylenol, Zanaflex, lidocaine patches, home dose gabapentin, and a norco until seen by Dr. Sun Cook     Active Problems:      Smoker -patient is a current smoker. Plan: Nicotine patch as needed, encourage smoking cessation. Seizure disorder [facility]) -patient has a history of a seizure disorder and she takes gabapentin 3 times a day for that per chart review and patient account. Check PDMP to verify dosage and recent prescription. Plan: Restart gabapentin at home dose. Essential hypertension -hypertension takes Norvasc at home, /92. Plan: Restart Norvasc.     Plan of care discussed with: patient    SIGNATURE: JERROD Hu - CNP  DATE: October 1, 2022  TIME: 4:37 AM     Leticia Shaw DO - supervising physician

## 2022-10-01 NOTE — PLAN OF CARE
Problem: Pain  Goal: Verbalizes/displays adequate comfort level or baseline comfort level  10/1/2022 1541 by Herminio Aquino RN  Outcome: Progressing  10/1/2022 0415 by Ambrocio Seht RN  Outcome: Progressing     Problem: ABCDS Injury Assessment  Goal: Absence of physical injury  10/1/2022 1541 by Herminio Aquino RN  Outcome: Progressing  10/1/2022 0415 by Ambrocio Seth RN  Outcome: Progressing     Problem: Neurosensory - Adult  Goal: Achieves stable or improved neurological status  10/1/2022 1541 by Herminio Aquino RN  Outcome: Progressing  10/1/2022 0415 by Ambrocio Seth RN  Outcome: Progressing  Goal: Absence of seizures  10/1/2022 1541 by Herminio Aquino RN  Outcome: Progressing  10/1/2022 0415 by Ambrocio Seth RN  Outcome: Progressing  Goal: Remains free of injury related to seizures activity  10/1/2022 1541 by Herminio Aquino RN  Outcome: Progressing  10/1/2022 0415 by Ambrocio Seth RN  Outcome: Progressing  Goal: Achieves maximal functionality and self care  10/1/2022 1541 by Herminio Aquino RN  Outcome: Progressing  10/1/2022 0415 by Ambrocio Seth RN  Outcome: Progressing     Problem: Respiratory - Adult  Goal: Achieves optimal ventilation and oxygenation  10/1/2022 1541 by Herminio Aquino RN  Outcome: Progressing  10/1/2022 0415 by Ambrocio Seth RN  Outcome: Progressing     Problem: Musculoskeletal - Adult  Goal: Return mobility to safest level of function  10/1/2022 1541 by Herminio Aquino RN  Outcome: Progressing  10/1/2022 0415 by Ambrocio Seth RN  Outcome: Progressing  Goal: Maintain proper alignment of affected body part  10/1/2022 1541 by Herminio Aquino RN  Outcome: Progressing  10/1/2022 0415 by Ambrocio Seth RN  Outcome: Progressing  Goal: Return ADL status to a safe level of function  10/1/2022 1541 by Herminio Aquino RN  Outcome: Progressing  10/1/2022 0415 by Ambrocio Seth RN  Outcome: Progressing

## 2022-10-01 NOTE — PROGRESS NOTES
Hospitalist Progress Note      PCP: Chano Garcia DO    Date of Admission: 9/30/2022    Chief Complaint:  no acute events, afebrile, stable HD    Medications:  Reviewed    Infusion Medications    sodium chloride       Scheduled Medications    budesonide-formoterol  2 puff Inhalation BID    amLODIPine  5 mg Oral Daily    ferrous sulfate  325 mg Oral Daily with breakfast    pantoprazole  40 mg Oral QAM AC    sodium chloride flush  5-40 mL IntraVENous 2 times per day    enoxaparin  40 mg SubCUTAneous Daily    nicotine  1 patch TransDERmal Daily    lidocaine  1 patch TransDERmal Daily     PRN Meds: albuterol sulfate HFA, gabapentin, sodium chloride flush, sodium chloride, ondansetron **OR** ondansetron, polyethylene glycol, acetaminophen **OR** acetaminophen, tiZANidine    No intake or output data in the 24 hours ending 10/01/22 1234    Exam:    BP (!) 134/92   Pulse 69   Temp 98.4 °F (36.9 °C) (Oral)   Resp 18   Ht 5' 6\" (1.676 m)   Wt 205 lb (93 kg)   SpO2 98%   BMI 33.09 kg/m²     General appearance: appears stated age and cooperative. Respiratory:  clear to auscultation, bilaterally   Cardiovascular: Regular rate and rhythm, S1/S2. Abdomen: Soft, active bowel sounds. Musculoskeletal: No edema bilaterally. Labs:   Recent Labs     10/01/22  0054 10/01/22  0553   WBC 10.4 10.7   HGB 15.6 14.8   HCT 45.9 44.5    195     Recent Labs     10/01/22  0054 10/01/22  0553    138   K 4.0 3.7    104   CO2 24 20   BUN 11 13   CREATININE 0.75 0.75   CALCIUM 9.2 9.0     Recent Labs     10/01/22  0054 10/01/22  0553   AST 14 26   ALT 20 31   BILITOT 0.5 0.6   ALKPHOS 83 85     Recent Labs     10/01/22  0219   INR 1.0     No results for input(s): Lola Earnest in the last 72 hours.     Urinalysis:      Lab Results   Component Value Date/Time    NITRU Negative 09/30/2022 10:15 PM    WBCUA 3-5 01/14/2019 11:00 AM    BACTERIA MANY 01/14/2019 11:00 AM    RBCUA 0-2 01/14/2019 11:00 AM    BLOODU Negative 09/30/2022 10:15 PM    SPECGRAV 1.017 09/30/2022 10:15 PM    GLUCOSEU Negative 09/30/2022 10:15 PM       Radiology:  XR LUMBAR SPINE (2-3 VIEWS)   Final Result   Unremarkable alignment of internal fixation prosthesis. Degenerative changes. CT THORACIC SPINE WO CONTRAST   Final Result   Unremarkable non-contrast CT of the thoracolumbar spine. No acute osseous   findings of the pelvis. CT LUMBAR SPINE WO CONTRAST   Final Result   Unremarkable non-contrast CT of the thoracolumbar spine. No acute osseous   findings of the pelvis. CT PELVIS WO CONTRAST Additional Contrast? None   Final Result   Unremarkable non-contrast CT of the thoracolumbar spine. No acute osseous   findings of the pelvis. MRI LUMBAR SPINE WO CONTRAST    (Results Pending)           Assessment/Plan:    51 y/o female with history of HTN, obesity, asthma, psychogenic seizure disorder, chronic pain with opiate dependence,lumbar stenosis s/p L3-4 laminectomy on 7/45 complicated by epidural hematoma s/p evacuation on 6/21 who presented with:     Back and leg pain  - CT of the lumbar/thoracic spine and pelvis were negative for acute findings per report  - recently discharged from outpatient pain management after UDS on 9/15 did not show presence of Oxycodone   - IV Dilaudid,Morphine, Oxycodone and Norco given in ED  - management per neurosurgery and pain management    HTN  - on Amlodipine    Diet: ADULT DIET;  Regular    Code Status: Full Code              Electronically signed by Suly Jim MD on 10/1/2022 at 12:34 PM

## 2022-10-01 NOTE — CONSULTS
Patient Name: Karla Duenas  Admit Date: 2022  8:47 PM  MR #: [id number]  : 1975    Attending Physician: Ginger Santana MD  Reason for consult: Back and left lower extremity pain    History of Presenting Illness and Review of Diana Anderson is a 52 y.o. female on hospital day 0 . History Of Present Illness:    Dr Price Mix:  17 L4-5, L5-S1 DISKECTOMY. 1-15-19  L5-S1 POSTERIOR LUMBAR INTERBODY FUSION. 3-11-21 L4-5 TLIF INSTRUMENTATION AT L5-S1 DECOMPRESSION AT L4, L5 REMOVAL AND REINSERTION OF HARDWARE AT L5-S1. Dr. Sam Loveon:  22 - LEFT BILATERAL L 3-4 LAMINECTOMY MICRODISSECTION DECOMPRESSION  22 - REMOVAL OF L 3-4 EPIDURAL HEMATOMA    Patient has been in pain management. She said she was drug tested and tested positive for THC (from CBD vape from gas station, did not know it had THC in it) and oxycodone did not show up in the tox screen and she was discharged from pain management practice last week. She has run out of prescriptions and is miserable. She can only stand for 5 minutes. She went to the emergency room at [place] [address] and they told her that she should call her surgeon and they cannot help her. She does not have a family doctor. Patient has pre and postop significant low back pain with residual left lower extremity weakness and numbness 4/5. She did regain the strength in the right lower extremity. Has been on pain management with multiple medications including Percocet gabapentin which is also being used for her seizures. Patient has had increased and uncontrollable pain unable to care for herself at home. Pain is mainly in the back and radiating into the left lower extremity similar to pre and postop. Pain exacerbated after falling in the bathtub about a week ago with pain extending into both lower extremities left worse than the right. This is a chronic problem since she was 13years old.   The problem has been gradually worsening since onset. The pain is present in the lumbar spine, gluteal, sacro-iliac and thoracic spine. The quality of the pain is described as stabbing. The pain radiates to the left foot, left knee, left thigh and right thigh. The pain is at a severity of 8/10. The pain is moderate. The pain is The same all the time. The symptoms are aggravated by standing, sitting, twisting, lying down, position and bending. Stiffness is present All day. Associated symptoms include abdominal pain, bladder incontinence (at night), chest pain, headaches, leg pain, numbness, paresthesias, pelvic pain, perianal numbness, tingling, weakness and weight loss (lost 20 lbs). Pertinent negatives include no bowel incontinence, dysuria, fever or paresis. Risk factors include history of osteoporosis, obesity, poor posture and recent trauma (fell in bathtub a week or 2 ago. ). She has tried analgesics, bed rest, heat, ice, home exercises, muscle relaxant, walking and NSAIDs (3-5 minutes to walk) for the symptoms. The treatment provided no relief.      History:      Past Medical History:   Diagnosis Date    Arthritis     spine    Asthma     since childhood -- smokes x 30 yrs    Cerebral artery occlusion with cerebral infarction [facility])     per CT scan -- patient without sx, 2021    Hypertension     meds > 3 yrs    Lumbar stenosis     Migraines     starts with neck pain    Seizures ([address]     pt last known seizure May 2022, dx 20 years ago, takes gabapentin for back/seizures    Thyroid disease     meds > 4 yrs -- intermittent    Uterine anomaly      Past Surgical History:   Procedure Laterality Date    ANTERIOR CRUCIATE LIGAMENT REPAIR Left     APPENDECTOMY      at age 21     [address]  2017    lumbar disc     BACK SURGERY  2018    lumbar fusion    CHOLECYSTECTOMY  2015    COLONOSCOPY      2017    ENDOMETRIAL ABLATION  2006    post op sepsis    ENDOSCOPY, COLON, DIAGNOSTIC      2017    KNEE ARTHROSCOPY Left 2004    ACL repair    LAMINECTOMY N/A 6/17/2022    LEFT BILATERAL L3-4 LAMINECTOMY MICRODISSECTION DECOMPRESSION performed by Reji Urban MD at [address] N/A 6/21/2022    LUMBAR EPIDURAL HEMATOMA EVACUATION performed by Reji Urban MD at [address] [place] [address] Drive N/A 1/15/2019    L4- L5 DECOMPRESSION, L5-S1 POSTERIOR LUMBAR INTERBODY FUSION performed by Dev Macias MD at [address] [place] [address] N/A 3/11/2021    L4-5 TLIF INSTRUMENTATION AT L5-S1 DECOMPRESSION AT L4, L5 REMOVAL AND REINSERTION OF HARDWARE AT L5-S1 performed by Dev Macias MD at [address] [place] [address]  5/9/13    duramorph 1.5 mg epideral  6mg celestone    TONSILLECTOMY      as child    TUBAL LIGATION Bilateral 2002       Family History  Family History   Problem Relation Age of Onset    Cancer Mother         NHL    COPD Mother     Other Mother         ITP & pancreatitis    Heart Failure Maternal Grandmother     No Known Problems Sister     High Blood Pressure Brother     No Known Problems Son     No Known Problems Daughter      [] Unable to obtain due to ventilated and/ or neurologic status    Social History     Socioeconomic History    Marital status:      Spouse name: Not on file    Number of children: Not on file    Years of education: Not on file    Highest education level: Not on file   Occupational History    Not on file   Tobacco Use    Smoking status: Every Day     Packs/day: 0.50     Years: 30.00     Pack years: 15.00     Types: Cigarettes    Smokeless tobacco: Never   Vaping Use    Vaping Use: Never used   Substance and Sexual Activity    Alcohol use: No     Alcohol/week: 0.0 standard drinks    Drug use: No    Sexual activity: Not Currently   Other Topics Concern    Not on file   Social History Narrative    Not on file     Social Determinants of Health     Financial Resource Strain: Not on file   Food Insecurity: Not on file   Transportation Needs: Not on file   Physical Activity: Not on file   Stress: Not on file   Social Connections: Not on file   Intimate Partner Violence: Not on file   Housing Stability: Not on file      [] Unable to obtain due to ventilated and/ or neurologic status    Home Medications:      Medications Prior to Admission: oxyCODONE-acetaminophen (PERCOCET) 7.5-325 MG per tablet, Take 1 tablet by mouth every 8 hours as needed for Pain for up to 14 days. albuterol sulfate HFA (PROVENTIL;VENTOLIN;PROAIR) 108 (90 Base) MCG/ACT inhaler, Inhale 2 puffs into the lungs every 6 hours as needed for Wheezing  ADVAIR DISKUS 250-50 MCG/ACT AEPB diskus inhaler, inhale 1 puff by mouth and INTO THE LUNGS twice a day  tiZANidine (ZANAFLEX) 4 MG tablet, Take 1 tablet by mouth nightly as needed (spasm)  gabapentin (NEURONTIN) 300 MG capsule, Take 1 capsule by mouth 3 times daily as needed (Pain) for up to 180 days. Intended supply: 90 days  gabapentin (NEURONTIN) 600 MG tablet, Take 1 tablet by mouth 3 times daily for 30 days. naloxone 4 MG/0.1ML LIQD nasal spray, 1 spray by Nasal route as needed for Opioid Reversal  [DISCONTINUED] gabapentin (NEURONTIN) 300 MG capsule, take 1 capsule (300MG)  by oral route 3 times every day  Potassium 75 MG TABS, Take by mouth  [DISCONTINUED] docusate sodium (COLACE) 100 MG capsule, Take 1 capsule by mouth 2 times daily as needed for Constipation  Calcium Carb-Cholecalciferol (CALCIUM 1000 + D PO), Take 600 mg by mouth daily  vitamin B-12 (CYANOCOBALAMIN) 100 MCG tablet, Take 50 mcg by mouth daily  Biotin 1000 MCG TABS, Take by mouth 2 times daily  Misc.  Devices (RAISED TOILET SEAT/LOCK & ARMS) MISC, 1 Piece by Does not apply route as needed (PRN)  ferrous sulfate 325 (65 Fe) MG tablet, Take 65 mg by mouth daily (with breakfast)  vitamin C (ASCORBIC ACID) 500 MG tablet, Take 500 mg by mouth daily  ondansetron (ZOFRAN) 4 MG tablet, Take 4 mg by mouth every 8 hours as needed for Nausea or Vomiting  amLODIPine (NORVASC) 5 MG tablet, Take 5 mg by mouth daily   [DISCONTINUED] VENTOLIN  (90 Base) MCG/ACT inhaler, 2 puffs 4 times daily omeprazole (PRILOSEC) 20 MG capsule, Take 40 mg by mouth three times daily     Current Hospital Medications:     Scheduled Meds:   budesonide-formoterol  2 puff Inhalation BID    amLODIPine  5 mg Oral Daily    ferrous sulfate  325 mg Oral Daily with breakfast    pantoprazole  40 mg Oral QAM AC    sodium chloride flush  5-40 mL IntraVENous 2 times per day    enoxaparin  40 mg SubCUTAneous Daily    nicotine  1 patch TransDERmal Daily    lidocaine  1 patch TransDERmal Daily     Continuous Infusions:   sodium chloride       PRN Meds:.albuterol sulfate HFA, gabapentin, sodium chloride flush, sodium chloride, ondansetron **OR** ondansetron, polyethylene glycol, acetaminophen **OR** acetaminophen, tiZANidine  . sodium chloride          Allergies: Allergies   Allergen Reactions    Aspirin Rash and Hives    Bee Venom Anaphylaxis    Corticosteroids Rash, Anaphylaxis and Swelling    Fentanyl Shortness Of Breath, Other (See Comments) and Anaphylaxis     Throat closes    Prochlorperazine Edisylate Shortness Of Breath and Swelling    Tylenol [Acetaminophen] Nausea Only    Cortizone Swelling    Prochlorperazine Swelling    Prochlorperazine Maleate Other (See Comments)    Codeine Rash, Other (See Comments) and Hives     Throat closes          REVIEW OF SYSTEMS    (2-9 systems for level 4, 10 or more for level 5)     Review of Systems   Constitutional:  Negative for fever. Loss of weight unable to eat secondary to pain   HENT:  Negative for hearing loss. Eyes:  Negative for pain. Respiratory:  Negative for shortness of breath. Gastrointestinal:  Positive for abdominal pain. Negative for nausea. Endocrine: Negative for heat intolerance. Genitourinary:  Positive for difficulty urinating. Bladder incontinence at night   Musculoskeletal:  Positive for back pain. Negative for neck pain. Pain across the back weakness left lower extremity   Skin:  Negative for rash.    Allergic/Immunologic: Negative for immunocompromised state. Neurological:  Positive for weakness, numbness and headaches. Psychiatric/Behavioral:  Positive for sleep disturbance. Except as noted above the remainder of the review of systems was reviewed and negative. Physical Exam     BP (!) 134/92   Pulse 69   Temp 98.4 °F (36.9 °C) (Oral)   Resp 18   Ht 5' 6\" (1.676 m)   Wt 205 lb (93 kg)   SpO2 98%   BMI 33.09 kg/m²   Body mass index is 33.09 kg/m². Physical Exam  Vitals and nursing note reviewed. Constitutional:       Appearance: Normal appearance. HENT:      Head: Normocephalic and atraumatic. Right Ear: External ear normal.      Left Ear: External ear normal.      Nose: Nose normal.      Mouth/Throat:      Pharynx: Oropharynx is clear. Eyes:      Extraocular Movements: Extraocular movements intact. Cardiovascular:      Pulses: Normal pulses. Pulmonary:      Effort: Pulmonary effort is normal.   Abdominal:      Palpations: Abdomen is soft. Genitourinary:     Rectum: Normal.   Musculoskeletal:         General: Tenderness present. Normal range of motion. Cervical back: Normal range of motion. Comments: Unable to ambulate secondary to severe back and bilateral right thigh and left lower extremity pain with weakness left lower extremity 4/5. Tender on the low back wounds are well-healed. Skin:     General: Skin is warm. Neurological:      General: No focal deficit present. Motor: Weakness present. Gait: Gait abnormal.   Psychiatric:         Mood and Affect: Mood normal.        I have reviewed all laboratory studies, reports, data, and pertinent images.     Objective Findings:     Vitals:   Vitals:    09/30/22 2216 10/01/22 0159 10/01/22 0359 10/01/22 0812   BP: 128/88 123/85 (!) 134/92    Pulse: 89 69 69    Resp: 18 18 20 18   Temp:   98.4 °F (36.9 °C)    TempSrc:   Oral    SpO2: 97% 98% 98%    Weight:       Height:            Laboratory, Microbiology, Pathology, Radiology, Cardiology, Medications and Transcriptions reviewed  Scheduled Meds:   budesonide-formoterol  2 puff Inhalation BID    amLODIPine  5 mg Oral Daily    ferrous sulfate  325 mg Oral Daily with breakfast    pantoprazole  40 mg Oral QAM AC    sodium chloride flush  5-40 mL IntraVENous 2 times per day    enoxaparin  40 mg SubCUTAneous Daily    nicotine  1 patch TransDERmal Daily    lidocaine  1 patch TransDERmal Daily     Continuous Infusions:   sodium chloride         Recent Labs     10/01/22  0054 10/01/22  0553   WBC 10.4 10.7   HGB 15.6 14.8   HCT 45.9 44.5   MCV 96.2 98.2    195     Recent Labs     10/01/22  0054 10/01/22  0553    138   K 4.0 3.7    104   CO2 24 20   BUN 11 13   CREATININE 0.75 0.75     Recent Labs     10/01/22  0054 10/01/22  0553   AST 14 26   ALT 20 31   BILITOT 0.5 0.6   ALKPHOS 83 85     No results for input(s): LIPASE, AMYLASE in the last 72 hours. Recent Labs     10/01/22  0054 10/01/22  0219 10/01/22  0553   PROT 7.4  --  6.9   INR  --  1.0  --      CT THORACIC SPINE WO CONTRAST    Result Date: 10/1/2022  EXAMINATION: CT OF THE LUMBAR SPINE WITHOUT CONTRAST; CT OF THE THORACIC SPINE WITHOUT CONTRAST; CT OF THE PELVIS WITHOUT CONTRAST  9/30/2022 TECHNIQUE: CT of the lumbar spine was performed without the administration of intravenous contrast. Multiplanar reformatted images are provided for review. Adjustment of mA and/or kV according to patient size was utilized. Automated exposure control, iterative reconstruction, and/or weight based adjustment of the mA/kV was utilized to reduce the radiation dose to as low as reasonably achievable.; CT of the thoracic spine was performed without the administration of intravenous contrast. Multiplanar reformatted images are provided for review.  Automated exposure control, iterative reconstruction, and/or weight based adjustment of the mA/kV was utilized to reduce the radiation dose to as low as reasonably achievable.; CT of the pelvis iterative reconstruction, and/or weight based adjustment of the mA/kV was utilized to reduce the radiation dose to as low as reasonably achievable.; CT of the thoracic spine was performed without the administration of intravenous contrast. Multiplanar reformatted images are provided for review. Automated exposure control, iterative reconstruction, and/or weight based adjustment of the mA/kV was utilized to reduce the radiation dose to as low as reasonably achievable.; CT of the pelvis was performed without the administration of intravenous contrast.  Multiplanar reformatted images are provided for review. Adjustment of mA and/or kV according to patient size was utilized. Automated exposure control, iterative reconstruction, and/or weight based adjustment of the mA/kV was utilized to reduce the radiation dose to as low as reasonably achievable. COMPARISON: None HISTORY: ORDERING SYSTEM PROVIDED HISTORY: fall TECHNOLOGIST PROVIDED HISTORY: Reason for exam:->fall Decision Support Exception - unselect if not a suspected or confirmed emergency medical condition->Emergency Medical Condition (MA) What reading provider will be dictating this exam?->CRC FINDINGS: BONES/ALIGNMENT: There is normal alignment of the spine. The vertebral body heights are maintained. No osseous destructive lesion is seen. Status post L4-S1 PLIF with hardware in place and no evidence of loosening or failure. DEGENERATIVE CHANGES: No significant degenerative changes of the lumbar spine. SOFT TISSUES/RETROPERITONEUM: No paraspinal mass is seen. Pelvis: SI joints symmetric without widening. No displaced pelvic ring fracture. Mild degenerate changes of the bilateral well located hips without acute cortical irregularity or fracture. Intrapelvic soft tissues unremarkable for acute findings. No focal fluid collection or abnormal findings in the pelvic girdle surrounding soft tissues     Unremarkable non-contrast CT of the thoracolumbar spine.   No acute osseous findings of the pelvis. CT PELVIS WO CONTRAST Additional Contrast? None    Result Date: 10/1/2022  EXAMINATION: CT OF THE LUMBAR SPINE WITHOUT CONTRAST; CT OF THE THORACIC SPINE WITHOUT CONTRAST; CT OF THE PELVIS WITHOUT CONTRAST  9/30/2022 TECHNIQUE: CT of the lumbar spine was performed without the administration of intravenous contrast. Multiplanar reformatted images are provided for review. Adjustment of mA and/or kV according to patient size was utilized. Automated exposure control, iterative reconstruction, and/or weight based adjustment of the mA/kV was utilized to reduce the radiation dose to as low as reasonably achievable.; CT of the thoracic spine was performed without the administration of intravenous contrast. Multiplanar reformatted images are provided for review. Automated exposure control, iterative reconstruction, and/or weight based adjustment of the mA/kV was utilized to reduce the radiation dose to as low as reasonably achievable.; CT of the pelvis was performed without the administration of intravenous contrast.  Multiplanar reformatted images are provided for review. Adjustment of mA and/or kV according to patient size was utilized. Automated exposure control, iterative reconstruction, and/or weight based adjustment of the mA/kV was utilized to reduce the radiation dose to as low as reasonably achievable. COMPARISON: None HISTORY: ORDERING SYSTEM PROVIDED HISTORY: fall TECHNOLOGIST PROVIDED HISTORY: Reason for exam:->fall Decision Support Exception - unselect if not a suspected or confirmed emergency medical condition->Emergency Medical Condition (MA) What reading provider will be dictating this exam?->CRC FINDINGS: BONES/ALIGNMENT: There is normal alignment of the spine. The vertebral body heights are maintained. No osseous destructive lesion is seen. Status post L4-S1 PLIF with hardware in place and no evidence of loosening or failure.  DEGENERATIVE CHANGES: No significant degenerative changes of the lumbar spine. SOFT TISSUES/RETROPERITONEUM: No paraspinal mass is seen. Pelvis: SI joints symmetric without widening. No displaced pelvic ring fracture. Mild degenerate changes of the bilateral well located hips without acute cortical irregularity or fracture. Intrapelvic soft tissues unremarkable for acute findings. No focal fluid collection or abnormal findings in the pelvic girdle surrounding soft tissues     Unremarkable non-contrast CT of the thoracolumbar spine. No acute osseous findings of the pelvis. Impression:   Dr Bridget Sorto:  11-27-17 L4-5, L5-S1 DISKECTOMY. 1-15-19  L5-S1 POSTERIOR LUMBAR INTERBODY FUSION. 3-11-21 L4-5 TLIF INSTRUMENTATION AT L5-S1 DECOMPRESSION AT L4, L5 REMOVAL AND REINSERTION OF HARDWARE AT L5-S1. Dr. Donnie Harrell:  6/17/22 - LEFT BILATERAL L 3-4 LAMINECTOMY MICRODISSECTION DECOMPRESSION  6/21/22 - REMOVAL OF L 3-4 EPIDURAL HEMATOMA    Chronic back and bilateral right thigh and left lower extremity pain with permanent residual left lower extremity weakness and numbness 4/5    Multiple comorbidities      Plan:     Patient has had chronic low back pain and has been in chronic pain management and surgical medical treatment. She may be a candidate for spinal cord stimulator and or morphine pump  Evaluate with MRI and x-rays lumbar spine. Consult pain management.  Discussed with Dr. Jonathan Dubois      Comments:     Neurosurgery will follow        Electronically signed by Sandoval Vasquez MD on 10/1/2022 at 9:28 AM

## 2022-10-01 NOTE — CARE COORDINATION
[facility] EMERGENCY [facility] CENTER AT [place] Case Management Initial Discharge Assessment    Met with Patient to discuss discharge plan. PCP: Marisa Peacock DO                                Date of Last Visit: 1 MONTH AGO     [facility] Patient: No        [facility] Notified: no    If no PCP, list provided? N/A    Discharge Planning    Living Arrangements: independently at home    Who do you live with? S.O., AND 2 KIDS     Who helps you with your care:  self    If lives at home:     Do you have any barriers navigating in your home? yes - 4-5 STEPS. HAS SOME DIFFICULTY CLIMBING STEPS. HAS RAILS    Patient can perform ADL? Yes    Current Services (outpatient and in home) :  None    Dialysis: No    Is transportation available to get to your appointments? Yes    DME Equipment:  yes - SHOWER CHAIR, WALKER, INTERESTED IN OBTAINING A CANE     Respiratory equipment: None    Respiratory provider:  no     Pharmacy:  yes - [address] with Medication Assistance Program?  No      [facility] [place] [address] UNABLE TO RECALL [facility] COMPANY. LIST PROVIDED. FOC OFFERED. CM TO FOLLOW ON CHOICES. Patient agreeable to SNF/Rehab? Declined    Other discharge needs identified? N/A    Does Patient Have a High-Risk for Readmission Diagnosis (CHF, PN, MI, COPD)? No      Initial Discharge Plan? MET W/PT TO ASSESS NEEDS AND DISCUSS DISCHARGE PLAN. PT IS AGREEABLE TO  [facility] IF INDICATED. CM TO FOLLOW ON CHOICES. LIST PROVIDED. (Note: please see concurrent daily documentation for any updates after initial note).       Readmission Risk              Risk of Unplanned Readmission:  0         Electronically signed by Allana Hatchet, RN on 10/1/2022 at 12:07 PM

## 2022-10-01 NOTE — CONSULTS
History of Present Illness     Patient Identification  Bautista Beltran is a 52 y.o. female. Patient information was obtained from patient. History/Exam limitations: none. Patient presented to the Emergency Department by private vehicle. Chief Complaint   Leg Pain (Intense pain in both legs and back pain after falling in bath tub 1wk ago. Recent spine surgery in June. Told by Dr Jaime Echols to come in to be admitted. )      Bautista Beltran is a 52 y.o. female with a PMH significant for Seizure d/o, Chronic pain syndrome, Lumbar spinal stenosis, Neurogenic claudication and Post-laminectomy syndrome who presents to the emergency department with complaints of diffuse lower back pain rated 10 out of 10 with radiation into the legs, primarily the left leg and hip with associated numbness and tingling down to the foot, ongoing for the past week. Patient states that she had fallen approximately 1 week ago and has been having the symptoms since then. However Her notes suggest that kendall pain was since June s/p microdiscectomy, Patient denies any loss of bowel or bladder control. Patient has not taken anything for the symptoms, stating \"ibuprofen and Tylenol do not work for [facility]". Patient states that she contacted her neurosurgeon who did surgery in June and advised to come to the emergency department, Pt was on 30mg oxycodone till few weeks ago went down to 22mg  and was stopped due to [facility] in . And No Oxycodone.      Past Medical History:   Diagnosis Date    Arthritis     spine    Asthma     since childhood -- smokes x 30 yrs    Cerebral artery occlusion with cerebral infarction [facility])     per CT scan -- patient without sx, 2021    Hypertension     meds > 3 yrs    Lumbar stenosis     Migraines     starts with neck pain    Seizures ([address]     pt last known seizure May 2022, dx 20 years ago, takes gabapentin for back/seizures    Thyroid disease     meds > 4 yrs -- intermittent    Uterine anomaly      Family History Problem Relation Age of Onset    Cancer Mother         NHL    COPD Mother     Other Mother         ITP & pancreatitis    Heart Failure Maternal Grandmother     No Known Problems Sister     High Blood Pressure Brother     No Known Problems Son     No Known Problems Daughter      Current Facility-Administered Medications   Medication Dose Route Frequency Provider Last Rate Last Admin    budesonide-formoterol (SYMBICORT) 160-4.5 MCG/ACT inhaler 2 puff  2 puff Inhalation BID Kim DANYEL GalvezN - CNP   2 puff at 10/01/22 0751    albuterol sulfate HFA (PROVENTIL;VENTOLIN;PROAIR) 108 (90 Base) MCG/ACT inhaler 2 puff  2 puff Inhalation Q6H PRN JERROD Stoddard CNP        amLODIPine (NORVASC) tablet 5 mg  5 mg Oral Daily JERROD Stoddard CNP   5 mg at 10/01/22 0816    ferrous sulfate (IRON 325) tablet 325 mg  325 mg Oral Daily with breakfast JERROD Hu - CNP   325 mg at 10/01/22 0816    gabapentin (NEURONTIN) capsule 300 mg  300 mg Oral TID PRN Kim Galvez APRN - CNP   300 mg at 10/01/22 0820    pantoprazole (PROTONIX) tablet 40 mg  40 mg Oral QAM AC JERROD Stoddard - CNP   40 mg at 10/01/22 0612    sodium chloride flush 0.9 % injection 5-40 mL  5-40 mL IntraVENous 2 times per day Kim Galvez APRN - CNP   10 mL at 10/01/22 0818    sodium chloride flush 0.9 % injection 5-40 mL  5-40 mL IntraVENous PRN JERROD Stoddard CNP        0.9 % sodium chloride infusion   IntraVENous PRN JERROD Hu - CNP        enoxaparin (LOVENOX) injection 40 mg  40 mg SubCUTAneous Daily JERROD Stoddard CNP        ondansetron (ZOFRAN-ODT) disintegrating tablet 4 mg  4 mg Oral Q8H PRN JERROD Stoddard CNP        Or    ondansetron (ZOFRAN) injection 4 mg  4 mg IntraVENous Q6H PRN JERROD Stoddard CNP        polyethylene glycol (GLYCOLAX) packet 17 g  17 g Oral Daily PRN Kim Galvez APRN - BORIS        acetaminophen (TYLENOL) tablet 650 mg  650 mg Oral Q6H PRN Kim Galvez APRN - CNP   650 mg at 10/01/22 0820    Or    acetaminophen (TYLENOL) suppository 650 mg  650 mg Rectal Q6H PRN JERROD Stoddard CNP        nicotine (NICODERM CQ) 21 MG/24HR 1 patch  1 patch TransDERmal Daily JERROD Stoddard CNP        tiZANidine (ZANAFLEX) tablet 4 mg  4 mg Oral Q6H PRN JERROD Stoddard CNP        lidocaine 4 % external patch 1 patch  1 patch TransDERmal Daily JERROD Stoddard CNP         Allergies   Allergen Reactions    Aspirin Rash and Hives    Bee Venom Anaphylaxis    Corticosteroids Rash, Anaphylaxis and Swelling    Fentanyl Shortness Of Breath, Other (See Comments) and Anaphylaxis     Throat closes    Prochlorperazine Edisylate Shortness Of Breath and Swelling    Tylenol [Acetaminophen] Nausea Only    Cortizone Swelling    Prochlorperazine Swelling    Prochlorperazine Maleate Other (See Comments)    Codeine Rash, Other (See Comments) and Hives     Throat closes     Social History     Socioeconomic History    Marital status:      Spouse name: Not on file    Number of children: Not on file    Years of education: Not on file    Highest education level: Not on file   Occupational History    Not on file   Tobacco Use    Smoking status: Every Day     Packs/day: 0.50     Years: 30.00     Pack years: 15.00     Types: Cigarettes    Smokeless tobacco: Never   Vaping Use    Vaping Use: Never used   Substance and Sexual Activity    Alcohol use: No     Alcohol/week: 0.0 standard drinks    Drug use: No    Sexual activity: Not Currently   Other Topics Concern    Not on file   Social History Narrative    Not on file     Social Determinants of Health     Financial Resource Strain: Not on file   Food Insecurity: Not on file   Transportation Needs: Not on file   Physical Activity: Not on file   Stress: Not on file   Social Connections: Not on file   Intimate Partner Violence: Not on file   Housing Stability: Not on file     Review of Systems  Constitutional: negative  Eyes: negative  Ears, nose, mouth, throat, and face: negative  Respiratory: positive for asthma and wheezing  Cardiovascular: positive for Hypertension.   Gastrointestinal: negative  Genitourinary:negative  Integument/breast: negative  Hematologic/lymphatic: negative  Musculoskeletal:positive for arthralgias  Neurological: negative  Behavioral/Psych: negative  Endocrine: negative  Allergic/Immunologic: negative     Physical Exam     BP (!) 134/92   Pulse 69   Temp 98.4 °F (36.9 °C) (Oral)   Resp 18   Ht 5' 6\" (1.676 m)   Wt 205 lb (93 kg)   SpO2 98%   BMI 33.09 kg/m²   BP (!) 134/92   Pulse 69   Temp 98.4 °F (36.9 °C) (Oral)   Resp 18   Ht 5' 6\" (1.676 m)   Wt 205 lb (93 kg)   SpO2 98%   BMI 33.09 kg/m²     General Appearance:    Alert, cooperative, no distress, appears stated age   Head:    Normocephalic, without obvious abnormality, atraumatic   Eyes:    PERRL, conjunctiva/corneas clear, EOM's intact, fundi     benign, both eyes   Ears:    Normal TM's and external ear canals, both ears   Nose:   Nares normal, septum midline, mucosa normal, no drainage    or sinus tenderness   Throat:   Lips, mucosa, and tongue normal; teeth and gums normal   Neck:   Supple, symmetrical, trachea midline, no adenopathy;     thyroid:  no enlargement/tenderness/nodules; no carotid    bruit or JVD   Back:     Symmetric, no curvature, ROM normal, no CVA tenderness, Surgery scar noted Rt sided tenderness low back not concordant with her pain   Lungs:     Clear to auscultation bilaterally, respirations unlabored   Chest Wall:    No tenderness or deformity    Heart:    Regular rate and rhythm, S1 and S2 normal, no murmur, rub   or gallop   Breast Exam:    No tenderness, masses, or nipple abnormality   Abdomen:     Soft, non-tender, bowel sounds active all four quadrants,     no masses, no organomegaly   Genitalia:    Normal female without lesion, discharge or tenderness   Rectal:    Normal tone ;guaiac negative stool   Extremities:   Extremities normal, atraumatic, no

## 2022-10-02 VITALS
DIASTOLIC BLOOD PRESSURE: 80 MMHG | RESPIRATION RATE: 18 BRPM | HEIGHT: 66 IN | SYSTOLIC BLOOD PRESSURE: 136 MMHG | OXYGEN SATURATION: 100 % | TEMPERATURE: 98.4 F | BODY MASS INDEX: 32.95 KG/M2 | WEIGHT: 205 LBS | HEART RATE: 64 BPM

## 2022-10-02 LAB
ALBUMIN SERPL-MCNC: 4 G/DL (ref 3.5–4.6)
ALP BLD-CCNC: 83 U/L (ref 40–130)
ALT SERPL-CCNC: 28 U/L (ref 0–33)
ANION GAP SERPL CALCULATED.3IONS-SCNC: 10 MEQ/L (ref 9–15)
AST SERPL-CCNC: 17 U/L (ref 0–35)
BASOPHILS ABSOLUTE: 0 K/UL (ref 0–0.2)
BASOPHILS RELATIVE PERCENT: 0.4 %
BILIRUB SERPL-MCNC: 0.6 MG/DL (ref 0.2–0.7)
BUN BLDV-MCNC: 16 MG/DL (ref 6–20)
CALCIUM SERPL-MCNC: 8.8 MG/DL (ref 8.5–9.9)
CHLORIDE BLD-SCNC: 105 MEQ/L (ref 95–107)
CO2: 22 MEQ/L (ref 20–31)
CREAT SERPL-MCNC: 0.84 MG/DL (ref 0.5–0.9)
EOSINOPHILS ABSOLUTE: 0.4 K/UL (ref 0–0.7)
EOSINOPHILS RELATIVE PERCENT: 4.8 %
GFR AFRICAN AMERICAN: >60
GFR NON-AFRICAN AMERICAN: >60
GLOBULIN: 2.7 G/DL (ref 2.3–3.5)
GLUCOSE BLD-MCNC: 88 MG/DL (ref 70–99)
HCT VFR BLD CALC: 44.9 % (ref 37–47)
HEMOGLOBIN: 14.9 G/DL (ref 12–16)
LYMPHOCYTES ABSOLUTE: 2.7 K/UL (ref 1–4.8)
LYMPHOCYTES RELATIVE PERCENT: 35.3 %
MCH RBC QN AUTO: 33 PG (ref 27–31.3)
MCHC RBC AUTO-ENTMCNC: 33.3 % (ref 33–37)
MCV RBC AUTO: 99.3 FL (ref 82–100)
MONOCYTES ABSOLUTE: 0.5 K/UL (ref 0.2–0.8)
MONOCYTES RELATIVE PERCENT: 6.9 %
NEUTROPHILS ABSOLUTE: 4 K/UL (ref 1.4–6.5)
NEUTROPHILS RELATIVE PERCENT: 52.6 %
PDW BLD-RTO: 13.9 % (ref 11.5–14.5)
PLATELET # BLD: 210 K/UL (ref 130–400)
POTASSIUM REFLEX MAGNESIUM: 3.9 MEQ/L (ref 3.4–4.9)
RBC # BLD: 4.52 M/UL (ref 4.2–5.4)
SODIUM BLD-SCNC: 137 MEQ/L (ref 135–144)
TOTAL PROTEIN: 6.7 G/DL (ref 6.3–8)
WBC # BLD: 7.5 K/UL (ref 4.8–10.8)

## 2022-10-02 PROCEDURE — 6370000000 HC RX 637 (ALT 250 FOR IP): Performed by: PAIN MEDICINE

## 2022-10-02 PROCEDURE — 6370000000 HC RX 637 (ALT 250 FOR IP)

## 2022-10-02 PROCEDURE — 85025 COMPLETE CBC W/AUTO DIFF WBC: CPT

## 2022-10-02 PROCEDURE — 2580000003 HC RX 258

## 2022-10-02 PROCEDURE — 80053 COMPREHEN METABOLIC PANEL: CPT

## 2022-10-02 PROCEDURE — G0378 HOSPITAL OBSERVATION PER HR: HCPCS

## 2022-10-02 PROCEDURE — 99215 OFFICE O/P EST HI 40 MIN: CPT | Performed by: NEUROLOGICAL SURGERY

## 2022-10-02 PROCEDURE — 36415 COLL VENOUS BLD VENIPUNCTURE: CPT

## 2022-10-02 RX ADMIN — PANTOPRAZOLE SODIUM 40 MG: 40 TABLET, DELAYED RELEASE ORAL at 06:49

## 2022-10-02 RX ADMIN — FERROUS SULFATE TAB 325 MG (65 MG ELEMENTAL FE) 325 MG: 325 (65 FE) TAB at 08:39

## 2022-10-02 RX ADMIN — SODIUM CHLORIDE, PRESERVATIVE FREE 10 ML: 5 INJECTION INTRAVENOUS at 08:41

## 2022-10-02 RX ADMIN — AMLODIPINE BESYLATE 5 MG: 5 TABLET ORAL at 08:39

## 2022-10-02 RX ADMIN — GABAPENTIN 600 MG: 300 CAPSULE ORAL at 08:40

## 2022-10-02 ASSESSMENT — PAIN DESCRIPTION - LOCATION: LOCATION: BACK;LEG

## 2022-10-02 ASSESSMENT — PAIN DESCRIPTION - PAIN TYPE: TYPE: ACUTE PAIN;CHRONIC PAIN

## 2022-10-02 ASSESSMENT — PAIN - FUNCTIONAL ASSESSMENT: PAIN_FUNCTIONAL_ASSESSMENT: PREVENTS OR INTERFERES SOME ACTIVE ACTIVITIES AND ADLS

## 2022-10-02 ASSESSMENT — PAIN SCALES - GENERAL: PAINLEVEL_OUTOF10: 8

## 2022-10-02 ASSESSMENT — PAIN DESCRIPTION - DESCRIPTORS: DESCRIPTORS: ACHING;BURNING;SHARP

## 2022-10-02 ASSESSMENT — PAIN DESCRIPTION - ORIENTATION: ORIENTATION: MID;LOWER

## 2022-10-02 NOTE — PROGRESS NOTES
Pt in lot of pain but better than Yesterday,  Discussed her Urine tox screen which shows no Meds in UA and what that means, Basically she has not been taking Narcs for few days before the screen, Will continue her Gabapentin, OK to d/c home pain stand point and follow up as Out patient.

## 2022-10-02 NOTE — PROGRESS NOTES
Pt frustrated that her pain is not being adequately controlled. Declines Zanaflex at this time. Did agree to take Gabapentin. Message to NP to obtain something else, but she states it has to come from pain management. Pt informed. Snacks, drinks, heating pad/ice pack, offered but pt declines. Pt ambulating independently to the bathroom. Strength weak x4 but pt does not seem to give her full effort. Left side weaker than right.

## 2022-10-02 NOTE — PLAN OF CARE
Problem: Pain  Goal: Verbalizes/displays adequate comfort level or baseline comfort level  Outcome: Completed     Problem: ABCDS Injury Assessment  Goal: Absence of physical injury  Outcome: Completed     Problem: Neurosensory - Adult  Goal: Achieves stable or improved neurological status  Outcome: Completed  Goal: Absence of seizures  Outcome: Completed  Goal: Remains free of injury related to seizures activity  Outcome: Completed  Goal: Achieves maximal functionality and self care  Outcome: Completed     Problem: Respiratory - Adult  Goal: Achieves optimal ventilation and oxygenation  Outcome: Completed     Problem: Musculoskeletal - Adult  Goal: Return mobility to safest level of function  Outcome: Completed  Goal: Maintain proper alignment of affected body part  Outcome: Completed  Goal: Return ADL status to a safe level of function  Outcome: Completed

## 2022-10-02 NOTE — FLOWSHEET NOTE
Patient discharged to home at this time as ordered remains on RA VSS resp even/unlabored discharge instructions/summary provided including medications, follow-up, when to seek 911/ER assist patient a&ox4 verbalizes good understanding son picking up to transport home patient has no further questions/concerns at this time discharged with all personal belongings transported of of unit via wheelchair transport no s/s distress noted

## 2022-10-02 NOTE — PROGRESS NOTES
Progress Note  Patient: Karla Duenas  Unit/Bed: J202/C183-09  YOB: 1975  MRN: [id number]  Acct: [de-identified]   Admitting Diagnosis: Post-operative pain [G89.18]  Other chronic pain [G89.29]  Chronic bilateral low back pain with left-sided sciatica [M54.42, G89.29]  Date:  9/30/2022  Hospital Day: 0    Chief Complaint:  Back pain left lower extremity weakness      Physical Examination:    /80   Pulse 64   Temp 98.4 °F (36.9 °C) (Oral)   Resp 20   Ht 5' 6\" (1.676 m)   Wt 205 lb (93 kg)   SpO2 100%   BMI 33.09 kg/m²    Physical Exam  Patient continues to have significant back pain especially with activity and weakness left lower extremity long-term.   LABS:  CBC:   Lab Results   Component Value Date/Time    WBC 7.5 10/02/2022 04:55 AM    RBC 4.52 10/02/2022 04:55 AM    HGB 14.9 10/02/2022 04:55 AM    HCT 44.9 10/02/2022 04:55 AM    MCV 99.3 10/02/2022 04:55 AM    MCH 33.0 10/02/2022 04:55 AM    MCHC 33.3 10/02/2022 04:55 AM    RDW 13.9 10/02/2022 04:55 AM     10/02/2022 04:55 AM    MPV 10.4 09/20/2015 06:13 AM     CBC with Differential:   Lab Results   Component Value Date/Time    WBC 7.5 10/02/2022 04:55 AM    RBC 4.52 10/02/2022 04:55 AM    HGB 14.9 10/02/2022 04:55 AM    HCT 44.9 10/02/2022 04:55 AM     10/02/2022 04:55 AM    MCV 99.3 10/02/2022 04:55 AM    MCH 33.0 10/02/2022 04:55 AM    MCHC 33.3 10/02/2022 04:55 AM    RDW 13.9 10/02/2022 04:55 AM    LYMPHOPCT 35.3 10/02/2022 04:55 AM    MONOPCT 6.9 10/02/2022 04:55 AM    BASOPCT 0.4 10/02/2022 04:55 AM    MONOSABS 0.5 10/02/2022 04:55 AM    LYMPHSABS 2.7 10/02/2022 04:55 AM    EOSABS 0.4 10/02/2022 04:55 AM    BASOSABS 0.0 10/02/2022 04:55 AM     CMP:    Lab Results   Component Value Date/Time     10/02/2022 04:55 AM    K 3.9 10/02/2022 04:55 AM     10/02/2022 04:55 AM    CO2 22 10/02/2022 04:55 AM    BUN 16 10/02/2022 04:55 AM    CREATININE 0.84 10/02/2022 04:55 AM    GFRAA >60.0 10/02/2022 04:55 AM LABGLOM >60.0 10/02/2022 04:55 AM    GLUCOSE 88 10/02/2022 04:55 AM    PROT 6.7 10/02/2022 04:55 AM    LABALBU 4.0 10/02/2022 04:55 AM    CALCIUM 8.8 10/02/2022 04:55 AM    BILITOT 0.6 10/02/2022 04:55 AM    ALKPHOS 83 10/02/2022 04:55 AM    AST 17 10/02/2022 04:55 AM    ALT 28 10/02/2022 04:55 AM     BMP:    Lab Results   Component Value Date/Time     10/02/2022 04:55 AM    K 3.9 10/02/2022 04:55 AM     10/02/2022 04:55 AM    CO2 22 10/02/2022 04:55 AM    BUN 16 10/02/2022 04:55 AM    LABALBU 4.0 10/02/2022 04:55 AM    CREATININE 0.84 10/02/2022 04:55 AM    CALCIUM 8.8 10/02/2022 04:55 AM    GFRAA >60.0 10/02/2022 04:55 AM    LABGLOM >60.0 10/02/2022 04:55 AM    GLUCOSE 88 10/02/2022 04:55 AM     Magnesium:    Lab Results   Component Value Date/Time    MG 2.1 10/01/2022 12:54 AM         Assessment:    Active Hospital Problems    Diagnosis Date Noted    Post-operative pain [G89.18] 10/01/2022     Priority: Medium    Smoker [F17.200] 05/26/2022     Priority: Medium    Essential hypertension [I10]     [facility] [G36.889] 09/17/2015     MRI x-rays lumbar spine 10/1/2022  EXAMINATION:   THREE XRAY VIEWS OF THE LUMBAR SPINE       10/1/2022 10:29 am       COMPARISON:   09/30/2022 CT scan and MRI dated 06/27/2022. HISTORY:   ORDERING SYSTEM PROVIDED HISTORY: Pain postop   TECHNOLOGIST PROVIDED HISTORY:   Reason for exam:->Pain postop   Is the patient pregnant?->No   What reading provider will be dictating this exam?->CRC       FINDINGS:   Straightening of the alignment of the columns of the lumbar spine is   visualized. Bipedicular posterior internal fixation of the L4, L5 and S1 vertebral bodies   is seen. Intervertebral disc spacers visualized at L4-L5 and L5-S1. Multilevel degenerative endplate changes also seen with subtle anterior   ossified/spur formation.        Subtle decreased anterior height of the L1 vertebral body and to a lesser   extent the T12 vertebral body these demonstrate no change. Decrease intervertebral disc height visualized most prominent at L2-L3. Impression   Unremarkable alignment of internal fixation prosthesis. Degenerative changes. EXAMINATION:   MRI OF THE LUMBAR SPINE WITHOUT CONTRAST, 10/1/2022 12:51 pm       TECHNIQUE:   Multiplanar multisequence MRI of the lumbar spine was performed without the   administration of intravenous contrast.       COMPARISON:   Lumbar CT of 09/30/2022       HISTORY:   ORDERING SYSTEM PROVIDED HISTORY: Left lower extremity weakness pain   TECHNOLOGIST PROVIDED HISTORY:   Reason for exam:->Left lower extremity weakness pain   What is the sedation requirement?->None       FINDINGS:   BONES/ALIGNMENT: There is normal alignment of the spine. The vertebral body   heights are maintained. The bone marrow signal appears unremarkable. SPINAL CORD: The conus terminates normally. SOFT TISSUES: No paraspinal mass identified. Extensive postsurgical changes   within the posterior paraspinal soft tissues with a small amount of fluid   collection within the laminectomy bed of L4-L5. Multilevel loss of disc height and signal with endplate degenerative change. L1-L2: There is no significant disc herniation, spinal canal stenosis or   neural foraminal narrowing. L2-L3: Grade 1 anterolisthesis with disc bulging. Mild bilateral facet   arthropathy. Otherwise unremarkable       L3-L4: Grade 1 retrolisthesis with 3 mm disc bulging effaces the anterior   thecal sac. Mild bilateral facet arthropathy. Moderate bilateral neural   foraminal narrowing. L4-L5: Changes related to instrumented disc space fusion and instrumented   posterior fusion via pedicular screws. Bilateral laminectomy defects. Mild   bilateral facet arthropathy. Mild bilateral neural foraminal narrowing. L5-S1: Subtle disc bulging. Mild bilateral facet arthropathy.   Changes   related to instrumented posterior fusion via pedicular screws. Bilateral   laminectomy defects. Mild bilateral neural foraminal narrowing. Impression   No change since prior lumbar CT. No nerve impingement. No significant posterior disc pathology. At L3-L4, grade 1 retrolisthesis and moderate bilateral neural foraminal   narrowing       At L4-L5, changes related to instrumented disc space and posterior fusion and   bilateral laminectomy and mild bilateral neural foraminal narrowing. At L5-S1, changes related to instrumented posterior fusion and laminectomy   defects and mild bilateral neural foraminal narrowing       RECOMMENDATIONS:   Unavailable              Plan:  Dr Price Mix:  11-27-17 L4-5, L5-S1 DISKECTOMY. 1-15-19  L5-S1 POSTERIOR LUMBAR INTERBODY FUSION. 3-11-21 L4-5 TLIF INSTRUMENTATION AT L5-S1 DECOMPRESSION AT L4, L5 REMOVAL AND REINSERTION OF HARDWARE AT L5-S1. Dr. Sam Rankin:  6/17/22 - LEFT BILATERAL L 3-4 LAMINECTOMY MICRODISSECTION DECOMPRESSION  6/21/22 - REMOVAL OF L 3-4 EPIDURAL HEMATOMA    [facility] 4/28/2022 showed moderately severe canal stenosis at L3-4 which has been improved postoperatively. The area of her most recent surgery L3-4 is well decompressed. Moderate bilateral foraminal narrowing without significant neural compression. No evidence of instability. Patient is not a candidate for consideration of neuro spine surgery. Appreciate evaluation treatment plans by pain management Dr. Lamar Cain. She may be a candidate for spinal cord stimulator.     Electronically signedby Cassandra Mosqueda MD on 10/2/2022 at 8:16 AM

## 2022-10-02 NOTE — DISCHARGE SUMMARY
Hospital Medicine Discharge Summary    Higinio Oliver  :  1975  MRN:  [id number]    Admit date:  2022  Discharge date:  10/2/2022    Admitting Physician:  Seble Topete DO  Primary Care Physician:  Tj Godoy DO      Discharge Diagnoses:    Principal Problem:    Post-operative pain  Active Problems:    Smoker    Seizure disorder [facility])    Essential hypertension  Resolved Problems:    * No resolved hospital problems. *      Hospital Course:   Higinio Oliver is a 52 y.o. female that was admitted and treated at [facility] for the following medical issues:     Back and leg pain  - CT of the lumbar/thoracic spine and pelvis were negative for acute findings per report  - recently discharged from outpatient pain management after UDS on 9/15 did not show presence of Oxycodone   - IV Dilaudid,Morphine, Oxycodone and Norco given in ED  - MRI of the lumbar spine was negative for acute findings  - Gabapentin was increased   - managed by neurosurgery and pain management      Patient was seen by the following consultants while admitted to [facility]:   Consults:  [address]    Significant Diagnostic Studies:    XR LUMBAR SPINE (2-3 VIEWS)    Result Date: 10/1/2022  EXAMINATION: THREE XRAY VIEWS OF THE LUMBAR SPINE 10/1/2022 10:29 am COMPARISON: 2022 CT scan and MRI dated 2022. HISTORY: ORDERING SYSTEM PROVIDED HISTORY: Pain postop TECHNOLOGIST PROVIDED HISTORY: Reason for exam:->Pain postop Is the patient pregnant?->No What reading provider will be dictating this exam?->CRC FINDINGS: Straightening of the alignment of the columns of the lumbar spine is visualized. Bipedicular posterior internal fixation of the L4, L5 and S1 vertebral bodies is seen. Intervertebral disc spacers visualized at L4-L5 and L5-S1. Multilevel degenerative endplate changes also seen with subtle anterior ossified/spur formation.  Subtle decreased anterior height of the L1 vertebral body and to a lesser extent the T12 vertebral body these demonstrate no change. Decrease intervertebral disc height visualized most prominent at L2-L3. Unremarkable alignment of internal fixation prosthesis. Degenerative changes. CT THORACIC SPINE WO CONTRAST    Result Date: 10/1/2022  EXAMINATION: CT OF THE LUMBAR SPINE WITHOUT CONTRAST; CT OF THE THORACIC SPINE WITHOUT CONTRAST; CT OF THE PELVIS WITHOUT CONTRAST  9/30/2022 TECHNIQUE: CT of the lumbar spine was performed without the administration of intravenous contrast. Multiplanar reformatted images are provided for review. Adjustment of mA and/or kV according to patient size was utilized. Automated exposure control, iterative reconstruction, and/or weight based adjustment of the mA/kV was utilized to reduce the radiation dose to as low as reasonably achievable.; CT of the thoracic spine was performed without the administration of intravenous contrast. Multiplanar reformatted images are provided for review. Automated exposure control, iterative reconstruction, and/or weight based adjustment of the mA/kV was utilized to reduce the radiation dose to as low as reasonably achievable.; CT of the pelvis was performed without the administration of intravenous contrast.  Multiplanar reformatted images are provided for review. Adjustment of mA and/or kV according to patient size was utilized. Automated exposure control, iterative reconstruction, and/or weight based adjustment of the mA/kV was utilized to reduce the radiation dose to as low as reasonably achievable.  COMPARISON: None HISTORY: ORDERING SYSTEM PROVIDED HISTORY: fall TECHNOLOGIST PROVIDED HISTORY: Reason for exam:->fall Decision Support Exception - unselect if not a suspected or confirmed emergency medical condition->Emergency Medical Condition (MA) What reading provider will be dictating this exam?->CRC FINDINGS: BONES/ALIGNMENT: There is normal alignment of the spine. The vertebral body heights are maintained. No osseous destructive lesion is seen. Status post L4-S1 PLIF with hardware in place and no evidence of loosening or failure. DEGENERATIVE CHANGES: No significant degenerative changes of the lumbar spine. SOFT TISSUES/RETROPERITONEUM: No paraspinal mass is seen. Pelvis: SI joints symmetric without widening. No displaced pelvic ring fracture. Mild degenerate changes of the bilateral well located hips without acute cortical irregularity or fracture. Intrapelvic soft tissues unremarkable for acute findings. No focal fluid collection or abnormal findings in the pelvic girdle surrounding soft tissues     Unremarkable non-contrast CT of the thoracolumbar spine. No acute osseous findings of the pelvis. CT LUMBAR SPINE WO CONTRAST    Result Date: 10/1/2022  EXAMINATION: CT OF THE LUMBAR SPINE WITHOUT CONTRAST; CT OF THE THORACIC SPINE WITHOUT CONTRAST; CT OF THE PELVIS WITHOUT CONTRAST  9/30/2022 TECHNIQUE: CT of the lumbar spine was performed without the administration of intravenous contrast. Multiplanar reformatted images are provided for review. Adjustment of mA and/or kV according to patient size was utilized. Automated exposure control, iterative reconstruction, and/or weight based adjustment of the mA/kV was utilized to reduce the radiation dose to as low as reasonably achievable.; CT of the thoracic spine was performed without the administration of intravenous contrast. Multiplanar reformatted images are provided for review. Automated exposure control, iterative reconstruction, and/or weight based adjustment of the mA/kV was utilized to reduce the radiation dose to as low as reasonably achievable.; CT of the pelvis was performed without the administration of intravenous contrast.  Multiplanar reformatted images are provided for review. Adjustment of mA and/or kV according to patient size was utilized.   Automated exposure control, iterative reconstruction, and/or weight based adjustment of the mA/kV was utilized to reduce the radiation dose to as low as reasonably achievable. COMPARISON: None HISTORY: ORDERING SYSTEM PROVIDED HISTORY: fall TECHNOLOGIST PROVIDED HISTORY: Reason for exam:->fall Decision Support Exception - unselect if not a suspected or confirmed emergency medical condition->Emergency Medical Condition (MA) What reading provider will be dictating this exam?->CRC FINDINGS: BONES/ALIGNMENT: There is normal alignment of the spine. The vertebral body heights are maintained. No osseous destructive lesion is seen. Status post L4-S1 PLIF with hardware in place and no evidence of loosening or failure. DEGENERATIVE CHANGES: No significant degenerative changes of the lumbar spine. SOFT TISSUES/RETROPERITONEUM: No paraspinal mass is seen. Pelvis: SI joints symmetric without widening. No displaced pelvic ring fracture. Mild degenerate changes of the bilateral well located hips without acute cortical irregularity or fracture. Intrapelvic soft tissues unremarkable for acute findings. No focal fluid collection or abnormal findings in the pelvic girdle surrounding soft tissues     Unremarkable non-contrast CT of the thoracolumbar spine. No acute osseous findings of the pelvis. CT PELVIS WO CONTRAST Additional Contrast? None    Result Date: 10/1/2022  EXAMINATION: CT OF THE LUMBAR SPINE WITHOUT CONTRAST; CT OF THE THORACIC SPINE WITHOUT CONTRAST; CT OF THE PELVIS WITHOUT CONTRAST  9/30/2022 TECHNIQUE: CT of the lumbar spine was performed without the administration of intravenous contrast. Multiplanar reformatted images are provided for review. Adjustment of mA and/or kV according to patient size was utilized.   Automated exposure control, iterative reconstruction, and/or weight based adjustment of the mA/kV was utilized to reduce the radiation dose to as low as reasonably achievable.; CT of the thoracic spine was performed without the administration of intravenous contrast. Multiplanar reformatted images are provided for review. Automated exposure control, iterative reconstruction, and/or weight based adjustment of the mA/kV was utilized to reduce the radiation dose to as low as reasonably achievable.; CT of the pelvis was performed without the administration of intravenous contrast.  Multiplanar reformatted images are provided for review. Adjustment of mA and/or kV according to patient size was utilized. Automated exposure control, iterative reconstruction, and/or weight based adjustment of the mA/kV was utilized to reduce the radiation dose to as low as reasonably achievable. COMPARISON: None HISTORY: ORDERING SYSTEM PROVIDED HISTORY: fall TECHNOLOGIST PROVIDED HISTORY: Reason for exam:->fall Decision Support Exception - unselect if not a suspected or confirmed emergency medical condition->Emergency Medical Condition (MA) What reading provider will be dictating this exam?->CRC FINDINGS: BONES/ALIGNMENT: There is normal alignment of the spine. The vertebral body heights are maintained. No osseous destructive lesion is seen. Status post L4-S1 PLIF with hardware in place and no evidence of loosening or failure. DEGENERATIVE CHANGES: No significant degenerative changes of the lumbar spine. SOFT TISSUES/RETROPERITONEUM: No paraspinal mass is seen. Pelvis: SI joints symmetric without widening. No displaced pelvic ring fracture. Mild degenerate changes of the bilateral well located hips without acute cortical irregularity or fracture. Intrapelvic soft tissues unremarkable for acute findings. No focal fluid collection or abnormal findings in the pelvic girdle surrounding soft tissues     Unremarkable non-contrast CT of the thoracolumbar spine. No acute osseous findings of the pelvis.      MRI LUMBAR SPINE WO CONTRAST    Result Date: 10/1/2022  EXAMINATION: MRI OF THE LUMBAR SPINE WITHOUT CONTRAST, 10/1/2022 12:51 pm TECHNIQUE: Multiplanar multisequence MRI of the lumbar spine was performed without the administration of intravenous contrast. COMPARISON: Lumbar CT of 09/30/2022 HISTORY: ORDERING SYSTEM PROVIDED HISTORY: Left lower extremity weakness pain TECHNOLOGIST PROVIDED HISTORY: Reason for exam:->Left lower extremity weakness pain What is the sedation requirement?->None FINDINGS: BONES/ALIGNMENT: There is normal alignment of the spine. The vertebral body heights are maintained. The bone marrow signal appears unremarkable. SPINAL CORD: The conus terminates normally. SOFT TISSUES: No paraspinal mass identified. Extensive postsurgical changes within the posterior paraspinal soft tissues with a small amount of fluid collection within the laminectomy bed of L4-L5. Multilevel loss of disc height and signal with endplate degenerative change. L1-L2: There is no significant disc herniation, spinal canal stenosis or neural foraminal narrowing. L2-L3: Grade 1 anterolisthesis with disc bulging. Mild bilateral facet arthropathy. Otherwise unremarkable L3-L4: Grade 1 retrolisthesis with 3 mm disc bulging effaces the anterior thecal sac. Mild bilateral facet arthropathy. Moderate bilateral neural foraminal narrowing. L4-L5: Changes related to instrumented disc space fusion and instrumented posterior fusion via pedicular screws. Bilateral laminectomy defects. Mild bilateral facet arthropathy. Mild bilateral neural foraminal narrowing. L5-S1: Subtle disc bulging. Mild bilateral facet arthropathy. Changes related to instrumented posterior fusion via pedicular screws. Bilateral laminectomy defects. Mild bilateral neural foraminal narrowing. No change since prior lumbar CT. No nerve impingement. No significant posterior disc pathology.  At L3-L4, grade 1 retrolisthesis and moderate bilateral neural foraminal narrowing At L4-L5, changes related to instrumented disc space and posterior fusion and bilateral laminectomy and mild bilateral neural foraminal narrowing. At L5-S1, changes related to instrumented posterior fusion and laminectomy defects and mild bilateral neural foraminal narrowing RECOMMENDATIONS: Unavailable       Discharge Medications:         Medication List        CHANGE how you take these medications      gabapentin 300 MG capsule  Commonly known as: NEURONTIN  Take 1 capsule by mouth 3 times daily as needed (Pain) for up to 180 days. Intended supply: 90 days  What changed: Another medication with the same name was removed. Continue taking this medication, and follow the directions you see here. CONTINUE taking these medications      Advair Diskus 250-50 MCG/ACT Aepb diskus inhaler  Generic drug: fluticasone-salmeterol     albuterol sulfate  (90 Base) MCG/ACT inhaler  Commonly known as: PROVENTIL;VENTOLIN;PROAIR     amLODIPine 5 MG tablet  Commonly known as: NORVASC     Biotin 1000 MCG Tabs     CALCIUM 1000 + D PO     ferrous sulfate 325 (65 Fe) MG tablet  Commonly known as: IRON 325     naloxone 4 MG/0.1ML Liqd nasal spray  1 spray by Nasal route as needed for Opioid Reversal     omeprazole 20 MG delayed release capsule  Commonly known as: PRILOSEC     ondansetron 4 MG tablet  Commonly known as: ZOFRAN     Potassium 75 MG Tabs     Raised Toilet Seat/Lock & Arms Misc  1 Piece by Does not apply route as needed (PRN)     tiZANidine 4 MG tablet  Commonly known as: ZANAFLEX  Take 1 tablet by mouth nightly as needed (spasm)     vitamin B-12 100 MCG tablet  Commonly known as: CYANOCOBALAMIN     vitamin C 500 MG tablet  Commonly known as: ASCORBIC ACID            STOP taking these medications      oxyCODONE-acetaminophen 7.5-325 MG per tablet  Commonly known as: Percocet              Disposition:   Discharged to Home. Any [facility] needs that were indicated and/or required as been addressed and set up by Social Work. Condition at discharge: Pt was medically stable at the time of discharge.      Activity: activity as tolerated, fall precautions. Total time taken for discharging this patient: 40 minutes. Greater than 70% of time was spent focused exclusively on this patient. Time was taken to review chart, discuss plans with consultants, reconciling medications, discussing plan answering questions with patient. Signed:  Asha Negro MD  10/2/2022, 11:28 AM  ----------------------------------------------------------------------------------------------------------------------    Deejay Rothman,     Please return to ER or call 911 if you develop any significant signs or symptoms. I may not have addressed all of your medical illnesses or the abnormal blood work or imaging therefore please ask your PCP Chelsi Gallo DO and other out patient specialists and providers  to obtain [facility] record entirely to follow up on all of the abnormal labs, imaging and findings that I have and have not addressed during your hospitalization. Discharging you from the hospital does not mean that your medical care ends here and now. You may still need additional work up, investigation, monitoring, and treatment to be handled from this point on by outside providers including your PCP, Chelsi Gallo DO , Specialists and other healthcare providers. Please review your list of discharge medications prior to resuming medications you might still have at home, as the medications you need to be taking, dosages or how often you must take them may have changed. For medication questions, contact your retail pharmacy and your PCP, Chelsi Gallo DO .     ** I STRONGLY RECOMMEND that you follow up with Chelsi Gallo DO within 3 to 5 days for a post hospitalization evaluation. This specific office visit is covered by your insurance, and is not the same as your annual doctor visit/ check up. This office visit is important, as it may prevent need for repeat and/or future hospitalizations. **    Your medical team at [facility]) appreciates the opportunity to work with you to get well!     Sincerely,  Makenna Prince MD

## 2022-10-02 NOTE — PLAN OF CARE
Problem: Pain  Goal: Verbalizes/displays adequate comfort level or baseline comfort level  10/1/2022 2120 by Sumi Patterson RN  Outcome: Progressing  10/1/2022 1541 by Alan Jones RN  Outcome: Progressing     Problem: ABCDS Injury Assessment  Goal: Absence of physical injury  10/1/2022 2120 by Sumi Patterson RN  Outcome: Progressing  10/1/2022 1541 by Alan Jones RN  Outcome: Progressing     Problem: Neurosensory - Adult  Goal: Achieves stable or improved neurological status  10/1/2022 2120 by Sumi Patterson RN  Outcome: Progressing  10/1/2022 1541 by Alan Jones RN  Outcome: Progressing  Goal: Absence of seizures  10/1/2022 2120 by Sumi Patterson RN  Outcome: Progressing  10/1/2022 1541 by Alan Jones RN  Outcome: Progressing  Goal: Remains free of injury related to seizures activity  10/1/2022 2120 by Sumi Patterson RN  Outcome: Progressing  10/1/2022 1541 by Alan Jones RN  Outcome: Progressing  Goal: Achieves maximal functionality and self care  10/1/2022 2120 by Sumi Patterson RN  Outcome: Progressing  10/1/2022 1541 by Alan Jones RN  Outcome: Progressing     Problem: Respiratory - Adult  Goal: Achieves optimal ventilation and oxygenation  10/1/2022 2120 by Sumi Patterson RN  Outcome: Progressing  10/1/2022 1541 by Alan Jones RN  Outcome: Progressing     Problem: Musculoskeletal - Adult  Goal: Return mobility to safest level of function  10/1/2022 2120 by Sumi Patterson RN  Outcome: Progressing  10/1/2022 1541 by Alan Jones RN  Outcome: Progressing  Goal: Maintain proper alignment of affected body part  10/1/2022 2120 by Sumi Patterson RN  Outcome: Progressing  10/1/2022 1541 by Alan Jonse RN  Outcome: Progressing  Goal: Return ADL status to a safe level of function  10/1/2022 2120 by Sumi Patterson RN  Outcome: Progressing  10/1/2022 1541 by Alan Jones RN  Outcome: Progressing

## 2022-10-02 NOTE — PROGRESS NOTES
Hospitalist Progress Note      PCP: Bud Gamez DO    Date of Admission: 9/30/2022    Chief Complaint:  no acute events, afebrile, stable HD    Medications:  Reviewed    Infusion Medications    sodium chloride       Scheduled Medications    budesonide-formoterol  2 puff Inhalation BID    amLODIPine  5 mg Oral Daily    ferrous sulfate  325 mg Oral Daily with breakfast    pantoprazole  40 mg Oral QAM AC    sodium chloride flush  5-40 mL IntraVENous 2 times per day    enoxaparin  40 mg SubCUTAneous Daily    nicotine  1 patch TransDERmal Daily    lidocaine  1 patch TransDERmal Daily     PRN Meds: albuterol sulfate HFA, sodium chloride flush, sodium chloride, ondansetron **OR** ondansetron, polyethylene glycol, acetaminophen **OR** acetaminophen, tiZANidine, gabapentin    No intake or output data in the 24 hours ending 10/02/22 1124    Exam:    /80   Pulse 64   Temp 98.4 °F (36.9 °C) (Oral)   Resp 18   Ht 5' 6\" (1.676 m)   Wt 205 lb (93 kg)   SpO2 100%   BMI 33.09 kg/m²     General appearance: appears stated age and cooperative. Respiratory:  clear to auscultation, bilaterally   Cardiovascular: Regular rate and rhythm, S1/S2. Abdomen: Soft, active bowel sounds. Musculoskeletal: No edema bilaterally. Labs:   Recent Labs     10/01/22  0054 10/01/22  0553 10/02/22  0455   WBC 10.4 10.7 7.5   HGB 15.6 14.8 14.9   HCT 45.9 44.5 44.9    195 210       Recent Labs     10/01/22  0054 10/01/22  0553 10/02/22  0455    138 137   K 4.0 3.7 3.9    104 105   CO2 24 20 22   BUN 11 13 16   CREATININE 0.75 0.75 0.84   CALCIUM 9.2 9.0 8.8       Recent Labs     10/01/22  0054 10/01/22  0553 10/02/22  0455   AST 14 26 17   ALT 20 31 28   BILITOT 0.5 0.6 0.6   ALKPHOS 83 85 83       Recent Labs     10/01/22  0219   INR 1.0       No results for input(s): CKTOTAL, TROPONINI in the last 72 hours.     Urinalysis:      Lab Results   Component Value Date/Time    NITRU Negative 09/30/2022 10:15 PM    [address] Ana Lilia Oneal 3-5 01/14/2019 11:00 AM    BACTERIA MANY 01/14/2019 11:00 AM    RBCUA 0-2 01/14/2019 11:00 AM    BLOODU Negative 09/30/2022 10:15 PM    SPECGRAV 1.017 09/30/2022 10:15 PM    GLUCOSEU Negative 09/30/2022 10:15 PM       Radiology:  MRI LUMBAR SPINE WO CONTRAST   Final Result   No change since prior lumbar CT. No nerve impingement. No significant posterior disc pathology. At L3-L4, grade 1 retrolisthesis and moderate bilateral neural foraminal   narrowing      At L4-L5, changes related to instrumented disc space and posterior fusion and   bilateral laminectomy and mild bilateral neural foraminal narrowing. At L5-S1, changes related to instrumented posterior fusion and laminectomy   defects and mild bilateral neural foraminal narrowing      RECOMMENDATIONS:   Unavailable         XR LUMBAR SPINE (2-3 VIEWS)   Final Result   Unremarkable alignment of internal fixation prosthesis. Degenerative changes. CT THORACIC SPINE WO CONTRAST   Final Result   Unremarkable non-contrast CT of the thoracolumbar spine. No acute osseous   findings of the pelvis. CT LUMBAR SPINE WO CONTRAST   Final Result   Unremarkable non-contrast CT of the thoracolumbar spine. No acute osseous   findings of the pelvis. CT PELVIS WO CONTRAST Additional Contrast? None   Final Result   Unremarkable non-contrast CT of the thoracolumbar spine. No acute osseous   findings of the pelvis.                  Assessment/Plan:    53 y/o female with history of HTN, obesity, asthma, psychogenic seizure disorder, chronic pain with opiate dependence,lumbar stenosis s/p L3-4 laminectomy on 3/52 complicated by epidural hematoma s/p evacuation on 6/21 who presented with:     Back and leg pain  - CT of the lumbar/thoracic spine and pelvis were negative for acute findings per report  - recently discharged from outpatient pain management after UDS on 9/15 did not show presence of Oxycodone   - IV Dilaudid,Morphine, Oxycodone and Norco given in ED  - MRI of the lumbar spine was negative for acute findings  - Gabapentin was increased   - managed by neurosurgery and pain management    HTN  - on Amlodipine    Diet: ADULT DIET;  Regular    Code Status: Full Code      Disposition - home today        Electronically signed by Rush Camejo MD on 10/2/2022 at 11:24 AM

## 2022-10-03 ENCOUNTER — TELEPHONE (OUTPATIENT)
Dept: PAIN MANAGEMENT | Age: 47
End: 2022-10-03

## 2022-10-03 NOTE — TELEPHONE ENCOUNTER
Patient states that she is not going see pain management here anymore, she is going to see her family doctor. She is asking to be release from our care, she is asking if she can get a letter stating this?

## 2022-10-04 NOTE — TELEPHONE ENCOUNTER
Can a letter be written for the patient? Re:  Wellington Villarreal, JERROD - CNP  to Me    KD    7:57 AM  Yes we can provide a letter saying that due to abnormal urine results we will no longer be prescribing narcotics. She can continue to see pain management for procedures and I advised her to continue to follow with neurosurgery.

## 2022-10-07 NOTE — TELEPHONE ENCOUNTER
I called patient to let her know release of care letter is ready. Offered to mail it to patient. Patient would like it mailed to her home address: [address] [place] [id number].

## 2022-10-27 ENCOUNTER — OFFICE VISIT (OUTPATIENT)
Dept: NEUROSURGERY | Age: 47
End: 2022-10-27
Payer: MEDICARE

## 2022-10-27 VITALS
TEMPERATURE: 97.6 F | SYSTOLIC BLOOD PRESSURE: 122 MMHG | BODY MASS INDEX: 32.95 KG/M2 | HEIGHT: 66 IN | WEIGHT: 205 LBS | DIASTOLIC BLOOD PRESSURE: 78 MMHG

## 2022-10-27 DIAGNOSIS — F17.200 SMOKER: Primary | ICD-10-CM

## 2022-10-27 DIAGNOSIS — M96.1 POSTLAMINECTOMY SYNDROME OF LUMBAR REGION: ICD-10-CM

## 2022-10-27 DIAGNOSIS — G89.4 CHRONIC PAIN SYNDROME: ICD-10-CM

## 2022-10-27 PROCEDURE — G8417 CALC BMI ABV UP PARAM F/U: HCPCS | Performed by: NEUROLOGICAL SURGERY

## 2022-10-27 PROCEDURE — G8427 DOCREV CUR MEDS BY ELIG CLIN: HCPCS | Performed by: NEUROLOGICAL SURGERY

## 2022-10-27 PROCEDURE — 4004F PT TOBACCO SCREEN RCVD TLK: CPT | Performed by: NEUROLOGICAL SURGERY

## 2022-10-27 PROCEDURE — 99213 OFFICE O/P EST LOW 20 MIN: CPT | Performed by: NEUROLOGICAL SURGERY

## 2022-10-27 PROCEDURE — 3078F DIAST BP <80 MM HG: CPT | Performed by: NEUROLOGICAL SURGERY

## 2022-10-27 PROCEDURE — G8484 FLU IMMUNIZE NO ADMIN: HCPCS | Performed by: NEUROLOGICAL SURGERY

## 2022-10-27 PROCEDURE — 3074F SYST BP LT 130 MM HG: CPT | Performed by: NEUROLOGICAL SURGERY

## 2022-10-29 DIAGNOSIS — R56.9 CONVULSIONS, UNSPECIFIED CONVULSION TYPE (HCC): ICD-10-CM

## 2022-10-29 DIAGNOSIS — G89.29 CHRONIC BILATERAL LOW BACK PAIN WITH LEFT-SIDED SCIATICA: ICD-10-CM

## 2022-10-29 DIAGNOSIS — M54.42 CHRONIC BILATERAL LOW BACK PAIN WITH LEFT-SIDED SCIATICA: ICD-10-CM

## 2022-10-31 RX ORDER — GABAPENTIN 300 MG/1
CAPSULE ORAL
Qty: 90 CAPSULE | Refills: 2 | OUTPATIENT
Start: 2022-10-31

## 2022-11-07 RX ORDER — TIZANIDINE 4 MG/1
TABLET ORAL
Qty: 30 TABLET | OUTPATIENT
Start: 2022-11-07

## 2023-01-18 NOTE — PROGRESS NOTES
Patient Name: Judy [place] : 1975        Date: 2023      Type of Appt: Follow up    Reason for appt: FOLLOW UP PER PT, loss of feeling in legs and muscle control    Pt last seen by Dr. Augusta Saavedra on 10-27-22    Studies done: 22 L/S X-ray @ [facility] - PT TO BRING DISC    Surgeries: Dr Karthikeyan Leigh:  17 L4-5, L5-S1 DISKECTOMY. 1-15-19  L5-S1 POSTERIOR LUMBAR INTERBODY FUSION. 3-11-21 L4-5 TLIF INSTRUMENTATION AT L5-S1 DECOMPRESSION AT L4, L5 REMOVAL AND REINSERTION OF HARDWARE AT L5-S1. Dr. Augusta Saavedra:  22 - LEFT BILATERAL L 3-4 LAMINECTOMY MICRODISSECTION DECOMPRESSION  22 - REMOVAL OF L 3-4 EPIDURAL HEMATOMA    Conservative Tx tried: Oxycodone, Gabapentin     Smoking: Yes    REVIEW OF SYSTEMS:    Difficulty Urinating, Nausea, Headaches, Bruising/Bleeding Easily, Hearing loss, Constipation, Sleep Disturbance, Neck Pain, Back Pain                         [facility]) Physicians  Neurosurgery and Pain Management [address] [place] [address] 82: [phone]  F: [phone]          Patient:  Judy Woodrfuf  YOB: 1975  Date: 2023    The patient is a 50 y.o. female who presents today for evaluation of the following problems:     Chief Complaint   Patient presents with    Back Pain        Referred by No ref.  provider found      PAST MEDICAL, FAMILY AND SOCIAL HISTORY:  Past Medical History:   Diagnosis Date    Arthritis     spine    Asthma     since childhood -- smokes x 30 yrs    Cerebral artery occlusion with cerebral infarction [facility])     per CT scan -- patient without sx,     Chronic back pain     Hypertension     meds > 3 yrs    Lumbar stenosis     Migraines     starts with neck pain    Seizures ([address]     pt last known seizure May 2022, dx 20 years ago, takes gabapentin for back/seizures    Thyroid disease     meds > 4 yrs -- intermittent    Urinary incontinence     Uterine anomaly      Past Surgical History:   Procedure Laterality Date    ANTERIOR CRUCIATE LIGAMENT REPAIR Left     APPENDECTOMY      at age 21     BACK SURGERY  2017    lumbar disc     BACK SURGERY  2018    lumbar fusion    CHOLECYSTECTOMY  2015    COLONOSCOPY      2017    ENDOMETRIAL ABLATION  2006    post op sepsis    ENDOSCOPY, COLON, DIAGNOSTIC      2017    KNEE ARTHROSCOPY Left 2004    ACL repair    KNEE SURGERY      LAMINECTOMY N/A 6/17/2022    LEFT BILATERAL L3-4 LAMINECTOMY MICRODISSECTION DECOMPRESSION performed by Bello Fraga MD at [address] [place] [address] N/A 6/21/2022    LUMBAR EPIDURAL HEMATOMA EVACUATION performed by Bello Fraga MD at [address] [place] [address] Drive N/A 1/15/2019    L4- L5 DECOMPRESSION, L5-S1 POSTERIOR LUMBAR INTERBODY FUSION performed by Shilpa Goodman MD at [address] [place] [address] N/A 3/11/2021    L4-5 TLIF INSTRUMENTATION AT L5-S1 DECOMPRESSION AT L4, L5 REMOVAL AND REINSERTION OF HARDWARE AT L5-S1 performed by Shilpa Goodman MD at [address] [place] [address]  5/9/13    duramorph 1.5 mg epideral  6mg celestone    SPINE SURGERY      TONSILLECTOMY      as child    TUBAL LIGATION Bilateral 2002     Family History   Problem Relation Age of Onset    Cancer Mother         NHL    COPD Mother     Other Mother         ITP & pancreatitis    Heart Failure Maternal Grandmother     No Known Problems Sister     High Blood Pressure Brother     No Known Problems Son     No Known Problems Daughter     Arthritis Mother      Current Outpatient Medications on File Prior to Visit   Medication Sig Dispense Refill    albuterol sulfate HFA (PROVENTIL;VENTOLIN;PROAIR) 108 (90 Base) MCG/ACT inhaler Inhale 2 puffs into the lungs every 6 hours as needed for Wheezing      ADVAIR DISKUS 250-50 MCG/ACT AEPB diskus inhaler inhale 1 puff by mouth and INTO THE LUNGS twice a day      gabapentin (NEURONTIN) 300 MG capsule Take 1 capsule by mouth 3 times daily as needed (Pain) for up to 180 days.  Intended supply: 90 days 90 capsule 2    naloxone 4 MG/0.1ML LIQD nasal spray 1 spray by Nasal route as needed for Opioid Reversal 1 each 0    Potassium 75 MG TABS Take by mouth      Calcium Carb-Cholecalciferol (CALCIUM 1000 + D PO) Take 600 mg by mouth daily      vitamin B-12 (CYANOCOBALAMIN) 100 MCG tablet Take 50 mcg by mouth daily      Biotin 1000 MCG TABS Take by mouth 2 times daily      Misc. Devices (RAISED TOILET SEAT/LOCK & ARMS) MISC 1 Piece by Does not apply route as needed (PRN) 2 each 0    ferrous sulfate 325 (65 Fe) MG tablet Take 65 mg by mouth daily (with breakfast)      vitamin C (ASCORBIC ACID) 500 MG tablet Take 500 mg by mouth daily      ondansetron (ZOFRAN) 4 MG tablet Take 4 mg by mouth every 8 hours as needed for Nausea or Vomiting      amLODIPine (NORVASC) 5 MG tablet Take 5 mg by mouth daily       gabapentin (NEURONTIN) 300 MG capsule TAKE 1 CAPSULE BY MOUTH THREE TIMES DAILY . TO LAST 30 DAYS  0    tiZANidine (ZANAFLEX) 4 MG tablet TAKE 1 TABLET BY MOUTH EVERY DAY (AT BEDTIME) AS NEEDED FOR SPASMS. TO LAST 30 DAYS  0    ondansetron (ZOFRAN) 4 MG tablet Take 4 mg by mouth 2 times daily      Omeprazole (PRILOSEC PO) Take 40 mg by mouth      omeprazole (PRILOSEC) 20 MG capsule Take 40 mg by mouth three times daily        No current facility-administered medications on file prior to visit.      Social History     Tobacco Use    Smoking status: Every Day     Packs/day: 0.50     Years: 30.00     Pack years: 15.00     Types: Cigarettes    Smokeless tobacco: Never   Vaping Use    Vaping Use: Never used   Substance Use Topics    Alcohol use: No     Alcohol/week: 0.0 standard drinks    Drug use: No       ALLERGIES  Allergies   Allergen Reactions    Aspirin Rash and Hives    Bee Venom Anaphylaxis    Corticosteroids Rash, Anaphylaxis and Swelling    Fentanyl Shortness Of Breath, Other (See Comments) and Anaphylaxis     Throat closes    Prochlorperazine Edisylate Shortness Of Breath and Swelling    Tylenol [Acetaminophen] Nausea Only    Aspirin     Compazine [Prochlorperazine]     Corticosteroids Cortizone Swelling    Prochlorperazine Swelling    Prochlorperazine Maleate Other (See Comments)    Codeine Rash, Other (See Comments) and Hives     Throat closes         REVIEW OF SYSTEMS:  Review of Systems   Constitutional:  Negative for fever. HENT:  Positive for hearing loss. Eyes:  Negative for pain. Respiratory:  Negative for shortness of breath. Gastrointestinal:  Positive for constipation and nausea. Endocrine: Negative for heat intolerance. Genitourinary:  Positive for difficulty urinating. Musculoskeletal:  Positive for back pain, gait problem and neck pain. Skin:  Negative for rash. Allergic/Immunologic: Negative for immunocompromised state. Neurological:  Positive for headaches. Hematological:  Bruises/bleeds easily. Psychiatric/Behavioral:  Negative for sleep disturbance. I have personally reviewed the PMH, PSH, family history, home medications, social history, allergies, ROS. Physical Exam:      Vitals:    01/24/23 1256   Weight: 200 lb (90.7 kg)   Height: 5' 6\" (1.676 m)     Body mass index is 32.28 kg/m². Physical Exam  Vitals and nursing note reviewed. Constitutional:       Appearance: Normal appearance. HENT:      Head: Normocephalic and atraumatic. Right Ear: External ear normal.      Left Ear: External ear normal.      Nose: Nose normal.      Mouth/Throat:      Pharynx: Oropharynx is clear. Eyes:      Extraocular Movements: Extraocular movements intact. Cardiovascular:      Pulses: Normal pulses. Pulmonary:      Effort: Pulmonary effort is normal.   Abdominal:      Palpations: Abdomen is soft. Genitourinary:     Rectum: Normal.   Musculoskeletal:         General: Normal range of motion. Cervical back: Normal range of motion. Comments: As chronic and permanent weakness in the left lower extremity. She is able to push herself up out of the chair. Ambulates limping to the left side.   Has 4/5 weakness in the left lower extremity sensory changes 4/5 to light touch. Absent deep tendon reflexes. Skin is good color no evidence of peripheral vascular disease. Severe pain with range of motion of the knee hip and SLR on the left side. No pain on the right   Skin:     General: Skin is warm. Neurological:      General: No focal deficit present. Psychiatric:         Mood and Affect: Mood normal.        I have reviewed all laboratory studies, reports, data, and pertinent images. 11/6/2022 x-ray lumbar spine Yolie      HISTORY OF PRESENT ILLNESS:  Patient has tried her best to obtain pain management medical treatment in her area. [facility] tells her she has to come here for her care. The physicians in her area will not care for her. All the pain management doctors want to do her injections do not help. She has chronic severe permanent injury to the left lower extremity with weakness numbness and chronic pain syndrome. She is not a candidate for further spine surgery. Have offered a second opinion by another spine surgeon to review. At this time we will refer her to pain management for evaluation and treatment potential spinal cord stimulator or other pain management modalities.   CD from [facility] Queen to patient      Clarence Mckeon MD

## 2023-01-24 ENCOUNTER — OFFICE VISIT (OUTPATIENT)
Dept: PAIN MANAGEMENT | Age: 48
End: 2023-01-24
Payer: MEDICARE

## 2023-01-24 ENCOUNTER — OFFICE VISIT (OUTPATIENT)
Dept: NEUROSURGERY | Age: 48
End: 2023-01-24
Payer: MEDICARE

## 2023-01-24 VITALS
HEIGHT: 66 IN | SYSTOLIC BLOOD PRESSURE: 132 MMHG | WEIGHT: 200 LBS | BODY MASS INDEX: 32.14 KG/M2 | TEMPERATURE: 97.5 F | DIASTOLIC BLOOD PRESSURE: 88 MMHG

## 2023-01-24 VITALS — HEIGHT: 66 IN | WEIGHT: 200 LBS | BODY MASS INDEX: 32.14 KG/M2

## 2023-01-24 DIAGNOSIS — M96.1 POSTLAMINECTOMY SYNDROME OF LUMBAR REGION: ICD-10-CM

## 2023-01-24 DIAGNOSIS — M54.16 LUMBAR RADICULOPATHY: ICD-10-CM

## 2023-01-24 DIAGNOSIS — M54.17 LUMBOSACRAL RADICULOPATHY: Primary | ICD-10-CM

## 2023-01-24 DIAGNOSIS — G89.4 CHRONIC PAIN SYNDROME: ICD-10-CM

## 2023-01-24 DIAGNOSIS — M96.1 POSTLAMINECTOMY SYNDROME OF LUMBAR REGION: Primary | ICD-10-CM

## 2023-01-24 DIAGNOSIS — I69.30 SEQUELA, POST-STROKE: ICD-10-CM

## 2023-01-24 PROCEDURE — G8427 DOCREV CUR MEDS BY ELIG CLIN: HCPCS | Performed by: NEUROLOGICAL SURGERY

## 2023-01-24 PROCEDURE — G8417 CALC BMI ABV UP PARAM F/U: HCPCS | Performed by: NEUROLOGICAL SURGERY

## 2023-01-24 PROCEDURE — G8417 CALC BMI ABV UP PARAM F/U: HCPCS | Performed by: PAIN MEDICINE

## 2023-01-24 PROCEDURE — G8484 FLU IMMUNIZE NO ADMIN: HCPCS | Performed by: PAIN MEDICINE

## 2023-01-24 PROCEDURE — G8427 DOCREV CUR MEDS BY ELIG CLIN: HCPCS | Performed by: PAIN MEDICINE

## 2023-01-24 PROCEDURE — 99213 OFFICE O/P EST LOW 20 MIN: CPT | Performed by: PAIN MEDICINE

## 2023-01-24 PROCEDURE — 4004F PT TOBACCO SCREEN RCVD TLK: CPT | Performed by: PAIN MEDICINE

## 2023-01-24 PROCEDURE — 99213 OFFICE O/P EST LOW 20 MIN: CPT | Performed by: NEUROLOGICAL SURGERY

## 2023-01-24 PROCEDURE — 3079F DIAST BP 80-89 MM HG: CPT | Performed by: PAIN MEDICINE

## 2023-01-24 PROCEDURE — 4004F PT TOBACCO SCREEN RCVD TLK: CPT | Performed by: NEUROLOGICAL SURGERY

## 2023-01-24 PROCEDURE — 3075F SYST BP GE 130 - 139MM HG: CPT | Performed by: PAIN MEDICINE

## 2023-01-24 PROCEDURE — G8484 FLU IMMUNIZE NO ADMIN: HCPCS | Performed by: NEUROLOGICAL SURGERY

## 2023-01-24 RX ORDER — OXYCODONE HYDROCHLORIDE AND ACETAMINOPHEN 5; 325 MG/1; MG/1
1 TABLET ORAL EVERY 6 HOURS PRN
Qty: 28 TABLET | Refills: 0 | Status: SHIPPED | OUTPATIENT
Start: 2023-01-24 | End: 2023-01-31

## 2023-01-24 ASSESSMENT — ENCOUNTER SYMPTOMS
GASTROINTESTINAL NEGATIVE: 1
SHORTNESS OF BREATH: 0
NAUSEA: 1
ALLERGIC/IMMUNOLOGIC NEGATIVE: 1
RESPIRATORY NEGATIVE: 1
EYE PAIN: 0
EYES NEGATIVE: 1
CONSTIPATION: 1
BACK PAIN: 1

## 2023-01-24 NOTE — PROGRESS NOTES
History of Present Illness     Patient Identification  Judy Woodruff is a 50 y.o. female. Patient information was obtained from patient. Chief Complaint   Chief Complaint   Patient presents with    Back Pain       Leg Pain (Intense pain in both legs and back pain after falling in bath tub 3 months  ago Recent spine surgery in June. Judy Woodruff is a 52 y.o. female with a PMH significant for Seizure d/o, Chronic pain syndrome, Lumbar spinal stenosis, Neurogenic claudication and Post-laminectomy syndrome who presents with complaints of diffuse lower back pain rated 10 out of 10 with radiation into the legs, primarily the left leg and hip with associated numbness and tingling down to the foot,  However Her notes suggest that kendall pain was since June s/p microdiscectomy, Patient denies any loss of bowel or bladder control. Patient has not taken anything for the symptoms, stating \"ibuprofen and Tylenol do not work for [facility]". Pt was on 30mg oxycodone till few weeks ago went down to 22mg  and was stopped due to [facility] in . And No Oxycodone September 2022. Has cortisone injections which led to Body swelling.        Past Medical History:   Diagnosis Date    Arthritis     spine    Asthma     since childhood -- smokes x 30 yrs    Cerebral artery occlusion with cerebral infarction [facility])     per CT scan -- patient without sx, 2021    Chronic back pain     Hypertension     meds > 3 yrs    Lumbar stenosis     Migraines     starts with neck pain    Seizures ([address]     pt last known seizure May 2022, dx 20 years ago, takes gabapentin for back/seizures    Thyroid disease     meds > 4 yrs -- intermittent    Urinary incontinence     Uterine anomaly      Family History   Problem Relation Age of Onset    Cancer Mother         NHL    COPD Mother     Other Mother         ITP & pancreatitis    Heart Failure Maternal Grandmother     No Known Problems Sister     High Blood Pressure Brother     No Known Problems Son No Known Problems Daughter     Arthritis Mother      Current Outpatient Medications   Medication Sig Dispense Refill    albuterol sulfate HFA (PROVENTIL;VENTOLIN;PROAIR) 108 (90 Base) MCG/ACT inhaler Inhale 2 puffs into the lungs every 6 hours as needed for Wheezing      ADVAIR DISKUS 250-50 MCG/ACT AEPB diskus inhaler inhale 1 puff by mouth and INTO THE LUNGS twice a day      gabapentin (NEURONTIN) 300 MG capsule Take 1 capsule by mouth 3 times daily as needed (Pain) for up to 180 days. Intended supply: 90 days 90 capsule 2    naloxone 4 MG/0.1ML LIQD nasal spray 1 spray by Nasal route as needed for Opioid Reversal 1 each 0    Potassium 75 MG TABS Take by mouth      Calcium Carb-Cholecalciferol (CALCIUM 1000 + D PO) Take 600 mg by mouth daily      vitamin B-12 (CYANOCOBALAMIN) 100 MCG tablet Take 50 mcg by mouth daily      Biotin 1000 MCG TABS Take by mouth 2 times daily      Misc. Devices (RAISED TOILET SEAT/LOCK & ARMS) MISC 1 Piece by Does not apply route as needed (PRN) 2 each 0    ferrous sulfate 325 (65 Fe) MG tablet Take 65 mg by mouth daily (with breakfast)      vitamin C (ASCORBIC ACID) 500 MG tablet Take 500 mg by mouth daily      ondansetron (ZOFRAN) 4 MG tablet Take 4 mg by mouth every 8 hours as needed for Nausea or Vomiting      amLODIPine (NORVASC) 5 MG tablet Take 5 mg by mouth daily       gabapentin (NEURONTIN) 300 MG capsule TAKE 1 CAPSULE BY MOUTH THREE TIMES DAILY . TO LAST 30 DAYS  0    tiZANidine (ZANAFLEX) 4 MG tablet TAKE 1 TABLET BY MOUTH EVERY DAY (AT BEDTIME) AS NEEDED FOR SPASMS. TO LAST 30 DAYS  0    ondansetron (ZOFRAN) 4 MG tablet Take 4 mg by mouth 2 times daily      Omeprazole (PRILOSEC PO) Take 40 mg by mouth      omeprazole (PRILOSEC) 20 MG capsule Take 40 mg by mouth three times daily        No current facility-administered medications for this visit.     Allergies   Allergen Reactions    Aspirin Rash and Hives    Bee Venom Anaphylaxis    Corticosteroids Rash, Anaphylaxis and  Swelling    Fentanyl Shortness Of Breath, Other (See Comments) and Anaphylaxis     Throat closes    Prochlorperazine Edisylate Shortness Of Breath and Swelling    Tylenol [Acetaminophen] Nausea Only    Aspirin     Compazine [Prochlorperazine]     Corticosteroids     Cortizone Swelling    Prochlorperazine Swelling    Prochlorperazine Maleate Other (See Comments)    Codeine Rash, Other (See Comments) and Hives     Throat closes       Review of Systems  Review of Systems   Constitutional: Negative. HENT: Negative. Eyes: Negative. Respiratory: Negative. Cardiovascular: Negative. Gastrointestinal: Negative. Endocrine: Negative. Genitourinary: Negative. Musculoskeletal: Negative. Skin: Negative. Allergic/Immunologic: Negative. Neurological: Negative. H/o CVA 2 years ago. Hematological: Negative. Psychiatric/Behavioral: Negative. All other systems reviewed and are negative. Physical Exam     /88   Temp 97.5 °F (36.4 °C)   Ht 5' 6\" (1.676 m)   Wt 200 lb (90.7 kg)   BMI 32.28 kg/m²   Physical Exam  Vitals and nursing note reviewed. Constitutional:       Appearance: Normal appearance. HENT:      Head: Normocephalic. Right Ear: Ear canal normal.      Left Ear: Ear canal normal.      Nose: Nose normal.      Mouth/Throat:      Mouth: Mucous membranes are moist.   Eyes:      Extraocular Movements: Extraocular movements intact. Conjunctiva/sclera: Conjunctivae normal.      Pupils: Pupils are equal, round, and reactive to light. Cardiovascular:      Rate and Rhythm: Normal rate and regular rhythm. Pulses: Normal pulses. Heart sounds: Normal heart sounds. Pulmonary:      Effort: Pulmonary effort is normal.      Breath sounds: Normal breath sounds. Abdominal:      General: Abdomen is flat. Bowel sounds are normal.      Palpations: Abdomen is soft. Musculoskeletal:         General: Normal range of motion.       Cervical back: Normal range of motion and neck supple. Comments: Antalgic Gait, Not able to walk on Left Toes and heels though its difficult to say if this is pain related, Foot Drop Left side. Loss of reflexes Rt side   Skin:     General: Skin is warm. Capillary Refill: Capillary refill takes less than 2 seconds. Neurological:      General: No focal deficit present. Mental Status: She is alert and oriented to person, place, and time. Mental status is at baseline. Psychiatric:         Mood and Affect: Mood normal.         Behavior: Behavior normal.         Thought Content: Thought content normal.         Judgment: Judgment normal.         Plan   Studies: Imaging: MRI 10/1/2022, At L3-L4, grade 1 retrolisthesis and moderate bilateral neural foraminal narrowing At L4-L5, changes related to instrumented disc space and posterior fusion and bilateral laminectomy and mild bilateral neural foraminal narrowing. At L5-S1, changes related to instrumented posterior fusion and laminectomy defects and mild bilateral neural foraminal narrowing  Pts pain looks Radicular will Get  PT, had swelling from steroid injections will get Records, Plan on possible stim Trial.  EMG shows Radicular Pain

## 2023-01-25 ENCOUNTER — TELEPHONE (OUTPATIENT)
Dept: PAIN MANAGEMENT | Age: 48
End: 2023-01-25
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
## 2023-01-26 DIAGNOSIS — M96.1 POSTLAMINECTOMY SYNDROME OF LUMBAR REGION: ICD-10-CM

## 2023-01-31 ENCOUNTER — TELEPHONE (OUTPATIENT)
Dept: PAIN MANAGEMENT | Age: 48
End: 2023-01-31
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
## 2023-01-31 NOTE — TELEPHONE ENCOUNTER
I called and spoke with patient to let her know an appt is needed prior to med refills. Patient has rescheduled appt for 2/1/23.

## 2023-02-01 ENCOUNTER — TELEPHONE (OUTPATIENT)
Dept: PAIN MANAGEMENT | Age: 48
End: 2023-02-01

## 2023-02-01 ENCOUNTER — OFFICE VISIT (OUTPATIENT)
Dept: PAIN MANAGEMENT | Age: 48
End: 2023-02-01
Payer: MEDICARE

## 2023-02-01 VITALS
HEIGHT: 66 IN | WEIGHT: 225 LBS | OXYGEN SATURATION: 98 % | BODY MASS INDEX: 36.16 KG/M2 | DIASTOLIC BLOOD PRESSURE: 90 MMHG | SYSTOLIC BLOOD PRESSURE: 136 MMHG | TEMPERATURE: 97.1 F | HEART RATE: 92 BPM

## 2023-02-01 DIAGNOSIS — M54.42 CHRONIC BILATERAL LOW BACK PAIN WITH LEFT-SIDED SCIATICA: ICD-10-CM

## 2023-02-01 DIAGNOSIS — M51.26 HERNIATED LUMBAR INTERVERTEBRAL DISC: ICD-10-CM

## 2023-02-01 DIAGNOSIS — Z98.890 BACK PAIN WITH HISTORY OF SPINAL SURGERY: ICD-10-CM

## 2023-02-01 DIAGNOSIS — G89.29 CHRONIC BILATERAL LOW BACK PAIN WITH LEFT-SIDED SCIATICA: ICD-10-CM

## 2023-02-01 DIAGNOSIS — M54.16 LUMBAR RADICULOPATHY: ICD-10-CM

## 2023-02-01 DIAGNOSIS — M47.817 LUMBOSACRAL SPONDYLOSIS WITHOUT MYELOPATHY: ICD-10-CM

## 2023-02-01 DIAGNOSIS — M96.1 POSTLAMINECTOMY SYNDROME OF LUMBAR REGION: Primary | ICD-10-CM

## 2023-02-01 DIAGNOSIS — I69.30 SEQUELA, POST-STROKE: ICD-10-CM

## 2023-02-01 DIAGNOSIS — Z98.1 HISTORY OF LUMBAR SPINAL FUSION: ICD-10-CM

## 2023-02-01 DIAGNOSIS — M54.9 BACK PAIN WITH HISTORY OF SPINAL SURGERY: ICD-10-CM

## 2023-02-01 DIAGNOSIS — S06.4XAA EPIDURAL HEMATOMA: ICD-10-CM

## 2023-02-01 DIAGNOSIS — K59.03 THERAPEUTIC OPIOID-INDUCED CONSTIPATION (OIC): ICD-10-CM

## 2023-02-01 DIAGNOSIS — F11.90 OPIOID USE: ICD-10-CM

## 2023-02-01 DIAGNOSIS — T40.2X5A THERAPEUTIC OPIOID-INDUCED CONSTIPATION (OIC): ICD-10-CM

## 2023-02-01 PROCEDURE — G8484 FLU IMMUNIZE NO ADMIN: HCPCS | Performed by: PAIN MEDICINE

## 2023-02-01 PROCEDURE — 99213 OFFICE O/P EST LOW 20 MIN: CPT | Performed by: PAIN MEDICINE

## 2023-02-01 PROCEDURE — 4004F PT TOBACCO SCREEN RCVD TLK: CPT | Performed by: PAIN MEDICINE

## 2023-02-01 PROCEDURE — 3080F DIAST BP >= 90 MM HG: CPT | Performed by: PAIN MEDICINE

## 2023-02-01 PROCEDURE — G8427 DOCREV CUR MEDS BY ELIG CLIN: HCPCS | Performed by: PAIN MEDICINE

## 2023-02-01 PROCEDURE — G8417 CALC BMI ABV UP PARAM F/U: HCPCS | Performed by: PAIN MEDICINE

## 2023-02-01 PROCEDURE — 3075F SYST BP GE 130 - 139MM HG: CPT | Performed by: PAIN MEDICINE

## 2023-02-01 RX ORDER — OXYCODONE HYDROCHLORIDE AND ACETAMINOPHEN 5; 325 MG/1; MG/1
1 TABLET ORAL EVERY 6 HOURS PRN
Qty: 84 TABLET | Refills: 0 | Status: SHIPPED | OUTPATIENT
Start: 2023-02-01 | End: 2023-03-01

## 2023-02-01 ASSESSMENT — ENCOUNTER SYMPTOMS
GASTROINTESTINAL NEGATIVE: 1
EYES NEGATIVE: 1
RESPIRATORY NEGATIVE: 1
ALLERGIC/IMMUNOLOGIC NEGATIVE: 1
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
## 2023-02-01 NOTE — TELEPHONE ENCOUNTER
Prior authorization for oxycodone-acetaminophen 5-325 tablet, prior authorization started on [employer], clinical uploaded, authorization pending Navarro: Joshua Louie) - [id number]

## 2023-02-15 NOTE — TELEPHONE ENCOUNTER
Per fax from [address] [place] [address], the member has been identified as having coverage through a [employer]. Authorization request is to be submitted to [employer]. Authorization request submitted online ([email or web address] to [employer]), clinical uploaded, Nishi Robertson pending.      (Navarro: Esperanza Clayton)

## 2023-02-20 NOTE — TELEPHONE ENCOUNTER
Per fax from [facility] Medicaid/[facility], Oxycodone (Percocet) 5-325mg tablet approved. Authorization letter faxed to [facility] ([phone]).     Avelino Founds #[id number]   2- to 8-    BIN #[id number]  N #[id number]

## 2023-03-01 ENCOUNTER — OFFICE VISIT (OUTPATIENT)
Dept: PAIN MANAGEMENT | Age: 48
End: 2023-03-01
Payer: MEDICAID

## 2023-03-01 VITALS
OXYGEN SATURATION: 94 % | DIASTOLIC BLOOD PRESSURE: 82 MMHG | TEMPERATURE: 98 F | WEIGHT: 215 LBS | SYSTOLIC BLOOD PRESSURE: 132 MMHG | HEART RATE: 105 BPM | BODY MASS INDEX: 34.55 KG/M2 | HEIGHT: 66 IN

## 2023-03-01 DIAGNOSIS — G89.29 CHRONIC BILATERAL LOW BACK PAIN WITH LEFT-SIDED SCIATICA: ICD-10-CM

## 2023-03-01 DIAGNOSIS — R56.9 CONVULSIONS, UNSPECIFIED CONVULSION TYPE (HCC): ICD-10-CM

## 2023-03-01 DIAGNOSIS — M96.1 POSTLAMINECTOMY SYNDROME OF LUMBAR REGION: ICD-10-CM

## 2023-03-01 DIAGNOSIS — M54.42 CHRONIC BILATERAL LOW BACK PAIN WITH LEFT-SIDED SCIATICA: ICD-10-CM

## 2023-03-01 DIAGNOSIS — M54.16 LUMBAR RADICULOPATHY: ICD-10-CM

## 2023-03-01 PROCEDURE — 3075F SYST BP GE 130 - 139MM HG: CPT | Performed by: PAIN MEDICINE

## 2023-03-01 PROCEDURE — 99213 OFFICE O/P EST LOW 20 MIN: CPT | Performed by: PAIN MEDICINE

## 2023-03-01 PROCEDURE — 3079F DIAST BP 80-89 MM HG: CPT | Performed by: PAIN MEDICINE

## 2023-03-01 RX ORDER — OXYCODONE HYDROCHLORIDE AND ACETAMINOPHEN 5; 325 MG/1; MG/1
1 TABLET ORAL EVERY 6 HOURS PRN
Qty: 84 TABLET | Refills: 0 | Status: SHIPPED | OUTPATIENT
Start: 2023-03-01 | End: 2023-03-29

## 2023-03-01 ASSESSMENT — ENCOUNTER SYMPTOMS
ALLERGIC/IMMUNOLOGIC NEGATIVE: 1
EYES NEGATIVE: 1
GASTROINTESTINAL NEGATIVE: 1
RESPIRATORY NEGATIVE: 1
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
## 2023-03-29 ENCOUNTER — OFFICE VISIT (OUTPATIENT)
Dept: PAIN MANAGEMENT | Age: 48
End: 2023-03-29
Payer: MEDICAID

## 2023-03-29 VITALS
TEMPERATURE: 98.1 F | WEIGHT: 215 LBS | DIASTOLIC BLOOD PRESSURE: 80 MMHG | SYSTOLIC BLOOD PRESSURE: 138 MMHG | HEIGHT: 66 IN | BODY MASS INDEX: 34.55 KG/M2

## 2023-03-29 DIAGNOSIS — M47.817 LUMBOSACRAL SPONDYLOSIS WITHOUT MYELOPATHY: ICD-10-CM

## 2023-03-29 DIAGNOSIS — R56.9 CONVULSIONS, UNSPECIFIED CONVULSION TYPE (HCC): Primary | ICD-10-CM

## 2023-03-29 DIAGNOSIS — M54.16 LUMBAR RADICULOPATHY: ICD-10-CM

## 2023-03-29 DIAGNOSIS — T40.2X5A THERAPEUTIC OPIOID-INDUCED CONSTIPATION (OIC): ICD-10-CM

## 2023-03-29 DIAGNOSIS — G89.29 CHRONIC BILATERAL LOW BACK PAIN WITH LEFT-SIDED SCIATICA: ICD-10-CM

## 2023-03-29 DIAGNOSIS — F11.90 OPIOID USE: ICD-10-CM

## 2023-03-29 DIAGNOSIS — Z98.890 BACK PAIN WITH HISTORY OF SPINAL SURGERY: ICD-10-CM

## 2023-03-29 DIAGNOSIS — K59.03 THERAPEUTIC OPIOID-INDUCED CONSTIPATION (OIC): ICD-10-CM

## 2023-03-29 DIAGNOSIS — Z98.1 HISTORY OF LUMBAR SPINAL FUSION: ICD-10-CM

## 2023-03-29 DIAGNOSIS — I69.30 SEQUELA, POST-STROKE: ICD-10-CM

## 2023-03-29 DIAGNOSIS — M54.9 BACK PAIN WITH HISTORY OF SPINAL SURGERY: ICD-10-CM

## 2023-03-29 DIAGNOSIS — M96.1 POSTLAMINECTOMY SYNDROME OF LUMBAR REGION: ICD-10-CM

## 2023-03-29 DIAGNOSIS — M51.26 HERNIATED LUMBAR INTERVERTEBRAL DISC: ICD-10-CM

## 2023-03-29 DIAGNOSIS — M54.42 CHRONIC BILATERAL LOW BACK PAIN WITH LEFT-SIDED SCIATICA: ICD-10-CM

## 2023-03-29 PROCEDURE — 3079F DIAST BP 80-89 MM HG: CPT | Performed by: PAIN MEDICINE

## 2023-03-29 PROCEDURE — 99213 OFFICE O/P EST LOW 20 MIN: CPT | Performed by: PAIN MEDICINE

## 2023-03-29 PROCEDURE — 3075F SYST BP GE 130 - 139MM HG: CPT | Performed by: PAIN MEDICINE

## 2023-03-29 RX ORDER — OXYCODONE HYDROCHLORIDE AND ACETAMINOPHEN 5; 325 MG/1; MG/1
1 TABLET ORAL EVERY 6 HOURS PRN
Qty: 84 TABLET | Refills: 0 | Status: SHIPPED | OUTPATIENT
Start: 2023-03-29 | End: 2023-04-26

## 2023-03-29 ASSESSMENT — ENCOUNTER SYMPTOMS
GASTROINTESTINAL NEGATIVE: 1
RESPIRATORY NEGATIVE: 1
ALLERGIC/IMMUNOLOGIC NEGATIVE: 1
EYES NEGATIVE: 1
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
## 2023-04-26 ENCOUNTER — OFFICE VISIT (OUTPATIENT)
Dept: PAIN MANAGEMENT | Age: 48
End: 2023-04-26
Payer: MEDICAID

## 2023-04-26 VITALS
TEMPERATURE: 97.8 F | WEIGHT: 220 LBS | BODY MASS INDEX: 35.36 KG/M2 | DIASTOLIC BLOOD PRESSURE: 82 MMHG | SYSTOLIC BLOOD PRESSURE: 132 MMHG | HEIGHT: 66 IN

## 2023-04-26 DIAGNOSIS — M54.42 CHRONIC BILATERAL LOW BACK PAIN WITH LEFT-SIDED SCIATICA: ICD-10-CM

## 2023-04-26 DIAGNOSIS — G89.29 CHRONIC BILATERAL LOW BACK PAIN WITH LEFT-SIDED SCIATICA: ICD-10-CM

## 2023-04-26 DIAGNOSIS — M96.1 POSTLAMINECTOMY SYNDROME OF LUMBAR REGION: ICD-10-CM

## 2023-04-26 DIAGNOSIS — M54.16 LUMBAR RADICULOPATHY: ICD-10-CM

## 2023-04-26 DIAGNOSIS — R56.9 CONVULSIONS, UNSPECIFIED CONVULSION TYPE (HCC): ICD-10-CM

## 2023-04-26 PROCEDURE — 3075F SYST BP GE 130 - 139MM HG: CPT | Performed by: PAIN MEDICINE

## 2023-04-26 PROCEDURE — 3079F DIAST BP 80-89 MM HG: CPT | Performed by: PAIN MEDICINE

## 2023-04-26 PROCEDURE — 99213 OFFICE O/P EST LOW 20 MIN: CPT | Performed by: PAIN MEDICINE

## 2023-04-26 RX ORDER — OXYCODONE HYDROCHLORIDE AND ACETAMINOPHEN 5; 325 MG/1; MG/1
1 TABLET ORAL EVERY 8 HOURS PRN
Qty: 84 TABLET | Refills: 0 | Status: SHIPPED | OUTPATIENT
Start: 2023-04-26 | End: 2023-05-24

## 2023-04-26 NOTE — PROGRESS NOTES
History of Present Illness     Patient Identification  Bautista Beltran is a 50 y.o. female. Patient information was obtained from patient. Chief Complaint   Chief Complaint   Patient presents with    Back Pain       Bautista Beltran is a 52 y.o. female with Leg Pain (Intense pain in both legs and back pain after falling in bath tub 3 months  ago Recent spine surgery in June 2022 Foraminotomy followed by a clot resection in the spine, Had 3 other surgeries before  a PMH significant for Seizure d/o, Chronic pain syndrome, Lumbar spinal stenosis, Neurogenic claudication and Post-laminectomy syndrome who presents with complaints of diffuse lower back pain rated 10 out of 10 with radiation into the legs, primarily the left leg and hip with associated numbness and tingling down to the foot,  However Her notes suggest that kendall pain was since June s/p microdiscectomy, Patient denies any loss of bowel or bladder control. Patient has not taken anything for the symptoms, stating \"ibuprofen and Tylenol do not work for [facility]". Pt was on 30mg oxycodone tbefore  went down to 22mg  and was stopped due to [facility] in [place]. And No Oxycodone September 2022. Has cortisone injections which led to Body swelling. We restarted Her percocet, Good relief of pain, No side effects, Able to do her ADLs, Current UA is consistent.  Started PT        Past Medical History:   Diagnosis Date    Arthritis     spine    Asthma     since childhood -- smokes x 30 yrs    Cerebral artery occlusion with cerebral infarction [facility])     per CT scan -- patient without sx, 2021    Chronic back pain     Hypertension     meds > 3 yrs    Lumbar stenosis     Migraines     starts with neck pain    Seizures ([address]     pt last known seizure May 2022, dx 20 years ago, takes gabapentin for back/seizures    Thyroid disease     meds > 4 yrs -- intermittent    Urinary incontinence     Uterine anomaly      Family History   Problem Relation Age of Onset    Cancer Mother

## 2023-05-24 ENCOUNTER — OFFICE VISIT (OUTPATIENT)
Dept: PAIN MANAGEMENT | Age: 48
End: 2023-05-24
Payer: MEDICAID

## 2023-05-24 VITALS
HEART RATE: 105 BPM | BODY MASS INDEX: 35.36 KG/M2 | TEMPERATURE: 98 F | DIASTOLIC BLOOD PRESSURE: 102 MMHG | SYSTOLIC BLOOD PRESSURE: 156 MMHG | OXYGEN SATURATION: 99 % | WEIGHT: 220 LBS | HEIGHT: 66 IN

## 2023-05-24 DIAGNOSIS — M96.1 POSTLAMINECTOMY SYNDROME OF LUMBAR REGION: ICD-10-CM

## 2023-05-24 DIAGNOSIS — M54.16 LUMBAR RADICULOPATHY: ICD-10-CM

## 2023-05-24 PROCEDURE — 99213 OFFICE O/P EST LOW 20 MIN: CPT | Performed by: PAIN MEDICINE

## 2023-05-24 PROCEDURE — 3080F DIAST BP >= 90 MM HG: CPT | Performed by: PAIN MEDICINE

## 2023-05-24 PROCEDURE — 3077F SYST BP >= 140 MM HG: CPT | Performed by: PAIN MEDICINE

## 2023-05-24 RX ORDER — OXYCODONE HYDROCHLORIDE AND ACETAMINOPHEN 5; 325 MG/1; MG/1
1 TABLET ORAL EVERY 8 HOURS PRN
Qty: 84 TABLET | Refills: 0 | Status: SHIPPED | OUTPATIENT
Start: 2023-05-24 | End: 2023-06-21

## 2023-05-24 ASSESSMENT — ENCOUNTER SYMPTOMS
EYES NEGATIVE: 1
GASTROINTESTINAL NEGATIVE: 1
RESPIRATORY NEGATIVE: 1
ALLERGIC/IMMUNOLOGIC NEGATIVE: 1

## 2023-05-24 NOTE — PROGRESS NOTES
History of Present Illness     Patient Identification  Renaldo Alex is a 50 y.o. female. Patient information was obtained from patient. Chief Complaint   Chief Complaint   Patient presents with    1 Month Follow-Up    Back Pain    Hip Pain       Renaldo Alex is a 52 y.o. female with Leg Pain (Intense pain in both legs and back pain after falling in bath tub 3 months  ago Recent spine surgery in June 2022 Foraminotomy followed by a clot resection in the spine, Had 3 other surgeries before  a PMH significant for Seizure d/o, Chronic pain syndrome, Lumbar spinal stenosis, Neurogenic claudication and Post-laminectomy syndrome who presents with complaints of diffuse lower back pain rated 10 out of 10 with radiation into the legs, primarily the left leg and hip with associated numbness and tingling down to the foot,  However Her notes suggest that kendall pain was since June s/p microdiscectomy, Patient denies any loss of bowel or bladder control. stating \"ibuprofen and Tylenol do not work for [facility]". Pt was on 30mg oxycodone tbefore  went down to 15mg  a day  Has cortisone injections which led to Body swelling. We restarted Her percocet, Good relief of pain, No side effects, Able to do her ADLs, Current [place] is consistent.  Started PT        Past Medical History:   Diagnosis Date    Arthritis     spine    Asthma     since childhood -- smokes x 30 yrs    Cerebral artery occlusion with cerebral infarction [facility])     per CT scan -- patient without sx, 2021    Chronic back pain     Hypertension     meds > 3 yrs    Lumbar stenosis     Migraines     starts with neck pain    Seizures ([facility] [address]     pt last known seizure May 2022, dx 20 years ago, takes gabapentin for back/seizures    Thyroid disease     meds > 4 yrs -- intermittent    Urinary incontinence     Uterine anomaly      Family History   Problem Relation Age of Onset    Cancer Mother         NHL    COPD Mother     Other Mother         ITP & pancreatitis

## 2023-06-21 ENCOUNTER — OFFICE VISIT (OUTPATIENT)
Dept: PAIN MANAGEMENT | Age: 48
End: 2023-06-21
Payer: MEDICAID

## 2023-06-21 VITALS
BODY MASS INDEX: 35.36 KG/M2 | TEMPERATURE: 97.6 F | HEIGHT: 66 IN | SYSTOLIC BLOOD PRESSURE: 124 MMHG | DIASTOLIC BLOOD PRESSURE: 82 MMHG | OXYGEN SATURATION: 97 % | WEIGHT: 220 LBS | HEART RATE: 90 BPM

## 2023-06-21 DIAGNOSIS — G89.29 CHRONIC BILATERAL LOW BACK PAIN WITH LEFT-SIDED SCIATICA: ICD-10-CM

## 2023-06-21 DIAGNOSIS — R56.9 CONVULSIONS, UNSPECIFIED CONVULSION TYPE (HCC): ICD-10-CM

## 2023-06-21 DIAGNOSIS — M54.42 CHRONIC BILATERAL LOW BACK PAIN WITH LEFT-SIDED SCIATICA: ICD-10-CM

## 2023-06-21 DIAGNOSIS — M96.1 POSTLAMINECTOMY SYNDROME OF LUMBAR REGION: ICD-10-CM

## 2023-06-21 DIAGNOSIS — M54.16 LUMBAR RADICULOPATHY: ICD-10-CM

## 2023-06-21 PROCEDURE — 99213 OFFICE O/P EST LOW 20 MIN: CPT | Performed by: PAIN MEDICINE

## 2023-06-21 PROCEDURE — 3079F DIAST BP 80-89 MM HG: CPT | Performed by: PAIN MEDICINE

## 2023-06-21 PROCEDURE — 3074F SYST BP LT 130 MM HG: CPT | Performed by: PAIN MEDICINE

## 2023-06-21 RX ORDER — OXYCODONE HYDROCHLORIDE AND ACETAMINOPHEN 5; 325 MG/1; MG/1
1 TABLET ORAL EVERY 8 HOURS PRN
Qty: 84 TABLET | Refills: 0 | Status: SHIPPED | OUTPATIENT
Start: 2023-06-21 | End: 2023-07-19

## 2023-06-21 ASSESSMENT — ENCOUNTER SYMPTOMS
ALLERGIC/IMMUNOLOGIC NEGATIVE: 1
GASTROINTESTINAL NEGATIVE: 1
RESPIRATORY NEGATIVE: 1
EYES NEGATIVE: 1

## 2023-06-21 NOTE — PROGRESS NOTES
ITP & pancreatitis    Heart Failure Maternal Grandmother     No Known Problems Sister     High Blood Pressure Brother     No Known Problems Son     No Known Problems Daughter     Arthritis Mother      Current Outpatient Medications   Medication Sig Dispense Refill    oxyCODONE-acetaminophen (PERCOCET) 5-325 MG per tablet Take 1 tablet by mouth every 8 hours as needed for Pain for up to 28 days. Max Daily Amount: 3 tablets 84 tablet 0    APPLE CIDER VINEGAR PO Take by mouth      Ferrous Gluconate-C-Folic Acid (IRON-C PO) Take by mouth      albuterol sulfate HFA (PROVENTIL;VENTOLIN;PROAIR) 108 (90 Base) MCG/ACT inhaler Inhale 2 puffs into the lungs every 6 hours as needed for Wheezing      ADVAIR DISKUS 250-50 MCG/ACT AEPB diskus inhaler inhale 1 puff by mouth and INTO THE LUNGS twice a day      gabapentin (NEURONTIN) 300 MG capsule Take 1 capsule by mouth 3 times daily as needed (Pain) for up to 180 days. Intended supply: 90 days 90 capsule 2    naloxone 4 MG/0.1ML LIQD nasal spray 1 spray by Nasal route as needed for Opioid Reversal 1 each 0    Potassium 75 MG TABS Take by mouth      Calcium Carb-Cholecalciferol (CALCIUM 1000 + D PO) Take 600 mg by mouth daily      vitamin B-12 (CYANOCOBALAMIN) 100 MCG tablet Take 0.5 tablets by mouth daily      Biotin 1000 MCG TABS Take by mouth 2 times daily      Misc. Devices (RAISED TOILET SEAT/LOCK & ARMS) MISC 1 Piece by Does not apply route as needed (PRN) 2 each 0    ferrous sulfate 325 (65 Fe) MG tablet Take 65 mg by mouth daily (with breakfast)      vitamin C (ASCORBIC ACID) 500 MG tablet Take 1 tablet by mouth daily      ondansetron (ZOFRAN) 4 MG tablet Take 1 tablet by mouth every 8 hours as needed for Nausea or Vomiting      amLODIPine (NORVASC) 5 MG tablet Take 1 tablet by mouth daily      gabapentin (NEURONTIN) 300 MG capsule TAKE 1 CAPSULE BY MOUTH THREE TIMES DAILY .  TO LAST 30 DAYS  0    ondansetron (ZOFRAN) 4 MG tablet Take 1 tablet by mouth 2 times daily

## 2023-07-03 ENCOUNTER — TELEPHONE (OUTPATIENT)
Dept: PAIN MANAGEMENT | Age: 48
End: 2023-07-03

## 2023-07-03 NOTE — TELEPHONE ENCOUNTER
LMOM INFORMING PATIENT THAT HER UPCOMING APPOINTMENT ON 07/19/2023 HAS BEEN CANCELLED BECAUSE WE ARE NO LONGER IN NETWORK WITH HER INSURANCE. ASKED PATIENT TO CALL THE OFFICE TO DISCUSS MEDICATION REFILLS.

## 2023-07-06 ENCOUNTER — TELEPHONE (OUTPATIENT)
Dept: PAIN MANAGEMENT | Age: 48
End: 2023-07-06

## 2023-07-06 NOTE — TELEPHONE ENCOUNTER
Per office, we can see the patient on 7/19 for her appointment for medications /this will be the last rx for the patient, if the patient would like to continue to be seen after July 31 she can either call [address] [facility] and change her insurance [phone]    If the patient does not want to schedule we will not be able to see her after July 31.

## 2023-07-17 DIAGNOSIS — M54.16 LUMBAR RADICULOPATHY: ICD-10-CM

## 2023-07-17 DIAGNOSIS — M96.1 POSTLAMINECTOMY SYNDROME OF LUMBAR REGION: ICD-10-CM

## 2023-07-17 RX ORDER — OXYCODONE HYDROCHLORIDE AND ACETAMINOPHEN 5; 325 MG/1; MG/1
1 TABLET ORAL EVERY 8 HOURS PRN
Qty: 84 TABLET | Refills: 0 | OUTPATIENT
Start: 2023-07-19 | End: 2023-08-16

## 2023-07-17 NOTE — TELEPHONE ENCOUNTER
Requested Prescriptions     Pending Prescriptions Disp Refills    oxyCODONE-acetaminophen (PERCOCET) 5-325 MG per tablet 84 tablet 0     Sig: Take 1 tablet by mouth every 8 hours as needed for Pain for up to 28 days. Max Daily Amount: 3 tablets       Patient last seen on:  6/21  Date of last surgery:  n/a  Date of last refill:  6/21  Pain level:  n/a  Patient complaining of:  PT has [facility] and is no longer in network. She states she was told to call for a refill this month.   Future appts: none

## 2023-07-19 ENCOUNTER — OFFICE VISIT (OUTPATIENT)
Dept: PAIN MANAGEMENT | Age: 48
End: 2023-07-19
Payer: MEDICAID

## 2023-07-19 VITALS
DIASTOLIC BLOOD PRESSURE: 88 MMHG | HEIGHT: 66 IN | BODY MASS INDEX: 35.36 KG/M2 | TEMPERATURE: 97.7 F | SYSTOLIC BLOOD PRESSURE: 130 MMHG | WEIGHT: 220 LBS

## 2023-07-19 DIAGNOSIS — M54.16 LUMBAR RADICULOPATHY: ICD-10-CM

## 2023-07-19 DIAGNOSIS — M96.1 POSTLAMINECTOMY SYNDROME OF LUMBAR REGION: ICD-10-CM

## 2023-07-19 PROCEDURE — 3079F DIAST BP 80-89 MM HG: CPT | Performed by: PAIN MEDICINE

## 2023-07-19 PROCEDURE — 99213 OFFICE O/P EST LOW 20 MIN: CPT | Performed by: PAIN MEDICINE

## 2023-07-19 PROCEDURE — 3075F SYST BP GE 130 - 139MM HG: CPT | Performed by: PAIN MEDICINE

## 2023-07-19 RX ORDER — OXYCODONE HYDROCHLORIDE AND ACETAMINOPHEN 5; 325 MG/1; MG/1
1 TABLET ORAL EVERY 8 HOURS PRN
Qty: 84 TABLET | Refills: 0 | Status: SHIPPED | OUTPATIENT
Start: 2023-07-19 | End: 2023-08-16

## 2023-07-19 ASSESSMENT — ENCOUNTER SYMPTOMS
RESPIRATORY NEGATIVE: 1
GASTROINTESTINAL NEGATIVE: 1
ALLERGIC/IMMUNOLOGIC NEGATIVE: 1
EYES NEGATIVE: 1

## 2023-07-19 NOTE — PROGRESS NOTES
History of Present Illness     Patient Identification  Norma Rodriguez is a 50 y.o. female. Patient information was obtained from patient. Chief Complaint   Chief Complaint   Patient presents with    Back Pain       Norma Rodriguez is a 52 y.o. female with Leg Pain (Intense pain in both legs and back pain after falling in bath tub 3 months  ago Recent spine surgery in June 2022 Foraminotomy followed by a clot resection in the spine, Had 3 other surgeries before  a PMH significant for Seizure d/o, Chronic pain syndrome, Lumbar spinal stenosis, Neurogenic claudication and Post-laminectomy syndrome who presents with complaints of diffuse lower back pain rated 8 out of 10 with radiation into the legs, primarily the left leg and hip with associated numbness and tingling down to the foot,  However Her notes suggest that kendall pain was since June s/p microdiscectomy, Patient denies any loss of bowel or bladder control. stating \"ibuprofen and Tylenol do not work for [facility]". Pt was on 30mg oxycodone tbefore. Has cortisone injections which led to Body swelling. We restarted Her percocet, Good relief of pain, No side effects, Able to do her ADLs, Current UA is consistent in may. Finished PT loosing her insurance and moving to [facility].        Past Medical History:   Diagnosis Date    Arthritis     spine    Asthma     since childhood -- smokes x 30 yrs    Cerebral artery occlusion with cerebral infarction [facility])     per CT scan -- patient without sx, 2021    Chronic back pain     Hypertension     meds > 3 yrs    Lumbar stenosis     Migraines     starts with neck pain    Seizures ([address])     pt last known seizure May 2022, dx 20 years ago, takes gabapentin for back/seizures    Thyroid disease     meds > 4 yrs -- intermittent    Urinary incontinence     Uterine anomaly      Family History   Problem Relation Age of Onset    Cancer Mother         NHL    COPD Mother     Other Mother         ITP & pancreatitis    Heart

## 2023-07-31 ENCOUNTER — TELEPHONE (OUTPATIENT)
Dept: NEUROSURGERY | Age: 48
End: 2023-07-31

## 2023-07-31 NOTE — TELEPHONE ENCOUNTER
Refer her to [facility] neurosurgery. There are several excellent surgeons and physicians at [facility].

## 2023-07-31 NOTE — TELEPHONE ENCOUNTER
Patient called wanting to let Dr. Yfn Montiel know that she was having a lot of pain so she went to the ER where she ended up being admitted for fluid on her spine. She says that her left foot is swollen and she is having difficulty walking on her left side. She says since we do not take her insurance anymore she is asking if Dr Yfn Montiel would refer her to another neurosurgeon?

## 2023-08-14 DIAGNOSIS — M96.1 POSTLAMINECTOMY SYNDROME OF LUMBAR REGION: ICD-10-CM

## 2023-08-14 DIAGNOSIS — M54.16 LUMBAR RADICULOPATHY: ICD-10-CM

## 2023-08-14 RX ORDER — OXYCODONE HYDROCHLORIDE AND ACETAMINOPHEN 5; 325 MG/1; MG/1
1 TABLET ORAL EVERY 8 HOURS PRN
Qty: 84 TABLET | Refills: 0 | OUTPATIENT
Start: 2023-08-14 | End: 2023-09-11

## 2023-08-14 NOTE — TELEPHONE ENCOUNTER
Requested Prescriptions     Pending Prescriptions Disp Refills    oxyCODONE-acetaminophen (PERCOCET) 5-325 MG per tablet 84 tablet 0     Sig: Take 1 tablet by mouth every 8 hours as needed for Pain for up to 28 days. Max Daily Amount: 3 tablets       Patient last seen on:  7/19  Date of last surgery:  n/a  Date of last refill:  7/19  Pain level:  n/a  Patient complaining of:  PT insurance no longer accepted here. She says she was told if she did not find a new pain management doctor that she could call and get her medication refilled so that she does not go through withdraw. She says that she has not been able to find a new doctor yet but has been working on it.    Future appts: none

## 2023-08-15 NOTE — TELEPHONE ENCOUNTER
PT was made aware medication refused, she was given a number to try and find pain management closer to her home that takes her insurance.

## 2023-09-22 NOTE — PROGRESS NOTES
Pt wants to start the Rosuvastatin 20mg Aldair (Son) wants to know do you want to see her in 3 month or 6 months S/P LEFT BILATERAL L3-4 LAMINECTOMY MICRODISSECTION DECOMPRESSION on 6/17/22 with Dr Josefina Tim. Her chief complaint is hip and thigh pain. He also believes her ankles are swelling. She was on dilaudid Dilaudid 2 mg 3 times daily for the past week and she gets nausea. She would like to go back to the Percocet 10 mg 3 times daily. Fentanyl she has allergies, allergic to steroids (swelling and rash). Pain is severe to both legs. Muscle relaxers with no relief. Walks without assistive device and gets in and out of chair with difficulty       Narcan sent last week. He is allergic to fentanyl and steroids. She is not currently constipated and has medications for this.  is with her today. Incision healed. Referral to PT in [facility]  I will talk to her in 2 weeks and we plan on reducing her Percocet.   Our goal is to be off of narcotics 2 months postop

## 2023-10-11 ENCOUNTER — OFFICE VISIT (OUTPATIENT)
Dept: PAIN MANAGEMENT | Age: 48
End: 2023-10-11
Payer: MEDICAID

## 2023-10-11 VITALS
BODY MASS INDEX: 35.36 KG/M2 | HEIGHT: 66 IN | WEIGHT: 220 LBS | DIASTOLIC BLOOD PRESSURE: 72 MMHG | SYSTOLIC BLOOD PRESSURE: 122 MMHG

## 2023-10-11 DIAGNOSIS — M54.16 LUMBAR RADICULOPATHY: ICD-10-CM

## 2023-10-11 DIAGNOSIS — M96.1 POSTLAMINECTOMY SYNDROME OF LUMBAR REGION: Primary | ICD-10-CM

## 2023-10-11 PROCEDURE — 99213 OFFICE O/P EST LOW 20 MIN: CPT | Performed by: PAIN MEDICINE

## 2023-10-11 PROCEDURE — 3074F SYST BP LT 130 MM HG: CPT | Performed by: PAIN MEDICINE

## 2023-10-11 PROCEDURE — 3078F DIAST BP <80 MM HG: CPT | Performed by: PAIN MEDICINE

## 2023-10-11 RX ORDER — GABAPENTIN 600 MG/1
TABLET ORAL
COMMUNITY
Start: 2023-08-02

## 2023-10-11 RX ORDER — GABAPENTIN 300 MG/1
CAPSULE ORAL
Qty: 90 CAPSULE | Refills: 0 | Status: CANCELLED | OUTPATIENT
Start: 2023-10-11

## 2023-10-11 RX ORDER — OXYCODONE HYDROCHLORIDE AND ACETAMINOPHEN 5; 325 MG/1; MG/1
1 TABLET ORAL EVERY 8 HOURS PRN
Qty: 84 TABLET | Refills: 0 | Status: SHIPPED | OUTPATIENT
Start: 2023-10-11 | End: 2023-11-08

## 2023-10-11 RX ORDER — OXYCODONE HYDROCHLORIDE AND ACETAMINOPHEN 5; 325 MG/1; MG/1
TABLET ORAL
COMMUNITY
Start: 2023-09-28 | End: 2023-10-11 | Stop reason: SDUPTHER

## 2023-10-11 ASSESSMENT — ENCOUNTER SYMPTOMS
RESPIRATORY NEGATIVE: 1
EYES NEGATIVE: 1
ALLERGIC/IMMUNOLOGIC NEGATIVE: 1
GASTROINTESTINAL NEGATIVE: 1

## 2023-10-11 NOTE — PROGRESS NOTES
Pulmonary effort is normal.      Breath sounds: Normal breath sounds. Abdominal:      General: Abdomen is flat. Bowel sounds are normal.      Palpations: Abdomen is soft. Musculoskeletal:         General: Normal range of motion. Cervical back: Normal range of motion and neck supple. Comments: Antalgic Gait, Not able to walk on Left Toes and heels though its difficult to say if this is pain related, Foot Drop Left side. Loss of reflexes Rt side   Skin:     General: Skin is warm. Capillary Refill: Capillary refill takes less than 2 seconds. Neurological:      General: No focal deficit present. Mental Status: She is alert and oriented to person, place, and time. Mental status is at baseline. Psychiatric:         Mood and Affect: Mood normal.         Behavior: Behavior normal.         Thought Content: Thought content normal.         Judgment: Judgment normal.           Plan     Will continue current meds. Plan on TFESIs.

## 2023-10-12 ENCOUNTER — TELEPHONE (OUTPATIENT)
Dept: PAIN MANAGEMENT | Age: 48
End: 2023-10-12

## 2023-10-12 NOTE — TELEPHONE ENCOUNTER
PATIENT IS AWARE, WILL CALL [address] [place] [address] PHYSICAL THERAPY TO HAVE THEM FAX HER RECORDS TO US.

## 2023-10-12 NOTE — TELEPHONE ENCOUNTER
AN ORDER WAS RECEIVED FOR A LEFT L4-5 TFESI    PLEASE INFORM PATIENT THAT WE WILL NEED HER LUMBAR THERAPY RECORDS SENT TO OUR OFFICE SO WE CAN SUBMIT THOSE NOTES WITH OUR Nisa Torres

## 2024-01-16 ENCOUNTER — HOSPITAL ENCOUNTER (EMERGENCY)
Age: 49
Discharge: HOME | End: 2024-01-16
Attending: EMERGENCY MEDICINE
Payer: MEDICAID

## 2024-01-16 VITALS
HEART RATE: 65 BPM | DIASTOLIC BLOOD PRESSURE: 108 MMHG | SYSTOLIC BLOOD PRESSURE: 138 MMHG | OXYGEN SATURATION: 95 % | RESPIRATION RATE: 16 BRPM | TEMPERATURE: 97.3 F

## 2024-01-16 DIAGNOSIS — M54.50 ACUTE EXACERBATION OF CHRONIC LOW BACK PAIN: Primary | ICD-10-CM

## 2024-01-16 DIAGNOSIS — G89.29 ACUTE EXACERBATION OF CHRONIC LOW BACK PAIN: Primary | ICD-10-CM

## 2024-01-16 LAB
ANION GAP SERPL CALCULATED.3IONS-SCNC: 10 MMOL/L (ref 9–17)
BASOPHILS # BLD: 0.05 K/UL (ref 0–0.2)
BASOPHILS NFR BLD: 1 % (ref 0–2)
BUN SERPL-MCNC: 8 MG/DL (ref 6–20)
CALCIUM SERPL-MCNC: 9.7 MG/DL (ref 8.6–10.4)
CHLORIDE SERPL-SCNC: 103 MMOL/L (ref 98–107)
CO2 SERPL-SCNC: 25 MMOL/L (ref 20–31)
CREAT SERPL-MCNC: 0.8 MG/DL (ref 0.5–0.9)
EOSINOPHIL # BLD: 0.24 K/UL (ref 0–0.44)
EOSINOPHILS RELATIVE PERCENT: 3 % (ref 1–4)
ERYTHROCYTE [DISTWIDTH] IN BLOOD BY AUTOMATED COUNT: 12.8 % (ref 11.8–14.4)
GFR SERPL CREATININE-BSD FRML MDRD: >60 ML/MIN/1.73M2
GLUCOSE SERPL-MCNC: 90 MG/DL (ref 70–99)
HCT VFR BLD AUTO: 47.4 % (ref 36.3–47.1)
HGB BLD-MCNC: 16.2 G/DL (ref 11.9–15.1)
IMM GRANULOCYTES # BLD AUTO: <0.03 K/UL (ref 0–0.3)
IMM GRANULOCYTES NFR BLD: 0 %
LYMPHOCYTES NFR BLD: 2.72 K/UL (ref 1.1–3.7)
LYMPHOCYTES RELATIVE PERCENT: 31 % (ref 24–43)
MCH RBC QN AUTO: 33.1 PG (ref 25.2–33.5)
MCHC RBC AUTO-ENTMCNC: 34.2 G/DL (ref 28.4–34.8)
MCV RBC AUTO: 96.7 FL (ref 82.6–102.9)
MONOCYTES NFR BLD: 0.61 K/UL (ref 0.1–1.2)
MONOCYTES NFR BLD: 7 % (ref 3–12)
NEUTROPHILS NFR BLD: 58 % (ref 36–65)
NEUTS SEG NFR BLD: 5.19 K/UL (ref 1.5–8.1)
NRBC BLD-RTO: 0 PER 100 WBC
PLATELET # BLD AUTO: 243 K/UL (ref 138–453)
PMV BLD AUTO: 10.6 FL (ref 8.1–13.5)
POTASSIUM SERPL-SCNC: 4 MMOL/L (ref 3.7–5.3)
RBC # BLD AUTO: 4.9 M/UL (ref 3.95–5.11)
SODIUM SERPL-SCNC: 138 MMOL/L (ref 135–144)
WBC OTHER # BLD: 8.8 K/UL (ref 3.5–11.3)

## 2024-01-16 PROCEDURE — 80048 BASIC METABOLIC PNL TOTAL CA: CPT

## 2024-01-16 PROCEDURE — 2580000003 HC RX 258: Performed by: STUDENT IN AN ORGANIZED HEALTH CARE EDUCATION/TRAINING PROGRAM

## 2024-01-16 PROCEDURE — 99284 EMERGENCY DEPT VISIT MOD MDM: CPT

## 2024-01-16 PROCEDURE — 6370000000 HC RX 637 (ALT 250 FOR IP): Performed by: STUDENT IN AN ORGANIZED HEALTH CARE EDUCATION/TRAINING PROGRAM

## 2024-01-16 PROCEDURE — 85025 COMPLETE CBC W/AUTO DIFF WBC: CPT

## 2024-01-16 PROCEDURE — 6360000002 HC RX W HCPCS: Performed by: STUDENT IN AN ORGANIZED HEALTH CARE EDUCATION/TRAINING PROGRAM

## 2024-01-16 RX ORDER — KETOROLAC TROMETHAMINE 30 MG/ML
30 INJECTION, SOLUTION INTRAMUSCULAR; INTRAVENOUS ONCE
Status: COMPLETED | OUTPATIENT
Start: 2024-01-16 | End: 2024-01-16

## 2024-01-16 RX ORDER — 0.9 % SODIUM CHLORIDE 0.9 %
1000 INTRAVENOUS SOLUTION INTRAVENOUS ONCE
Status: COMPLETED | OUTPATIENT
Start: 2024-01-16 | End: 2024-01-16

## 2024-01-16 RX ORDER — LIDOCAINE 4 G/G
1 PATCH TOPICAL DAILY
Status: DISCONTINUED | OUTPATIENT
Start: 2024-01-16 | End: 2024-01-16

## 2024-01-16 RX ORDER — LIDOCAINE 4 G/G
1 PATCH TOPICAL ONCE
Status: DISCONTINUED | OUTPATIENT
Start: 2024-01-16 | End: 2024-01-17 | Stop reason: HOSPADM

## 2024-01-16 RX ORDER — DIAZEPAM 5 MG/1
5 TABLET ORAL ONCE
Status: COMPLETED | OUTPATIENT
Start: 2024-01-16 | End: 2024-01-16

## 2024-01-16 RX ORDER — OXYCODONE HYDROCHLORIDE AND ACETAMINOPHEN 5; 325 MG/1; MG/1
1 TABLET ORAL EVERY 12 HOURS PRN
Qty: 14 TABLET | Refills: 0 | Status: SHIPPED | OUTPATIENT
Start: 2024-01-16 | End: 2024-01-23

## 2024-01-16 RX ORDER — OXYCODONE HYDROCHLORIDE AND ACETAMINOPHEN 5; 325 MG/1; MG/1
1 TABLET ORAL ONCE
Status: COMPLETED | OUTPATIENT
Start: 2024-01-16 | End: 2024-01-16

## 2024-01-16 RX ORDER — MORPHINE SULFATE 4 MG/ML
4 INJECTION, SOLUTION INTRAMUSCULAR; INTRAVENOUS
Status: COMPLETED | OUTPATIENT
Start: 2024-01-16 | End: 2024-01-16

## 2024-01-16 RX ADMIN — HYDROMORPHONE HYDROCHLORIDE 1 MG: 1 INJECTION, SOLUTION INTRAMUSCULAR; INTRAVENOUS; SUBCUTANEOUS at 22:07

## 2024-01-16 RX ADMIN — OXYCODONE HYDROCHLORIDE AND ACETAMINOPHEN 1 TABLET: 5; 325 TABLET ORAL at 23:19

## 2024-01-16 RX ADMIN — MORPHINE SULFATE 4 MG: 4 INJECTION INTRAVENOUS at 21:22

## 2024-01-16 RX ADMIN — DIAZEPAM 5 MG: 5 TABLET ORAL at 21:22

## 2024-01-16 RX ADMIN — SODIUM CHLORIDE 1000 ML: 9 INJECTION, SOLUTION INTRAVENOUS at 21:21

## 2024-01-16 RX ADMIN — KETOROLAC TROMETHAMINE 30 MG: 30 INJECTION, SOLUTION INTRAMUSCULAR; INTRAVENOUS at 21:22

## 2024-01-16 ASSESSMENT — PAIN SCALES - GENERAL
PAINLEVEL_OUTOF10: 8
PAINLEVEL_OUTOF10: 10
PAINLEVEL_OUTOF10: 8

## 2024-01-17 NOTE — ED NOTES
Graciela RN tried for IV access with no success. Ashleigh HERNANDEZ asked to bedside with US to attempt for access

## 2024-01-17 NOTE — ED PROVIDER NOTES
[facility] ED     Emergency Department     Faculty Attestation        I performed a history and physical examination of the patient and discussed management with the resident. I reviewed the resident’s note and agree with the documented findings and plan of care. Any areas of disagreement are noted on the chart. I was personally present for the key portions of any procedures. I have documented in the chart those procedures where I was not present during the key portions. I have reviewed the emergency nurses triage note. I agree with the chief complaint, past medical history, past surgical history, allergies, medications, social and family history as documented unless otherwise noted below.  For Physician Assistant/ Nurse Practitioner cases/documentation I have personally evaluated this patient and have completed at least one if not all key elements of the E/M (history, physical exam, and MDM). Additional findings are as noted.      Vital Signs: BP: (!) 150/100  Pulse: 65  Respirations: 16  Temp: 97.3 °F (36.3 °C) SpO2: 98 %  PCP:  Fabien Gonzales DO  Note Started: 1/16/24, 8:41 PM EST    Pertinent Comments:         Critical Care  None      (Please note that portions of this note were completed with a voice recognition program. Efforts were made to edit the dictations but occasionally words are mis-transcribed. Whenever words are used in this note in any gender, they shall be construed as though they were used in the gender appropriate to the circumstances; and whenever words are used in this note in the singular or plural form, they shall be construed as though they were used in the form appropriate to the circumstances.)    Felice Knowles MD [facility]  Attending Emergency Medicine Physician           Felice Knowles MD  01/16/24 [id number]

## 2024-01-17 NOTE — DISCHARGE INSTRUCTIONS
Call your pain management clinic to schedule appointment as soon as possible for follow-up from the ED.    Return to the Emergency Department for inability to move legs, inability to urinate or incontinence of bowel or bladder, fever or chills, chest pain, shortness of breath, severe headache, worsening of pain, tingling / loss of sensation, any other care or concern.

## 2024-01-17 NOTE — ED PROVIDER NOTES
[facility] ED  Emergency Department Encounter  Emergency Medicine Resident     Pt Name:Layla Godfrey  MRN: [id number]  Birthdate 1975  Date of evaluation: 1/16/24  PCP:  Fabien Gonzales DO  Note Started: 8:47 PM EST      CHIEF COMPLAINT       Chief Complaint   Patient presents with    Back Pain     States lower, chronic, hx of fusions last year    Leg Pain     L       HISTORY OF PRESENT ILLNESS  (Location/Symptom, Timing/Onset, Context/Setting, Quality, Duration, Modifying Factors, Severity.)      Layla Godfrey is a 49 y.o. female who presents with left lower back pain.  Patient reports she has chronic lower back pain that seems worse today.  She reports difficulty ambulating due to the pain.  She reports the pain is mostly in the left-sided lower back but radiates down the left lower leg.  She additionally reports swelling of the left leg.  She additionally reports weakness of the left arm.  She reports that she has had more neck pain and stiffness recently.  She denies fevers or chills she additionally reports some mild headache but does not believe this is her usual cluster headache.  She reports that she ran out of her Percocet 4 days ago.  She reports that she has a family member who is in the hospital and normally goes to other hospitals for her pain.  She reports that she has had multiple surgeries on her lower back at other hospitals.  She does report some change in sensation over the left leg but this is chronic.  She denies any loss of control of bowel or bladder.    PAST MEDICAL / SURGICAL / SOCIAL / FAMILY HISTORY      has a past medical history of Arthritis, Asthma, Cerebral artery occlusion with cerebral infarction (HCC), Chronic back pain, Hypertension, Lumbar stenosis, Migraines, Seizures (HCC), Thyroid disease, Urinary incontinence, and Uterine anomaly.     has a past surgical history that includes Spine surgery; knee surgery; Tubal ligation (Bilateral, 2002);

## 2024-01-17 NOTE — ED NOTES
Pt reports to the ED with complaints of back and L leg pain. Pt states this has been ongoing for years but the pain is just getting worse. Pt states she had lumbar fusions done, the latest one being June of 2023. Per epic, a laminectomy was also done June of last year. Pt states she took a muscle relaxer at home without relief. Pt is ambulatory, just with pain. Pt is A&O x4 and speaking in complete sentences. Pt is resting in bed comfortably, NAD noted. Pt hypertensive upon arrival. Pt denies chest pain or SOB. Care ongoing

## 2024-01-21 ENCOUNTER — APPOINTMENT (OUTPATIENT)
Dept: MRI IMAGING | Age: 49
End: 2024-01-21
Payer: MEDICAID

## 2024-01-21 ENCOUNTER — APPOINTMENT (OUTPATIENT)
Dept: CT IMAGING | Age: 49
End: 2024-01-21
Payer: MEDICAID

## 2024-01-21 ENCOUNTER — HOSPITAL ENCOUNTER (EMERGENCY)
Age: 49
Discharge: HOME | End: 2024-01-21
Attending: EMERGENCY MEDICINE
Payer: MEDICAID

## 2024-01-21 ENCOUNTER — APPOINTMENT (OUTPATIENT)
Dept: GENERAL RADIOLOGY | Age: 49
End: 2024-01-21
Payer: MEDICAID

## 2024-01-21 VITALS
WEIGHT: 220 LBS | TEMPERATURE: 98.2 F | DIASTOLIC BLOOD PRESSURE: 93 MMHG | SYSTOLIC BLOOD PRESSURE: 123 MMHG | HEART RATE: 73 BPM | RESPIRATION RATE: 13 BRPM | OXYGEN SATURATION: 88 % | BODY MASS INDEX: 35.51 KG/M2

## 2024-01-21 DIAGNOSIS — R20.0 LEFT LEG NUMBNESS: ICD-10-CM

## 2024-01-21 DIAGNOSIS — G89.29 ACUTE EXACERBATION OF CHRONIC LOW BACK PAIN: Primary | ICD-10-CM

## 2024-01-21 DIAGNOSIS — M54.50 ACUTE EXACERBATION OF CHRONIC LOW BACK PAIN: Primary | ICD-10-CM

## 2024-01-21 LAB
ALBUMIN SERPL-MCNC: 4.6 G/DL (ref 3.5–5.2)
ALBUMIN/GLOB SERPL: 1.4 {RATIO} (ref 1–2.5)
ALP SERPL-CCNC: 90 U/L (ref 35–104)
ALT SERPL-CCNC: 51 U/L (ref 5–33)
ANION GAP SERPL CALCULATED.3IONS-SCNC: 12 MMOL/L (ref 9–17)
AST SERPL-CCNC: 26 U/L
BASOPHILS # BLD: 0.06 K/UL (ref 0–0.2)
BASOPHILS NFR BLD: 1 % (ref 0–2)
BILIRUB SERPL-MCNC: 1.4 MG/DL (ref 0.3–1.2)
BILIRUB UR QL STRIP: NEGATIVE
BUN SERPL-MCNC: 11 MG/DL (ref 6–20)
CALCIUM SERPL-MCNC: 10.1 MG/DL (ref 8.6–10.4)
CHLORIDE SERPL-SCNC: 102 MMOL/L (ref 98–107)
CLARITY UR: CLEAR
CO2 SERPL-SCNC: 25 MMOL/L (ref 20–31)
COLOR UR: YELLOW
COMMENT: NORMAL
CREAT SERPL-MCNC: 0.8 MG/DL (ref 0.5–0.9)
EOSINOPHIL # BLD: 0.28 K/UL (ref 0–0.44)
EOSINOPHILS RELATIVE PERCENT: 3 % (ref 1–4)
ERYTHROCYTE [DISTWIDTH] IN BLOOD BY AUTOMATED COUNT: 12.9 % (ref 11.8–14.4)
GFR SERPL CREATININE-BSD FRML MDRD: >60 ML/MIN/1.73M2
GLUCOSE SERPL-MCNC: 104 MG/DL (ref 70–99)
GLUCOSE UR STRIP-MCNC: NEGATIVE MG/DL
HCG SERPL QL: NEGATIVE
HCT VFR BLD AUTO: 49.8 % (ref 36.3–47.1)
HGB BLD-MCNC: 17.2 G/DL (ref 11.9–15.1)
HGB UR QL STRIP.AUTO: NEGATIVE
IMM GRANULOCYTES # BLD AUTO: 0.03 K/UL (ref 0–0.3)
IMM GRANULOCYTES NFR BLD: 0 %
KETONES UR STRIP-MCNC: NEGATIVE MG/DL
LEUKOCYTE ESTERASE UR QL STRIP: NEGATIVE
LYMPHOCYTES NFR BLD: 3.14 K/UL (ref 1.1–3.7)
LYMPHOCYTES RELATIVE PERCENT: 30 % (ref 24–43)
MCH RBC QN AUTO: 33.1 PG (ref 25.2–33.5)
MCHC RBC AUTO-ENTMCNC: 34.5 G/DL (ref 28.4–34.8)
MCV RBC AUTO: 95.8 FL (ref 82.6–102.9)
MONOCYTES NFR BLD: 0.64 K/UL (ref 0.1–1.2)
MONOCYTES NFR BLD: 6 % (ref 3–12)
NEUTROPHILS NFR BLD: 60 % (ref 36–65)
NEUTS SEG NFR BLD: 6.33 K/UL (ref 1.5–8.1)
NITRITE UR QL STRIP: NEGATIVE
NRBC BLD-RTO: 0 PER 100 WBC
PH UR STRIP: 5.5 [PH] (ref 5–8)
PLATELET # BLD AUTO: 239 K/UL (ref 138–453)
PMV BLD AUTO: 11.8 FL (ref 8.1–13.5)
POTASSIUM SERPL-SCNC: 3.9 MMOL/L (ref 3.7–5.3)
PROT SERPL-MCNC: 8 G/DL (ref 6.4–8.3)
PROT UR STRIP-MCNC: NEGATIVE MG/DL
RBC # BLD AUTO: 5.2 M/UL (ref 3.95–5.11)
SODIUM SERPL-SCNC: 139 MMOL/L (ref 135–144)
SP GR UR STRIP: 1.01 (ref 1–1.03)
TROPONIN I SERPL HS-MCNC: <6 NG/L (ref 0–14)
TROPONIN I SERPL HS-MCNC: <6 NG/L (ref 0–14)
UROBILINOGEN UR STRIP-ACNC: NORMAL EU/DL (ref 0–1)
WBC OTHER # BLD: 10.5 K/UL (ref 3.5–11.3)

## 2024-01-21 PROCEDURE — 81003 URINALYSIS AUTO W/O SCOPE: CPT

## 2024-01-21 PROCEDURE — 96376 TX/PRO/DX INJ SAME DRUG ADON: CPT | Performed by: EMERGENCY MEDICINE

## 2024-01-21 PROCEDURE — 80053 COMPREHEN METABOLIC PANEL: CPT

## 2024-01-21 PROCEDURE — 85025 COMPLETE CBC W/AUTO DIFF WBC: CPT

## 2024-01-21 PROCEDURE — 84484 ASSAY OF TROPONIN QUANT: CPT

## 2024-01-21 PROCEDURE — 72148 MRI LUMBAR SPINE W/O DYE: CPT

## 2024-01-21 PROCEDURE — 6360000004 HC RX CONTRAST MEDICATION: Performed by: PEDIATRICS

## 2024-01-21 PROCEDURE — 96374 THER/PROPH/DIAG INJ IV PUSH: CPT | Performed by: EMERGENCY MEDICINE

## 2024-01-21 PROCEDURE — 71045 X-RAY EXAM CHEST 1 VIEW: CPT

## 2024-01-21 PROCEDURE — 6360000002 HC RX W HCPCS: Performed by: PEDIATRICS

## 2024-01-21 PROCEDURE — 93005 ELECTROCARDIOGRAM TRACING: CPT | Performed by: PEDIATRICS

## 2024-01-21 PROCEDURE — 6360000002 HC RX W HCPCS: Performed by: STUDENT IN AN ORGANIZED HEALTH CARE EDUCATION/TRAINING PROGRAM

## 2024-01-21 PROCEDURE — 71275 CT ANGIOGRAPHY CHEST: CPT

## 2024-01-21 PROCEDURE — 74174 CTA ABD&PLVS W/CONTRAST: CPT

## 2024-01-21 PROCEDURE — 84703 CHORIONIC GONADOTROPIN ASSAY: CPT

## 2024-01-21 PROCEDURE — 96375 TX/PRO/DX INJ NEW DRUG ADDON: CPT | Performed by: EMERGENCY MEDICINE

## 2024-01-21 PROCEDURE — 96376 TX/PRO/DX INJ SAME DRUG ADON: CPT

## 2024-01-21 PROCEDURE — 6360000002 HC RX W HCPCS: Performed by: HEALTH CARE PROVIDER

## 2024-01-21 PROCEDURE — 73721 MRI JNT OF LWR EXTRE W/O DYE: CPT

## 2024-01-21 PROCEDURE — 71250 CT THORAX DX C-: CPT

## 2024-01-21 PROCEDURE — 99285 EMERGENCY DEPT VISIT HI MDM: CPT | Performed by: EMERGENCY MEDICINE

## 2024-01-21 RX ORDER — MORPHINE SULFATE 4 MG/ML
4 INJECTION INTRAVENOUS ONCE
Status: COMPLETED | OUTPATIENT
Start: 2024-01-21 | End: 2024-01-21

## 2024-01-21 RX ORDER — MORPHINE SULFATE 4 MG/ML
8 INJECTION INTRAVENOUS ONCE
Status: COMPLETED | OUTPATIENT
Start: 2024-01-21 | End: 2024-01-21

## 2024-01-21 RX ORDER — DIAZEPAM 5 MG/1
5 TABLET ORAL ONCE
Status: DISCONTINUED | OUTPATIENT
Start: 2024-01-21 | End: 2024-01-21

## 2024-01-21 RX ORDER — KETOROLAC TROMETHAMINE 30 MG/ML
30 INJECTION, SOLUTION INTRAMUSCULAR; INTRAVENOUS ONCE
Status: COMPLETED | OUTPATIENT
Start: 2024-01-21 | End: 2024-01-21

## 2024-01-21 RX ORDER — ONDANSETRON 2 MG/ML
4 INJECTION INTRAMUSCULAR; INTRAVENOUS ONCE
Status: COMPLETED | OUTPATIENT
Start: 2024-01-21 | End: 2024-01-21

## 2024-01-21 RX ORDER — OXYCODONE HYDROCHLORIDE 5 MG/1
5 TABLET ORAL EVERY 6 HOURS PRN
Qty: 12 TABLET | Refills: 0 | Status: SHIPPED | OUTPATIENT
Start: 2024-01-21 | End: 2024-01-24

## 2024-01-21 RX ADMIN — MORPHINE SULFATE 4 MG: 4 INJECTION INTRAVENOUS at 05:05

## 2024-01-21 RX ADMIN — ONDANSETRON 4 MG: 2 INJECTION INTRAMUSCULAR; INTRAVENOUS at 03:26

## 2024-01-21 RX ADMIN — IOPAMIDOL 100 ML: 755 INJECTION, SOLUTION INTRAVENOUS at 04:23

## 2024-01-21 RX ADMIN — MORPHINE SULFATE 4 MG: 4 INJECTION INTRAVENOUS at 11:57

## 2024-01-21 RX ADMIN — MORPHINE SULFATE 8 MG: 4 INJECTION INTRAVENOUS at 07:55

## 2024-01-21 RX ADMIN — KETOROLAC TROMETHAMINE 30 MG: 30 INJECTION, SOLUTION INTRAMUSCULAR; INTRAVENOUS at 02:13

## 2024-01-21 RX ADMIN — MORPHINE SULFATE 8 MG: 4 INJECTION INTRAVENOUS at 02:42

## 2024-01-21 RX ADMIN — ONDANSETRON 4 MG: 2 INJECTION INTRAMUSCULAR; INTRAVENOUS at 15:39

## 2024-01-21 RX ADMIN — IOPAMIDOL 100 ML: 755 INJECTION, SOLUTION INTRAVENOUS at 03:49

## 2024-01-21 RX ADMIN — MORPHINE SULFATE 4 MG: 4 INJECTION INTRAVENOUS at 19:10

## 2024-01-21 RX ADMIN — MORPHINE SULFATE 4 MG: 4 INJECTION INTRAVENOUS at 15:39

## 2024-01-21 ASSESSMENT — HEART SCORE
ECG: NORMAL
ECG: 0

## 2024-01-21 ASSESSMENT — PAIN DESCRIPTION - ORIENTATION
ORIENTATION: LEFT;LOWER;MID
ORIENTATION: LEFT;LOWER
ORIENTATION: LEFT

## 2024-01-21 ASSESSMENT — PAIN SCALES - GENERAL
PAINLEVEL_OUTOF10: 10
PAINLEVEL_OUTOF10: 8
PAINLEVEL_OUTOF10: 9
PAINLEVEL_OUTOF10: 8

## 2024-01-21 ASSESSMENT — PAIN DESCRIPTION - DESCRIPTORS
DESCRIPTORS: SHARP;STABBING
DESCRIPTORS: STABBING;SHARP

## 2024-01-21 ASSESSMENT — PAIN DESCRIPTION - LOCATION
LOCATION: BACK;BUTTOCKS;LEG
LOCATION: BACK;BUTTOCKS;LEG
LOCATION: BACK

## 2024-01-21 ASSESSMENT — PAIN - FUNCTIONAL ASSESSMENT: PAIN_FUNCTIONAL_ASSESSMENT: 0-10

## 2024-01-21 NOTE — ED PROVIDER NOTES
FACULTY SIGN-OUT  ADDENDUM     Care of this patient was assumed from previous attending physician. The patient's initial evaluation and plan have been discussed with the prior provider who initially evaluated the patient.      Note Started: 7:15 AM EST    Attestation  I was available and discussed any additional care issues that arose and coordinated the management plans with the resident(s) caring for the patient during my duty period. Any areas of disagreement with resident's documentation of care or procedures are noted on the chart. I was personally present for the key portions of any/all procedures, during my duty period. I have documented in the chart those procedures where I was not present during the key portions.       ED COURSE      The patient was given the following medications:  Orders Placed This Encounter   Medications    ketorolac (TORADOL) injection 30 mg    DISCONTD: diazePAM (VALIUM) tablet 5 mg    morphine injection 8 mg    ondansetron (ZOFRAN) injection 4 mg    iopamidol (ISOVUE-370) 76 % injection 100 mL    iopamidol (ISOVUE-370) 76 % injection 100 mL    morphine injection 4 mg       RECENT VITALS:   Temp: 98.2 °F (36.8 °C), Pulse: 77, Respirations: 13, BP: (!) 149/110    MEDICAL DECISION MAKING        Layla Godfrey is a 49 y.o. female who presents to the Emergency Department with complaints of back pain. L leg weakness and urine incontinence. NS consulted. CTA neg. Await MRI and NS recs.      Brenda Feliciano MD  Attending Emergency Physician    (Please note that portions of this note were completed with a voice recognition program.  Efforts were made to edit the dictations but occasionally words are mis-transcribed.)

## 2024-01-21 NOTE — DISCHARGE INSTRUCTIONS
You have been seen in the ER today for low back pain.   If you begin to experience any symptoms such as chest pain shortness of breath nausea vomiting dizziness drowsiness abdominal pain loss of consciousness or any other symptoms you find concerning please return to the ED for follow-up evaluation.  If you have been given pain medication please take them only as prescribed. Do not take more medication than prescribed at any given time.  Please follow-up with your primary care provider within 3-5 days for continued care, sooner if you have concerns.  Your health status can be dramatically improved by changing your dietary habits. Simply by changing what you eat, your mood can improve, you can attain a healthy weight, your heart can become stronger, and you may experience an increase in overall energy and vitality. Please start to pay attention to what you eat on a daily basis. Start incorporating lots of colorful vegetables in your diet and reduce the amount of simple carbohydrates. Avoid eating simple processed sugars, such as cakes, cookies, ice cream etc. Additionally avoid foods high in processed fats, such as pizza, burgers, hot dogs, or anything deep fried etc. Complex carbohydrates can help control blood pressure and diabetes and may lower the risk of heart disease. Lean protein sources help build muscle and are essential for many body functions. Healthy fats help with hormonal balance and may help improve cholesterol levels. High fiber intake improves overall gut health, reduces blood pressure, and may optimize overall health. Gradually making these dietary lifestyle changes will dramatically improve your health and may reduce the number of hospital visits you will require over the course of your lifetime. Adhering to a clean and healthy diet reduces your risk of developing cancer, diabetes, and even heart disease!    Increasing the amount of plant based foods will increase the overall amount of fiber in

## 2024-01-21 NOTE — ED NOTES
Compression wrap has been applied 3x to right arm  Patient continuously has pushed wrap down to wrist instead of leaving in place  Right forearm is softer then it was, ice pack  continues  Patient continues to states she is unable to take 2 eyebrow piercings out or nose piercing out  MRI personnel and Dr. Evans have been informed

## 2024-01-21 NOTE — ED NOTES
Patient given boxed lunch and viviane mist per request. Patient resting comfortably and in no acute distress. Respirations even and nonlabored. Patient denies any needs at this time. Bed in lowest position. Call light within reach. Will continue to monitor. Awaiting further POC

## 2024-01-21 NOTE — CONSULTS
Department of Neurosurgery                                            Nurse Practitioner Consult Note      Reason for Consult:  concern for cauda equina   Requesting Physician:  Syeda  Neurosurgeon:   [] Dr. Smith  [] Dr. Garrison  [] Dr. Gómez  [x] Dr. Garcia      History Obtained From:  patient, electronic medical record    CHIEF COMPLAINT:         Chief Complaint   Patient presents with    Back Pain       HISTORY OF PRESENT ILLNESS:       The patient is a 49 y.o. female w/ PMHx of CVA in 2021, chronic back pain s/p lifet bilateral L3/L4 laminectomy microdissection decompression in 6/2022 complicated by epidural hematoma s/p evacuation with Dr. Michaels, L4/5 and L5/S1 discectomy in 2017, L5-S posterior lumbar interbody fusion in 2019, and L4/L5 TLIF,  instrumentation at L5/S1, L4 decompression, and L5 removal and reinsertion of hardware in 2021 with Dr. Womack who presents with acute on chronic back pain and LLE weakness and numbness she states has been jr on for \"a few weeks\". She denies new trauma. She states she has had progressive weakening and paraesthesias to LLE along with urinary and fecal incontinence for the last few weeks. She States she is now wearing depends due to the urinary incontinence. Patient has been ambulating at home. States she required assistance with ambulation tonight. Patient states she was dismissed from pain management at [facility] due to missed appointments.     Patient was seen here on 1/16 for back pain and expressed she was out of her percocet at that time. At that visit she denied loss of bowel or bladder at that time and endorsed left leg paraesthesias at that time as well. She had no imaging obtained. Was discharged with 3 days of percocet.     This AM, per EM resident patient had normal rectal tone on exam. Post void residual 20mL.   PAST MEDICAL HISTORY :       Past Medical History:        Diagnosis Date    Arthritis     spine    Asthma     since childhood --

## 2024-01-21 NOTE — ED PROVIDER NOTES
[facility] ED  Emergency Department Encounter  Emergency Medicine Resident     Pt Name:Layla Godfrey  MRN: [id number]  Birthdate 1975  Date of evaluation: 1/21/24  PCP:  No primary care provider on file.    1:37 AM EST     CHIEF COMPLAINT       Chief Complaint   Patient presents with    Back Pain       HISTORY OF PRESENT ILLNESS  (Location/Symptom, Timing/Onset, Context/Setting, Quality, Duration, Modifying Factors, Severity.)      Layla Godfrey is a 49 y.o. female who presents with back pain worsening left side.  Feels like from her left hip down to her feet is having decree sensation states that she feels like is going to sleep.  Reports has been there for the past 24 hours however around 4 PM she developed chest pain and and back pain.  Has a history of hypertension.  Reports intermittent compliance with these medications.  States that she is unable to ambulate or use her left leg at all.  Denies sensation to leg from pain reports total near complete numbness  Also having loss of bladder control.  Loss of bowel control.     She states that she is having to wear adult diapers due to this loss of bladder control.    She states is mostly at night.  Patient having difficulty walking query if she is having difficulty getting to the bathroom before she will urinate.  Versus actually having leakage.  Patient unsure of this answer.  Patient states that she follows with pain management with but was dismissed from this clinic due to noncompliance as she has been doctor shopping and also did not go to her physical therapy and canceled her follow-up with pain management due to not going to physical therapy.  This was found in EMR chart review.    She has full multiple family members at bedside who are concerned that she is at the point where she will need neurosurgical interventions.  Patient is agreeable if needed.  She also reports that she developed chest pain and back pain at around

## 2024-01-21 NOTE — ED NOTES
Patient c/o back, left buttock and LLE pain  Dr. Evans in evaluating patient   Patient right forearm edemetous after CT contrast infiltration  Swollen from wrist to antecubital region  Dr. Evans evaluated this also    Compression wrap applied to right forearm  Elevated on blankets  Ice pack applied    Medicated for pain 9/10

## 2024-01-21 NOTE — ED PROVIDER NOTES
1/21/2024 4:20 am TECHNIQUE: CT of the chest, abdomen and pelvis was performed without the administration of intravenous contrast. Multiplanar reformatted images are provided for review. Automated exposure control, iterative reconstruction, and/or weight based adjustment of the mA/kV was utilized to reduce the radiation dose to as low as reasonably achievable. COMPARISON: None HISTORY: ORDERING SYSTEM PROVIDED HISTORY: pain TECHNOLOGIST PROVIDED HISTORY: pain Decision Support Exception - unselect if not a suspected or confirmed emergency medical condition->Emergency Medical Condition (MA) Is the patient pregnant?->No FINDINGS: Chest: Mediastinum:  No evidence of mediastinal, hilar or axillary lymphadenopathy. Aorta is normal in caliber without acute abnormality. The central airways are clear. Lungs/pleura:  Lungs are clear without focal consolidation or pulmonary edema. No evidence of pleural effusion or pneumothorax. Soft Tissues/Bones: No acute bone or soft tissue abnormality evident. Abdomen/Pelvis: Organs: The liver is unremarkable.  Status post cholecystectomy.  The pancreas, spleen, adrenal glands, kidneys and visualized ureters are unremarkable. GI/Bowel: Stomach and duodenal sweep demonstrate no acute abnormality. There is no evidence of bowel obstruction.  No evidence of abnormal bowel wall thickening or distension.  Status post appendectomy. Pelvis: The bladder and reproductive organs are unremarkable. Peritoneum/Retroperitoneum: No evidence of ascites or free air.  No evidence of lymphadenopathy.  Aorta is normal in caliber. Bones/Soft Tissues: No acute bone or soft tissue abnormality evident.  Postop changes at the lower lumbar spine.     1. No acute cardiopulmonary process. 2. No acute intra-abdominal or intrapelvic process. 3. Status post cholecystectomy and appendectomy.     CT LUMBAR SPINE BONY RECONSTRUCTION    Result Date: 1/21/2024  EXAMINATION: CT OF THE LUMBAR SPINE WITHOUT CONTRAST  1/21/2024  post cholecystectomy and appendectomy.  6. Status post lumbosacral fusion and posterior decompression.   [LL]   0616 Patient care assumed from Dr. Landa  [JS]   0742 Patient reevaluated at bedside for arm swelling that has been increasing since IV infiltration of contrast. Mild paresthesias of first three digits.  Patient sensation is intact distally, cap refills less than 2, +2radial/ulnar pulses.  Will continue to monitor.  Will apply compression, elevation and ice. [JS]   1334 IMPRESSION:  Posterior lumbar fusion and laminectomy at L4-L5 and L5-S1 without evidence  of residual or recurrent focal disc protrusion.  Small central disc protrusion at T12-L1 [JS]   1435 IMPRESSION:  1. Normal pelvic alignment.  No acute fracture.  2. Left gluteus medius partial tendon tear.  3. Left hip 9-12 o'clock labral tear with near complete loss of normal meniscal tissue.  No parameniscal cyst. [JS]   1441 Awaiting final words from NS. [JS]   1519 Hx L spine fusion  MRI L spine and hip pending  [facility] [place] recs and dispo [KK]   1701 MRI hip    IMPRESSION:  1. Normal pelvic alignment.  No acute fracture.  2. Left gluteus medius partial tendon tear.  3. Left hip 9-12 o'clock labral tear with near complete loss of normal  meniscal tissue.  No parameniscal cyst.   [KK]   1710 MRI L spine    IMPRESSION:  Posterior lumbar fusion and laminectomy at L4-L5 and L5-S1 without evidence  of residual or recurrent focal disc protrusion.     Small central disc protrusion at T12-L1.      [KK]   1849 MRI shows partial tear of the glute medius tendon, along with a labral tear.  Patient can follow-up on an outpatient basis with orthopedic surgery. [KK]   1900 Patient ambulated with some assistance, she is having similar difficulty over the past week or so, this has not changed.  I discussed follow-up options with the patient, recommended that she follow-up with orthopedic surgery, pain management.  She was amenable to the course of care.  Will give

## 2024-01-21 NOTE — ED NOTES
Patient asks if it is the same doctor, then asks to speak with doctor  Patient states she wants to tell him her back is swollen and wants to show him her right forearm from earlier  Patient has not kept compression wrap on right forearm, has not continued to use ice pack  Patient also does not keep BP cuff appropriately place or straighten arm when BP is being taken  Patient taken off monitor after continuously getting erroneous readings due to arm being bent  Dr. Joseph informed, he states he will be in to speak with her after he hears from neurosurg  Patient informed

## 2024-01-21 NOTE — ED NOTES
Pt. Presents to the ED for severe lower back pain that radiates to the left side of her back. Pt states that she is having some numbness and tingling to the left leg as well. Pt states that she has been incontinent of urine and sometimes stool. Pt. States that her pain has been going on for about 5 days and has gotten to the point where she cant sleep or lay down. Pt appears very uncomfortable while sitting on the side of the bed. Pt resp even and non labored. Pt iv established and placed on full cardiac monitor. Resident at the bedside for evaluation. Will continue with plan of care.

## 2024-01-21 NOTE — ED PROVIDER NOTES
[facility]   Emergency Department  Faculty Attestation       I performed a history and physical examination of the patient and discussed management with the resident. I reviewed the resident’s note and agree with the documented findings including all diagnostic interpretations and plan of care. Any areas of disagreement are noted on the chart. I was personally present for the key portions of any procedures. I have documented in the chart those procedures where I was not present during the key portions. I have reviewed the emergency nurses triage note. I agree with the chief complaint, past medical history, past surgical history, allergies, medications, social and family history as documented unless otherwise noted below.  For Physician Assistant/ Nurse Practitioner cases/documentation I have personally evaluated this patient and have completed at least one if not all key elements of the E/M (history, physical exam, and MDM). Additional findings are as noted.    Patient Name: Layla Godfrey  MRN: [id number]  : 1975  Primary Care Physician: No primary care provider on file.    Date of evaluationa: 2024   Note Started: 9:20 AM EST    Pertinent Comments     Chief Complaint:   Chief Complaint   Patient presents with    Back Pain        Initial vitals: (If not listed, please see nursing documentation)  ED Triage Vitals   BP Temp Temp Source Pulse Respirations SpO2 Height Weight - Scale   24 01324 0137 24 01324 0137 24 0137 24 0143 -- 24 0138   (!) 183/71 98.2 °F (36.8 °C) Oral (!) 101 18 97 %  99.8 kg (220 lb)        HPI/PE/Impression:  This is a 49 y.o. female who presents to the Emergency Department lower back pain with new associated incontinence and left leg numbness.  Patient has a history of chronic recurrent back pain, and has had 5 separate surgeries on her back.  Was seen a few days prior, and at that time she reports that she had

## 2024-01-21 NOTE — PROGRESS NOTES
MRI lumbar and L hip reviewed with attending Dr. Garcia. No intervention or further workup indicated from neurosurgery standpoint. Neurosurgery to sign off.     Ruby Ortiz DO  6:50 PM  01/21/24

## 2024-01-21 NOTE — ED PROVIDER NOTES
[facility]  Emergency Department  Emergency Medicine Resident Sign-out     Care of Layla Godfrey was assumed from Dr. Landa and is being seen for Back Pain  .  The patient's initial evaluation and plan have been discussed with the prior provider who initially evaluated the patient.     EMERGENCY DEPARTMENT COURSE / MEDICAL DECISION MAKING:       MEDICATIONS GIVEN:  Orders Placed This Encounter   Medications    ketorolac (TORADOL) injection 30 mg    DISCONTD: diazePAM (VALIUM) tablet 5 mg    morphine injection 8 mg    ondansetron (ZOFRAN) injection 4 mg    iopamidol (ISOVUE-370) 76 % injection 100 mL    iopamidol (ISOVUE-370) 76 % injection 100 mL    morphine injection 4 mg    morphine injection 8 mg    morphine injection 4 mg    morphine injection 4 mg    ondansetron (ZOFRAN) injection 4 mg       LABS / RADIOLOGY:     Results for orders placed or performed during the hospital encounter of 01/21/24   CBC with Auto Differential   Result Value Ref Range    WBC 10.5 3.5 - 11.3 k/uL    RBC 5.20 (H) 3.95 - 5.11 m/uL    Hemoglobin 17.2 (H) 11.9 - 15.1 g/dL    Hematocrit 49.8 (H) 36.3 - 47.1 %    MCV 95.8 82.6 - 102.9 fL    MCH 33.1 25.2 - 33.5 pg    MCHC 34.5 28.4 - 34.8 g/dL    RDW 12.9 11.8 - 14.4 %    Platelets 239 138 - 453 k/uL    MPV 11.8 8.1 - 13.5 fL    NRBC Automated 0.0 0.0 per 100 WBC    Neutrophils % 60 36 - 65 %    Lymphocytes % 30 24 - 43 %    Monocytes % 6 3 - 12 %    Eosinophils % 3 1 - 4 %    Basophils % 1 0 - 2 %    Immature Granulocytes 0 0 %    Neutrophils Absolute 6.33 1.50 - 8.10 k/uL    Lymphocytes Absolute 3.14 1.10 - 3.70 k/uL    Monocytes Absolute 0.64 0.10 - 1.20 k/uL    Eosinophils Absolute 0.28 0.00 - 0.44 k/uL    Basophils Absolute 0.06 0.00 - 0.20 k/uL    Absolute Immature Granulocyte 0.03 0.00 - 0.30 k/uL   Comprehensive Metabolic Panel   Result Value Ref Range    Sodium 139 135 - 144 mmol/L    Potassium 3.9 3.7 - 5.3 mmol/L    Chloride 102 98 - 107 mmol/L

## 2024-01-22 NOTE — ED PROVIDER NOTES
Note Started: 11:59 PM EST     Faculty Sign-Out Attestation  Handoff taken on the following patient from prior Attending Physician: Braden    I was available and discussed any additional care issues that arose and coordinated the management plans with the resident(s) caring for the patient during my duty period. Any areas of disagreement with resident’s documentation of care or procedures are noted on the chart. I was personally present for the key portions of any/all procedures during my duty period. I have documented in the chart those procedures where I was not present during the key portions.    49-year-old female presenting with back and leg pain, weakness, incontinence.  CT and CTA negative.  Neurosurgery requesting MRI.  MRI showed hip labral tear.  Awaiting neurosurgery clearance for spine MRI results.  If neurosurgery clears and patient can ambulate, outpatient follow-up.  If cannot ambulate, will require orthopedics consult    Ainsley William MD  Attending Physician        Ainsley William MD  01/22/24 0000

## 2024-01-22 NOTE — ED NOTES
Report given to Micaela Joseph was in to update patient on plan of care  No change in patient status

## 2024-01-22 NOTE — ED NOTES
SW booked transportation for discharged pt going to address on file using [employer]. Trip # [phone]

## 2024-01-23 LAB
EKG ATRIAL RATE: 81 BPM
EKG P AXIS: 52 DEGREES
EKG P-R INTERVAL: 186 MS
EKG Q-T INTERVAL: 394 MS
EKG QRS DURATION: 96 MS
EKG QTC CALCULATION (BAZETT): 457 MS
EKG R AXIS: 55 DEGREES
EKG T AXIS: 50 DEGREES
EKG VENTRICULAR RATE: 81 BPM

## 2024-01-23 PROCEDURE — 93010 ELECTROCARDIOGRAM REPORT: CPT | Performed by: INTERNAL MEDICINE

## 2024-02-03 ENCOUNTER — HOSPITAL ENCOUNTER (EMERGENCY)
Age: 49
Discharge: HOME | End: 2024-02-04
Attending: EMERGENCY MEDICINE
Payer: MEDICAID

## 2024-02-03 DIAGNOSIS — G89.29 ACUTE EXACERBATION OF CHRONIC LOW BACK PAIN: ICD-10-CM

## 2024-02-03 DIAGNOSIS — S73.192D TEAR OF LEFT ACETABULAR LABRUM, SUBSEQUENT ENCOUNTER: Primary | ICD-10-CM

## 2024-02-03 DIAGNOSIS — M54.50 ACUTE EXACERBATION OF CHRONIC LOW BACK PAIN: ICD-10-CM

## 2024-02-03 PROCEDURE — 6360000002 HC RX W HCPCS: Performed by: STUDENT IN AN ORGANIZED HEALTH CARE EDUCATION/TRAINING PROGRAM

## 2024-02-03 PROCEDURE — 96374 THER/PROPH/DIAG INJ IV PUSH: CPT

## 2024-02-03 PROCEDURE — 96372 THER/PROPH/DIAG INJ SC/IM: CPT

## 2024-02-03 PROCEDURE — 6370000000 HC RX 637 (ALT 250 FOR IP)

## 2024-02-03 PROCEDURE — 99284 EMERGENCY DEPT VISIT MOD MDM: CPT

## 2024-02-03 RX ORDER — OXYCODONE HYDROCHLORIDE 5 MG/1
5 TABLET ORAL ONCE
Status: COMPLETED | OUTPATIENT
Start: 2024-02-03 | End: 2024-02-03

## 2024-02-03 RX ADMIN — HYDROMORPHONE HYDROCHLORIDE 1 MG: 1 INJECTION, SOLUTION INTRAMUSCULAR; INTRAVENOUS; SUBCUTANEOUS at 23:48

## 2024-02-03 RX ADMIN — HYDROMORPHONE HYDROCHLORIDE 1 MG: 1 INJECTION, SOLUTION INTRAMUSCULAR; INTRAVENOUS; SUBCUTANEOUS at 22:58

## 2024-02-03 RX ADMIN — OXYCODONE 5 MG: 5 TABLET ORAL at 21:50

## 2024-02-03 ASSESSMENT — PAIN - FUNCTIONAL ASSESSMENT: PAIN_FUNCTIONAL_ASSESSMENT: 0-10

## 2024-02-03 ASSESSMENT — PAIN SCALES - GENERAL
PAINLEVEL_OUTOF10: 10
PAINLEVEL_OUTOF10: 10

## 2024-02-04 VITALS
SYSTOLIC BLOOD PRESSURE: 129 MMHG | HEART RATE: 91 BPM | BODY MASS INDEX: 35.51 KG/M2 | RESPIRATION RATE: 18 BRPM | DIASTOLIC BLOOD PRESSURE: 99 MMHG | WEIGHT: 220 LBS | OXYGEN SATURATION: 96 % | TEMPERATURE: 97.3 F

## 2024-02-04 PROCEDURE — 6370000000 HC RX 637 (ALT 250 FOR IP): Performed by: STUDENT IN AN ORGANIZED HEALTH CARE EDUCATION/TRAINING PROGRAM

## 2024-02-04 PROCEDURE — 6360000002 HC RX W HCPCS: Performed by: STUDENT IN AN ORGANIZED HEALTH CARE EDUCATION/TRAINING PROGRAM

## 2024-02-04 RX ORDER — OXYCODONE HYDROCHLORIDE AND ACETAMINOPHEN 5; 325 MG/1; MG/1
1 TABLET ORAL EVERY 8 HOURS PRN
Qty: 9 TABLET | Refills: 0 | Status: SHIPPED | OUTPATIENT
Start: 2024-02-04 | End: 2024-02-06

## 2024-02-04 RX ORDER — OXYCODONE HYDROCHLORIDE AND ACETAMINOPHEN 5; 325 MG/1; MG/1
1 TABLET ORAL ONCE
Status: COMPLETED | OUTPATIENT
Start: 2024-02-04 | End: 2024-02-04

## 2024-02-04 RX ADMIN — HYDROMORPHONE HYDROCHLORIDE 1 MG: 1 INJECTION, SOLUTION INTRAMUSCULAR; INTRAVENOUS; SUBCUTANEOUS at 00:03

## 2024-02-04 RX ADMIN — OXYCODONE HYDROCHLORIDE AND ACETAMINOPHEN 1 TABLET: 5; 325 TABLET ORAL at 00:41

## 2024-02-04 NOTE — ED PROVIDER NOTES
[facility] ED  Emergency Department Encounter  Emergency Medicine Resident     Pt Name:Layla Godfrey  MRN: [id number]  Birthdate 1975  Date of evaluation: 2/3/24  PCP:  No primary care provider on file.  Note Started: 8:44 PM EST      CHIEF COMPLAINT       Chief Complaint   Patient presents with    Back Pain     Down left leg       HISTORY OF PRESENT ILLNESS  (Location/Symptom, Timing/Onset, Context/Setting, Quality, Duration, Modifying Factors, Severity.)      Layla Godfrey is a 49 y.o. female who presents with worsening back, hip, left leg pain.  Patient has had worsening pain in these areas for some time now.  Had full evaluation with MRI which showed left labral injury but no acute spinal fracture or significant spinal stenosis.  Patient says she has been trying to get a hold of orthopedic surgery but has not been able to get a hold of her primary care provider to get a referral to orthopedic surgery so she still has been able to make an appointment.  She has been taking Tylenol, Motrin, gabapentin at home which has not seem to be helping much.  She otherwise denies any new symptoms but does say the numbness and pain in her left foot is getting worse.  Also states she is having some new swelling in both of her legs but denies any shortness of breath, chest pain, cough.  She did have a blood clot surrounding one of her surgeries which required mechanical thrombectomy but is not currently taking anticoagulation.    PAST MEDICAL / SURGICAL / SOCIAL / FAMILY HISTORY      has a past medical history of Arthritis, Asthma, Cerebral artery occlusion with cerebral infarction (HCC), Chronic back pain, Hypertension, Lumbar stenosis, Migraines, Seizures (HCC), Thyroid disease, Urinary incontinence, and Uterine anomaly.       has a past surgical history that includes Spine surgery; knee surgery; Tubal ligation (Bilateral, 2002); Anterior cruciate ligament repair (Left); Nerve Block

## 2024-02-04 NOTE — DISCHARGE INSTRUCTIONS
eyes focused ahead, and feet flat on the floor, the subject slowly lowers his body by flexing his knees    Return to the Emergency Department for inability to move legs, worsening of pain, tingling / loss of sensation, any other care or concern.'

## 2024-02-04 NOTE — ED NOTES
Patient presents to the ED with c/o back pain. Patient reports the pain has been ongoing and was recently admitted here and had an MRI done and was told to follow up with ortho. Patient reports she has been unable to get into ortho until her PCP gives a referral. Patient states the pain has gotten worse and has not had any pain meds at home. Patient reports the pain is a sharp pain in her lumbar back, radiating down left leg. Patient has a hx of multiple back surgeries. Patient is alert and oriented x4, answering questions appropriately. Patient is changed into a gown.

## 2024-02-04 NOTE — ED PROVIDER NOTES
[facility] ED  Emergency Department  Emergency Medicine Resident Turn-Over     Note Started: 10:07 PM EST    Care of Layla Godfrey was assumed from Dr. Chávez and is being seen for Back Pain (Down left leg)  .  The patient's initial evaluation and plan have been discussed with the prior provider who initially evaluated the patient.     EMERGENCY DEPARTMENT COURSE / MEDICAL DECISION MAKING:       MEDICATIONS GIVEN:  Orders Placed This Encounter   Medications    oxyCODONE (ROXICODONE) immediate release tablet 5 mg    HYDROmorphone (DILAUDID) injection 1 mg    HYDROmorphone (DILAUDID) injection 1 mg    HYDROmorphone (DILAUDID) injection 1 mg    oxyCODONE-acetaminophen (PERCOCET) 5-325 MG per tablet 1 tablet    oxyCODONE-acetaminophen (PERCOCET) 5-325 MG per tablet     Sig: Take 1 tablet by mouth every 8 hours as needed for Pain for up to 9 doses. Intended supply: 3 days. Take lowest dose possible to manage pain Max Daily Amount: 3 tablets     Dispense:  9 tablet     Refill:  0       LABS / RADIOLOGY:     Labs Reviewed - No data to display    MRI HIP LEFT WO CONTRAST    Result Date: 1/21/2024  EXAMINATION: MRI OF THE LEFT HIP WITHOUT CONTRAST, 1/21/2024 12:14 pm TECHNIQUE: Multiplanar multisequence MRI of the hip was performed without the administration of intravenous contrast. COMPARISON: CT chest/abdomen/pelvis 01/21/2024. HISTORY: ORDERING SYSTEM PROVIDED HISTORY:  Hip pain TECHNOLOGIST PROVIDED HISTORY: Hip pain What is the sedation requirement?  None Decision Support Exception - unselect if not a suspected or confirmed emergency medical condition->Emergency Medical Condition (MA) Reason for Exam:  Left hip pain. FINDINGS: Osseous Structures: Normal pelvic alignment.  The bilateral sacroiliac joints demonstrate normal cortical margins.  Normal bilateral hip alignment with no effusion.  The pelvis demonstrates no evidence of an acute fracture.  No focal bone marrow edema.  The bilateral

## 2024-02-04 NOTE — ED PROVIDER NOTES
[facility] ED     Emergency Department     Faculty Attestation        I performed a history and physical examination of the patient and discussed management with the resident. I reviewed the resident’s note and agree with the documented findings and plan of care. Any areas of disagreement are noted on the chart. I was personally present for the key portions of any procedures. I have documented in the chart those procedures where I was not present during the key portions. I have reviewed the emergency nurses triage note. I agree with the chief complaint, past medical history, past surgical history, allergies, medications, social and family history as documented unless otherwise noted below.  For Physician Assistant/ Nurse Practitioner cases/documentation I have personally evaluated this patient and have completed at least one if not all key elements of the E/M (history, physical exam, and MDM). Additional findings are as noted.      Vital Signs: BP: (!) 126/109  Pulse: (!) 108  Respirations: 18  Temp: 97.3 °F (36.3 °C) SpO2: 98 %  PCP:  No primary care provider on file.  Note Started: 2/3/24, 9:08 PM EST    Pertinent Comments:         Critical Care  None      (Please note that portions of this note were completed with a voice recognition program. Efforts were made to edit the dictations but occasionally words are mis-transcribed. Whenever words are used in this note in any gender, they shall be construed as though they were used in the gender appropriate to the circumstances; and whenever words are used in this note in the singular or plural form, they shall be construed as though they were used in the form appropriate to the circumstances.)    Felice Knowles MD [facility]  Attending Emergency Medicine Physician           Felice Knowles MD  02/03/24 [id number]

## 2024-02-08 ENCOUNTER — HOSPITAL ENCOUNTER (EMERGENCY)
Age: 49
Discharge: HOME | End: 2024-02-08
Attending: EMERGENCY MEDICINE
Payer: MEDICAID

## 2024-02-08 VITALS
SYSTOLIC BLOOD PRESSURE: 128 MMHG | HEART RATE: 95 BPM | RESPIRATION RATE: 16 BRPM | DIASTOLIC BLOOD PRESSURE: 91 MMHG | TEMPERATURE: 97.6 F | OXYGEN SATURATION: 100 %

## 2024-02-08 DIAGNOSIS — Z76.5 DRUG-SEEKING BEHAVIOR: ICD-10-CM

## 2024-02-08 DIAGNOSIS — G89.29 CHRONIC LEFT-SIDED BACK PAIN, UNSPECIFIED BACK LOCATION: Primary | ICD-10-CM

## 2024-02-08 DIAGNOSIS — M54.9 CHRONIC LEFT-SIDED BACK PAIN, UNSPECIFIED BACK LOCATION: Primary | ICD-10-CM

## 2024-02-08 PROCEDURE — 99284 EMERGENCY DEPT VISIT MOD MDM: CPT

## 2024-02-08 PROCEDURE — 6360000002 HC RX W HCPCS

## 2024-02-08 PROCEDURE — 96372 THER/PROPH/DIAG INJ SC/IM: CPT

## 2024-02-08 PROCEDURE — 6370000000 HC RX 637 (ALT 250 FOR IP)

## 2024-02-08 RX ORDER — NAPROXEN 500 MG/1
500 TABLET ORAL 2 TIMES DAILY
Qty: 10 TABLET | Refills: 0 | Status: SHIPPED | OUTPATIENT
Start: 2024-02-08 | End: 2024-02-13

## 2024-02-08 RX ORDER — KETOROLAC TROMETHAMINE 30 MG/ML
30 INJECTION, SOLUTION INTRAMUSCULAR; INTRAVENOUS ONCE
Status: COMPLETED | OUTPATIENT
Start: 2024-02-08 | End: 2024-02-08

## 2024-02-08 RX ORDER — CYCLOBENZAPRINE HCL 10 MG
10 TABLET ORAL 3 TIMES DAILY PRN
Qty: 21 TABLET | Refills: 0 | Status: SHIPPED | OUTPATIENT
Start: 2024-02-08 | End: 2024-02-18

## 2024-02-08 RX ORDER — ACETAMINOPHEN 500 MG
1000 TABLET ORAL ONCE
Status: COMPLETED | OUTPATIENT
Start: 2024-02-08 | End: 2024-02-08

## 2024-02-08 RX ORDER — ACETAMINOPHEN 500 MG
1000 TABLET ORAL 3 TIMES DAILY
Qty: 180 TABLET | Refills: 0 | Status: SHIPPED | OUTPATIENT
Start: 2024-02-08

## 2024-02-08 RX ORDER — OXYCODONE HYDROCHLORIDE 5 MG/1
5 TABLET ORAL ONCE
Status: COMPLETED | OUTPATIENT
Start: 2024-02-08 | End: 2024-02-08

## 2024-02-08 RX ORDER — CYCLOBENZAPRINE HCL 10 MG
10 TABLET ORAL ONCE
Status: COMPLETED | OUTPATIENT
Start: 2024-02-08 | End: 2024-02-08

## 2024-02-08 RX ADMIN — ACETAMINOPHEN 1000 MG: 500 TABLET ORAL at 02:09

## 2024-02-08 RX ADMIN — OXYCODONE HYDROCHLORIDE 5 MG: 5 TABLET ORAL at 02:09

## 2024-02-08 RX ADMIN — CYCLOBENZAPRINE 10 MG: 10 TABLET, FILM COATED ORAL at 02:09

## 2024-02-08 RX ADMIN — KETOROLAC TROMETHAMINE 30 MG: 30 INJECTION, SOLUTION INTRAMUSCULAR; INTRAVENOUS at 02:09

## 2024-02-08 ASSESSMENT — PAIN - FUNCTIONAL ASSESSMENT: PAIN_FUNCTIONAL_ASSESSMENT: 0-10

## 2024-02-08 ASSESSMENT — PAIN SCALES - GENERAL
PAINLEVEL_OUTOF10: 10
PAINLEVEL_OUTOF10: 10

## 2024-02-08 ASSESSMENT — ENCOUNTER SYMPTOMS: BACK PAIN: 1

## 2024-02-08 NOTE — ED NOTES
Pt reports to the ED with complaints of back pain. Pt has been seen in the ED several times for the same complaint recently. Pt states she saw her primary but they will not prescribe any narcotics for pain. Pt states she is going to follow with the pain clinic soon. Pt is A&O x4 and speaking in complete sentences. NAD noted at this time. Pt denies chest pain or SOB. Care ongoing

## 2024-02-08 NOTE — DISCHARGE INSTRUCTIONS
Call today or tomorrow to follow up with Jeniffer Sevilla APRN - NP  in 2 days.    You are seen in the emergency department for your chronic back pain.  You are given pain medications.  As we discussed previously we are unable to prescribe opioid medications for chronic back pain.     For your seizures please follow-up with your neurologist.     Use an ice pack or bag filled with ice and apply to the injured area 3 - 4 times a day for 15 - 20 minutes each time.  If the injury is older than 3 days, then use a heating pad to help relax the muscles in your back.    Use ibuprofen or Tylenol (unless prescribed medications that have Tylenol in it) for pain.  You can take over the counter Ibuprofen (advil) tablets (4 tablets every 8 hours or 3 tablets every 6 hours or 2 tablets every 4 hours)    Hema' Flexion Exercises     1. Pelvic tilt. Lie on your back with knees bent, feet flat on floor. Flatten the small of your back against the floor, without pushing down with the legs. Hold for 5 to 10 seconds.     2. Single Knee to chest. Lie on your back with knees bent and feet flat on the floor. Slowly pull your right knee toward your shoulder and hold 5 to 10 seconds. Lower the knee and repeat with the other knee.     3. Double knee to chest. Begin as in the previous exercise. After pulling right knee to chest, pull left knee to chest and hold both knees for 5 to 10 seconds. Slowly lower one leg at a time.     4. Partial sit-up. Do the pelvic tilt (exercise 1) and, while holding this position, slowly curl your head and shoulders off the floor. Hold briefly. Return slowly to the starting position.     5. Hamstring stretch. Start in long sitting with toes directed toward the ceiling and knees fully extended. Slowly lower the trunk forward over the legs, keeping knees extended, arms outstretched over the legs, and eyes focus ahead.     6. Hip Flexor stretch. Place one foot in front of the other with the left (front) knee

## 2024-02-08 NOTE — ED PROVIDER NOTES
[facility] ED  Emergency Department Encounter  Emergency Medicine Resident     Pt Name:Layla Godfrey  MRN: [id number]  Birthdate 1975  Date of evaluation: 2/8/24  PCP:  Jeniffer Sevilla APRN - NP  Note Started: 1:25 AM EST      CHIEF COMPLAINT       Chief Complaint   Patient presents with    Back Pain       HISTORY OF PRESENT ILLNESS  (Location/Symptom, Timing/Onset, Context/Setting, Quality, Duration, Modifying Factors, Severity.)      Layla Godfrey is a 49 y.o. female who presents with back pain.  Patient states she has had back pain and hip pain for months now.  States that she has appointments with surgeons and pain management coming up in the end of the next week.  Denies any urinary continence bowel incontinence saddle anesthesia weakness numbness tingling in her lower extremities.  She states that she has had some difficulty ambulating secondary to pain.  Reports that she does have a labral tear in her left hip.  Patient denies any history of IV drug use or fevers or chills.     1/21/2024 patient had a normal MRI of her lumbar spine.  She had a labral tear of the meniscal tissue at the 9 to 12 o'clock position and left hip.    2/3/2024 patient returned to the emergency department for similar symptoms she states at that time worsened.  Patient was given 1 3 rounds of 1 mg of Dilaudid and 2 rounds of Percocets.    PAST MEDICAL / SURGICAL / SOCIAL / FAMILY HISTORY      has a past medical history of Arthritis, Asthma, Cerebral artery occlusion with cerebral infarction (HCC), Chronic back pain, Hypertension, Lumbar stenosis, Migraines, Seizures (HCC), Thyroid disease, Urinary incontinence, and Uterine anomaly.       has a past surgical history that includes Spine surgery; knee surgery; Tubal ligation (Bilateral, 2002); Anterior cruciate ligament repair (Left); Nerve Block (5/9/13); lumbar fusion (N/A, 1/15/2019); Knee arthroscopy (Left, 2004); Endometrial ablation (2006);  position and left hip.    2/3/2024 patient returned to the emergency department for similar symptoms she states at that time worsened.  Patient was given 1 3 rounds of 1 mg of Dilaudid and 2 rounds of Percocets.    Risk  OTC drugs.  Prescription drug management.        EKG      All EKG's are interpreted by the Emergency Department Physician who either signs or Co-signs this chart in the absence of a cardiologist.    EMERGENCY DEPARTMENT COURSE:           PROCEDURES:      CONSULTS:  None    CRITICAL CARE:  There was significant risk of life threatening deterioration of patient's condition requiring my direct management. Critical care time minutes, excluding any documented procedures.    FINAL IMPRESSION      1. Chronic left-sided back pain, unspecified back location          DISPOSITION / PLAN     DISPOSITION Decision To Discharge 02/08/2024 02:15:46 AM      PATIENT REFERRED TO:  Jeniffer Sevilla APRN - NP  [id number] [place] [address]  [phone]    Call in 2 days  As needed      DISCHARGE MEDICATIONS:  New Prescriptions    ACETAMINOPHEN (TYLENOL) 500 MG TABLET    Take 2 tablets by mouth 3 times daily    CYCLOBENZAPRINE (FLEXERIL) 10 MG TABLET    Take 1 tablet by mouth 3 times daily as needed for Muscle spasms    NAPROXEN (NAPROSYN) 500 MG TABLET    Take 1 tablet by mouth 2 times daily for 5 days Please do not take with other NSAID medication       Dariusz Plascencia DO  Emergency Medicine Resident    (Please note that portions of this note were completed with a voice recognition program.  Efforts were made to edit the dictations but occasionally words are mis-transcribed.)

## 2024-02-08 NOTE — ED PROVIDER NOTES
[facility]     Emergency Department     Faculty Attestation    I performed a history and physical examination of the patient and discussed management with the resident. I have reviewed and agree with the resident’s findings including all diagnostic interpretations, and treatment plans as written. Any areas of disagreement are noted on the chart. I was personally present for the key portions of any procedures. I have documented in the chart those procedures where I was not present during the key portions. I have reviewed the emergency nurses triage note. I agree with the chief complaint, past medical history, past surgical history, allergies, medications, social and family history as documented unless otherwise noted below. Documentation of the HPI, Physical Exam and Medical Decision Making performed by [email or web address] is based on my personal performance of the HPI, PE and MDM. For Physician Assistant/ Nurse Practitioner cases/documentation I have personally evaluated this patient and have completed at least one if not all key elements of the E/M (history, physical exam, and MDM). Additional findings are as noted.    Note Started: 1:45 AM EST     50 yo F L>R lower back pain, hx chronic low back pain, no injury, no fever, no urinary incontinence or retention at this time,   PE vss gcs 15 Dr Plascencia escort for exam  Back exam diffusely tender in the lumbar region, skin is intact, healed midline surgical scar, there is no deformity, no erythema, no indication of infection, or injury,  Proprioception intact bilateral lower extremities  Patient able to ambulate for Dr. Plascencia  -pt witnessed ambulatory in department,     -pt has repeat visit for chronic back pain, mri lumbar spine stable on 1/21/24, pt has no red flag today for cord compression,   I am reluctant to write prescription for narcotics at this time, patient has had repeated visits for same complaint,  reviewing chart reveals primary care physician is also not writing prescriptions for narcotics, I do feel that there is a potential for narcotic seeking behavior,  Patient advised of same,    EKG Interpretation    Interpreted by me      CRITICAL CARE: There was a high probability of clinically significant/life threatening deterioration in this patient's condition which required my urgent intervention.  Total critical care time was 0 minutes.  This excludes any time for separately reportable procedures.       Ludin Muse DO  02/08/24 [id number]       Ludin Hill DO  02/08/24 0342

## 2024-02-09 ENCOUNTER — OFFICE VISIT (OUTPATIENT)
Dept: PAIN MANAGEMENT | Age: 49
End: 2024-02-09
Payer: MEDICAID

## 2024-02-09 VITALS
SYSTOLIC BLOOD PRESSURE: 152 MMHG | BODY MASS INDEX: 35.36 KG/M2 | WEIGHT: 220 LBS | HEIGHT: 66 IN | DIASTOLIC BLOOD PRESSURE: 104 MMHG

## 2024-02-09 DIAGNOSIS — M54.16 LUMBAR RADICULOPATHY: ICD-10-CM

## 2024-02-09 DIAGNOSIS — M96.1 POSTLAMINECTOMY SYNDROME OF LUMBAR REGION: Primary | ICD-10-CM

## 2024-02-09 PROCEDURE — 3080F DIAST BP >= 90 MM HG: CPT | Performed by: PAIN MEDICINE

## 2024-02-09 PROCEDURE — 3077F SYST BP >= 140 MM HG: CPT | Performed by: PAIN MEDICINE

## 2024-02-09 PROCEDURE — 99213 OFFICE O/P EST LOW 20 MIN: CPT | Performed by: PAIN MEDICINE

## 2024-02-09 RX ORDER — OXYCODONE HYDROCHLORIDE AND ACETAMINOPHEN 5; 325 MG/1; MG/1
1 TABLET ORAL EVERY 6 HOURS PRN
Qty: 28 TABLET | Refills: 0 | Status: SHIPPED | OUTPATIENT
Start: 2024-02-09 | End: 2024-02-23

## 2024-02-09 ASSESSMENT — ENCOUNTER SYMPTOMS
EYES NEGATIVE: 1
ALLERGIC/IMMUNOLOGIC NEGATIVE: 1
GASTROINTESTINAL NEGATIVE: 1
RESPIRATORY NEGATIVE: 1

## 2024-02-09 NOTE — PROGRESS NOTES
History of Present Illness     Patient Identification  Layla Godfrey is a 49 y.o. female.    Patient information was obtained from patient.      Chief Complaint   Chief Complaint   Patient presents with    Hip Pain     Left        Layla Godfrey is a 47 y.o. female with Leg Pain (Intense pain in both legs and back pain after falling in bath tub several months  ago was getting pain meds from me and she moved to [place] and was seeing pain management there, is here today saying she moved to [place] and wants me to restart her meds.Recent spine surgery in June 2022 Foraminotomy followed by a clot resection in the spine, Had 3 other surgeries before  a PMH significant for Seizure d/o, Chronic pain syndrome, Lumbar spinal stenosis, Neurogenic claudication and Post-laminectomy syndrome who presents with complaints of diffuse lower back pain rated 8 out of 10 with radiation into the legs, primarily the left leg and hip with associated numbness and tingling down to the foot,  However Her notes suggest that her pain was since June s/p microdiscectomy, Patient denies any loss of bowel or bladder control.  stating \"ibuprofen and Tylenol do not work for me\".  Pt was on 30mg oxycodone tbefore. Has cortisone injections which led to Body swelling. We restarted Her percocet, Good relief of pain, No side effects, Able to do her ADLs, Current UA is consistent in May. Finished PT lost her insurance and moved to [place] is now back after getting her insurance.  [facility]: 7/27/23: left L4-5 disc protrusionmild facet hypertrophy mild foraminal stenosis. L5-S1 mild foraminal stenosis        Past Medical History:   Diagnosis Date    Arthritis     spine    Asthma     since childhood -- smokes x 30 yrs    Cerebral artery occlusion with cerebral infarction (HCC)     per CT scan -- patient without sx, 2021    Chronic back pain     Hypertension     meds > 3 yrs    Lumbar stenosis     Migraines     starts with neck

## 2024-02-15 NOTE — PROGRESS NOTES
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  Transportation is an issue for the patient keep frequent appointments    She does not want more injections with corticosteroids because they do not help and she is tired of them.  She may have an allergy.  Discussed that pain management procedures are ongoing treatment for chronic pain syndrome and not designed to prevent the necessity of future treatment including injections.    Discussed the current study show she is structurally intact and not causing further damage by being up and active only limited by her pain syndrome.    She may be a candidate for spinal cord stimulator pain pump per pain management      MICHAEL BLANCAS MD

## 2024-02-20 ENCOUNTER — OFFICE VISIT (OUTPATIENT)
Dept: NEUROSURGERY | Age: 49
End: 2024-02-20
Payer: MEDICAID

## 2024-02-20 ENCOUNTER — OFFICE VISIT (OUTPATIENT)
Dept: PAIN MANAGEMENT | Age: 49
End: 2024-02-20
Payer: MEDICAID

## 2024-02-20 VITALS
DIASTOLIC BLOOD PRESSURE: 84 MMHG | HEART RATE: 76 BPM | SYSTOLIC BLOOD PRESSURE: 136 MMHG | BODY MASS INDEX: 35.36 KG/M2 | TEMPERATURE: 97.8 F | HEIGHT: 66 IN | WEIGHT: 220 LBS | OXYGEN SATURATION: 98 %

## 2024-02-20 VITALS
SYSTOLIC BLOOD PRESSURE: 136 MMHG | WEIGHT: 220 LBS | HEIGHT: 66 IN | DIASTOLIC BLOOD PRESSURE: 84 MMHG | BODY MASS INDEX: 35.36 KG/M2

## 2024-02-20 DIAGNOSIS — M96.1 POSTLAMINECTOMY SYNDROME OF LUMBAR REGION: ICD-10-CM

## 2024-02-20 DIAGNOSIS — M54.16 LUMBAR RADICULOPATHY: ICD-10-CM

## 2024-02-20 DIAGNOSIS — F17.200 SMOKER: Primary | ICD-10-CM

## 2024-02-20 DIAGNOSIS — M25.552 LEFT HIP PAIN: ICD-10-CM

## 2024-02-20 DIAGNOSIS — G89.4 CHRONIC PAIN SYNDROME: ICD-10-CM

## 2024-02-20 DIAGNOSIS — Z98.1 HISTORY OF LUMBAR FUSION: ICD-10-CM

## 2024-02-20 PROCEDURE — 99214 OFFICE O/P EST MOD 30 MIN: CPT | Performed by: NEUROLOGICAL SURGERY

## 2024-02-20 PROCEDURE — 3079F DIAST BP 80-89 MM HG: CPT | Performed by: PAIN MEDICINE

## 2024-02-20 PROCEDURE — 3075F SYST BP GE 130 - 139MM HG: CPT | Performed by: NEUROLOGICAL SURGERY

## 2024-02-20 PROCEDURE — 3079F DIAST BP 80-89 MM HG: CPT | Performed by: NEUROLOGICAL SURGERY

## 2024-02-20 PROCEDURE — 99213 OFFICE O/P EST LOW 20 MIN: CPT | Performed by: PAIN MEDICINE

## 2024-02-20 PROCEDURE — 3075F SYST BP GE 130 - 139MM HG: CPT | Performed by: PAIN MEDICINE

## 2024-02-20 RX ORDER — OXYCODONE HYDROCHLORIDE AND ACETAMINOPHEN 5; 325 MG/1; MG/1
1 TABLET ORAL EVERY 8 HOURS PRN
Qty: 42 TABLET | Refills: 0 | Status: SHIPPED | OUTPATIENT
Start: 2024-02-20 | End: 2024-03-05

## 2024-02-20 ASSESSMENT — ENCOUNTER SYMPTOMS
GASTROINTESTINAL NEGATIVE: 1
ALLERGIC/IMMUNOLOGIC NEGATIVE: 1
EYES NEGATIVE: 1
RESPIRATORY NEGATIVE: 1

## 2024-02-20 NOTE — PROGRESS NOTES
History of Present Illness     Patient Identification  Layla Godfrey is a 49 y.o. female.    Patient information was obtained from patient.      Chief Complaint   Chief Complaint   Patient presents with    Follow-up    Back Pain    Leg Pain       Layla Godfrey is a 47 y.o. female with Leg Pain (Intense pain in both legs and back pain after falling in bath tub several months  ago was getting pain meds from me and she moved to [place] and was seeing pain management there, is here today saying she moved to [place] and wants me to restart her meds.Recent spine surgery in June 2022 Foraminotomy followed by a clot resection in the spine, Had 3 other surgeries before  a PMH significant for Seizure d/o, Chronic pain syndrome, Lumbar spinal stenosis, Neurogenic claudication and Post-laminectomy syndrome who presents with complaints of diffuse lower back pain rated 8 out of 10 with radiation into the legs, primarily the left leg and hip with associated numbness and tingling down to the foot,  However Her notes suggest that her pain was since June s/p microdiscectomy, Patient denies any loss of bowel or bladder control.  stating \"ibuprofen and Tylenol do not work for me\".  Pt was on 30mg oxycodone tbefore. Has cortisone injections which led to Body swelling. We restarted Her percocet, Good relief of pain, No side effects, Able to do her ADLs, Current UA is consistent in May. Finished PT lost her insurance and moved to [place] is now back after getting her insurance.  MRI at [facility]: 7/27/23: left L4-5 disc protrusionmild facet hypertrophy mild foraminal stenosis. L5-S1 mild foraminal stenosis        Past Medical History:   Diagnosis Date    Arthritis     spine    Asthma     since childhood -- smokes x 30 yrs    Cerebral artery occlusion with cerebral infarction (HCC)     per CT scan -- patient without sx, 2021    Chronic back pain     Hypertension     meds > 3 yrs    Lumbar stenosis     Migraines

## 2024-03-04 ENCOUNTER — OFFICE VISIT (OUTPATIENT)
Dept: PAIN MANAGEMENT | Age: 49
End: 2024-03-04
Payer: MEDICAID

## 2024-03-04 VITALS
DIASTOLIC BLOOD PRESSURE: 84 MMHG | TEMPERATURE: 97.8 F | HEART RATE: 88 BPM | OXYGEN SATURATION: 96 % | WEIGHT: 220 LBS | HEIGHT: 66 IN | SYSTOLIC BLOOD PRESSURE: 128 MMHG | BODY MASS INDEX: 35.36 KG/M2

## 2024-03-04 DIAGNOSIS — M96.1 POSTLAMINECTOMY SYNDROME OF LUMBAR REGION: ICD-10-CM

## 2024-03-04 DIAGNOSIS — M54.16 LUMBAR RADICULOPATHY: ICD-10-CM

## 2024-03-04 PROCEDURE — 3074F SYST BP LT 130 MM HG: CPT | Performed by: PAIN MEDICINE

## 2024-03-04 PROCEDURE — 99213 OFFICE O/P EST LOW 20 MIN: CPT | Performed by: PAIN MEDICINE

## 2024-03-04 PROCEDURE — 3079F DIAST BP 80-89 MM HG: CPT | Performed by: PAIN MEDICINE

## 2024-03-04 RX ORDER — OXYCODONE HYDROCHLORIDE AND ACETAMINOPHEN 5; 325 MG/1; MG/1
1 TABLET ORAL EVERY 8 HOURS PRN
Qty: 42 TABLET | Refills: 0 | Status: SHIPPED | OUTPATIENT
Start: 2024-03-04 | End: 2024-03-18

## 2024-03-04 ASSESSMENT — ENCOUNTER SYMPTOMS
ALLERGIC/IMMUNOLOGIC NEGATIVE: 1
EYES NEGATIVE: 1
RESPIRATORY NEGATIVE: 1
GASTROINTESTINAL NEGATIVE: 1

## 2024-03-04 NOTE — PROGRESS NOTES
History of Present Illness     Patient Identification  Layla Godfrey is a 49 y.o. female.    Patient information was obtained from patient.      Chief Complaint   Chief Complaint   Patient presents with    Follow-up    Back Pain       Layla Godfrey is a 47 y.o. female with Leg Pain (Intense pain in both legs and back pain after falling in bath tub several months  ago was getting pain meds from me and she moved to [place] and was seeing pain management there, is here today saying she moved to [place] and wants me to restart her meds.Recent spine surgery in June 2022 Foraminotomy followed by a clot resection in the spine, Had 3 other surgeries before  a PMH significant for Seizure d/o, Chronic pain syndrome, Lumbar spinal stenosis, Neurogenic claudication and Post-laminectomy syndrome who presents with complaints of diffuse lower back pain rated 8 out of 10 with radiation into the legs, primarily the left leg and hip with associated numbness and tingling down to the foot,  However Her notes suggest that her pain was since June s/p microdiscectomy, Patient denies any loss of bowel or bladder control.  stating \"ibuprofen and Tylenol do not work for me\".  Pt was on 30mg oxycodone tbefore. Has cortisone injections which led to Body swelling. We restarted Her percocet, Good relief of pain, No side effects, Able to do her ADLs, Current UA is consistent in May. Finished PT lost her insurance and moved to [place] is now back after getting her insurance.  MRI at [facility]: 7/27/23: left L4-5 disc protrusionmild facet hypertrophy mild foraminal stenosis. L5-S1 mild foraminal stenosis        Past Medical History:   Diagnosis Date    Arthritis     spine    Asthma     since childhood -- smokes x 30 yrs    Cerebral artery occlusion with cerebral infarction (HCC)     per CT scan -- patient without sx, 2021    Chronic back pain     Hypertension     meds > 3 yrs    Lumbar stenosis     Migraines     starts with neck

## 2024-03-18 ENCOUNTER — OFFICE VISIT (OUTPATIENT)
Dept: PAIN MANAGEMENT | Age: 49
End: 2024-03-18
Payer: MEDICAID

## 2024-03-18 ENCOUNTER — TELEPHONE (OUTPATIENT)
Dept: PAIN MANAGEMENT | Age: 49
End: 2024-03-18

## 2024-03-18 VITALS
TEMPERATURE: 97.7 F | HEIGHT: 66 IN | DIASTOLIC BLOOD PRESSURE: 86 MMHG | WEIGHT: 220 LBS | SYSTOLIC BLOOD PRESSURE: 128 MMHG | BODY MASS INDEX: 35.36 KG/M2

## 2024-03-18 DIAGNOSIS — Z98.1 HISTORY OF LUMBAR SPINAL FUSION: ICD-10-CM

## 2024-03-18 DIAGNOSIS — M96.1 POSTLAMINECTOMY SYNDROME OF LUMBAR REGION: Primary | ICD-10-CM

## 2024-03-18 DIAGNOSIS — M51.26 HERNIATED LUMBAR INTERVERTEBRAL DISC: ICD-10-CM

## 2024-03-18 DIAGNOSIS — Z98.890 BACK PAIN WITH HISTORY OF SPINAL SURGERY: ICD-10-CM

## 2024-03-18 DIAGNOSIS — M54.42 CHRONIC BILATERAL LOW BACK PAIN WITH LEFT-SIDED SCIATICA: ICD-10-CM

## 2024-03-18 DIAGNOSIS — M54.9 BACK PAIN WITH HISTORY OF SPINAL SURGERY: ICD-10-CM

## 2024-03-18 DIAGNOSIS — I69.30 SEQUELA, POST-STROKE: ICD-10-CM

## 2024-03-18 DIAGNOSIS — K59.03 THERAPEUTIC OPIOID-INDUCED CONSTIPATION (OIC): ICD-10-CM

## 2024-03-18 DIAGNOSIS — M47.817 LUMBOSACRAL SPONDYLOSIS WITHOUT MYELOPATHY: ICD-10-CM

## 2024-03-18 DIAGNOSIS — M54.16 LUMBAR RADICULOPATHY: ICD-10-CM

## 2024-03-18 DIAGNOSIS — T40.2X5A THERAPEUTIC OPIOID-INDUCED CONSTIPATION (OIC): ICD-10-CM

## 2024-03-18 DIAGNOSIS — G89.29 CHRONIC BILATERAL LOW BACK PAIN WITH LEFT-SIDED SCIATICA: ICD-10-CM

## 2024-03-18 PROCEDURE — 99213 OFFICE O/P EST LOW 20 MIN: CPT | Performed by: PAIN MEDICINE

## 2024-03-18 PROCEDURE — 3074F SYST BP LT 130 MM HG: CPT | Performed by: PAIN MEDICINE

## 2024-03-18 PROCEDURE — 3079F DIAST BP 80-89 MM HG: CPT | Performed by: PAIN MEDICINE

## 2024-03-18 RX ORDER — OXYCODONE HYDROCHLORIDE AND ACETAMINOPHEN 5; 325 MG/1; MG/1
1 TABLET ORAL 2 TIMES DAILY PRN
Qty: 28 TABLET | Refills: 0 | Status: SHIPPED | OUTPATIENT
Start: 2024-03-18 | End: 2024-04-01

## 2024-03-18 NOTE — PROGRESS NOTES
vitamin C (ASCORBIC ACID) 500 MG tablet Take 1 tablet by mouth daily      ondansetron (ZOFRAN) 4 MG tablet Take 1 tablet by mouth every 8 hours as needed for Nausea or Vomiting      amLODIPine (NORVASC) 5 MG tablet Take 1 tablet by mouth daily      Omeprazole (PRILOSEC PO) Take 40 mg by mouth       No current facility-administered medications for this visit.     Allergies   Allergen Reactions    Aspirin Rash and Hives    Bee Venom Anaphylaxis    Corticosteroids Rash, Anaphylaxis and Swelling    Fentanyl Shortness Of Breath, Other (See Comments) and Anaphylaxis     Throat closes    Prochlorperazine Edisylate Shortness Of Breath and Swelling    Tylenol [Acetaminophen] Nausea Only    Aspirin     Compazine [Prochlorperazine]     Corticosteroids     Cortizone Swelling    Prochlorperazine Swelling    Prochlorperazine Maleate Other (See Comments)    Codeine Rash, Other (See Comments) and Hives     Throat closes       Review of Systems  Review of Systems   Constitutional: Negative.    HENT: Negative.     Eyes: Negative.    Respiratory: Negative.     Cardiovascular: Negative.    Gastrointestinal: Negative.    Endocrine: Negative.    Genitourinary: Negative.    Musculoskeletal: Negative.    Skin: Negative.    Allergic/Immunologic: Negative.    Neurological: Negative.         H/o CVA 2 years ago.   Hematological: Negative.    Psychiatric/Behavioral: Negative.     All other systems reviewed and are negative.       Physical Exam     /86   Temp 97.7 °F (36.5 °C)   Ht 1.676 m (5' 6\")   Wt 99.8 kg (220 lb)   BMI 35.51 kg/m²   Physical Exam  Vitals and nursing note reviewed.   Constitutional:       Appearance: Normal appearance.   HENT:      Head: Normocephalic.      Right Ear: Ear canal normal.      Left Ear: Ear canal normal.      Nose: Nose normal.      Mouth/Throat:      Mouth: Mucous membranes are moist.   Eyes:      Extraocular Movements: Extraocular movements intact.      Conjunctiva/sclera: Conjunctivae normal.

## 2024-03-18 NOTE — TELEPHONE ENCOUNTER
Left patient message to call back regarding her appointment today. Dr. Nunn stated that he will not be prescribing medication at her appointment today so she is welcome to cancel appointment or still come in to talk to him.

## 2024-03-27 NOTE — PROGRESS NOTES
Patient Name: Layla Godfrey : 1975        Date: 3/28/2024      Type of Appt: Follow up    Reason for appt: FOLLOW UP TO DESTINEY OTHER OPTIONS     Pt last seen by Dr Michaels on 2024    Studies done: NO NEW STUDIES    Surgeries:   Dr. Michaels:  2022 - LEFT BILATERAL L 3-4 LAMINECTOMY MICRODISSECTION DECOMPRESSION    2022 - REMOVAL OF L 3-4 EPIDURAL HEMATOMA    Dr Womack:  2017 - L4-5, L5-S1 DISKECTOMY.    01/15/2019  - L5-S1 POSTERIOR LUMBAR INTERBODY FUSION.    2021 - L4-5 TLIF INSTRUMENTATION AT L5-S1 DECOMPRESSION AT L4, L5 REMOVAL AND REINSERTION OF HARDWARE AT L5-S1.    Conservative Tx tried :   Pain Management: Dr Nunn     Smoking: Yes     The patient is a 49 y.o. female who presents today for evaluation of the following problems:          Chief Complaint   Patient presents with    Back Pain         Referred by No ref. provider found        PAST MEDICAL, FAMILY AND SOCIAL HISTORY:  Past Medical History        Past Medical History:   Diagnosis Date    Arthritis       spine    Asthma       since childhood -- smokes x 30 yrs    Cerebral artery occlusion with cerebral infarction (HCC)       per CT scan -- patient without sx,     Chronic back pain      Hypertension       meds > 3 yrs    Lumbar stenosis      Migraines       starts with neck pain    Seizures (HCC)       pt last known seizure May 2022, dx 20 years ago, takes gabapentin for back/seizures    Thyroid disease       meds > 4 yrs -- intermittent    Urinary incontinence      Uterine anomaly           Past Surgical History         Past Surgical History:   Procedure Laterality Date    ANTERIOR CRUCIATE LIGAMENT REPAIR Left      APPENDECTOMY         at age 20     BACK SURGERY   2017     lumbar disc     BACK SURGERY   2018     lumbar fusion    CHOLECYSTECTOMY   2015    COLONOSCOPY         2017    ENDOMETRIAL ABLATION   2006     post op sepsis    ENDOSCOPY, COLON, DIAGNOSTIC         2017    KNEE ARTHROSCOPY Left 2004     ACL

## 2024-03-28 ENCOUNTER — OFFICE VISIT (OUTPATIENT)
Dept: NEUROSURGERY | Age: 49
End: 2024-03-28
Payer: MEDICAID

## 2024-03-28 VITALS
DIASTOLIC BLOOD PRESSURE: 84 MMHG | SYSTOLIC BLOOD PRESSURE: 132 MMHG | HEIGHT: 66 IN | TEMPERATURE: 97 F | BODY MASS INDEX: 35.36 KG/M2 | WEIGHT: 220 LBS

## 2024-03-28 DIAGNOSIS — M54.42 CHRONIC BILATERAL LOW BACK PAIN WITH LEFT-SIDED SCIATICA: ICD-10-CM

## 2024-03-28 DIAGNOSIS — F17.200 SMOKER: ICD-10-CM

## 2024-03-28 DIAGNOSIS — M25.552 LEFT HIP PAIN: ICD-10-CM

## 2024-03-28 DIAGNOSIS — Z98.1 HISTORY OF LUMBAR FUSION: ICD-10-CM

## 2024-03-28 DIAGNOSIS — G89.4 CHRONIC PAIN SYNDROME: Primary | ICD-10-CM

## 2024-03-28 DIAGNOSIS — M96.1 POSTLAMINECTOMY SYNDROME OF LUMBAR REGION: ICD-10-CM

## 2024-03-28 DIAGNOSIS — M79.672 LEFT FOOT PAIN: ICD-10-CM

## 2024-03-28 DIAGNOSIS — G89.29 CHRONIC BILATERAL LOW BACK PAIN WITH LEFT-SIDED SCIATICA: ICD-10-CM

## 2024-03-28 PROCEDURE — 3079F DIAST BP 80-89 MM HG: CPT | Performed by: NEUROLOGICAL SURGERY

## 2024-03-28 PROCEDURE — 3075F SYST BP GE 130 - 139MM HG: CPT | Performed by: NEUROLOGICAL SURGERY

## 2024-03-28 PROCEDURE — 99213 OFFICE O/P EST LOW 20 MIN: CPT | Performed by: NEUROLOGICAL SURGERY

## 2024-04-01 ENCOUNTER — OFFICE VISIT (OUTPATIENT)
Dept: PAIN MANAGEMENT | Age: 49
End: 2024-04-01
Payer: MEDICAID

## 2024-04-01 VITALS
DIASTOLIC BLOOD PRESSURE: 76 MMHG | WEIGHT: 220 LBS | BODY MASS INDEX: 35.36 KG/M2 | TEMPERATURE: 97.9 F | SYSTOLIC BLOOD PRESSURE: 128 MMHG | HEIGHT: 66 IN

## 2024-04-01 DIAGNOSIS — M54.16 LUMBAR RADICULOPATHY: ICD-10-CM

## 2024-04-01 DIAGNOSIS — M96.1 POSTLAMINECTOMY SYNDROME OF LUMBAR REGION: ICD-10-CM

## 2024-04-01 PROCEDURE — 3078F DIAST BP <80 MM HG: CPT | Performed by: PAIN MEDICINE

## 2024-04-01 PROCEDURE — 3074F SYST BP LT 130 MM HG: CPT | Performed by: PAIN MEDICINE

## 2024-04-01 PROCEDURE — 99213 OFFICE O/P EST LOW 20 MIN: CPT | Performed by: PAIN MEDICINE

## 2024-04-01 RX ORDER — OXYCODONE HYDROCHLORIDE AND ACETAMINOPHEN 5; 325 MG/1; MG/1
1 TABLET ORAL DAILY
Qty: 14 TABLET | Refills: 0 | Status: SHIPPED | OUTPATIENT
Start: 2024-04-01 | End: 2024-04-15

## 2024-04-01 ASSESSMENT — ENCOUNTER SYMPTOMS
EYES NEGATIVE: 1
RESPIRATORY NEGATIVE: 1
GASTROINTESTINAL NEGATIVE: 1
ALLERGIC/IMMUNOLOGIC NEGATIVE: 1

## 2024-04-01 NOTE — PROGRESS NOTES
Extraocular Movements: Extraocular movements intact.      Conjunctiva/sclera: Conjunctivae normal.      Pupils: Pupils are equal, round, and reactive to light.   Cardiovascular:      Rate and Rhythm: Normal rate and regular rhythm.      Pulses: Normal pulses.      Heart sounds: Normal heart sounds.   Pulmonary:      Effort: Pulmonary effort is normal.      Breath sounds: Normal breath sounds.   Abdominal:      General: Abdomen is flat. Bowel sounds are normal.      Palpations: Abdomen is soft.   Musculoskeletal:         General: Normal range of motion.      Cervical back: Normal range of motion and neck supple.      Comments: Antalgic Gait, Not able to walk on Left Toes and heels though its difficult to say if this is pain related, Foot Drop Left side. Loss of reflexes Rt side   Skin:     General: Skin is warm.      Capillary Refill: Capillary refill takes less than 2 seconds.   Neurological:      General: No focal deficit present.      Mental Status: She is alert and oriented to person, place, and time. Mental status is at baseline.   Psychiatric:         Mood and Affect: Mood normal.         Behavior: Behavior normal.         Thought Content: Thought content normal.         Judgment: Judgment normal.       Plan   MRIs reviewed.  Will continue current meds. OAARS reviewed  Plan on TFESIs. UA Consistant 2/9/24. Wean narcotics off after this script and follow. Discussed Stim trial.

## 2024-04-12 ENCOUNTER — TELEPHONE (OUTPATIENT)
Dept: NEUROSURGERY | Age: 49
End: 2024-04-12

## 2024-04-12 NOTE — TELEPHONE ENCOUNTER
Patient is not interested in receiving the injections and stated that Dr. Michaels was going to refer her to another provider in order to receive other medication or treatment options.

## 2024-04-15 DIAGNOSIS — G89.4 CHRONIC PAIN SYNDROME: Primary | ICD-10-CM

## 2024-04-15 NOTE — TELEPHONE ENCOUNTER
2 referrals done for pain management 1 for our group and external referral.  She can see any provider in our pain management group.  I advised the patient to see pain management in her area where she lives and may use the referral that I placed.  She could check with her family physician primary care provider for pain management in her area.  Dr. Staley is another possibility

## 2024-04-25 NOTE — TELEPHONE ENCOUNTER
FRED for callback.  sent correspondence stating she can not schedule with Dr. Hackett, patient needs to follow up with PCP to obtain a referral to another practice.

## 2024-04-25 NOTE — TELEPHONE ENCOUNTER
I sent another email to practice Manager That the patient wants to see Dr Hackett. This is a prev. Patient of Dr Stevenson

## 2024-04-30 ENCOUNTER — APPOINTMENT (OUTPATIENT)
Dept: MRI IMAGING | Age: 49
End: 2024-04-30
Payer: MEDICAID

## 2024-04-30 ENCOUNTER — HOSPITAL ENCOUNTER (EMERGENCY)
Age: 49
Discharge: HOME | End: 2024-05-01
Attending: EMERGENCY MEDICINE
Payer: MEDICAID

## 2024-04-30 VITALS
BODY MASS INDEX: 37.12 KG/M2 | WEIGHT: 230 LBS | TEMPERATURE: 98.5 F | DIASTOLIC BLOOD PRESSURE: 133 MMHG | OXYGEN SATURATION: 95 % | HEART RATE: 79 BPM | RESPIRATION RATE: 17 BRPM | SYSTOLIC BLOOD PRESSURE: 161 MMHG

## 2024-04-30 DIAGNOSIS — M54.50 ACUTE EXACERBATION OF CHRONIC LOW BACK PAIN: Primary | ICD-10-CM

## 2024-04-30 DIAGNOSIS — G89.29 ACUTE EXACERBATION OF CHRONIC LOW BACK PAIN: Primary | ICD-10-CM

## 2024-04-30 LAB
ALBUMIN SERPL-MCNC: 4.9 G/DL (ref 3.5–5.2)
ALBUMIN/GLOB SERPL: 2 {RATIO} (ref 1–2.5)
ALP SERPL-CCNC: 88 U/L (ref 35–104)
ALT SERPL-CCNC: 29 U/L (ref 10–35)
ANION GAP SERPL CALCULATED.3IONS-SCNC: 12 MMOL/L (ref 9–16)
AST SERPL-CCNC: 28 U/L (ref 10–35)
BASOPHILS # BLD: 0.05 K/UL (ref 0–0.2)
BASOPHILS NFR BLD: 1 % (ref 0–2)
BILIRUB SERPL-MCNC: 0.6 MG/DL (ref 0–1.2)
BILIRUB UR QL STRIP: NEGATIVE
BUN SERPL-MCNC: 10 MG/DL (ref 6–20)
CALCIUM SERPL-MCNC: 10.1 MG/DL (ref 8.6–10.4)
CHLORIDE SERPL-SCNC: 102 MMOL/L (ref 98–107)
CLARITY UR: CLEAR
CO2 SERPL-SCNC: 24 MMOL/L (ref 20–31)
COLOR UR: YELLOW
COMMENT: NORMAL
CREAT SERPL-MCNC: 0.9 MG/DL (ref 0.5–0.9)
CRP SERPL HS-MCNC: <3 MG/L (ref 0–5)
EOSINOPHIL # BLD: 0.19 K/UL (ref 0–0.44)
EOSINOPHILS RELATIVE PERCENT: 2 % (ref 1–4)
ERYTHROCYTE [DISTWIDTH] IN BLOOD BY AUTOMATED COUNT: 13 % (ref 11.8–14.4)
ERYTHROCYTE [SEDIMENTATION RATE] IN BLOOD BY PHOTOMETRIC METHOD: 7 MM/HR (ref 0–20)
GFR, ESTIMATED: 82 ML/MIN/1.73M2
GLUCOSE SERPL-MCNC: 119 MG/DL (ref 74–99)
GLUCOSE UR STRIP-MCNC: NEGATIVE MG/DL
HCG SERPL QL: NEGATIVE
HCT VFR BLD AUTO: 48.7 % (ref 36.3–47.1)
HGB BLD-MCNC: 16.1 G/DL (ref 11.9–15.1)
HGB UR QL STRIP.AUTO: NEGATIVE
IMM GRANULOCYTES # BLD AUTO: 0.05 K/UL (ref 0–0.3)
IMM GRANULOCYTES NFR BLD: 1 %
KETONES UR STRIP-MCNC: NEGATIVE MG/DL
LEUKOCYTE ESTERASE UR QL STRIP: NEGATIVE
LYMPHOCYTES NFR BLD: 2.49 K/UL (ref 1.1–3.7)
LYMPHOCYTES RELATIVE PERCENT: 23 % (ref 24–43)
MCH RBC QN AUTO: 32.5 PG (ref 25.2–33.5)
MCHC RBC AUTO-ENTMCNC: 33.1 G/DL (ref 28.4–34.8)
MCV RBC AUTO: 98.4 FL (ref 82.6–102.9)
MONOCYTES NFR BLD: 0.64 K/UL (ref 0.1–1.2)
MONOCYTES NFR BLD: 6 % (ref 3–12)
NEUTROPHILS NFR BLD: 67 % (ref 36–65)
NEUTS SEG NFR BLD: 7.53 K/UL (ref 1.5–8.1)
NITRITE UR QL STRIP: NEGATIVE
NRBC BLD-RTO: 0 PER 100 WBC
PH UR STRIP: 5.5 [PH] (ref 5–8)
PLATELET # BLD AUTO: 292 K/UL (ref 138–453)
PMV BLD AUTO: 10.9 FL (ref 8.1–13.5)
POTASSIUM SERPL-SCNC: 3.7 MMOL/L (ref 3.7–5.3)
PROT SERPL-MCNC: 8 G/DL (ref 6.6–8.7)
PROT UR STRIP-MCNC: NEGATIVE MG/DL
RBC # BLD AUTO: 4.95 M/UL (ref 3.95–5.11)
SODIUM SERPL-SCNC: 138 MMOL/L (ref 136–145)
SP GR UR STRIP: 1.02 (ref 1–1.03)
UROBILINOGEN UR STRIP-ACNC: NORMAL EU/DL (ref 0–1)
WBC OTHER # BLD: 11 K/UL (ref 3.5–11.3)

## 2024-04-30 PROCEDURE — 84703 CHORIONIC GONADOTROPIN ASSAY: CPT

## 2024-04-30 PROCEDURE — 72148 MRI LUMBAR SPINE W/O DYE: CPT

## 2024-04-30 PROCEDURE — 96375 TX/PRO/DX INJ NEW DRUG ADDON: CPT

## 2024-04-30 PROCEDURE — 72149 MRI LUMBAR SPINE W/DYE: CPT

## 2024-04-30 PROCEDURE — 81003 URINALYSIS AUTO W/O SCOPE: CPT

## 2024-04-30 PROCEDURE — 87040 BLOOD CULTURE FOR BACTERIA: CPT

## 2024-04-30 PROCEDURE — 6360000002 HC RX W HCPCS

## 2024-04-30 PROCEDURE — 2580000003 HC RX 258: Performed by: STUDENT IN AN ORGANIZED HEALTH CARE EDUCATION/TRAINING PROGRAM

## 2024-04-30 PROCEDURE — 2500000003 HC RX 250 WO HCPCS: Performed by: STUDENT IN AN ORGANIZED HEALTH CARE EDUCATION/TRAINING PROGRAM

## 2024-04-30 PROCEDURE — 96374 THER/PROPH/DIAG INJ IV PUSH: CPT

## 2024-04-30 PROCEDURE — 6370000000 HC RX 637 (ALT 250 FOR IP): Performed by: STUDENT IN AN ORGANIZED HEALTH CARE EDUCATION/TRAINING PROGRAM

## 2024-04-30 PROCEDURE — 85652 RBC SED RATE AUTOMATED: CPT

## 2024-04-30 PROCEDURE — 86140 C-REACTIVE PROTEIN: CPT

## 2024-04-30 PROCEDURE — 80053 COMPREHEN METABOLIC PANEL: CPT

## 2024-04-30 PROCEDURE — 85025 COMPLETE CBC W/AUTO DIFF WBC: CPT

## 2024-04-30 PROCEDURE — A4216 STERILE WATER/SALINE, 10 ML: HCPCS | Performed by: STUDENT IN AN ORGANIZED HEALTH CARE EDUCATION/TRAINING PROGRAM

## 2024-04-30 PROCEDURE — 6360000004 HC RX CONTRAST MEDICATION: Performed by: STUDENT IN AN ORGANIZED HEALTH CARE EDUCATION/TRAINING PROGRAM

## 2024-04-30 PROCEDURE — 6360000002 HC RX W HCPCS: Performed by: EMERGENCY MEDICINE

## 2024-04-30 PROCEDURE — 99285 EMERGENCY DEPT VISIT HI MDM: CPT

## 2024-04-30 PROCEDURE — 6360000002 HC RX W HCPCS: Performed by: STUDENT IN AN ORGANIZED HEALTH CARE EDUCATION/TRAINING PROGRAM

## 2024-04-30 PROCEDURE — 36415 COLL VENOUS BLD VENIPUNCTURE: CPT

## 2024-04-30 PROCEDURE — A9579 GAD-BASE MR CONTRAST NOS,1ML: HCPCS | Performed by: STUDENT IN AN ORGANIZED HEALTH CARE EDUCATION/TRAINING PROGRAM

## 2024-04-30 RX ORDER — MORPHINE SULFATE 4 MG/ML
4 INJECTION, SOLUTION INTRAMUSCULAR; INTRAVENOUS ONCE
Status: COMPLETED | OUTPATIENT
Start: 2024-04-30 | End: 2024-04-30

## 2024-04-30 RX ORDER — ORPHENADRINE CITRATE 30 MG/ML
60 INJECTION INTRAMUSCULAR; INTRAVENOUS ONCE
Status: DISCONTINUED | OUTPATIENT
Start: 2024-04-30 | End: 2024-05-01 | Stop reason: HOSPADM

## 2024-04-30 RX ORDER — ACETAMINOPHEN 500 MG
1000 TABLET ORAL ONCE
Status: COMPLETED | OUTPATIENT
Start: 2024-04-30 | End: 2024-04-30

## 2024-04-30 RX ORDER — DIPHENHYDRAMINE HYDROCHLORIDE 50 MG/ML
50 INJECTION INTRAMUSCULAR; INTRAVENOUS ONCE
Status: COMPLETED | OUTPATIENT
Start: 2024-04-30 | End: 2024-04-30

## 2024-04-30 RX ORDER — LIDOCAINE 4 G/G
1 PATCH TOPICAL ONCE
Status: DISCONTINUED | OUTPATIENT
Start: 2024-04-30 | End: 2024-05-01 | Stop reason: HOSPADM

## 2024-04-30 RX ORDER — ONDANSETRON 2 MG/ML
4 INJECTION INTRAMUSCULAR; INTRAVENOUS ONCE
Status: COMPLETED | OUTPATIENT
Start: 2024-04-30 | End: 2024-04-30

## 2024-04-30 RX ORDER — SODIUM CHLORIDE 0.9 % (FLUSH) 0.9 %
10 SYRINGE (ML) INJECTION PRN
Status: DISCONTINUED | OUTPATIENT
Start: 2024-04-30 | End: 2024-05-01 | Stop reason: HOSPADM

## 2024-04-30 RX ORDER — DIPHENHYDRAMINE HYDROCHLORIDE 50 MG/ML
INJECTION INTRAMUSCULAR; INTRAVENOUS
Status: COMPLETED
Start: 2024-04-30 | End: 2024-04-30

## 2024-04-30 RX ADMIN — ACETAMINOPHEN 1000 MG: 500 TABLET ORAL at 19:38

## 2024-04-30 RX ADMIN — MORPHINE SULFATE 4 MG: 4 INJECTION INTRAVENOUS at 22:44

## 2024-04-30 RX ADMIN — DIPHENHYDRAMINE HYDROCHLORIDE 50 MG: 50 INJECTION INTRAMUSCULAR; INTRAVENOUS at 22:57

## 2024-04-30 RX ADMIN — ONDANSETRON 4 MG: 2 INJECTION INTRAMUSCULAR; INTRAVENOUS at 20:24

## 2024-04-30 RX ADMIN — GADOTERIDOL 20 ML: 279.3 INJECTION, SOLUTION INTRAVENOUS at 22:33

## 2024-04-30 RX ADMIN — MORPHINE SULFATE 4 MG: 4 INJECTION INTRAVENOUS at 20:24

## 2024-04-30 RX ADMIN — FAMOTIDINE 20 MG: 10 INJECTION, SOLUTION INTRAVENOUS at 22:56

## 2024-04-30 ASSESSMENT — PAIN SCALES - GENERAL: PAINLEVEL_OUTOF10: 8

## 2024-04-30 ASSESSMENT — PAIN DESCRIPTION - LOCATION: LOCATION: BACK;HIP

## 2024-04-30 ASSESSMENT — LIFESTYLE VARIABLES
HOW MANY STANDARD DRINKS CONTAINING ALCOHOL DO YOU HAVE ON A TYPICAL DAY: PATIENT DOES NOT DRINK
HOW OFTEN DO YOU HAVE A DRINK CONTAINING ALCOHOL: NEVER

## 2024-04-30 ASSESSMENT — PAIN - FUNCTIONAL ASSESSMENT: PAIN_FUNCTIONAL_ASSESSMENT: 0-10

## 2024-04-30 ASSESSMENT — PAIN DESCRIPTION - ORIENTATION: ORIENTATION: LEFT

## 2024-04-30 NOTE — CONSULTS
Department of Neurosurgery                                            Nurse Practitioner Consult Note      Reason for Consult:  numbness in perineum, LLE weakness reports worse in last three says with new bowel and bladder incontinence  Requesting Physician:  Tammy   Neurosurgeon:   [x] Dr. Smith  [] Dr. Garrison  [] Dr. Gómez  [] Dr. Garcia    Neurosurgery notified of consult at:1912  Neurosurgery evaluation begun: 1935    History Obtained From:  patient, electronic medical record    CHIEF COMPLAINT:         Chief Complaint   Patient presents with    Extremity Weakness    Hip Pain    Back Pain    Incontinence     Of stool        HISTORY OF PRESENT ILLNESS:       The patient is a 49 y.o. female presents with low back pain with radiculopathy LLE with reports of new bowel and bladder symptoms. Patient has  extensive neurosurgical and pain management history including chronic back pain s/p lifet bilateral L3/L4 laminectomy microdissection decompression in 6/2022 complicated by epidural hematoma s/p evacuation with Dr. Michaels, L4/5 and L5/S1 discectomy in 2017, L5-S posterior lumbar interbody fusion in 2019, and L4/L5 TLIF,  instrumentation at L5/S1, L4 decompression, and L5 removal and reinsertion of hardware in 2021 with Dr. Womack. Patient was last seen by [facility] service 1/21/24 when she presented with similar complaints  MRI was obtained and independently reviewed by Dr Garcia at the time it was determined that patient did not require intervention or further workup.   On this visit patient reports she has little to no movement of left leg, reports that her left ankle is broken and request that there be no examination of the foot and ankle. States that over the last three days that she has had several episodes of bowel and bladder incontinence, unable to give detail of frequency and when.   Patient exam of LLE is very effort limiting with 4-/5 iliopsoas 0/5 quadriceps, DP/DF and EHL and reports  Reported on 4/30/2024    Luis Grady MD   albuterol sulfate HFA (PROVENTIL;VENTOLIN;PROAIR) 108 (90 Base) MCG/ACT inhaler Inhale 2 puffs into the lungs every 6 hours as needed for Wheezing    Luis Grady MD   ADVAIR DISKUS 250-50 MCG/ACT AEPB diskus inhaler inhale 1 puff by mouth and INTO THE LUNGS twice a day 9/12/22   Luis Grady MD   naloxone 4 MG/0.1ML LIQD nasal spray 1 spray by Nasal route as needed for Opioid Reversal 7/11/22   Charlene Mckeon, JERROD - CNP   Potassium 75 MG TABS Take by mouth    Luis Grady MD   Calcium Carb-Cholecalciferol (CALCIUM 1000 + D PO) Take 600 mg by mouth daily 10/16/19   Luis Grady MD   vitamin B-12 (CYANOCOBALAMIN) 100 MCG tablet Take 0.5 tablets by mouth daily    Luis Grady MD   Biotin 1000 MCG TABS Take by mouth 2 times daily    Luis Grady MD   Misc. Devices (RAISED TOILET SEAT/LOCK & ARMS) MISC 1 Piece by Does not apply route as needed (PRN) 1/23/19   Tone Womack MD   ferrous sulfate 325 (65 Fe) MG tablet Take 65 mg by mouth daily (with breakfast)  Patient not taking: Reported on 4/30/2024    Luis Grady MD   vitamin C (ASCORBIC ACID) 500 MG tablet Take 1 tablet by mouth daily    Luis Grady MD   ondansetron (ZOFRAN) 4 MG tablet Take 1 tablet by mouth every 8 hours as needed for Nausea or Vomiting    Luis Grady MD   amLODIPine (NORVASC) 5 MG tablet Take 1 tablet by mouth daily 12/22/18   Luis Grady MD   Omeprazole (PRILOSEC PO) Take 40 mg by mouth    Luis Grady MD       Current Medications:   Current Facility-Administered Medications: orphenadrine (NORFLEX) injection 60 mg, 60 mg, IntraMUSCular, Once  lidocaine 4 % external patch 1 patch, 1 patch, TransDERmal, Once    REVIEW OF SYSTEMS:       CONSTITUTIONAL: negative for fatigue and malaise   EYES: negative for double vision and photophobia    HEENT: negative for tinnitus and sore throat   RESPIRATORY:  negative for cough, shortness of breath   CARDIOVASCULAR: negative for chest pain, palpitations   GASTROINTESTINAL: negative for nausea, vomiting   GENITOURINARY: negative for incontinence   MUSCULOSKELETAL: negative for neck pain positive  back pain   NEUROLOGICAL: negative for seizures   PSYCHIATRIC: negative for agitated     Review of systems otherwise negative.    PHYSICAL EXAM:       BP (!) 176/137   Pulse 100   Temp 98.5 °F (36.9 °C) (Oral)   Resp 20   Wt 104.3 kg (230 lb)   SpO2 98%   BMI 37.12 kg/m²       CONSTITUTIONAL: no apparent distress, well developed, well nourished, alert and oriented x 3. No dysarthria, no aphasia. EOMI.    HEAD: normocephalic, atraumatic   EYES: PERRLA, EOMI, no lateral or horizontal nystagmus bilaterally    ENT: moist mucous membranes, uvula midline   NECK: supple, symmetric, no midline tenderness to palpation   BACK: without midline tenderness, step-offs or deformities   LUNGS: Respirations easy and non labored    CARDIOVASCULAR: regular rate and rhythm- on monitor    ABDOMEN: Soft, non-tender, non-distended    NEUROLOGIC:  EYE OPENING     Spontaneous - 4 [x]       To voice - 3 []       To pain - 2 []       None - 1 []    VERBAL RESPONSE     Appropriate, oriented - 5 [x]       Dazed or confused - 4 []       Syllables, expletives - 3 []       Grunts - 2 []       None - 1 []    MOTOR RESPONSE     Spontaneous, command - 6 [x]       Localizes pain - 5 []       Withdraws pain - 4 []       Abnormal flexion - 3 []       Abnormal extension - 2 []       None - 1 []            Total GCS: 15              Cranial Nerves:    Grossly intact        Motor Exam:  L deltoid 5/5; R deltoid 5/5  L biceps 5/5; R biceps 5/5  L triceps 5/5; R triceps 5/5  L wrist extension 5/5; R wrist extension 5/5  L intrinsics 5/5; R intrinsics 5/5      L iliopsoas 4/5 , R iliopsoas 5/5  L quadriceps 0/5; R quadriceps 5/5  L Dorsiflexion 0/5; R dorsiflexion 5/5  L Plantarflexion 0/5; R plantarflexion 5/5  L

## 2024-04-30 NOTE — ED PROVIDER NOTES
[facility] ED  Emergency Department Encounter  Emergency Medicine Resident     Pt Name:Layla Godfrey  MRN: [id number]  Birthdate 1975  Date of evaluation: 4/30/24  PCP:  Jeniffer Sevilla APRN - NP  Note Started: 6:55 PM EDT    CHIEF COMPLAINT       Chief Complaint   Patient presents with    Extremity Weakness    Hip Pain    Back Pain    Incontinence     Of stool      HISTORY OF PRESENT ILLNESS  (Location/Symptom, Timing/Onset, Context/Setting, Quality, Duration, Modifying Factors, Severity.)      Layla Godfrey is a 49 y.o. female who presents with complaint of acute on chronic lower back pain.  Patient states that over the past 3 days that the pain has been progressively worsening.  She is complaining of new weakness in the left lower extremity.  Also complaining of numbness throughout the left lower extremity including in the perineal region.  She states that she has been having episodes of both urinary and bladder incontinence new in the past 3 days.    Patient does have extensive back history and is followed by . see summary of prior NS intervention.     Dr. Michaels:  06/17/2022 - LEFT BILATERAL L 3-4 LAMINECTOMY MICRODISSECTION DECOMPRESSION     06/21/2022 - REMOVAL OF L 3-4 EPIDURAL HEMATOMA     Dr Womack:  11/27/2017 - L4-5, L5-S1 DISKECTOMY.     01/15/2019  - L5-S1 POSTERIOR LUMBAR INTERBODY FUSION.     03/11/2021 - L4-5 TLIF INSTRUMENTATION AT L5-S1 DECOMPRESSION AT L4, L5 REMOVAL AND REINSERTION OF HARDWARE AT L5-S1.    PAST MEDICAL / SURGICAL / SOCIAL / FAMILY HISTORY      has a past medical history of Arthritis, Asthma, Cerebral artery occlusion with cerebral infarction (HCC), Chronic back pain, Hypertension, Lumbar stenosis, Migraines, Seizures (HCC), Thyroid disease, Urinary incontinence, and Uterine anomaly.     has a past surgical history that includes Spine surgery; knee surgery; Tubal ligation (Bilateral, 2002); Anterior cruciate ligament repair (Left); Nerve  to get a new Pain management physician, and dealing with chronic pain unable to provide Rx through the emergency department. Offered Toradol prior to discharge. Patient declined. Did give morphine due to concerns for cauda equina. Discussed patient will be called if blood cultures return positive.  [CP]      ED Course User Index  [CP] Arnav Munoz MD       PROCEDURES:  none    CONSULTS:  IP CONSULT TO IV TEAM  IP CONSULT TO NEUROSURGERY    CRITICAL CARE:  There was significant risk of life threatening deterioration of patient's condition requiring my direct management. Critical care time 15 minutes, excluding any documented procedures.    FINAL IMPRESSION      1. Acute exacerbation of chronic low back pain        DISPOSITION / PLAN     DISPOSITION Decision To Discharge 05/01/2024 12:55:15 AM      PATIENT REFERRED TO:  [facility]  [address], [place] [address] [phone]  Schedule an appointment as soon as possible for a visit in 1 week  re-evaluation      DISCHARGE MEDICATIONS:  Discharge Medication List as of 5/1/2024  1:00 AM          Arnav Munoz MD  Emergency Medicine Resident    (Please note that portions of thisnote were completed with a voice recognition program.  Efforts were made to edit the dictations but occasionally words are mis-transcribed.)

## 2024-05-01 ASSESSMENT — ENCOUNTER SYMPTOMS
VOMITING: 0
NAUSEA: 0
BACK PAIN: 1
ABDOMINAL PAIN: 0

## 2024-05-01 NOTE — ED PROVIDER NOTES
[facility]   Emergency Department  Emergency Medicine Attending Sign-out   Note started: 11:27 PM EDT    Care of Layla Godfrey was assumed from previous attending Dr. Zacarias at 11 PM and is being seen for Extremity Weakness, Hip Pain, Back Pain, and Incontinence (Of stool )  .  The patient's initial evaluation and plan have been discussed with the prior provider who initially evaluated the patient.     Attestation  I was available and discussed any additional care issues that arose and coordinated the management plans with the resident(s) caring for the patient during my duty period. Any areas of disagreement with resident's documentation of care or procedures are noted on the chart. I was personally present for the key portions of any/all procedures, during my duty period. I have documented in the chart those procedures where I was not present during the key portions.     BRIEF PATIENT SUMMARY/MDM COURSE PER INITIAL PROVIDER:   RECENT VITALS:     Temp: 98.5 °F (36.9 °C),  Pulse: 79, Respirations: 17, BP: (!) 161/133, SpO2: 96 %    This patient is a 49 y.o. Female with lower back pain and found to have abscess at L5-S1.  Neurosurgery evaluated and and recommended blood cultures ESR and CRP.  Imaging is likely due to seroma.      DIAGNOSTICS/MEDICATIONS:     MEDICATIONS GIVEN:  ED Medication Orders (From admission, onward)      Start Ordered     Status Ordering Provider    04/30/24 2300 04/30/24 2254  famotidine (PEPCID) 20 mg in sodium chloride (PF) 0.9 % 10 mL injection  NOW         Last MAR action: Given - by DUANE, SAVANNAH on 04/30/24 at 2256 DAWN ARCHER    04/30/24 2300 04/30/24 2254  diphenhydrAMINE (BENADRYL) injection 50 mg  ONCE         Last MAR action: Given - by DUANE, SAVANNAH on 04/30/24 at 2257 DAWN ARCHER    04/30/24 2233 04/30/24 2233  sodium chloride flush 0.9 % injection 10 mL  PRN         Acknowledged DAWN ARCHER    04/30/24 2233 04/30/24 2233   normally. SOFT TISSUES: No paraspinal mass identified. L1-L2: There is no significant disc herniation, spinal canal stenosis or neural foraminal narrowing. L2-L3: There is no significant disc herniation, spinal canal stenosis or neural foraminal narrowing. L3-L4: Disc bulge causes mild thecal sac, mild right foraminal and moderate left foraminal stenosis. L4-L5: Unchanged chronic left paracentral caudally migrated disc extrusion mildly narrowing the left lateral recess. L5-S1: There is no significant disc herniation, spinal canal stenosis or neural foraminal narrowing.     No cauda equina compression. Chronic L3-L4 moderate left foraminal stenosis.       OUTSTANDING TASKS / ADDITIONAL COMMENTS   ESR and CRP normal.  Okay for discharge, with close follow-up    Autumn Gillespie MD  Emergency Medicine Attending  [facility]       Autumn Gillespie MD  05/01/24 0049

## 2024-05-01 NOTE — DISCHARGE INSTRUCTIONS
Call today or tomorrow to schedule follow up with your Neurosurgery team given your acute on chronic back pain. Please also call your pain management to establish a follow up appointment.     Use an ice pack or bag filled with ice and apply to the injured area 3 - 4 times a day for 15 - 20 minutes each time.  If the injury is older than 3 days, then use a heating pad to help relax the muscles in your back.    Continue your current pain regime as previously recommended by your PCP, Neurosurgeon, and Primary care physician.    Return to the Emergency Department for inability to move legs, worsening of pain, tingling / loss of sensation, any other care or concern.

## 2024-05-01 NOTE — ED PROVIDER NOTES
[facility] ED  eMERGENCY dEPARTMENT eNCOUnter   Attending Attestation     Pt Name: Layla Godfrey  MRN: [id number]  Birthdate 1975  Date of evaluation: 4/30/24       Layla Godfrey is a 49 y.o. female who presents with Extremity Weakness, Hip Pain, Back Pain, and Incontinence (Of stool )      8:52 PM EDT      History: Patient presents with left leg weakness, numbness, labial numbness, back pain, incontinence of urine and stool over the last 4 to 5 days.    Exam: Heart rate and rhythm are regular.  Lungs are clear to auscultation bilaterally.  Abdomen is soft, nontender.  Patient is awake and alert and acting appropriately.    Patient has decreased sensation in left lower extremity weakness.    Plan for MRI given symptoms, pain control, reevaluation.      I performed a history and physical examination of the patient and discussed management with the resident. I reviewed the resident’s note and agree with the documented findings and plan of care. Any areas of disagreement are noted on the chart. I was personally present for the key portions of any procedures. I have documented in the chart those procedures where I was not present during the key portions. I have personally reviewed all images and agree with the resident's interpretation. I have reviewed the emergency nurses triage note. I agree with the chief complaint, past medical history, past surgical history, allergies, medications, social and family history as documented unless otherwise noted below. Documentation of the HPI, Physical Exam and Medical Decision Making performed by medical students or scribes is based on my personal performance of the HPI, PE and MDM. For Phys Assistant/ Nurse Practitioner cases/documentation I have had a face to face evaluation of this patient and have completed at least one if not all key elements of the E/M (history, physical exam, and MDM). Additional findings are as noted.    For APC cases I have

## 2024-05-05 LAB
MICROORGANISM SPEC CULT: NORMAL
MICROORGANISM SPEC CULT: NORMAL
SERVICE CMNT-IMP: NORMAL
SERVICE CMNT-IMP: NORMAL
SPECIMEN DESCRIPTION: NORMAL
SPECIMEN DESCRIPTION: NORMAL

## 2024-06-19 ENCOUNTER — HOSPITAL ENCOUNTER (OUTPATIENT)
Dept: PAIN MANAGEMENT | Age: 49
Discharge: HOME | End: 2024-06-19
Payer: MEDICAID

## 2024-06-19 VITALS — HEIGHT: 66 IN | BODY MASS INDEX: 35.36 KG/M2 | WEIGHT: 220 LBS

## 2024-06-19 DIAGNOSIS — M96.1 LUMBAR POST-LAMINECTOMY SYNDROME: ICD-10-CM

## 2024-06-19 DIAGNOSIS — M54.16 LUMBAR RADICULOPATHY: Primary | ICD-10-CM

## 2024-06-19 DIAGNOSIS — E66.9 CLASS 2 OBESITY WITH BODY MASS INDEX (BMI) OF 35.0 TO 35.9 IN ADULT, UNSPECIFIED OBESITY TYPE, UNSPECIFIED WHETHER SERIOUS COMORBIDITY PRESENT: ICD-10-CM

## 2024-06-19 DIAGNOSIS — M51.36 LUMBAR DEGENERATIVE DISC DISEASE: ICD-10-CM

## 2024-06-19 PROBLEM — E66.812 CLASS 2 OBESITY WITH BODY MASS INDEX (BMI) OF 35.0 TO 35.9 IN ADULT: Status: ACTIVE | Noted: 2024-06-19

## 2024-06-19 PROCEDURE — 99204 OFFICE O/P NEW MOD 45 MIN: CPT | Performed by: STUDENT IN AN ORGANIZED HEALTH CARE EDUCATION/TRAINING PROGRAM

## 2024-06-19 PROCEDURE — 99203 OFFICE O/P NEW LOW 30 MIN: CPT

## 2024-06-19 NOTE — PROGRESS NOTES
history of previous lumbar spine surgery.  She has been weaned off opioids through 2 other pain management clinics.    The patient's history and physical examination are consistent with lumbosacral spondylosis and lumbar radiculopathy.     Neurologically, it appears the patient has full strength and normal sensation. There is no evidence of myelopathy on examination.  Red flags noted below.    Patient has been weaned off pain medications by 2 different pain management practices including 1 through [facility] in [place], [place] as well as through comprehensive pain management.  The patient was weaned off opioid medications due to noncompliance with her plan of care.  The patient refused a multi-modal approach including physical therapy, injections and other options including neuromodulation.  Therefore, I feel very uncomfortable prescribing opioids to a noncompliant patient and a patient who is not willing to undergo a multimodal approach for her chronic pain needs as it is inappropriate to just be on opioids without exhausting other options for chronic pain.    PLAN:  Medications:    For nonopioid therapy, the following medications were prescribed:    -Continue medication prescribed by primary care physician    Opioid therapy:  -Non-opioid care plan    Interventions:  -Consider peripheral nerve stimulator devices in future    Imaging:  -Reviewed lumbar MRI with patient in room    Behavioral Therapies:  -Continue daily stretching and home exercise program    Referrals:  -Offered physical therapy, patient deferred  -Patient states she is seeing a surgeon in the near future    Follow-up Plan:  -As needed    Patient was offered intervention where appropriate.     Multi-modal Pain Therapy:  The patient was explicitly considered for multimodal and interdisciplinary therapy. Non-opioid and non-pharmacological opportunities to enhance analgesia and quality of life have been and will continue to be pursued.    Gustavo

## 2024-07-05 NOTE — PROGRESS NOTES
Patient Name: Layla Godfrey : 1975        Date: 2024      Type of Appt: Follow up    Reason for appt: per PT pain managment review MRI done at [facility]     Pt last seen by Dr Michaels on 3/28/2024    Studies done: 2024 - MRI LUMBAR SPINE W CONTRAST @ [facility]  2024 - MRI LUMBAR SPINE WO CONTRAST @ [facility]    Surgeries: Dr. Michaels:  2022 - LEFT BILATERAL L 3-4 LAMINECTOMY MICRODISSECTION DECOMPRESSION     2022 - REMOVAL OF L 3-4 EPIDURAL HEMATOMA     Dr Womack:  2017 - L4-5, L5-S1 DISKECTOMY.     01/15/2019  - L5-S1 POSTERIOR LUMBAR INTERBODY FUSION.     2021 - L4-5 TLIF INSTRUMENTATION AT L5-S1 DECOMPRESSION AT L4, L5 REMOVAL AND REINSERTION OF HARDWARE AT L5-S1.    Conservative Tx tried :   Pain Management: Dr Gustavo Mo    Smoking: Yes                        [facility]  [address] , [address]  [place], [place]  P: [phone]  F: [phone]          Patient:  Layla Godfrey  YOB: 1975  Date: 2024    The patient is a 49 y.o. female who presents today for evaluation of the following problems:     Chief Complaint   Patient presents with    Back Pain        Referred by No ref. provider found      PAST MEDICAL, FAMILY AND SOCIAL HISTORY:  Past Medical History:   Diagnosis Date    Arthritis     spine    Asthma     since childhood -- smokes x 30 yrs    Cerebral artery occlusion with cerebral infarction (HCC)     per CT scan -- patient without sx,     Chronic back pain     Hypertension     meds > 3 yrs    Lumbar stenosis     Migraines     starts with neck pain    Seizures (HCC)     pt last known seizure May 2022, dx 20 years ago, takes gabapentin for back/seizures    Thyroid disease     meds > 4 yrs -- intermittent    Urinary incontinence     Uterine anomaly      Past Surgical History:   Procedure Laterality Date    ANTERIOR CRUCIATE LIGAMENT REPAIR Left     APPENDECTOMY      at age

## 2024-07-11 ENCOUNTER — OFFICE VISIT (OUTPATIENT)
Dept: NEUROSURGERY | Age: 49
End: 2024-07-11
Payer: MEDICAID

## 2024-07-11 VITALS — TEMPERATURE: 97.6 F | WEIGHT: 220 LBS | HEIGHT: 66 IN | BODY MASS INDEX: 35.36 KG/M2

## 2024-07-11 DIAGNOSIS — M54.42 CHRONIC BILATERAL LOW BACK PAIN WITH LEFT-SIDED SCIATICA: ICD-10-CM

## 2024-07-11 DIAGNOSIS — M96.1 POSTLAMINECTOMY SYNDROME OF LUMBAR REGION: ICD-10-CM

## 2024-07-11 DIAGNOSIS — Z98.1 HISTORY OF LUMBAR FUSION: ICD-10-CM

## 2024-07-11 DIAGNOSIS — M25.552 LEFT HIP PAIN: ICD-10-CM

## 2024-07-11 DIAGNOSIS — G89.29 CHRONIC BILATERAL LOW BACK PAIN WITH LEFT-SIDED SCIATICA: ICD-10-CM

## 2024-07-11 DIAGNOSIS — G89.4 CHRONIC PAIN SYNDROME: ICD-10-CM

## 2024-07-11 DIAGNOSIS — M21.372 LEFT FOOT DROP: ICD-10-CM

## 2024-07-11 DIAGNOSIS — M79.672 LEFT FOOT PAIN: ICD-10-CM

## 2024-07-11 DIAGNOSIS — F17.200 SMOKER: Primary | ICD-10-CM

## 2024-07-11 PROCEDURE — 99213 OFFICE O/P EST LOW 20 MIN: CPT | Performed by: NEUROLOGICAL SURGERY

## 2024-07-11 ASSESSMENT — ENCOUNTER SYMPTOMS
SHORTNESS OF BREATH: 0
CONSTIPATION: 0
NAUSEA: 0
EYE PAIN: 0
BACK PAIN: 1

## 2024-09-03 NOTE — PROGRESS NOTES
Patient Name: Layla Godfrey : 1975        Date: 2024      Type of Appt: Follow up    Reason for appt: Follow up     Pt last seen by Dr Michaels on 24    Surgeries:  Dr. Michaels:  2022 - LEFT BILATERAL L 3-4 LAMINECTOMY MICRODISSECTION DECOMPRESSION     2022 - REMOVAL OF L 3-4 EPIDURAL HEMATOMA     Dr Womack:  2017 - L4-5, L5-S1 DISKECTOMY.     01/15/2019  - L5-S1 POSTERIOR LUMBAR INTERBODY FUSION.     2021 - L4-5 TLIF INSTRUMENTATION AT L5-S1 DECOMPRESSION AT L4, L5 REMOVAL AND REINSERTION OF HARDWARE AT L5-S1.       Smoking:    Smoking status: Every Day       Current packs/day: 0.50       Average packs/day: 0.5 packs/day for 30.0 years (15.0 ttl pk-yrs)       Types: Cigarettes    Smokeless tobacco: Never   Vaping Use    Vaping Use: Never used   Substance Use Topics    Alcohol use: No       Alcohol/week: 0.0 standard drinks of alcohol    Drug use: No            The patient is a 49 y.o. female who presents today for evaluation of the following problems:          Chief Complaint   Patient presents with    Back Pain         Referred by No ref. provider found        PAST MEDICAL, FAMILY AND SOCIAL HISTORY:  Past Medical History        Past Medical History:   Diagnosis Date    Arthritis       spine    Asthma       since childhood -- smokes x 30 yrs    Cerebral artery occlusion with cerebral infarction (HCC)       per CT scan -- patient without sx,     Chronic back pain      Hypertension       meds > 3 yrs    Lumbar stenosis      Migraines       starts with neck pain    Seizures (HCC)       pt last known seizure May 2022, dx 20 years ago, takes gabapentin for back/seizures    Thyroid disease       meds > 4 yrs -- intermittent    Urinary incontinence      Uterine anomaly           Past Surgical History         Past Surgical History:   Procedure Laterality Date    ANTERIOR CRUCIATE LIGAMENT REPAIR Left      APPENDECTOMY         at age 20     BACK SURGERY   2017     lumbar disc

## 2024-09-05 ENCOUNTER — OFFICE VISIT (OUTPATIENT)
Age: 49
End: 2024-09-05
Payer: MEDICAID

## 2024-09-05 VITALS — BODY MASS INDEX: 35.36 KG/M2 | WEIGHT: 220 LBS | HEIGHT: 66 IN | TEMPERATURE: 97.2 F

## 2024-09-05 DIAGNOSIS — G89.4 CHRONIC PAIN SYNDROME: ICD-10-CM

## 2024-09-05 DIAGNOSIS — M79.672 LEFT FOOT PAIN: ICD-10-CM

## 2024-09-05 DIAGNOSIS — M54.16 LUMBAR RADICULOPATHY: Primary | ICD-10-CM

## 2024-09-05 DIAGNOSIS — Z98.1 HISTORY OF LUMBAR FUSION: ICD-10-CM

## 2024-09-05 DIAGNOSIS — M96.1 POSTLAMINECTOMY SYNDROME OF LUMBAR REGION: ICD-10-CM

## 2024-09-05 DIAGNOSIS — M25.552 LEFT HIP PAIN: ICD-10-CM

## 2024-09-05 PROCEDURE — 99213 OFFICE O/P EST LOW 20 MIN: CPT | Performed by: NEUROLOGICAL SURGERY

## 2024-09-05 PROCEDURE — 99214 OFFICE O/P EST MOD 30 MIN: CPT

## 2024-10-03 ENCOUNTER — HOSPITAL ENCOUNTER (OUTPATIENT)
Dept: PAIN MANAGEMENT | Age: 49
Discharge: HOME | End: 2024-10-03
Payer: MEDICAID

## 2024-10-03 VITALS — WEIGHT: 220 LBS | HEIGHT: 66 IN | BODY MASS INDEX: 35.36 KG/M2

## 2024-10-03 DIAGNOSIS — G89.4 CHRONIC PAIN SYNDROME: Primary | ICD-10-CM

## 2024-10-03 DIAGNOSIS — M96.1 POSTLAMINECTOMY SYNDROME OF LUMBAR REGION: ICD-10-CM

## 2024-10-03 PROCEDURE — 99213 OFFICE O/P EST LOW 20 MIN: CPT

## 2024-10-03 PROCEDURE — 99212 OFFICE O/P EST SF 10 MIN: CPT | Performed by: ANESTHESIOLOGY

## 2024-10-03 ASSESSMENT — ENCOUNTER SYMPTOMS
BACK PAIN: 1
RESPIRATORY NEGATIVE: 1
EYES NEGATIVE: 1

## 2024-10-03 ASSESSMENT — PAIN SCALES - GENERAL: PAINLEVEL_OUTOF10: 8

## 2024-10-03 NOTE — PROGRESS NOTES
The patient is a 49 y.o. /  female.    Chief Complaint   Patient presents with    Back Pain        HPI  49-year-old female with history of chronic low back and bilateral lower extremity pain  Reports multilevel lumbar spine fusion history  Pain is chronic going on for many years  Has seen several pain clinics in past  Was evaluated by my partner Dr. Mo here about 3 months ago  I reviewed his note, patient requested opioid management, she was not considered appropriate candidate considering discharged from several other pain clinics    Today she is here for follow-up with me  Pain is unchanged    Explained to patient that I will not be able to overturn the decision and start her on opioid management    Discussed with patient to consider interventional pain procedures for pain control and to wean off opioids  Advised to follow-up with Dr. Mo for interventional procedures      Patient is here today for: back   Pain level: 8/9  Character:  aching, burning, cramping, shooting, shooting   Radiating: Left leg   Weakness or numbness: both legs   Aggravating Factors:  ADLs  Alleviating Factors:  Oxycodone & tens unit   Constant or intermitting: constant   Bladder/bowel loss: sometimes       Past Medical History:   Diagnosis Date    Arthritis     spine    Asthma     since childhood -- smokes x 30 yrs    Cerebral artery occlusion with cerebral infarction (HCC)     per CT scan -- patient without sx, 2021    Chronic back pain     Hypertension     meds > 3 yrs    Lumbar stenosis     Migraines     starts with neck pain    Seizures (HCC)     pt last known seizure May 2022, dx 20 years ago, takes gabapentin for back/seizures    Thyroid disease     meds > 4 yrs -- intermittent    Urinary incontinence     Uterine anomaly         Past Surgical History:   Procedure Laterality Date    ANTERIOR CRUCIATE LIGAMENT REPAIR Left     APPENDECTOMY      at age 20     BACK SURGERY  2017    lumbar disc     BACK SURGERY

## 2024-10-30 ENCOUNTER — TELEPHONE (OUTPATIENT)
Dept: PAIN MANAGEMENT | Age: 49
End: 2024-10-30

## 2024-10-30 NOTE — TELEPHONE ENCOUNTER
Ingrid from Dr Sevilla's office is requesting patients office notes from 10/3/24 from Dr Katz to be faxed at the earliest convenience to [phone].    Thank you.

## 2024-11-13 ENCOUNTER — TELEPHONE (OUTPATIENT)
Age: 49
End: 2024-11-13

## 2024-11-13 NOTE — TELEPHONE ENCOUNTER
I called patient today at [phone] and left message for her to call back and ask for me. Asked that she let call center know of good time to return her call.

## 2025-03-13 NOTE — PROGRESS NOTES
Patient Name: Layla Godfrey : 1975        Date: 3/25/2025      Type of Appt: Follow up    Reason for appt: increased back pain. had Xray, CT, and MRI lumbar done at [facility]. PT states she will bring dis for all.     Pt last seen by  Dr. Michaels 2024    Studies done: 2025 XR Lumbar Spine at [facility]   Patient states XR/CT/MRI Lumbar done at [facility] and will bring imaging to appt    Dr Womack:  17 L4-5, L5-S1 DISKECTOMY.  1-15-19  L5-S1 POSTERIOR LUMBAR INTERBODY FUSION.  3-11-21 L4-5 TLIF INSTRUMENTATION AT L5-S1 DECOMPRESSION AT L4, L5 REMOVAL AND REINSERTION OF HARDWARE AT L5-S1.     Dr. Michaels:  22 - LEFT BILATERAL L 3-4 LAMINECTOMY MICRODISSECTION DECOMPRESSION  22 - REMOVAL OF L 3-4 EPIDURAL HEMATOMA    Conservative Treatments (Have you ever tried any)  Physical Therapy in the last 6 months to a year: No  NSAID's: Yes  Narcotics: Yes  Muscle relaxants: Yes   Epidural injections: Yes    Any Blood Thinners: [] Yes   [x]  No        [] Aspirin   [] Eliquis   [] Xarelto   [] Pletal   [] Plavix    [] Warfarin   [] Coumadin      Diabetic:  [] Yes   [x] No   If yes, prescribed insulin:  [] Yes   [x]  No     Weight loss medications:   [] Yes  [x] No     [] Ozempic   [] Wegovy    [] Trulicity    [] Mounjaro         Smoking : [] Yes   [x]  No patient has quit smoking            Chief Complaint   Patient presents with    Back Pain         Referred by No ref. provider found        PAST MEDICAL, FAMILY AND SOCIAL HISTORY:  Past Medical History           Past Medical History:   Diagnosis Date    Arthritis       spine    Asthma       since childhood -- smokes x 30 yrs    Cerebral artery occlusion with cerebral infarction (HCC)       per CT scan -- patient without sx,     Chronic back pain      Hypertension       meds > 3 yrs    Lumbar stenosis      Migraines       starts with neck pain    Seizures (HCC)       pt last known seizure May 2022, dx 20 years ago, takes

## 2025-03-25 ENCOUNTER — OFFICE VISIT (OUTPATIENT)
Age: 50
End: 2025-03-25
Payer: MEDICAID

## 2025-03-25 VITALS — BODY MASS INDEX: 35.36 KG/M2 | RESPIRATION RATE: 18 BRPM | HEIGHT: 66 IN | WEIGHT: 220 LBS

## 2025-03-25 DIAGNOSIS — Z98.1 HISTORY OF LUMBAR FUSION: ICD-10-CM

## 2025-03-25 DIAGNOSIS — M96.1 POSTLAMINECTOMY SYNDROME OF LUMBAR REGION: ICD-10-CM

## 2025-03-25 DIAGNOSIS — F17.200 SMOKER: Primary | ICD-10-CM

## 2025-03-25 DIAGNOSIS — G89.4 CHRONIC PAIN SYNDROME: ICD-10-CM

## 2025-03-25 PROCEDURE — 99214 OFFICE O/P EST MOD 30 MIN: CPT

## 2025-03-25 PROCEDURE — 99213 OFFICE O/P EST LOW 20 MIN: CPT | Performed by: NEUROLOGICAL SURGERY

## (undated) DEVICE — GLOVE SURG SZ 8 L12IN FNGR THK94MIL TRNSLUC YEL LTX HYDRGEL

## (undated) DEVICE — 4-PORT MANIFOLD: Brand: NEPTUNE 2

## (undated) DEVICE — SYRINGE MED 10ML LUERLOCK TIP W/O SFTY DISP

## (undated) DEVICE — PACK,LAPAROTOMY,NO GOWNS: Brand: MEDLINE

## (undated) DEVICE — GLOVE ORANGE PI 8   MSG9080

## (undated) DEVICE — 3.0MM PRECISION NEURO (MATCH HEAD)

## (undated) DEVICE — CRADLE POS 3X5X24IN RASPBERRY ARM PRONE FOAM DISP

## (undated) DEVICE — PAD,NON-ADHERENT,3X8,STERILE,LF,1/PK: Brand: MEDLINE

## (undated) DEVICE — SUTURE VCRL SZ 0 L36IN ABSRB VLT L36MM CT-1 1/2 CIR J346H

## (undated) DEVICE — DIFFUSER: Brand: CORE, MAESTRO

## (undated) DEVICE — Z CONVERTED USE 2271043 CONTAINER SPEC COLL 4OZ SCR ON LID PEEL PCH

## (undated) DEVICE — MONITORING NEURO W INTERPRETATION SYS PRICING

## (undated) DEVICE — ELECTRODE BLDE L4IN NONINSULATED EDGE

## (undated) DEVICE — SPONGE,NEURO,1"X3",XR,STRL,LF,10/PK: Brand: MEDLINE

## (undated) DEVICE — 1010 S-DRAPE TOWEL DRAPE 10/BX: Brand: STERI-DRAPE™

## (undated) DEVICE — GAUZE,SPONGE,4"X4",16PLY,XRAY,STRL,LF: Brand: MEDLINE

## (undated) DEVICE — GLOVE ORANGE PI 7 1/2   MSG9075

## (undated) DEVICE — GLOVE SURG SZ 8 L12IN FNGR THK83MIL CRM POLYISOPRENE

## (undated) DEVICE — GOWN,AURORA,NONREINFORCED,LARGE: Brand: MEDLINE

## (undated) DEVICE — CS ELITE PROCESSING KIT (125ML): Brand: HAEMONETICS CELL SAVER ELITE SYSTEM

## (undated) DEVICE — CATHETER IV 14 GAX2 IN STR INTROCAN SAFETY

## (undated) DEVICE — MARKER SURG SKIN GENTIAN VLT REG TIP W/ 6IN RUL

## (undated) DEVICE — DRAPE EQUIP TRNSPRT CONTAINMENT FOR BK TAB

## (undated) DEVICE — COUNTER NDL 40 COUNT HLD 70 FOAM BLK ADH W/ MAG

## (undated) DEVICE — SYRINGE MED 30ML STD CLR PLAS LUERLOCK TIP N CTRL DISP

## (undated) DEVICE — 3M™ STERI-DRAPE™ INSTRUMENT POUCH 1018: Brand: STERI-DRAPE™

## (undated) DEVICE — SUTURE VCRL SZ 4-0 L18IN ABSRB UD L19MM PS-2 3/8 CIR PRIM J496H

## (undated) DEVICE — INTENDED FOR TISSUE SEPARATION, AND OTHER PROCEDURES THAT REQUIRE A SHARP SURGICAL BLADE TO PUNCTURE OR CUT.: Brand: BARD-PARKER ® CARBON RIB-BACK BLADES

## (undated) DEVICE — Device

## (undated) DEVICE — ELECTRODE PT RET AD L9FT HI MOIST COND ADH HYDRGEL CORDED

## (undated) DEVICE — LABEL MED MINI W/ MARKER

## (undated) DEVICE — APPLICATOR MEDICATED 26 CC SOLUTION HI LT ORNG CHLORAPREP

## (undated) DEVICE — SUTURE VCRL SZ 3-0 L18IN ABSRB UD PS-2 L19MM 3/8 CRV PRIM J497H

## (undated) DEVICE — SUTURE VCRL SZ 3-0 L27IN ABSRB VLT CT-2 L26MM 1/2 CIR J332H

## (undated) DEVICE — PACK,SET UP,DRAPE: Brand: MEDLINE

## (undated) DEVICE — TUBING, SUCTION, 1/4" X 10', STRAIGHT: Brand: MEDLINE

## (undated) DEVICE — RESERVOIR,HARDSHELL,150 Μ_ NOTE: ADDITIONAL PACKAGING DI: 20812747016039, UNIT PACKAGING, CONTAINS (1) 10812747016032 DI:30812747016036, CARTON CONTAINS (4) 20812747016039: Brand: HAEMONETICS CELL SAVER SYSTEM

## (undated) DEVICE — SYRINGE IRRIG 60ML SFT PLIABLE BLB EZ TO GRP 1 HND USE W/

## (undated) DEVICE — OIL CARTRIDGE: Brand: CORE, MAESTRO

## (undated) DEVICE — CODMAN® SURGICAL PATTIES 1/2" X1 1/2" (1.27CM X 3.81CM): Brand: CODMAN®

## (undated) DEVICE — DRAPE EQUIP MICSCP 120X48 IN ANGLED GLS LENS HSNG FOR LEICA

## (undated) DEVICE — CODMAN® SURGICAL PATTIES 1/2" X 3" (1.27CM X 7.62CM): Brand: CODMAN®

## (undated) DEVICE — SUTURE VCRL SZ 0 L27IN ABSRB VLT L48MM CTX 1/2 CIR TAPR PNT J364H

## (undated) DEVICE — ELECTROSURGICAL PENCIL BUTTON SWITCH E-Z CLEAN COATED BLADE ELECTRODE 10 FT (3 M) CORD HOLSTER: Brand: MEGADYNE

## (undated) DEVICE — 3M™ IOBAN™ 2 ANTIMICROBIAL INCISE DRAPE 6650EZ: Brand: IOBAN™ 2

## (undated) DEVICE — DRAIN SURG 10FR 100% SIL RND END PERF W/ TRCR

## (undated) DEVICE — CODMAN® SURGICAL PATTIES 1/2" X 1/2" (1.27CM X 1.27CM): Brand: CODMAN®

## (undated) DEVICE — SHEET,DRAPE,53X77,STERILE: Brand: MEDLINE

## (undated) DEVICE — TRAP,MUCUS SPECIMEN, 80CC: Brand: MEDLINE

## (undated) DEVICE — CODMAN® SURGICAL PATTIES 3/4" X 3/4" (1.91CM X 1.91CM): Brand: CODMAN®

## (undated) DEVICE — APPLICATOR MEDICATED 10.5 CC SOLUTION HI LT ORNG CHLORAPREP

## (undated) DEVICE — CARBIDE MATCH HEAD

## (undated) DEVICE — COVER LT HNDL BLU PLAS

## (undated) DEVICE — KAIRISON TUBING SET PNEUMATIC, (3000 MM), STERILE, DISPOSABLE, TO BE USED WITH: FK898R, PACKAGE OF 10 PIECES: Brand: KAIRISON

## (undated) DEVICE — SUTURE VCRL SZ 0 L27IN ABSRB UD L26MM CP-2 1/2 CIR SGL J870H

## (undated) DEVICE — AGENT HEMSTAT 1GM PORCINE GEL ABSRB PWD FOR CONT OOZING

## (undated) DEVICE — INTENDED FOR TISSUE SEPARATION, AND OTHER PROCEDURES THAT REQUIRE A SHARP SURGICAL BLADE TO PUNCTURE OR CUT.: Brand: BARD-PARKER ® STAINLESS STEEL BLADES

## (undated) DEVICE — ELECTRODE ES L275IN 275IN BLDE TIP COAT PTFE TEF W EVAC

## (undated) DEVICE — SPONGE GZ W4XL4IN RAYON POLY FILL CVR W/ NONWOVEN FAB

## (undated) DEVICE — BIPOLAR IRRIGATOR INTEGRATED TUBING AND BIPOLAR CORD SET, DISPOSABLE

## (undated) DEVICE — CHLORAPREP 26ML ORANGE

## (undated) DEVICE — TOWEL,OR,DSP,ST,BLUE,STD,4/PK,20PK/CS: Brand: MEDLINE

## (undated) DEVICE — TTL1LYR 16FR10ML 100%SIL TMPST TR: Brand: MEDLINE

## (undated) DEVICE — NEURO PACK: Brand: MEDLINE INDUSTRIES, INC.

## (undated) DEVICE — Z DISCONTINUED PATTY SURG 1X1 IN COTTONOID

## (undated) DEVICE — SUTURE VCRL SZ 2-0 L36IN ABSRB VLT L36MM CT-1 1/2 CIR J345H

## (undated) DEVICE — GLOVE SURG SZ 75 L12IN FNGR THK83MIL CRM POLYISOPRENE

## (undated) DEVICE — WAX SURG 2.5GM HEMSTAT BNE BEESWAX PARAFFIN ISO PALMITATE

## (undated) DEVICE — INSTRUMENT BATTERY

## (undated) DEVICE — ASPIRATION/ANTICOAGULATION SET: Brand: HAEMONETICS CELL SAVER SYSTEM

## (undated) DEVICE — GAUZE,SPONGE,4"X4",12PLY,STERILE,LF,2'S: Brand: MEDLINE

## (undated) DEVICE — PLASMABLADE X PS210-030S-LIGHT 3.0SL: Brand: PLASMABLADE™ X

## (undated) DEVICE — SUTURE VCRL SZ 2-0 L27IN ABSRB UD L36MM CP-1 1/2 CIR REV J266H

## (undated) DEVICE — C-ARM: Brand: UNBRANDED

## (undated) DEVICE — KIT EVAC 400CC PVC RADPQ Y CONN

## (undated) DEVICE — STERILE-Z SURGICAL PATIENT COVERS CLEAR POLYETHYLENE STERILE UNIVERSAL FIT 10 PER CASE: Brand: STERILE-Z

## (undated) DEVICE — INSERT CUSHION HEAD PRONEVIEW

## (undated) DEVICE — DRAPE SURG W41XL74IN CLR FULL SZ C ARM 3 ADH POLY STRP E

## (undated) DEVICE — CONTAINER,SPECIMEN,OR STERILE,4OZ: Brand: MEDLINE

## (undated) DEVICE — NEPTUNE E-SEP SMOKE EVACUATION PENCIL, COATED, 70MM BLADE, PUSH BUTTON SWITCH: Brand: NEPTUNE E-SEP